# Patient Record
Sex: FEMALE | Race: WHITE | Employment: OTHER | URBAN - METROPOLITAN AREA
[De-identification: names, ages, dates, MRNs, and addresses within clinical notes are randomized per-mention and may not be internally consistent; named-entity substitution may affect disease eponyms.]

---

## 2017-01-13 ENCOUNTER — HOSPITAL ENCOUNTER (OUTPATIENT)
Dept: PHARMACY | Age: 82
Discharge: HOME | End: 2017-01-13
Payer: MEDICARE

## 2017-01-13 ENCOUNTER — OFFICE VISIT (OUTPATIENT)
Dept: SURGERY | Age: 82
End: 2017-01-13

## 2017-01-13 VITALS
BODY MASS INDEX: 38.64 KG/M2 | WEIGHT: 210 LBS | DIASTOLIC BLOOD PRESSURE: 68 MMHG | SYSTOLIC BLOOD PRESSURE: 130 MMHG | HEART RATE: 84 BPM | HEIGHT: 62 IN

## 2017-01-13 DIAGNOSIS — I48.91 ATRIAL FIBRILLATION, UNSPECIFIED TYPE (HCC): ICD-10-CM

## 2017-01-13 DIAGNOSIS — Z91.81 AT HIGH RISK FOR FALLS: ICD-10-CM
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
## 2017-01-13 DIAGNOSIS — E03.9 HYPOTHYROIDISM, UNSPECIFIED TYPE: ICD-10-CM

## 2017-01-13 LAB
GLUCOSE BLD-MCNC: 243 MG/DL
HBA1C MFR BLD: 7.4 %
INR BLD: 2.4
PROTIME: 28.6 SECONDS

## 2017-01-13 PROCEDURE — G8427 DOCREV CUR MEDS BY ELIG CLIN: HCPCS | Performed by: INTERNAL MEDICINE

## 2017-01-13 PROCEDURE — G8419 CALC BMI OUT NRM PARAM NOF/U: HCPCS | Performed by: INTERNAL MEDICINE

## 2017-01-13 PROCEDURE — 4040F PNEUMOC VAC/ADMIN/RCVD: CPT | Performed by: INTERNAL MEDICINE

## 2017-01-13 PROCEDURE — 82962 GLUCOSE BLOOD TEST: CPT | Performed by: INTERNAL MEDICINE

## 2017-01-13 PROCEDURE — 85610 PROTHROMBIN TIME: CPT

## 2017-01-13 PROCEDURE — G0463 HOSPITAL OUTPT CLINIC VISIT: HCPCS

## 2017-01-13 PROCEDURE — 99213 OFFICE O/P EST LOW 20 MIN: CPT | Performed by: INTERNAL MEDICINE

## 2017-01-13 PROCEDURE — 83036 HEMOGLOBIN GLYCOSYLATED A1C: CPT | Performed by: INTERNAL MEDICINE

## 2017-01-13 PROCEDURE — G8484 FLU IMMUNIZE NO ADMIN: HCPCS | Performed by: INTERNAL MEDICINE

## 2017-01-13 PROCEDURE — 1090F PRES/ABSN URINE INCON ASSESS: CPT | Performed by: INTERNAL MEDICINE

## 2017-01-13 PROCEDURE — 1036F TOBACCO NON-USER: CPT | Performed by: INTERNAL MEDICINE

## 2017-01-13 PROCEDURE — 1123F ACP DISCUSS/DSCN MKR DOCD: CPT | Performed by: INTERNAL MEDICINE

## 2017-01-13 PROCEDURE — G8399 PT W/DXA RESULTS DOCUMENT: HCPCS | Performed by: INTERNAL MEDICINE

## 2017-01-13 ASSESSMENT — ENCOUNTER SYMPTOMS: VISUAL CHANGE: 0

## 2017-01-17 ENCOUNTER — HOSPITAL ENCOUNTER (OUTPATIENT)
Dept: WOMENS IMAGING | Age: 82
Discharge: HOME | End: 2017-01-17
Payer: MEDICARE

## 2017-01-17 ENCOUNTER — OFFICE VISIT (OUTPATIENT)
Dept: INTERNAL MEDICINE | Age: 82
End: 2017-01-17

## 2017-01-17 VITALS
TEMPERATURE: 98.1 F | HEIGHT: 62 IN | WEIGHT: 210.8 LBS | DIASTOLIC BLOOD PRESSURE: 68 MMHG | HEART RATE: 71 BPM | SYSTOLIC BLOOD PRESSURE: 112 MMHG | OXYGEN SATURATION: 97 % | RESPIRATION RATE: 16 BRPM | BODY MASS INDEX: 38.79 KG/M2

## 2017-01-17 DIAGNOSIS — Z13.9 SCREENING: ICD-10-CM

## 2017-01-17 DIAGNOSIS — M25.511 ACUTE PAIN OF RIGHT SHOULDER: Primary | ICD-10-CM

## 2017-01-17 DIAGNOSIS — R30.0 DYSURIA: ICD-10-CM

## 2017-01-17 LAB
BILIRUBIN, POC: NORMAL
BLOOD URINE, POC: NORMAL
CLARITY, POC: CLEAR
COLOR, POC: YELLOW
GLUCOSE URINE, POC: NORMAL
KETONES, POC: NORMAL
LEUKOCYTE EST, POC: NORMAL
NITRITE, POC: NORMAL
PH, POC: 6
PROTEIN, POC: NORMAL
SPECIFIC GRAVITY, POC: 1.03
UROBILINOGEN, POC: NORMAL

## 2017-01-17 PROCEDURE — 1123F ACP DISCUSS/DSCN MKR DOCD: CPT | Performed by: FAMILY MEDICINE

## 2017-01-17 PROCEDURE — 99213 OFFICE O/P EST LOW 20 MIN: CPT | Performed by: FAMILY MEDICINE

## 2017-01-17 PROCEDURE — 81002 URINALYSIS NONAUTO W/O SCOPE: CPT | Performed by: FAMILY MEDICINE

## 2017-01-17 PROCEDURE — 1036F TOBACCO NON-USER: CPT | Performed by: FAMILY MEDICINE

## 2017-01-17 PROCEDURE — 1090F PRES/ABSN URINE INCON ASSESS: CPT | Performed by: FAMILY MEDICINE

## 2017-01-17 PROCEDURE — G8399 PT W/DXA RESULTS DOCUMENT: HCPCS | Performed by: FAMILY MEDICINE

## 2017-01-17 PROCEDURE — G8419 CALC BMI OUT NRM PARAM NOF/U: HCPCS | Performed by: FAMILY MEDICINE

## 2017-01-17 PROCEDURE — G0202 SCR MAMMO BI INCL CAD: HCPCS

## 2017-01-17 PROCEDURE — 4040F PNEUMOC VAC/ADMIN/RCVD: CPT | Performed by: FAMILY MEDICINE

## 2017-01-17 PROCEDURE — G8427 DOCREV CUR MEDS BY ELIG CLIN: HCPCS | Performed by: FAMILY MEDICINE

## 2017-01-17 PROCEDURE — G8484 FLU IMMUNIZE NO ADMIN: HCPCS | Performed by: FAMILY MEDICINE

## 2017-01-17 RX ORDER — HYDROCODONE BITARTRATE AND ACETAMINOPHEN 5; 325 MG/1; MG/1
1 TABLET ORAL EVERY 6 HOURS PRN
Qty: 30 TABLET | Refills: 0 | Status: SHIPPED | OUTPATIENT
Start: 2017-01-17 | End: 2017-04-04 | Stop reason: SDUPTHER

## 2017-01-18 ENCOUNTER — NURSE ONLY (OUTPATIENT)
Dept: INTERNAL MEDICINE | Age: 82
End: 2017-01-18

## 2017-01-18 DIAGNOSIS — R30.0 DYSURIA: Primary | ICD-10-CM

## 2017-01-18 DIAGNOSIS — Z79.899 ENCOUNTER FOR LONG-TERM CURRENT USE OF MEDICATION: Primary | ICD-10-CM

## 2017-01-18 LAB
AMPHETAMINE SCREEN, URINE: NORMAL
BARBITURATE SCREEN URINE: NORMAL
BENZODIAZEPINE SCREEN, URINE: NORMAL
BILIRUBIN, POC: NORMAL
BLOOD URINE, POC: NORMAL
CANNABINOID SCREEN URINE: NORMAL
CLARITY, POC: NORMAL
COCAINE METABOLITE SCREEN URINE: NORMAL
COLOR, POC: YELLOW
GLUCOSE URINE, POC: NORMAL
KETONES, POC: NORMAL
LEUKOCYTE EST, POC: NORMAL
Lab: NORMAL
NITRITE, POC: NORMAL
OPIATE SCREEN URINE: NORMAL
PH, POC: 5
PHENCYCLIDINE SCREEN URINE: NORMAL
PROTEIN, POC: NORMAL
SPECIFIC GRAVITY, POC: 1.02
UROBILINOGEN, POC: NORMAL

## 2017-01-18 PROCEDURE — 81002 URINALYSIS NONAUTO W/O SCOPE: CPT | Performed by: FAMILY MEDICINE

## 2017-02-17 ENCOUNTER — HOSPITAL ENCOUNTER (OUTPATIENT)
Dept: PHARMACY | Age: 82
Discharge: HOME | End: 2017-02-17
Payer: MEDICARE

## 2017-02-17 DIAGNOSIS — I48.91 ATRIAL FIBRILLATION, UNSPECIFIED TYPE (HCC): ICD-10-CM

## 2017-02-17 DIAGNOSIS — Z91.81 AT HIGH RISK FOR FALLS: ICD-10-CM
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
## 2017-02-17 LAB
INR BLD: 2.2
PROTIME: 26.3 SECONDS

## 2017-02-17 PROCEDURE — 85610 PROTHROMBIN TIME: CPT

## 2017-02-17 PROCEDURE — G0463 HOSPITAL OUTPT CLINIC VISIT: HCPCS

## 2017-03-02 RX ORDER — SOTALOL HYDROCHLORIDE 80 MG/1
80 TABLET ORAL 2 TIMES DAILY
Qty: 180 TABLET | Refills: 3 | Status: ON HOLD | OUTPATIENT
Start: 2017-03-02 | End: 2018-04-10 | Stop reason: HOSPADM

## 2017-03-07 ENCOUNTER — TELEPHONE (OUTPATIENT)
Dept: PHARMACY | Age: 82
End: 2017-03-07

## 2017-03-07 ENCOUNTER — ANTI-COAG VISIT (OUTPATIENT)
Dept: PHARMACY | Age: 82
End: 2017-03-07

## 2017-03-07 DIAGNOSIS — Z91.81 AT HIGH RISK FOR FALLS: ICD-10-CM
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
## 2017-03-07 DIAGNOSIS — I48.91 ATRIAL FIBRILLATION, UNSPECIFIED TYPE (HCC): ICD-10-CM

## 2017-03-31 ENCOUNTER — HOSPITAL ENCOUNTER (OUTPATIENT)
Dept: CARDIOLOGY | Age: 82
Discharge: HOME | End: 2017-03-31
Payer: MEDICARE

## 2017-03-31 ENCOUNTER — HOSPITAL ENCOUNTER (OUTPATIENT)
Dept: PHARMACY | Age: 82
Discharge: HOME | End: 2017-03-31
Payer: MEDICARE
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
## 2017-03-31 DIAGNOSIS — Z91.81 AT HIGH RISK FOR FALLS: ICD-10-CM

## 2017-03-31 DIAGNOSIS — I48.91 ATRIAL FIBRILLATION, UNSPECIFIED TYPE (HCC): ICD-10-CM

## 2017-03-31 LAB
INR BLD: 2.2
PROTIME: 27 SECONDS

## 2017-03-31 PROCEDURE — G0463 HOSPITAL OUTPT CLINIC VISIT: HCPCS

## 2017-03-31 PROCEDURE — 93280 PM DEVICE PROGR EVAL DUAL: CPT

## 2017-03-31 PROCEDURE — 85610 PROTHROMBIN TIME: CPT

## 2017-04-04 ENCOUNTER — OFFICE VISIT (OUTPATIENT)
Dept: INTERNAL MEDICINE | Age: 82
End: 2017-04-04

## 2017-04-04 VITALS
TEMPERATURE: 98.1 F | DIASTOLIC BLOOD PRESSURE: 60 MMHG | WEIGHT: 208.2 LBS | HEIGHT: 62 IN | RESPIRATION RATE: 16 BRPM | SYSTOLIC BLOOD PRESSURE: 114 MMHG | BODY MASS INDEX: 38.31 KG/M2 | HEART RATE: 73 BPM | OXYGEN SATURATION: 98 %

## 2017-04-04 DIAGNOSIS — I10 ESSENTIAL HYPERTENSION: Primary | ICD-10-CM

## 2017-04-04 PROCEDURE — G8427 DOCREV CUR MEDS BY ELIG CLIN: HCPCS | Performed by: FAMILY MEDICINE

## 2017-04-04 PROCEDURE — 1036F TOBACCO NON-USER: CPT | Performed by: FAMILY MEDICINE

## 2017-04-04 PROCEDURE — 1090F PRES/ABSN URINE INCON ASSESS: CPT | Performed by: FAMILY MEDICINE

## 2017-04-04 PROCEDURE — 99213 OFFICE O/P EST LOW 20 MIN: CPT | Performed by: FAMILY MEDICINE

## 2017-04-04 PROCEDURE — G8417 CALC BMI ABV UP PARAM F/U: HCPCS | Performed by: FAMILY MEDICINE

## 2017-04-04 PROCEDURE — 1123F ACP DISCUSS/DSCN MKR DOCD: CPT | Performed by: FAMILY MEDICINE

## 2017-04-04 PROCEDURE — 4040F PNEUMOC VAC/ADMIN/RCVD: CPT | Performed by: FAMILY MEDICINE

## 2017-04-04 PROCEDURE — G8399 PT W/DXA RESULTS DOCUMENT: HCPCS | Performed by: FAMILY MEDICINE

## 2017-04-04 RX ORDER — HYDROCODONE BITARTRATE AND ACETAMINOPHEN 5; 325 MG/1; MG/1
1 TABLET ORAL EVERY 6 HOURS PRN
Qty: 30 TABLET | Refills: 0 | Status: SHIPPED | OUTPATIENT
Start: 2017-04-04 | End: 2017-06-17

## 2017-04-04 ASSESSMENT — PATIENT HEALTH QUESTIONNAIRE - PHQ9
SUM OF ALL RESPONSES TO PHQ QUESTIONS 1-9: 0
2. FEELING DOWN, DEPRESSED OR HOPELESS: 0
SUM OF ALL RESPONSES TO PHQ9 QUESTIONS 1 & 2: 0
1. LITTLE INTEREST OR PLEASURE IN DOING THINGS: 0

## 2017-04-07 DIAGNOSIS — E03.9 HYPOTHYROIDISM, UNSPECIFIED TYPE: ICD-10-CM

## 2017-04-07 LAB
ANION GAP SERPL CALCULATED.3IONS-SCNC: 10 MEQ/L (ref 7–13)
BUN BLDV-MCNC: 25 MG/DL (ref 8–23)
CALCIUM SERPL-MCNC: 10 MG/DL (ref 8.6–10.2)
CHLORIDE BLD-SCNC: 101 MEQ/L (ref 98–107)
CO2: 30 MEQ/L (ref 22–29)
CREAT SERPL-MCNC: 1.1 MG/DL (ref 0.5–0.9)
GFR AFRICAN AMERICAN: 57.4
GFR NON-AFRICAN AMERICAN: 47.5
GLUCOSE BLD-MCNC: 120 MG/DL (ref 74–109)
HBA1C MFR BLD: 6.9 % (ref 4.8–5.9)
POTASSIUM SERPL-SCNC: 5 MEQ/L (ref 3.5–5.1)
SODIUM BLD-SCNC: 141 MEQ/L (ref 132–144)
T4 FREE: 1.2 NG/DL (ref 0.93–1.7)
TSH SERPL DL<=0.05 MIU/L-ACNC: 1.86 UIU/ML (ref 0.27–4.2)

## 2017-04-14 ENCOUNTER — OFFICE VISIT (OUTPATIENT)
Dept: SURGERY | Age: 82
End: 2017-04-14

## 2017-04-14 VITALS
DIASTOLIC BLOOD PRESSURE: 71 MMHG | BODY MASS INDEX: 38.28 KG/M2 | HEIGHT: 62 IN | SYSTOLIC BLOOD PRESSURE: 124 MMHG | HEART RATE: 87 BPM | WEIGHT: 208 LBS

## 2017-04-14 DIAGNOSIS — E03.9 HYPOTHYROIDISM, UNSPECIFIED TYPE: ICD-10-CM

## 2017-04-14 LAB — GLUCOSE BLD-MCNC: 234 MG/DL

## 2017-04-14 PROCEDURE — 99213 OFFICE O/P EST LOW 20 MIN: CPT | Performed by: INTERNAL MEDICINE

## 2017-04-14 PROCEDURE — 4040F PNEUMOC VAC/ADMIN/RCVD: CPT | Performed by: INTERNAL MEDICINE

## 2017-04-14 PROCEDURE — G8427 DOCREV CUR MEDS BY ELIG CLIN: HCPCS | Performed by: INTERNAL MEDICINE

## 2017-04-14 PROCEDURE — 1090F PRES/ABSN URINE INCON ASSESS: CPT | Performed by: INTERNAL MEDICINE

## 2017-04-14 PROCEDURE — 1123F ACP DISCUSS/DSCN MKR DOCD: CPT | Performed by: INTERNAL MEDICINE

## 2017-04-14 PROCEDURE — 82962 GLUCOSE BLOOD TEST: CPT | Performed by: INTERNAL MEDICINE

## 2017-04-14 PROCEDURE — 1036F TOBACCO NON-USER: CPT | Performed by: INTERNAL MEDICINE

## 2017-04-14 PROCEDURE — G8399 PT W/DXA RESULTS DOCUMENT: HCPCS | Performed by: INTERNAL MEDICINE

## 2017-04-14 PROCEDURE — G8417 CALC BMI ABV UP PARAM F/U: HCPCS | Performed by: INTERNAL MEDICINE

## 2017-05-04 ENCOUNTER — TELEPHONE (OUTPATIENT)
Dept: INTERNAL MEDICINE | Age: 82
End: 2017-05-04

## 2017-05-11 ENCOUNTER — ANTI-COAG VISIT (OUTPATIENT)
Dept: PHARMACY | Age: 82
End: 2017-05-11

## 2017-05-11 ENCOUNTER — HOSPITAL ENCOUNTER (OUTPATIENT)
Dept: CARDIAC CATH/INVASIVE PROCEDURES | Age: 82
Discharge: HOME | End: 2017-05-11
Attending: INTERNAL MEDICINE | Admitting: INTERNAL MEDICINE
Payer: MEDICARE

## 2017-05-11 VITALS
HEIGHT: 62 IN | WEIGHT: 205.03 LBS | BODY MASS INDEX: 37.73 KG/M2 | OXYGEN SATURATION: 98 % | HEART RATE: 70 BPM | RESPIRATION RATE: 16 BRPM | SYSTOLIC BLOOD PRESSURE: 102 MMHG | DIASTOLIC BLOOD PRESSURE: 78 MMHG
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
## 2017-05-11 DIAGNOSIS — I48.91 ATRIAL FIBRILLATION, UNSPECIFIED TYPE (HCC): ICD-10-CM

## 2017-05-11 DIAGNOSIS — Z91.81 AT HIGH RISK FOR FALLS: ICD-10-CM

## 2017-05-11 LAB
INR BLD: 2.1
PROTHROMBIN TIME: 22.6 SEC (ref 8.1–13.7)

## 2017-05-11 PROCEDURE — 2500000003 HC RX 250 WO HCPCS: Performed by: INTERNAL MEDICINE

## 2017-05-11 PROCEDURE — 2580000003 HC RX 258: Performed by: INTERNAL MEDICINE

## 2017-05-11 PROCEDURE — 93005 ELECTROCARDIOGRAM TRACING: CPT

## 2017-05-11 PROCEDURE — 6360000002 HC RX W HCPCS

## 2017-05-11 PROCEDURE — 85610 PROTHROMBIN TIME: CPT

## 2017-05-11 PROCEDURE — 93280 PM DEVICE PROGR EVAL DUAL: CPT

## 2017-05-11 PROCEDURE — 92960 CARDIOVERSION ELECTRIC EXT: CPT | Performed by: INTERNAL MEDICINE

## 2017-05-11 RX ORDER — LOSARTAN POTASSIUM 50 MG/1
50 TABLET ORAL DAILY
Status: DISCONTINUED | OUTPATIENT
Start: 2017-05-11 | End: 2017-05-11 | Stop reason: HOSPADM

## 2017-05-11 RX ORDER — NITROGLYCERIN 0.4 MG/1
0.4 TABLET SUBLINGUAL EVERY 5 MIN PRN
Status: DISCONTINUED | OUTPATIENT
Start: 2017-05-11 | End: 2017-05-11 | Stop reason: HOSPADM

## 2017-05-11 RX ORDER — NALOXONE HYDROCHLORIDE 0.4 MG/ML
0.4 INJECTION, SOLUTION INTRAMUSCULAR; INTRAVENOUS; SUBCUTANEOUS PRN
Status: DISCONTINUED | OUTPATIENT
Start: 2017-05-11 | End: 2017-05-11 | Stop reason: HOSPADM

## 2017-05-11 RX ORDER — SODIUM CHLORIDE 0.9 % (FLUSH) 0.9 %
10 SYRINGE (ML) INJECTION PRN
Status: DISCONTINUED | OUTPATIENT
Start: 2017-05-11 | End: 2017-05-11 | Stop reason: HOSPADM

## 2017-05-11 RX ORDER — SODIUM CHLORIDE 9 MG/ML
INJECTION, SOLUTION INTRAVENOUS CONTINUOUS
Status: DISCONTINUED | OUTPATIENT
Start: 2017-05-11 | End: 2017-05-11 | Stop reason: HOSPADM

## 2017-05-11 RX ORDER — PRAVASTATIN SODIUM 40 MG
40 TABLET ORAL DAILY
Status: DISCONTINUED | OUTPATIENT
Start: 2017-05-11 | End: 2017-05-11 | Stop reason: HOSPADM

## 2017-05-11 RX ORDER — TELMISARTAN 40 MG/1
40 TABLET ORAL DAILY
Status: DISCONTINUED | OUTPATIENT
Start: 2017-05-11 | End: 2017-05-11 | Stop reason: CLARIF

## 2017-05-11 RX ORDER — FUROSEMIDE 40 MG/1
40 TABLET ORAL DAILY
Status: DISCONTINUED | OUTPATIENT
Start: 2017-05-11 | End: 2017-05-11 | Stop reason: HOSPADM

## 2017-05-11 RX ORDER — ASPIRIN 81 MG/1
81 TABLET ORAL DAILY
Status: DISCONTINUED | OUTPATIENT
Start: 2017-05-11 | End: 2017-05-11 | Stop reason: HOSPADM

## 2017-05-11 RX ORDER — MIDAZOLAM HYDROCHLORIDE 1 MG/ML
INJECTION INTRAMUSCULAR; INTRAVENOUS
Status: COMPLETED
Start: 2017-05-11 | End: 2017-05-11

## 2017-05-11 RX ORDER — ISOSORBIDE MONONITRATE 30 MG/1
60 TABLET, EXTENDED RELEASE ORAL DAILY
Status: DISCONTINUED | OUTPATIENT
Start: 2017-05-11 | End: 2017-05-11 | Stop reason: HOSPADM

## 2017-05-11 RX ORDER — FLUMAZENIL 0.1 MG/ML
0.5 INJECTION, SOLUTION INTRAVENOUS ONCE
Status: COMPLETED | OUTPATIENT
Start: 2017-05-11 | End: 2017-05-11

## 2017-05-11 RX ORDER — MEPERIDINE HYDROCHLORIDE 50 MG/ML
INJECTION INTRAMUSCULAR; INTRAVENOUS; SUBCUTANEOUS
Status: COMPLETED
Start: 2017-05-11 | End: 2017-05-11

## 2017-05-11 RX ORDER — SODIUM CHLORIDE 0.9 % (FLUSH) 0.9 %
10 SYRINGE (ML) INJECTION EVERY 12 HOURS SCHEDULED
Status: DISCONTINUED | OUTPATIENT
Start: 2017-05-11 | End: 2017-05-11 | Stop reason: HOSPADM

## 2017-05-11 RX ORDER — SOTALOL HYDROCHLORIDE 80 MG/1
80 TABLET ORAL 2 TIMES DAILY
Status: DISCONTINUED | OUTPATIENT
Start: 2017-05-11 | End: 2017-05-11 | Stop reason: HOSPADM

## 2017-05-11 RX ORDER — WARFARIN SODIUM 5 MG/1
5 TABLET ORAL DAILY
Status: DISCONTINUED | OUTPATIENT
Start: 2017-05-11 | End: 2017-05-11 | Stop reason: HOSPADM

## 2017-05-11 RX ORDER — SODIUM CHLORIDE 0.9 % (FLUSH) 0.9 %
10 SYRINGE (ML) INJECTION EVERY 12 HOURS SCHEDULED
Status: DISCONTINUED | OUTPATIENT
Start: 2017-05-11 | End: 2017-05-11 | Stop reason: SDUPTHER

## 2017-05-11 RX ADMIN — MIDAZOLAM HYDROCHLORIDE: 1 INJECTION, SOLUTION INTRAMUSCULAR; INTRAVENOUS at 10:41

## 2017-05-11 RX ADMIN — FLUMAZENIL 0.5 MG: 0.1 INJECTION, SOLUTION INTRAVENOUS at 10:47

## 2017-05-11 RX ADMIN — SODIUM CHLORIDE: 900 INJECTION, SOLUTION INTRAVENOUS at 10:41

## 2017-05-11 RX ADMIN — Medication 10 ML: at 10:42

## 2017-05-11 RX ADMIN — MEPERIDINE HYDROCHLORIDE: 50 INJECTION, SOLUTION INTRAMUSCULAR; INTRAVENOUS; SUBCUTANEOUS at 10:40

## 2017-05-11 ASSESSMENT — PAIN SCALES - GENERAL: PAINLEVEL_OUTOF10: 4

## 2017-05-12 ENCOUNTER — HOSPITAL ENCOUNTER (OUTPATIENT)
Dept: PHARMACY | Age: 82
Discharge: HOME | End: 2017-05-12
Payer: MEDICARE
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
## 2017-05-12 DIAGNOSIS — I48.91 ATRIAL FIBRILLATION, UNSPECIFIED TYPE (HCC): ICD-10-CM

## 2017-05-12 DIAGNOSIS — Z91.81 AT HIGH RISK FOR FALLS: ICD-10-CM

## 2017-05-12 LAB
INR BLD: 2.3
PROTIME: 28 SECONDS

## 2017-05-12 PROCEDURE — 85610 PROTHROMBIN TIME: CPT

## 2017-05-12 PROCEDURE — 99211 OFF/OP EST MAY X REQ PHY/QHP: CPT

## 2017-05-15 LAB
EKG ATRIAL RATE: 47 BPM
EKG ATRIAL RATE: 78 BPM
EKG P-R INTERVAL: 288 MS
EKG Q-T INTERVAL: 482 MS
EKG Q-T INTERVAL: 514 MS
EKG QRS DURATION: 134 MS
EKG QRS DURATION: 170 MS
EKG QTC CALCULATION (BAZETT): 520 MS
EKG QTC CALCULATION (BAZETT): 555 MS
EKG R AXIS: 55 DEGREES
EKG R AXIS: 59 DEGREES
EKG T AXIS: 109 DEGREES
EKG T AXIS: 115 DEGREES
EKG VENTRICULAR RATE: 70 BPM
EKG VENTRICULAR RATE: 70 BPM

## 2017-05-15 RX ORDER — LEVOTHYROXINE SODIUM 0.07 MG/1
TABLET ORAL
Qty: 30 TABLET | Refills: 5 | Status: SHIPPED | OUTPATIENT
Start: 2017-05-15 | End: 2017-10-18 | Stop reason: SDUPTHER

## 2017-05-18 DIAGNOSIS — E11.8 UNCONTROLLED TYPE 2 DIABETES MELLITUS WITH COMPLICATION, UNSPECIFIED LONG TERM INSULIN USE STATUS: ICD-10-CM

## 2017-05-18 DIAGNOSIS — E11.65 UNCONTROLLED TYPE 2 DIABETES MELLITUS WITH COMPLICATION, UNSPECIFIED LONG TERM INSULIN USE STATUS: ICD-10-CM

## 2017-05-18 RX ORDER — PRAVASTATIN SODIUM 40 MG
40 TABLET ORAL DAILY
Qty: 90 TABLET | Refills: 3 | Status: SHIPPED | OUTPATIENT
Start: 2017-05-18

## 2017-05-23 RX ORDER — PANTOPRAZOLE SODIUM 40 MG/1
TABLET, DELAYED RELEASE ORAL
Qty: 90 TABLET | Refills: 2 | Status: SHIPPED | OUTPATIENT
Start: 2017-05-23 | End: 2018-03-01 | Stop reason: SDUPTHER

## 2017-06-05 ENCOUNTER — TELEPHONE (OUTPATIENT)
Dept: PHARMACY | Age: 82
End: 2017-06-05

## 2017-06-17 ENCOUNTER — HOSPITAL ENCOUNTER (EMERGENCY)
Age: 82
Discharge: HOME | End: 2017-06-17
Payer: MEDICARE

## 2017-06-17 VITALS
WEIGHT: 199 LBS | DIASTOLIC BLOOD PRESSURE: 67 MMHG | HEART RATE: 78 BPM | TEMPERATURE: 97.4 F | BODY MASS INDEX: 36.4 KG/M2 | OXYGEN SATURATION: 95 % | SYSTOLIC BLOOD PRESSURE: 129 MMHG | RESPIRATION RATE: 16 BRPM

## 2017-06-17 DIAGNOSIS — B02.9 HERPES ZOSTER WITHOUT COMPLICATION: Primary | ICD-10-CM

## 2017-06-17 PROCEDURE — 6370000000 HC RX 637 (ALT 250 FOR IP): Performed by: PHYSICIAN ASSISTANT

## 2017-06-17 PROCEDURE — 99282 EMERGENCY DEPT VISIT SF MDM: CPT

## 2017-06-17 RX ORDER — ACYCLOVIR 200 MG/1
800 CAPSULE ORAL
Qty: 140 CAPSULE | Refills: 0 | Status: SHIPPED | OUTPATIENT
Start: 2017-06-17 | End: 2017-06-24

## 2017-06-17 RX ORDER — ACYCLOVIR 200 MG/1
800 CAPSULE ORAL
Status: DISCONTINUED | OUTPATIENT
Start: 2017-06-17 | End: 2017-06-17 | Stop reason: HOSPADM

## 2017-06-17 RX ORDER — HYDROCODONE BITARTRATE AND ACETAMINOPHEN 5; 325 MG/1; MG/1
1 TABLET ORAL EVERY 6 HOURS PRN
Qty: 12 TABLET | Refills: 0 | Status: SHIPPED | OUTPATIENT
Start: 2017-06-17 | End: 2017-08-07

## 2017-06-17 RX ADMIN — ACYCLOVIR 800 MG: 200 CAPSULE ORAL at 09:55

## 2017-06-17 ASSESSMENT — ENCOUNTER SYMPTOMS
EYES NEGATIVE: 1
RESPIRATORY NEGATIVE: 1
GASTROINTESTINAL NEGATIVE: 1

## 2017-06-17 ASSESSMENT — PAIN DESCRIPTION - LOCATION: LOCATION: ARM

## 2017-06-17 ASSESSMENT — PAIN DESCRIPTION - DESCRIPTORS: DESCRIPTORS: DULL;BURNING

## 2017-06-17 ASSESSMENT — PAIN SCALES - GENERAL: PAINLEVEL_OUTOF10: 7

## 2017-06-17 ASSESSMENT — PAIN DESCRIPTION - ORIENTATION: ORIENTATION: LEFT

## 2017-06-17 ASSESSMENT — PAIN DESCRIPTION - FREQUENCY: FREQUENCY: CONTINUOUS

## 2017-06-19 ENCOUNTER — OFFICE VISIT (OUTPATIENT)
Dept: INTERNAL MEDICINE | Age: 82
End: 2017-06-19

## 2017-06-19 VITALS
OXYGEN SATURATION: 97 % | BODY MASS INDEX: 37.36 KG/M2 | DIASTOLIC BLOOD PRESSURE: 76 MMHG | HEIGHT: 62 IN | SYSTOLIC BLOOD PRESSURE: 128 MMHG | HEART RATE: 73 BPM | WEIGHT: 203 LBS | TEMPERATURE: 98.6 F | RESPIRATION RATE: 16 BRPM

## 2017-06-19 DIAGNOSIS — B02.9 HERPES ZOSTER WITHOUT COMPLICATION: Primary | ICD-10-CM

## 2017-06-19 PROCEDURE — 1036F TOBACCO NON-USER: CPT | Performed by: PHYSICIAN ASSISTANT

## 2017-06-19 PROCEDURE — 1123F ACP DISCUSS/DSCN MKR DOCD: CPT | Performed by: PHYSICIAN ASSISTANT

## 2017-06-19 PROCEDURE — G8399 PT W/DXA RESULTS DOCUMENT: HCPCS | Performed by: PHYSICIAN ASSISTANT

## 2017-06-19 PROCEDURE — 99213 OFFICE O/P EST LOW 20 MIN: CPT | Performed by: PHYSICIAN ASSISTANT

## 2017-06-19 PROCEDURE — 1090F PRES/ABSN URINE INCON ASSESS: CPT | Performed by: PHYSICIAN ASSISTANT

## 2017-06-19 PROCEDURE — 4040F PNEUMOC VAC/ADMIN/RCVD: CPT | Performed by: PHYSICIAN ASSISTANT

## 2017-06-19 PROCEDURE — G8417 CALC BMI ABV UP PARAM F/U: HCPCS | Performed by: PHYSICIAN ASSISTANT

## 2017-06-19 PROCEDURE — G8427 DOCREV CUR MEDS BY ELIG CLIN: HCPCS | Performed by: PHYSICIAN ASSISTANT

## 2017-06-19 RX ORDER — NYSTATIN 100000 [USP'U]/G
POWDER TOPICAL
Refills: 0 | COMMUNITY
Start: 2017-06-07 | End: 2019-03-12 | Stop reason: ALTCHOICE

## 2017-06-19 ASSESSMENT — ENCOUNTER SYMPTOMS
WHEEZING: 0
BLURRED VISION: 0
EYE PAIN: 0
ABDOMINAL PAIN: 0
EYE DISCHARGE: 0
SHORTNESS OF BREATH: 0
SORE THROAT: 0
COUGH: 0
VOMITING: 0
DIARRHEA: 0
EYE REDNESS: 0
CONSTIPATION: 0
HEARTBURN: 0
NAUSEA: 0
BACK PAIN: 0

## 2017-06-23 ENCOUNTER — HOSPITAL ENCOUNTER (OUTPATIENT)
Dept: PHARMACY | Age: 82
Setting detail: THERAPIES SERIES
Discharge: HOME | End: 2017-06-23
Payer: MEDICARE

## 2017-06-23 DIAGNOSIS — I48.91 ATRIAL FIBRILLATION, UNSPECIFIED TYPE (HCC): ICD-10-CM

## 2017-06-23 LAB
INR BLD: 2.2
PROTIME: 26.7 SECONDS

## 2017-06-23 PROCEDURE — 99211 OFF/OP EST MAY X REQ PHY/QHP: CPT

## 2017-06-23 PROCEDURE — 85610 PROTHROMBIN TIME: CPT

## 2017-06-30 ENCOUNTER — HOSPITAL ENCOUNTER (OUTPATIENT)
Dept: CARDIOLOGY | Age: 82
Discharge: HOME | End: 2017-06-30
Payer: MEDICARE

## 2017-06-30 PROCEDURE — 93296 REM INTERROG EVL PM/IDS: CPT

## 2017-07-10 ENCOUNTER — TELEPHONE (OUTPATIENT)
Dept: INTERNAL MEDICINE | Age: 82
End: 2017-07-10

## 2017-07-10 RX ORDER — TRIAMCINOLONE ACETONIDE 0.25 MG/G
CREAM TOPICAL
Qty: 15 G | Refills: 0 | Status: ON HOLD | OUTPATIENT
Start: 2017-07-10 | End: 2017-08-01

## 2017-07-13 DIAGNOSIS — E03.9 HYPOTHYROIDISM, UNSPECIFIED TYPE: ICD-10-CM

## 2017-07-13 LAB
ANION GAP SERPL CALCULATED.3IONS-SCNC: 13 MEQ/L (ref 7–13)
BUN BLDV-MCNC: 32 MG/DL (ref 8–23)
CALCIUM SERPL-MCNC: 8.9 MG/DL (ref 8.6–10.2)
CHLORIDE BLD-SCNC: 96 MEQ/L (ref 98–107)
CO2: 29 MEQ/L (ref 22–29)
CREAT SERPL-MCNC: 1.07 MG/DL (ref 0.5–0.9)
GFR AFRICAN AMERICAN: 59.2
GFR NON-AFRICAN AMERICAN: 49
GLUCOSE BLD-MCNC: 161 MG/DL (ref 74–109)
HBA1C MFR BLD: 7.6 % (ref 4.8–5.9)
POTASSIUM SERPL-SCNC: 4.5 MEQ/L (ref 3.5–5.1)
SODIUM BLD-SCNC: 138 MEQ/L (ref 132–144)
T4 FREE: 1.41 NG/DL (ref 0.93–1.7)
TSH SERPL DL<=0.05 MIU/L-ACNC: 4.02 UIU/ML (ref 0.27–4.2)

## 2017-07-18 ENCOUNTER — OFFICE VISIT (OUTPATIENT)
Dept: SURGERY | Age: 82
End: 2017-07-18

## 2017-07-18 VITALS
DIASTOLIC BLOOD PRESSURE: 79 MMHG | WEIGHT: 203 LBS | BODY MASS INDEX: 37.36 KG/M2 | HEART RATE: 72 BPM | SYSTOLIC BLOOD PRESSURE: 127 MMHG | HEIGHT: 62 IN

## 2017-07-18 DIAGNOSIS — E03.9 HYPOTHYROIDISM, UNSPECIFIED TYPE: ICD-10-CM

## 2017-07-18 LAB — GLUCOSE BLD-MCNC: 159 MG/DL

## 2017-07-18 PROCEDURE — 1036F TOBACCO NON-USER: CPT | Performed by: INTERNAL MEDICINE

## 2017-07-18 PROCEDURE — 99213 OFFICE O/P EST LOW 20 MIN: CPT | Performed by: INTERNAL MEDICINE

## 2017-07-18 PROCEDURE — 1090F PRES/ABSN URINE INCON ASSESS: CPT | Performed by: INTERNAL MEDICINE

## 2017-07-18 PROCEDURE — 4040F PNEUMOC VAC/ADMIN/RCVD: CPT | Performed by: INTERNAL MEDICINE

## 2017-07-18 PROCEDURE — G8417 CALC BMI ABV UP PARAM F/U: HCPCS | Performed by: INTERNAL MEDICINE

## 2017-07-18 PROCEDURE — 82962 GLUCOSE BLOOD TEST: CPT | Performed by: INTERNAL MEDICINE

## 2017-07-18 PROCEDURE — G8399 PT W/DXA RESULTS DOCUMENT: HCPCS | Performed by: INTERNAL MEDICINE

## 2017-07-18 PROCEDURE — G8427 DOCREV CUR MEDS BY ELIG CLIN: HCPCS | Performed by: INTERNAL MEDICINE

## 2017-07-18 PROCEDURE — 1123F ACP DISCUSS/DSCN MKR DOCD: CPT | Performed by: INTERNAL MEDICINE

## 2017-08-01 ENCOUNTER — HOSPITAL ENCOUNTER (OUTPATIENT)
Dept: CARDIAC CATH/INVASIVE PROCEDURES | Age: 82
Discharge: HOME | End: 2017-08-01
Attending: INTERNAL MEDICINE | Admitting: INTERNAL MEDICINE
Payer: MEDICARE

## 2017-08-01 VITALS
HEIGHT: 62 IN | HEART RATE: 71 BPM | OXYGEN SATURATION: 100 % | RESPIRATION RATE: 13 BRPM | SYSTOLIC BLOOD PRESSURE: 115 MMHG | TEMPERATURE: 96.8 F | DIASTOLIC BLOOD PRESSURE: 54 MMHG | WEIGHT: 205 LBS | BODY MASS INDEX: 37.73 KG/M2

## 2017-08-01 LAB
INR BLD: 2.1
PROTHROMBIN TIME: 21.9 SEC (ref 8.1–13.7)

## 2017-08-01 PROCEDURE — 92960 CARDIOVERSION ELECTRIC EXT: CPT | Performed by: INTERNAL MEDICINE

## 2017-08-01 PROCEDURE — 93005 ELECTROCARDIOGRAM TRACING: CPT

## 2017-08-01 PROCEDURE — 85610 PROTHROMBIN TIME: CPT

## 2017-08-01 RX ORDER — SODIUM CHLORIDE 0.9 % (FLUSH) 0.9 %
10 SYRINGE (ML) INJECTION PRN
Status: DISCONTINUED | OUTPATIENT
Start: 2017-08-01 | End: 2017-08-01 | Stop reason: HOSPADM

## 2017-08-01 RX ORDER — SODIUM CHLORIDE 0.9 % (FLUSH) 0.9 %
10 SYRINGE (ML) INJECTION EVERY 12 HOURS SCHEDULED
Status: DISCONTINUED | OUTPATIENT
Start: 2017-08-01 | End: 2017-08-01 | Stop reason: HOSPADM

## 2017-08-01 RX ORDER — SODIUM CHLORIDE 9 MG/ML
INJECTION, SOLUTION INTRAVENOUS CONTINUOUS
Status: DISCONTINUED | OUTPATIENT
Start: 2017-08-01 | End: 2017-08-01 | Stop reason: HOSPADM

## 2017-08-01 RX ORDER — MEPERIDINE HYDROCHLORIDE 50 MG/ML
INJECTION INTRAMUSCULAR; INTRAVENOUS; SUBCUTANEOUS
Status: DISCONTINUED
Start: 2017-08-01 | End: 2017-08-01 | Stop reason: HOSPADM

## 2017-08-01 RX ORDER — MIDAZOLAM HYDROCHLORIDE 1 MG/ML
INJECTION INTRAMUSCULAR; INTRAVENOUS
Status: DISCONTINUED
Start: 2017-08-01 | End: 2017-08-01 | Stop reason: HOSPADM

## 2017-08-01 ASSESSMENT — PAIN DESCRIPTION - PAIN TYPE: TYPE: CHRONIC PAIN

## 2017-08-01 ASSESSMENT — PAIN SCALES - GENERAL: PAINLEVEL_OUTOF10: 2

## 2017-08-07 ENCOUNTER — ANTI-COAG VISIT (OUTPATIENT)
Dept: PHARMACY | Age: 82
End: 2017-08-07

## 2017-08-07 ENCOUNTER — OFFICE VISIT (OUTPATIENT)
Dept: INTERNAL MEDICINE | Age: 82
End: 2017-08-07

## 2017-08-07 VITALS
HEIGHT: 62 IN | BODY MASS INDEX: 37.84 KG/M2 | WEIGHT: 205.6 LBS | OXYGEN SATURATION: 97 % | TEMPERATURE: 97.9 F | SYSTOLIC BLOOD PRESSURE: 122 MMHG | HEART RATE: 70 BPM | RESPIRATION RATE: 18 BRPM | DIASTOLIC BLOOD PRESSURE: 60 MMHG

## 2017-08-07 DIAGNOSIS — I10 ESSENTIAL HYPERTENSION: ICD-10-CM

## 2017-08-07 DIAGNOSIS — M25.511 CHRONIC RIGHT SHOULDER PAIN: ICD-10-CM

## 2017-08-07 DIAGNOSIS — G89.29 CHRONIC RIGHT SHOULDER PAIN: ICD-10-CM

## 2017-08-07 DIAGNOSIS — I48.91 ATRIAL FIBRILLATION, UNSPECIFIED TYPE (HCC): ICD-10-CM

## 2017-08-07 LAB — HBA1C MFR BLD: 7.4 % (ref 4.8–5.9)

## 2017-08-07 PROCEDURE — 99213 OFFICE O/P EST LOW 20 MIN: CPT | Performed by: FAMILY MEDICINE

## 2017-08-07 PROCEDURE — G8427 DOCREV CUR MEDS BY ELIG CLIN: HCPCS | Performed by: FAMILY MEDICINE

## 2017-08-07 PROCEDURE — 1123F ACP DISCUSS/DSCN MKR DOCD: CPT | Performed by: FAMILY MEDICINE

## 2017-08-07 PROCEDURE — G8399 PT W/DXA RESULTS DOCUMENT: HCPCS | Performed by: FAMILY MEDICINE

## 2017-08-07 PROCEDURE — 4040F PNEUMOC VAC/ADMIN/RCVD: CPT | Performed by: FAMILY MEDICINE

## 2017-08-07 PROCEDURE — 1090F PRES/ABSN URINE INCON ASSESS: CPT | Performed by: FAMILY MEDICINE

## 2017-08-07 PROCEDURE — G8417 CALC BMI ABV UP PARAM F/U: HCPCS | Performed by: FAMILY MEDICINE

## 2017-08-07 PROCEDURE — 1036F TOBACCO NON-USER: CPT | Performed by: FAMILY MEDICINE

## 2017-08-07 RX ORDER — LIDOCAINE 50 MG/G
1 PATCH TOPICAL DAILY
Qty: 30 PATCH | Refills: 6 | Status: SHIPPED | OUTPATIENT
Start: 2017-08-07 | End: 2018-03-01

## 2017-08-08 ENCOUNTER — ANTI-COAG VISIT (OUTPATIENT)
Dept: PHARMACY | Age: 82
End: 2017-08-08

## 2017-08-08 ENCOUNTER — APPOINTMENT (OUTPATIENT)
Dept: PHARMACY | Age: 82
End: 2017-08-08
Payer: MEDICARE

## 2017-08-08 DIAGNOSIS — I48.91 ATRIAL FIBRILLATION, UNSPECIFIED TYPE (HCC): ICD-10-CM

## 2017-08-08 LAB
EKG ATRIAL RATE: 71 BPM
EKG ATRIAL RATE: 77 BPM
EKG P-R INTERVAL: 256 MS
EKG Q-T INTERVAL: 482 MS
EKG Q-T INTERVAL: 516 MS
EKG QRS DURATION: 160 MS
EKG QRS DURATION: 168 MS
EKG QTC CALCULATION (BAZETT): 535 MS
EKG QTC CALCULATION (BAZETT): 557 MS
EKG R AXIS: 56 DEGREES
EKG R AXIS: 62 DEGREES
EKG T AXIS: 106 DEGREES
EKG T AXIS: 98 DEGREES
EKG VENTRICULAR RATE: 70 BPM
EKG VENTRICULAR RATE: 74 BPM

## 2017-08-09 ENCOUNTER — HOSPITAL ENCOUNTER (OUTPATIENT)
Dept: PHARMACY | Age: 82
Setting detail: THERAPIES SERIES
Discharge: HOME | End: 2017-08-09
Payer: MEDICARE

## 2017-08-09 DIAGNOSIS — I48.91 ATRIAL FIBRILLATION, UNSPECIFIED TYPE (HCC): ICD-10-CM

## 2017-08-09 LAB
INR BLD: 2.1
PROTIME: 25 SECONDS

## 2017-08-09 PROCEDURE — 85610 PROTHROMBIN TIME: CPT

## 2017-08-09 PROCEDURE — 99211 OFF/OP EST MAY X REQ PHY/QHP: CPT

## 2017-08-23 ENCOUNTER — HOSPITAL ENCOUNTER (EMERGENCY)
Age: 82
Discharge: HOME | End: 2017-08-23
Payer: MEDICARE

## 2017-08-23 VITALS
DIASTOLIC BLOOD PRESSURE: 95 MMHG | OXYGEN SATURATION: 95 % | WEIGHT: 200 LBS | SYSTOLIC BLOOD PRESSURE: 162 MMHG | RESPIRATION RATE: 19 BRPM | HEART RATE: 70 BPM | HEIGHT: 62 IN | BODY MASS INDEX: 36.8 KG/M2 | TEMPERATURE: 98 F

## 2017-08-23 DIAGNOSIS — I10 ESSENTIAL HYPERTENSION: Primary | ICD-10-CM

## 2017-08-23 DIAGNOSIS — L30.4 INTERTRIGO: ICD-10-CM

## 2017-08-23 LAB
ANION GAP SERPL CALCULATED.3IONS-SCNC: 13 MEQ/L (ref 7–13)
BASOPHILS ABSOLUTE: 0.1 K/UL (ref 0–0.2)
BASOPHILS RELATIVE PERCENT: 0.7 %
BUN BLDV-MCNC: 25 MG/DL (ref 8–23)
CALCIUM SERPL-MCNC: 8.8 MG/DL (ref 8.6–10.2)
CHLORIDE BLD-SCNC: 103 MEQ/L (ref 98–107)
CHP ED QC CHECK: YES
CO2: 25 MEQ/L (ref 22–29)
CREAT SERPL-MCNC: 0.91 MG/DL (ref 0.5–0.9)
EKG ATRIAL RATE: 72 BPM
EKG Q-T INTERVAL: 490 MS
EKG QRS DURATION: 168 MS
EKG QTC CALCULATION (BAZETT): 536 MS
EKG R AXIS: 66 DEGREES
EKG T AXIS: 81 DEGREES
EKG VENTRICULAR RATE: 72 BPM
EOSINOPHILS ABSOLUTE: 0.2 K/UL (ref 0–0.7)
EOSINOPHILS RELATIVE PERCENT: 1.2 %
GFR AFRICAN AMERICAN: >60
GFR NON-AFRICAN AMERICAN: 59
GLUCOSE BLD-MCNC: 88 MG/DL (ref 74–109)
GLUCOSE BLD-MCNC: 93 MG/DL
GLUCOSE BLD-MCNC: 93 MG/DL (ref 60–115)
HCT VFR BLD CALC: 45 % (ref 37–47)
HEMOGLOBIN: 14.6 G/DL (ref 12–16)
INR BLD: 2.3
LYMPHOCYTES ABSOLUTE: 2.3 K/UL (ref 1–4.8)
LYMPHOCYTES RELATIVE PERCENT: 17.3 %
MCH RBC QN AUTO: 27.5 PG (ref 27–31.3)
MCHC RBC AUTO-ENTMCNC: 32.5 % (ref 33–37)
MCV RBC AUTO: 84.5 FL (ref 82–100)
MONOCYTES ABSOLUTE: 1 K/UL (ref 0.2–0.8)
MONOCYTES RELATIVE PERCENT: 7.7 %
NEUTROPHILS ABSOLUTE: 9.6 K/UL (ref 1.4–6.5)
NEUTROPHILS RELATIVE PERCENT: 73.1 %
PDW BLD-RTO: 15 % (ref 11.5–14.5)
PERFORMED ON: NORMAL
PLATELET # BLD: 193 K/UL (ref 130–400)
POTASSIUM SERPL-SCNC: 4.8 MEQ/L (ref 3.5–5.1)
PROTHROMBIN TIME: 24.4 SEC (ref 8.1–13.7)
RBC # BLD: 5.32 M/UL (ref 4.2–5.4)
SODIUM BLD-SCNC: 141 MEQ/L (ref 132–144)
WBC # BLD: 13.1 K/UL (ref 4.8–10.8)

## 2017-08-23 PROCEDURE — 85610 PROTHROMBIN TIME: CPT

## 2017-08-23 PROCEDURE — 99283 EMERGENCY DEPT VISIT LOW MDM: CPT

## 2017-08-23 PROCEDURE — 93005 ELECTROCARDIOGRAM TRACING: CPT

## 2017-08-23 PROCEDURE — 80048 BASIC METABOLIC PNL TOTAL CA: CPT

## 2017-08-23 PROCEDURE — 36415 COLL VENOUS BLD VENIPUNCTURE: CPT

## 2017-08-23 PROCEDURE — 85025 COMPLETE CBC W/AUTO DIFF WBC: CPT

## 2017-08-23 RX ORDER — DIAPER,BRIEF,INFANT-TODD,DISP
EACH MISCELLANEOUS
Qty: 1 TUBE | Refills: 0 | Status: SHIPPED | OUTPATIENT
Start: 2017-08-23 | End: 2017-08-30

## 2017-08-23 ASSESSMENT — ENCOUNTER SYMPTOMS
VOMITING: 0
ABDOMINAL DISTENTION: 0
APNEA: 0
VOICE CHANGE: 0
EYE DISCHARGE: 0
SHORTNESS OF BREATH: 0
NAUSEA: 0
ANAL BLEEDING: 0
COUGH: 0
PHOTOPHOBIA: 0

## 2017-09-05 RX ORDER — INSULIN LISPRO 100 [IU]/ML
INJECTION, SOLUTION INTRAVENOUS; SUBCUTANEOUS
Qty: 30 PEN | Refills: 3 | Status: SHIPPED | OUTPATIENT
Start: 2017-09-05 | End: 2019-03-03 | Stop reason: SDUPTHER

## 2017-09-08 LAB
ANION GAP SERPL CALCULATED.3IONS-SCNC: 16 MEQ/L (ref 7–13)
BUN BLDV-MCNC: 29 MG/DL (ref 8–23)
CALCIUM SERPL-MCNC: 9 MG/DL (ref 8.6–10.2)
CHLORIDE BLD-SCNC: 99 MEQ/L (ref 98–107)
CO2: 27 MEQ/L (ref 22–29)
CREAT SERPL-MCNC: 1.1 MG/DL (ref 0.5–0.9)
GFR AFRICAN AMERICAN: 57.4
GFR NON-AFRICAN AMERICAN: 47.4
GLUCOSE BLD-MCNC: 101 MG/DL (ref 74–109)
HCT VFR BLD CALC: 44.8 % (ref 37–47)
HEMOGLOBIN: 14.5 G/DL (ref 12–16)
MCH RBC QN AUTO: 27.9 PG (ref 27–31.3)
MCHC RBC AUTO-ENTMCNC: 32.5 % (ref 33–37)
MCV RBC AUTO: 86 FL (ref 82–100)
PDW BLD-RTO: 15.1 % (ref 11.5–14.5)
PLATELET # BLD: 197 K/UL (ref 130–400)
POTASSIUM SERPL-SCNC: 4.6 MEQ/L (ref 3.5–5.1)
RBC # BLD: 5.21 M/UL (ref 4.2–5.4)
SODIUM BLD-SCNC: 142 MEQ/L (ref 132–144)
WBC # BLD: 8.7 K/UL (ref 4.8–10.8)

## 2017-09-20 ENCOUNTER — HOSPITAL ENCOUNTER (OUTPATIENT)
Dept: PHARMACY | Age: 82
Setting detail: THERAPIES SERIES
Discharge: HOME | End: 2017-09-20
Payer: MEDICARE

## 2017-09-20 DIAGNOSIS — I48.91 ATRIAL FIBRILLATION, UNSPECIFIED TYPE (HCC): ICD-10-CM

## 2017-09-20 LAB
INR BLD: 3.2
PROTIME: 38.4 SECONDS

## 2017-09-20 PROCEDURE — 99211 OFF/OP EST MAY X REQ PHY/QHP: CPT

## 2017-09-20 PROCEDURE — 85610 PROTHROMBIN TIME: CPT

## 2017-09-29 ENCOUNTER — HOSPITAL ENCOUNTER (OUTPATIENT)
Dept: CARDIOLOGY | Age: 82
Discharge: HOME | End: 2017-09-29
Payer: MEDICARE

## 2017-09-29 PROCEDURE — 93280 PM DEVICE PROGR EVAL DUAL: CPT

## 2017-10-11 ENCOUNTER — HOSPITAL ENCOUNTER (OUTPATIENT)
Dept: PHARMACY | Age: 82
Setting detail: THERAPIES SERIES
Discharge: HOME | End: 2017-10-11
Payer: MEDICARE

## 2017-10-11 DIAGNOSIS — I48.91 ATRIAL FIBRILLATION, UNSPECIFIED TYPE (HCC): ICD-10-CM

## 2017-10-11 LAB
INR BLD: 2.2
PROTIME: 26.4 SECONDS

## 2017-10-11 PROCEDURE — 85610 PROTHROMBIN TIME: CPT

## 2017-10-11 PROCEDURE — 99211 OFF/OP EST MAY X REQ PHY/QHP: CPT

## 2017-10-18 ENCOUNTER — OFFICE VISIT (OUTPATIENT)
Dept: SURGERY | Age: 82
End: 2017-10-18

## 2017-10-18 VITALS
DIASTOLIC BLOOD PRESSURE: 74 MMHG | HEART RATE: 72 BPM | BODY MASS INDEX: 37.17 KG/M2 | SYSTOLIC BLOOD PRESSURE: 134 MMHG | WEIGHT: 202 LBS | HEIGHT: 62 IN

## 2017-10-18 DIAGNOSIS — E03.9 HYPOTHYROIDISM, UNSPECIFIED TYPE: ICD-10-CM

## 2017-10-18 LAB — GLUCOSE BLD-MCNC: 128 MG/DL

## 2017-10-18 PROCEDURE — G8427 DOCREV CUR MEDS BY ELIG CLIN: HCPCS | Performed by: INTERNAL MEDICINE

## 2017-10-18 PROCEDURE — G8417 CALC BMI ABV UP PARAM F/U: HCPCS | Performed by: INTERNAL MEDICINE

## 2017-10-18 PROCEDURE — G8399 PT W/DXA RESULTS DOCUMENT: HCPCS | Performed by: INTERNAL MEDICINE

## 2017-10-18 PROCEDURE — 1123F ACP DISCUSS/DSCN MKR DOCD: CPT | Performed by: INTERNAL MEDICINE

## 2017-10-18 PROCEDURE — 82962 GLUCOSE BLOOD TEST: CPT | Performed by: INTERNAL MEDICINE

## 2017-10-18 PROCEDURE — 1036F TOBACCO NON-USER: CPT | Performed by: INTERNAL MEDICINE

## 2017-10-18 PROCEDURE — 1090F PRES/ABSN URINE INCON ASSESS: CPT | Performed by: INTERNAL MEDICINE

## 2017-10-18 PROCEDURE — 99213 OFFICE O/P EST LOW 20 MIN: CPT | Performed by: INTERNAL MEDICINE

## 2017-10-18 PROCEDURE — 4040F PNEUMOC VAC/ADMIN/RCVD: CPT | Performed by: INTERNAL MEDICINE

## 2017-10-18 PROCEDURE — G8484 FLU IMMUNIZE NO ADMIN: HCPCS | Performed by: INTERNAL MEDICINE

## 2017-10-18 RX ORDER — LEVOTHYROXINE SODIUM 0.07 MG/1
TABLET ORAL
Qty: 30 TABLET | Refills: 5 | Status: SHIPPED | OUTPATIENT
Start: 2017-10-18 | End: 2018-04-14 | Stop reason: SDUPTHER

## 2017-10-18 NOTE — PROGRESS NOTES
Subjective:      Patient ID: Norma Dela Cruz is a 80 y.o. female. Diabetes   She presents for her follow-up diabetic visit. She has type 2 diabetes mellitus. Her disease course has been worsening. Hypoglycemia symptoms include confusion and nervousness/anxiousness. Associated symptoms include fatigue and foot paresthesias. Symptoms are worsening. Diabetic complications include nephropathy and peripheral neuropathy. Risk factors for coronary artery disease include diabetes mellitus, dyslipidemia, post-menopausal and sedentary lifestyle. Current diabetic treatment includes insulin injections (lantus plus humalog ). She is compliant with treatment all of the time. She is currently taking insulin pre-breakfast, pre-lunch, pre-dinner and at bedtime. Insulin injections are given by patient. She is following a generally healthy diet. She participates in exercise intermittently. Her highest blood glucose is >200 mg/dl. Her overall blood glucose range is 130-140 mg/dl. (Lab Results       Component                Value               Date                       LABA1C                   7.4 (H)             08/07/2017                ) She sees a podiatrist (dr banerjee ).         Hypothyroidism on replacement  With synthroid 75 mcg daily labs  Lab Results   Component Value Date    TSH 4.020 07/13/2017    C1MTKEQ 0.86 11/05/2014               Patient Active Problem List   Diagnosis    Hypertension    Hyperlipidemia    Hypothyroidism    Chronic kidney disease, stage II (mild)    Incontinence of urine    Cystocele    Osteopenia    Degenerative arthritis of lumbar spine    CHF (congestive heart failure) (HCC)    Senile cataract    Uncontrolled type 2 diabetes mellitus without complication, with long-term current use of insulin (HCC)    Atrial fibrillation (HCC)    Pneumonia of both lower lobes due to infectious organism    Hypoxemia    Fall against object    Anticoagulated on Coumadin    At high risk for falls TEST FIVE TIMES DAILy Dx E11.65 (Patient taking differently: TEST THREE TIMES DAILy Dx E11.65), Disp: 200 each, Rfl: 3    FREESTYLE LANCETS MISC, Check blood sugars three times daily. Dx: E11.65 IDDM, Disp: 300 each, Rfl: 6    albuterol (PROVENTIL HFA;VENTOLIN HFA) 108 (90 BASE) MCG/ACT inhaler, [place] inhalations three times a day for 5 days then as needed for cough or SOB, Disp: 1 Inhaler, Rfl: 4    furosemide (LASIX) 40 MG tablet, Take 40 mg by mouth daily Except take 1 tablet by mouth twice daily on Monday, Wednesday, and Friday. Rest of days takes daily. , Disp: , Rfl:     nitroGLYCERIN (NITROSTAT) 0.4 MG SL tablet, Place 1 tablet under the tongue every 5 minutes as needed. , Disp: 25 tablet, Rfl: 1    Cholecalciferol (VITAMIN D3) 1000 UNITS TABS, Take 1,000 Units by mouth daily. , Disp: , Rfl:     Multiple Vitamins-Minerals (CENTRUM SILVER PO), Take  by mouth daily. , Disp: , Rfl:     aspirin 81 MG EC tablet, Take 81 mg by mouth daily. , Disp: , Rfl:         Review of Systems   Constitutional: Positive for fatigue. Musculoskeletal: Positive for arthralgias. Psychiatric/Behavioral: Positive for confusion. The patient is nervous/anxious. All other systems reviewed and are negative. Vitals:    10/18/17 1348   BP: 134/74   Site: Left Arm   Position: Sitting   Cuff Size: Large Adult   Pulse: 72   Weight: 202 lb (91.6 kg)   Height: 5' 2\" (1.575 m)       Objective:   Physical Exam   Constitutional: She appears well-developed and well-nourished. HENT:   Head: Normocephalic and atraumatic. Neck: Neck supple. Cardiovascular: Normal rate. Pulmonary/Chest: Effort normal. She has no wheezes. She has no rales. Abdominal:   Obese    Musculoskeletal: Normal range of motion. Feet:    Neurological: She is alert. Skin: Skin is warm. Psychiatric: Her mood appears anxious. She exhibits a depressed mood.      Lab Results   Component Value Date     09/08/2017    K 4.6 09/08/2017    CL 99 09/08/2017    CO2 27 09/08/2017    BUN 29 (H) 09/08/2017    CREATININE 1.10 (H) 09/08/2017    GLUCOSE 128 10/18/2017    CALCIUM 9.0 09/08/2017    PROT 6.5 11/28/2016    LABALBU 4.0 11/28/2016    BILITOT 0.3 11/28/2016    ALKPHOS 65 11/28/2016    AST 20 11/28/2016    ALT 16 11/28/2016    LABGLOM 47.4 (L) 09/08/2017    GFRAA 57.4 (L) 09/08/2017    GLOB 2.5 11/28/2016         Assessment:      1. Uncontrolled type 2 diabetes mellitus without complication, with long-term current use of insulin (HCC)  POCT Glucose     DIABETES EYE EXAM     DIABETES FOOT EXAM    Lipid Panel    Microalbumin / Creatinine Urine Ratio    Basic Metabolic Panel    Hemoglobin A1C   2.  Hypothyroidism, unspecified type  T4, Free    TSH without Reflex           Plan:      Orders Placed This Encounter   Procedures    Lipid Panel     Standing Status:   Future     Standing Expiration Date:   10/18/2018     Order Specific Question:   Is Patient Fasting?/# of Hours     Answer:   Yes, 12 Hours    Microalbumin / Creatinine Urine Ratio     Standing Status:   Future     Standing Expiration Date:   10/18/2018    T4, Free     Standing Status:   Future     Standing Expiration Date:   10/18/2018    TSH without Reflex     Standing Status:   Future     Standing Expiration Date:   10/18/2018    Basic Metabolic Panel     Standing Status:   Future     Standing Expiration Date:   10/18/2018    Hemoglobin A1C     Standing Status:   Future     Standing Expiration Date:   10/18/2018    POCT Glucose     DIABETES EYE EXAM     DIABETES FOOT EXAM     Orders Placed This Encounter   Medications    levothyroxine (SYNTHROID) 75 MCG tablet     Sig: take 1 tablet by mouth once daily     Dispense:  30 tablet     Refill:  5     continue lantus plus Humalog at current dose

## 2017-11-09 ENCOUNTER — HOSPITAL ENCOUNTER (OUTPATIENT)
Dept: PHARMACY | Age: 82
Setting detail: THERAPIES SERIES
Discharge: HOME | End: 2017-11-09
Payer: MEDICARE

## 2017-11-09 DIAGNOSIS — I48.91 ATRIAL FIBRILLATION, UNSPECIFIED TYPE (HCC): ICD-10-CM

## 2017-11-09 LAB
INR BLD: 3.2
PROTIME: 38.8 SECONDS

## 2017-11-09 PROCEDURE — 99211 OFF/OP EST MAY X REQ PHY/QHP: CPT

## 2017-11-09 PROCEDURE — 85610 PROTHROMBIN TIME: CPT

## 2017-11-15 ENCOUNTER — HOSPITAL ENCOUNTER (EMERGENCY)
Age: 82
Discharge: HOME | End: 2017-11-15
Attending: STUDENT IN AN ORGANIZED HEALTH CARE EDUCATION/TRAINING PROGRAM
Payer: MEDICARE

## 2017-11-15 ENCOUNTER — APPOINTMENT (OUTPATIENT)
Dept: GENERAL RADIOLOGY | Age: 82
End: 2017-11-15
Payer: MEDICARE

## 2017-11-15 VITALS
OXYGEN SATURATION: 96 % | RESPIRATION RATE: 18 BRPM | SYSTOLIC BLOOD PRESSURE: 144 MMHG | TEMPERATURE: 98.4 F | HEART RATE: 81 BPM | BODY MASS INDEX: 35.88 KG/M2 | WEIGHT: 195 LBS | DIASTOLIC BLOOD PRESSURE: 79 MMHG | HEIGHT: 62 IN

## 2017-11-15 DIAGNOSIS — R05.9 COUGH: ICD-10-CM

## 2017-11-15 DIAGNOSIS — J40 BRONCHITIS: Primary | ICD-10-CM

## 2017-11-15 LAB
ALBUMIN SERPL-MCNC: 4.4 G/DL (ref 3.9–4.9)
ALP BLD-CCNC: 61 U/L (ref 40–130)
ALT SERPL-CCNC: 14 U/L (ref 0–33)
ANION GAP SERPL CALCULATED.3IONS-SCNC: 12 MEQ/L (ref 7–13)
APTT: 38.8 SEC (ref 21.6–35.4)
AST SERPL-CCNC: 17 U/L (ref 0–35)
BACTERIA: ABNORMAL /HPF
BASOPHILS ABSOLUTE: 0.1 K/UL (ref 0–0.2)
BASOPHILS RELATIVE PERCENT: 0.7 %
BILIRUB SERPL-MCNC: 0.6 MG/DL (ref 0–1.2)
BILIRUBIN URINE: NEGATIVE
BLOOD, URINE: NEGATIVE
BUN BLDV-MCNC: 21 MG/DL (ref 8–23)
CALCIUM SERPL-MCNC: 9.3 MG/DL (ref 8.6–10.2)
CHLORIDE BLD-SCNC: 98 MEQ/L (ref 98–107)
CLARITY: CLEAR
CO2: 30 MEQ/L (ref 22–29)
COLOR: YELLOW
CREAT SERPL-MCNC: 0.81 MG/DL (ref 0.5–0.9)
EOSINOPHILS ABSOLUTE: 0.2 K/UL (ref 0–0.7)
EOSINOPHILS RELATIVE PERCENT: 1.8 %
EPITHELIAL CELLS, UA: ABNORMAL /HPF
GFR AFRICAN AMERICAN: >60
GFR NON-AFRICAN AMERICAN: >60
GLOBULIN: 2.9 G/DL (ref 2.3–3.5)
GLUCOSE BLD-MCNC: 156 MG/DL (ref 74–109)
GLUCOSE URINE: NEGATIVE MG/DL
HCT VFR BLD CALC: 43.7 % (ref 37–47)
HEMOGLOBIN: 14.4 G/DL (ref 12–16)
INR BLD: 2.6
KETONES, URINE: NEGATIVE MG/DL
LEUKOCYTE ESTERASE, URINE: ABNORMAL
LYMPHOCYTES ABSOLUTE: 1.9 K/UL (ref 1–4.8)
LYMPHOCYTES RELATIVE PERCENT: 19.5 %
MCH RBC QN AUTO: 28.1 PG (ref 27–31.3)
MCHC RBC AUTO-ENTMCNC: 32.8 % (ref 33–37)
MCV RBC AUTO: 85.5 FL (ref 82–100)
MONOCYTES ABSOLUTE: 0.8 K/UL (ref 0.2–0.8)
MONOCYTES RELATIVE PERCENT: 8.6 %
NEUTROPHILS ABSOLUTE: 6.6 K/UL (ref 1.4–6.5)
NEUTROPHILS RELATIVE PERCENT: 69.4 %
NITRITE, URINE: NEGATIVE
PDW BLD-RTO: 14.8 % (ref 11.5–14.5)
PH UA: 6 (ref 5–9)
PLATELET # BLD: 182 K/UL (ref 130–400)
POTASSIUM SERPL-SCNC: 4.1 MEQ/L (ref 3.5–5.1)
PRO-BNP: 1095 PG/ML
PROTEIN UA: NEGATIVE MG/DL
PROTHROMBIN TIME: 27.9 SEC (ref 8.1–13.7)
RBC # BLD: 5.12 M/UL (ref 4.2–5.4)
RBC UA: ABNORMAL /HPF (ref 0–2)
SODIUM BLD-SCNC: 140 MEQ/L (ref 132–144)
SPECIFIC GRAVITY UA: 1.01 (ref 1–1.03)
TOTAL CK: 44 U/L (ref 0–170)
TOTAL PROTEIN: 7.3 G/DL (ref 6.4–8.1)
TROPONIN: <0.01 NG/ML (ref 0–0.01)
URINE REFLEX TO CULTURE: YES
UROBILINOGEN, URINE: 0.2 E.U./DL
WBC # BLD: 9.5 K/UL (ref 4.8–10.8)
WBC UA: ABNORMAL /HPF (ref 0–5)

## 2017-11-15 PROCEDURE — 84484 ASSAY OF TROPONIN QUANT: CPT

## 2017-11-15 PROCEDURE — 87040 BLOOD CULTURE FOR BACTERIA: CPT

## 2017-11-15 PROCEDURE — 82550 ASSAY OF CK (CPK): CPT

## 2017-11-15 PROCEDURE — 36415 COLL VENOUS BLD VENIPUNCTURE: CPT

## 2017-11-15 PROCEDURE — 83880 ASSAY OF NATRIURETIC PEPTIDE: CPT

## 2017-11-15 PROCEDURE — 85025 COMPLETE CBC W/AUTO DIFF WBC: CPT

## 2017-11-15 PROCEDURE — 87077 CULTURE AEROBIC IDENTIFY: CPT

## 2017-11-15 PROCEDURE — 80053 COMPREHEN METABOLIC PANEL: CPT

## 2017-11-15 PROCEDURE — 71020 XR CHEST STANDARD TWO VW: CPT

## 2017-11-15 PROCEDURE — 87186 SC STD MICRODIL/AGAR DIL: CPT

## 2017-11-15 PROCEDURE — 99284 EMERGENCY DEPT VISIT MOD MDM: CPT

## 2017-11-15 PROCEDURE — 85610 PROTHROMBIN TIME: CPT

## 2017-11-15 PROCEDURE — 81001 URINALYSIS AUTO W/SCOPE: CPT

## 2017-11-15 PROCEDURE — 85730 THROMBOPLASTIN TIME PARTIAL: CPT

## 2017-11-15 PROCEDURE — 87086 URINE CULTURE/COLONY COUNT: CPT

## 2017-11-15 RX ORDER — AZITHROMYCIN 250 MG/1
TABLET, FILM COATED ORAL
Qty: 1 PACKET | Refills: 0 | Status: SHIPPED | OUTPATIENT
Start: 2017-11-15 | End: 2017-11-25

## 2017-11-15 RX ORDER — BENZONATATE 100 MG/1
100 CAPSULE ORAL 3 TIMES DAILY PRN
Qty: 20 CAPSULE | Refills: 0 | Status: SHIPPED | OUTPATIENT
Start: 2017-11-15 | End: 2017-12-06 | Stop reason: ALTCHOICE

## 2017-11-15 ASSESSMENT — ENCOUNTER SYMPTOMS
WHEEZING: 0
DIARRHEA: 0
EYE DISCHARGE: 0
NAUSEA: 0
VOMITING: 0
COUGH: 1
COLOR CHANGE: 0
ABDOMINAL PAIN: 0
ABDOMINAL DISTENTION: 0
CONSTIPATION: 0
RHINORRHEA: 0
SHORTNESS OF BREATH: 1
STRIDOR: 0
SORE THROAT: 0

## 2017-11-15 NOTE — ED PROVIDER NOTES
[facility] ED  eMERGENCY dEPARTMENT eNCOUnter      Pt Name: Negro Vargas  MRN: [id number]  [place] 1934  Date of evaluation: 11/15/2017  Provider: Uli Dawn PA-C    CHIEF COMPLAINT       Chief Complaint   Patient presents with    Cough     x 3 weeks with congestion. Having trouble catching her breath         HISTORY OF PRESENT ILLNESS   (Location/Symptom, Timing/Onset, Context/Setting, Quality, Duration, Modifying Factors, Severity)  Note limiting factors. Negro Vargas is a 80 y.o. female who presents to the emergency department With a complaint of ongoing cough ×3 weeks. Patient denies any fevers she states she only has shortness of breath when she is coughing excessively but otherwise no shortness of breath. She denies any fevers no nausea vomiting no dizziness no chest pain. She states she went to the urgent care center today and they advised her to come to the emergency department. She has not followed up with her family doctor to this point. HPI    Nursing Notes were reviewed. REVIEW OF SYSTEMS    (2-9 systems for level 4, 10 or more for level 5)     Review of Systems   Constitutional: Negative for activity change and appetite change. HENT: Negative for congestion, ear discharge, ear pain, nosebleeds, rhinorrhea and sore throat. Eyes: Negative for discharge. Respiratory: Positive for cough and shortness of breath. Negative for wheezing and stridor. Cardiovascular: Negative for chest pain, palpitations and leg swelling. Gastrointestinal: Negative for abdominal distention, abdominal pain, constipation, diarrhea, nausea and vomiting. Genitourinary: Negative for difficulty urinating and dysuria. Musculoskeletal: Negative for arthralgias. Skin: Negative for color change, pallor, rash and wound. Neurological: Negative for dizziness, tremors, syncope, weakness, numbness and headaches. Psychiatric/Behavioral: Negative for agitation and confusion. Except as noted above the remainder of the review of systems was reviewed and negative. PAST MEDICAL HISTORY     Past Medical History:   Diagnosis Date    Chronic kidney disease (CKD), stage II (mild)     Hyperlipidemia     Hypertension     Hypothyroidism     Interstitial cystitis     Dr. Santa Adams diagnosed this for michele.  Type II or unspecified type diabetes mellitus without mention of complication, not stated as uncontrolled     UTI (urinary tract infection)          SURGICAL HISTORY       Past Surgical History:   Procedure Laterality Date    ABDOMEN SURGERY      CATARACT REMOVAL WITH IMPLANT      both eyes    CORONARY ANGIOPLASTY WITH STENT PLACEMENT      HYSTERECTOMY      OTHER SURGICAL HISTORY  09/07/2016    GENERATOR CHANGE     PACEMAKER PLACEMENT           CURRENT MEDICATIONS       Previous Medications    ALBUTEROL (PROVENTIL HFA;VENTOLIN HFA) 108 (90 BASE) MCG/ACT INHALER    [place] inhalations three times a day for 5 days then as needed for cough or SOB    ALENDRONATE (FOSAMAX) 70 MG TABLET    take 1 tablet by mouth every week on an empty stomach with 6 oz of water. No food / meds for AN HOUR. Remain Upright    ASPIRIN 81 MG EC TABLET    Take 81 mg by mouth daily. CHOLECALCIFEROL (VITAMIN D3) 1000 UNITS TABS    Take 1,000 Units by mouth daily. FREESTYLE LANCETS MISC    Check blood sugars three times daily. Dx: E11.65 IDDM    FUROSEMIDE (LASIX) 40 MG TABLET    Take 40 mg by mouth daily Except take 1 tablet by mouth twice daily on Monday, Wednesday, and Friday. Rest of days takes daily.     GLUCOSE BLOOD VI TEST STRIPS (FREESTYLE INSULINX TEST) STRIP    TEST FIVE TIMES DAILy Dx E11.65    HUMALOG KWIKPEN 100 UNIT/ML PEN    inject 4 unit subcutaneously IF GLUCOSE  6 UNITS -200 8 UNITS -857 10 UNITS -747 12 UNITS -360 14 UNITS -4    ISOSORBIDE MONONITRATE (IMDUR) 60 MG EXTENDED RELEASE TABLET    Take 1 tablet by mouth daily    LANTUS SOLOSTAR 100 UNIT/ML INJECTION PEN    inject 30 units subcutaneously every morning    LEVOTHYROXINE (SYNTHROID) 75 MCG TABLET    take 1 tablet by mouth once daily    LIDOCAINE (LIDODERM) 5 %    Place 1 patch onto the skin daily 12 hours on, 12 hours off. MECLIZINE (ANTIVERT) 12.5 MG TABLET    Take 12.5 mg by mouth 3 times daily as needed    MULTIPLE VITAMINS-MINERALS (CENTRUM SILVER PO)    Take  by mouth daily. NITROGLYCERIN (NITROSTAT) 0.4 MG SL TABLET    Place 1 tablet under the tongue every 5 minutes as needed. NOVOFINE 32G X 6 MM MISC    use four times a day    NYAMYC 098611 UNIT/GM POWDER    apply to affected area three times a day    PANTOPRAZOLE (PROTONIX) 40 MG TABLET    take 1 tablet by mouth once daily    PRAVASTATIN (PRAVACHOL) 40 MG TABLET    Take 1 tablet by mouth daily    SOTALOL (BETAPACE) 80 MG TABLET    Take 1 tablet by mouth 2 times daily    TELMISARTAN (MICARDIS) 40 MG TABLET    Take 1 tablet by mouth daily    WARFARIN (COUMADIN) 5 MG TABLET    Take as directed by [facility] AT [facility]. Quantity equals 90 day supply. ALLERGIES     Avelox [moxifloxacin hcl in nacl];  Penicillins; and Adhesive tape    FAMILY HISTORY       Family History   Problem Relation Age of Onset    Arthritis Other     Diabetes Other     Heart Disease Other           SOCIAL HISTORY       Social History     Social History    Marital status:      Spouse name: N/A    Number of children: N/A    Years of education: N/A     Social History Main Topics    Smoking status: Never Smoker    Smokeless tobacco: Never Used    Alcohol use No      Comment: denies    Drug use: No    Sexual activity: Not Currently     Other Topics Concern    None     Social History Narrative    None       SCREENINGS             PHYSICAL EXAM    (up to 7 for level 4, 8 or more for level 5)     ED Triage Vitals [11/15/17 1143]   BP Temp Temp Source Pulse Resp SpO2 Height Weight   (!) 144/79 98.4 °F (36.9 °C) Oral 81 18 96 % 5' 2\" (1.575 m) 195 lb (88.5 kg)       Physical Exam   Constitutional: She is oriented to person, place, and time. She appears well-developed and well-nourished. HENT:   Head: Normocephalic. Eyes: Pupils are equal, round, and reactive to light. Neck: Normal range of motion. Neck supple. No JVD present. No tracheal deviation present. Cardiovascular: Normal rate. Pulmonary/Chest: Effort normal and breath sounds normal. No respiratory distress. She has no wheezes. She has no rales. She exhibits no tenderness. Lungs sounds are clear in all fields no wheezes rales or rhonchi, no signs of accessory muscle use speaks in full sentences   Abdominal: Soft. Bowel sounds are normal. She exhibits no distension and no mass. There is no tenderness. There is no guarding. Musculoskeletal: She exhibits no edema or deformity. Neurological: She is alert and oriented to person, place, and time. Coordination normal.   Skin: Skin is warm and dry. Psychiatric: She has a normal mood and affect. DIAGNOSTIC RESULTS     EKG: All EKG's are interpreted by the Emergency Department Physician who either signs or Co-signs this chart in the absence of a cardiologist.        RADIOLOGY:   Non-plain film images such as CT, Ultrasound and MRI are read by the radiologist. Plain radiographic images are visualized and preliminarily interpreted by the emergency physician with the below findings:    Chest x-ray shows no acute pulmonary process.     Interpretation per the Radiologist below, if available at the time of this note:    XR CHEST STANDARD (2 VW)    (Results Pending)         ED BEDSIDE ULTRASOUND:   Performed by ED Physician - none    LABS:  Labs Reviewed   COMPREHENSIVE METABOLIC PANEL - Abnormal; Notable for the following:        Result Value    CO2 30 (*)     Glucose 156 (*)     All other components within normal limits   CBC WITH AUTO DIFFERENTIAL - Abnormal; Notable for the following:     MCHC 32.8 (*) RDW 14.8 (*)     Neutrophils # 6.6 (*)     All other components within normal limits   URINE RT REFLEX TO CULTURE - Abnormal; Notable for the following:     Leukocyte Esterase, Urine MODERATE (*)     All other components within normal limits   PROTIME-INR - Abnormal; Notable for the following:     Protime 27.9 (*)     All other components within normal limits   APTT - Abnormal; Notable for the following:     aPTT 38.8 (*)     All other components within normal limits   CULTURE BLOOD #1   CULTURE BLOOD #2   URINE CULTURE   TROPONIN   CK   BRAIN NATRIURETIC PEPTIDE   MICROSCOPIC URINALYSIS       All other labs were within normal range or not returned as of this dictation. EMERGENCY DEPARTMENT COURSE and DIFFERENTIAL DIAGNOSIS/MDM:   Vitals:    Vitals:    11/15/17 1143   BP: (!) 144/79   Pulse: 81   Resp: 18   Temp: 98.4 °F (36.9 °C)   TempSrc: Oral   SpO2: 96%   Weight: 195 lb (88.5 kg)   Height: 5' 2\" (1.575 m)         ED Course      MDM  Number of Diagnoses or Management Options  Diagnosis management comments: Patient was seen by myself as well as Dr. Jarrell Boas. Cardiac evaluation was completed on the patient here in emergency department chest x-ray shows no acute pulmonary process EKG shows no acute changes and cardiac enzymes are negative this time patient appears comfortable during her visit here in emergency department with minimal cough which is nonproductive. He discharged out as a bronchitis with Zithromax and Tessalon Perles. Advised to follow-up with her regular family physician in the next 48 hours. CRITICAL CARE TIME   Total Critical Care time was 0 minutes, excluding separately reportable procedures. There was a high probability of clinically significant/life threatening deterioration in the patient's condition which required my urgent intervention. CONSULTS:  None    PROCEDURES:  Unless otherwise noted below, none     Procedures    FINAL IMPRESSION      1. Bronchitis    2.  Cough DISPOSITION/PLAN   DISPOSITION     PATIENT REFERRED TO:  Emani Metz MD  . Mary Boucher [address] [place] [id number]  [phone]    In 2 days        DISCHARGE MEDICATIONS:  New Prescriptions    AZITHROMYCIN (ZITHROMAX) 250 MG TABLET    Take 2 tablets (500 mg) on Day 1, followed by 1 tablet (250 mg) once daily on Days 2 through 5.     BENZONATATE (TESSALON PERLES) 100 MG CAPSULE    Take 1 capsule by mouth 3 times daily as needed for Cough          (Please note that portions of this note were completed with a voice recognition program.  Efforts were made to edit the dictations but occasionally words are mis-transcribed.)    Jose Lynn PA-C (electronically signed)  Attending Emergency Physician         Jose Lynn PA-C  11/15/17 2627

## 2017-11-17 LAB
ORGANISM: ABNORMAL
URINE CULTURE, ROUTINE: ABNORMAL
URINE CULTURE, ROUTINE: ABNORMAL

## 2017-11-18 ENCOUNTER — OFFICE VISIT (OUTPATIENT)
Dept: INTERNAL MEDICINE | Age: 82
End: 2017-11-18

## 2017-11-18 VITALS
OXYGEN SATURATION: 98 % | DIASTOLIC BLOOD PRESSURE: 56 MMHG | SYSTOLIC BLOOD PRESSURE: 100 MMHG | HEART RATE: 72 BPM | BODY MASS INDEX: 36.47 KG/M2 | WEIGHT: 198.2 LBS | HEIGHT: 62 IN | TEMPERATURE: 98 F

## 2017-11-18 DIAGNOSIS — J20.9 ACUTE BRONCHITIS, UNSPECIFIED ORGANISM: Primary | ICD-10-CM

## 2017-11-18 DIAGNOSIS — J30.89 ACUTE NON-SEASONAL ALLERGIC RHINITIS, UNSPECIFIED TRIGGER: ICD-10-CM

## 2017-11-18 PROCEDURE — 99213 OFFICE O/P EST LOW 20 MIN: CPT | Performed by: PHYSICIAN ASSISTANT

## 2017-11-18 PROCEDURE — 1090F PRES/ABSN URINE INCON ASSESS: CPT | Performed by: PHYSICIAN ASSISTANT

## 2017-11-18 PROCEDURE — 1123F ACP DISCUSS/DSCN MKR DOCD: CPT | Performed by: PHYSICIAN ASSISTANT

## 2017-11-18 PROCEDURE — G8484 FLU IMMUNIZE NO ADMIN: HCPCS | Performed by: PHYSICIAN ASSISTANT

## 2017-11-18 PROCEDURE — G8399 PT W/DXA RESULTS DOCUMENT: HCPCS | Performed by: PHYSICIAN ASSISTANT

## 2017-11-18 PROCEDURE — G8427 DOCREV CUR MEDS BY ELIG CLIN: HCPCS | Performed by: PHYSICIAN ASSISTANT

## 2017-11-18 PROCEDURE — G8417 CALC BMI ABV UP PARAM F/U: HCPCS | Performed by: PHYSICIAN ASSISTANT

## 2017-11-18 PROCEDURE — 4040F PNEUMOC VAC/ADMIN/RCVD: CPT | Performed by: PHYSICIAN ASSISTANT

## 2017-11-18 PROCEDURE — 1036F TOBACCO NON-USER: CPT | Performed by: PHYSICIAN ASSISTANT

## 2017-11-18 RX ORDER — LORATADINE 10 MG/1
10 TABLET ORAL DAILY
Qty: 30 TABLET | Refills: 1 | Status: SHIPPED | OUTPATIENT
Start: 2017-11-18 | End: 2018-03-01

## 2017-11-18 ASSESSMENT — ENCOUNTER SYMPTOMS
NAUSEA: 1
COUGH: 1
HEARTBURN: 1
EYES NEGATIVE: 1
SPUTUM PRODUCTION: 1

## 2017-11-18 NOTE — PROGRESS NOTES
(CKD), stage II (mild)     Hyperlipidemia     Hypertension     Hypothyroidism     Interstitial cystitis     Dr. Sergio Barnett diagnosed this for michele.  Type II or unspecified type diabetes mellitus without mention of complication, not stated as uncontrolled     UTI (urinary tract infection)      Past Surgical History:   Procedure Laterality Date    ABDOMEN SURGERY      CATARACT REMOVAL WITH IMPLANT      both eyes    CORONARY ANGIOPLASTY WITH STENT PLACEMENT      HYSTERECTOMY      OTHER SURGICAL HISTORY  09/07/2016    GENERATOR CHANGE     PACEMAKER PLACEMENT       Social History     Social History    Marital status:      Spouse name: N/A    Number of children: N/A    Years of education: N/A     Occupational History    Not on file.      Social History Main Topics    Smoking status: Never Smoker    Smokeless tobacco: Never Used    Alcohol use No      Comment: denies    Drug use: No    Sexual activity: Not Currently     Other Topics Concern    Not on file     Social History Narrative    No narrative on file     Family History   Problem Relation Age of Onset    Arthritis Other     Diabetes Other     Heart Disease Other      Allergies   Allergen Reactions    Avelox [Moxifloxacin Hcl In Nacl] Itching and Rash    Penicillins Other (See Comments)     welt at the site of injection    Adhesive Tape Rash     Current Outpatient Prescriptions   Medication Sig Dispense Refill    loratadine (CLARITIN) 10 MG tablet Take 1 tablet by mouth daily 30 tablet 1    azithromycin (ZITHROMAX) 250 MG tablet Take 2 tablets (500 mg) on Day 1, followed by 1 tablet (250 mg) once daily on Days 2 through 5. 1 packet 0    benzonatate (TESSALON PERLES) 100 MG capsule Take 1 capsule by mouth 3 times daily as needed for Cough 20 capsule 0    LANTUS SOLOSTAR 100 UNIT/ML injection pen inject 30 units subcutaneously every morning 5 Pen 3    levothyroxine (SYNTHROID) 75 MCG tablet take 1 tablet by mouth Vitamins-Minerals (CENTRUM SILVER PO) Take  by mouth daily.  aspirin 81 MG EC tablet Take 81 mg by mouth daily.  lidocaine (LIDODERM) 5 % Place 1 patch onto the skin daily 12 hours on, 12 hours off. 30 patch 6    NYAMYC 047265 UNIT/GM powder apply to affected area three times a day  0    furosemide (LASIX) 40 MG tablet Take 40 mg by mouth daily Except take 1 tablet by mouth twice daily on Monday, Wednesday, and Friday. Rest of days takes daily. No current facility-administered medications for this visit. Objective    Vitals:    11/18/17 0949   BP: (!) 100/56   Site: Left Arm   Position: Sitting   Cuff Size: Large Adult   Pulse: 72   Temp: 98 °F (36.7 °C)   TempSrc: Oral   SpO2: 98%   Weight: 198 lb 3.2 oz (89.9 kg)   Height: 5' 2\" (1.575 m)     Physical Exam   Constitutional: She is oriented to person, place, and time and well-developed, well-nourished, and in no distress. HENT:   Head: Normocephalic and atraumatic. Right Ear: External ear normal.   Left Ear: External ear normal.   Nose: Nose normal.   Mouth/Throat: Oropharynx is clear and moist.   Eyes: Conjunctivae and EOM are normal. Pupils are equal, round, and reactive to light. Neck: Normal range of motion. Neck supple. No thyromegaly present. Cardiovascular: Normal rate, regular rhythm and normal heart sounds. No murmur heard. Pulmonary/Chest: Effort normal and breath sounds normal. She has no wheezes. She has no rales. Lymphadenopathy:     She has no cervical adenopathy. Neurological: She is alert and oriented to person, place, and time. Gait normal.   Skin: Skin is warm and dry. Assessment & Plan   1. Acute bronchitis, unspecified organism           No orders of the defined types were placed in this encounter.     Orders Placed This Encounter   Medications    loratadine (CLARITIN) 10 MG tablet     Sig: Take 1 tablet by mouth daily     Dispense:  30 tablet     Refill:  1     There are no discontinued

## 2017-11-20 LAB
BLOOD CULTURE, ROUTINE: NORMAL
CULTURE, BLOOD 2: NORMAL

## 2017-11-27 ENCOUNTER — TELEPHONE (OUTPATIENT)
Dept: PHARMACY | Age: 82
End: 2017-11-27

## 2017-12-05 RX ORDER — WARFARIN SODIUM 5 MG/1
TABLET ORAL
Qty: 90 TABLET | Refills: 1 | Status: SHIPPED | OUTPATIENT
Start: 2017-12-05 | End: 2018-08-15 | Stop reason: SDUPTHER

## 2017-12-06 ENCOUNTER — OFFICE VISIT (OUTPATIENT)
Dept: INTERNAL MEDICINE | Age: 82
End: 2017-12-06

## 2017-12-06 VITALS
HEIGHT: 62 IN | WEIGHT: 206 LBS | OXYGEN SATURATION: 98 % | RESPIRATION RATE: 14 BRPM | HEART RATE: 84 BPM | SYSTOLIC BLOOD PRESSURE: 112 MMHG | DIASTOLIC BLOOD PRESSURE: 64 MMHG | TEMPERATURE: 97.6 F | BODY MASS INDEX: 37.91 KG/M2

## 2017-12-06 DIAGNOSIS — M47.814 SPONDYLOSIS OF THORACIC REGION WITHOUT MYELOPATHY OR RADICULOPATHY: ICD-10-CM

## 2017-12-06 DIAGNOSIS — M47.812 SPONDYLOSIS OF CERVICAL REGION WITHOUT MYELOPATHY OR RADICULOPATHY: Primary | ICD-10-CM

## 2017-12-06 PROCEDURE — G8399 PT W/DXA RESULTS DOCUMENT: HCPCS | Performed by: PHYSICIAN ASSISTANT

## 2017-12-06 PROCEDURE — 1123F ACP DISCUSS/DSCN MKR DOCD: CPT | Performed by: PHYSICIAN ASSISTANT

## 2017-12-06 PROCEDURE — 1090F PRES/ABSN URINE INCON ASSESS: CPT | Performed by: PHYSICIAN ASSISTANT

## 2017-12-06 PROCEDURE — G8427 DOCREV CUR MEDS BY ELIG CLIN: HCPCS | Performed by: PHYSICIAN ASSISTANT

## 2017-12-06 PROCEDURE — G8484 FLU IMMUNIZE NO ADMIN: HCPCS | Performed by: PHYSICIAN ASSISTANT

## 2017-12-06 PROCEDURE — G8417 CALC BMI ABV UP PARAM F/U: HCPCS | Performed by: PHYSICIAN ASSISTANT

## 2017-12-06 PROCEDURE — 1036F TOBACCO NON-USER: CPT | Performed by: PHYSICIAN ASSISTANT

## 2017-12-06 PROCEDURE — 99213 OFFICE O/P EST LOW 20 MIN: CPT | Performed by: PHYSICIAN ASSISTANT

## 2017-12-06 PROCEDURE — 4040F PNEUMOC VAC/ADMIN/RCVD: CPT | Performed by: PHYSICIAN ASSISTANT

## 2017-12-06 RX ORDER — HYDROCODONE BITARTRATE AND ACETAMINOPHEN 5; 325 MG/1; MG/1
1 TABLET ORAL EVERY 6 HOURS PRN
Qty: 28 TABLET | Refills: 0 | Status: SHIPPED | OUTPATIENT
Start: 2017-12-06 | End: 2017-12-13

## 2017-12-06 ASSESSMENT — ENCOUNTER SYMPTOMS
BACK PAIN: 1
ABDOMINAL PAIN: 0
SHORTNESS OF BREATH: 1
DOUBLE VISION: 0
SORE THROAT: 0
CONSTIPATION: 0
COUGH: 1
VOMITING: 0
NAUSEA: 0
EYE PAIN: 0
DIARRHEA: 0

## 2017-12-07 ENCOUNTER — HOSPITAL ENCOUNTER (OUTPATIENT)
Dept: PHARMACY | Age: 82
Setting detail: THERAPIES SERIES
Discharge: HOME | End: 2017-12-07
Payer: MEDICARE

## 2017-12-07 DIAGNOSIS — I48.91 ATRIAL FIBRILLATION, UNSPECIFIED TYPE (HCC): ICD-10-CM

## 2017-12-07 LAB
INR BLD: 3.1
PROTIME: 36.7 SECONDS

## 2017-12-07 PROCEDURE — 99211 OFF/OP EST MAY X REQ PHY/QHP: CPT

## 2017-12-07 PROCEDURE — 85610 PROTHROMBIN TIME: CPT

## 2017-12-11 DIAGNOSIS — E03.9 HYPOTHYROIDISM, UNSPECIFIED TYPE: ICD-10-CM

## 2017-12-11 LAB
ANION GAP SERPL CALCULATED.3IONS-SCNC: 13 MEQ/L (ref 9–17)
BUN BLDV-MCNC: 26 MG/DL (ref 8–23)
CALCIUM SERPL-MCNC: 9.4 MG/DL (ref 8.6–10.2)
CHLORIDE BLD-SCNC: 98 MEQ/L (ref 97–107)
CO2: 31 MEQ/L (ref 17–24)
CREAT SERPL-MCNC: 0.93 MG/DL (ref 0.5–0.9)
CREATININE URINE: 150.4 MG/DL
GFR AFRICAN AMERICAN: >60
GFR NON-AFRICAN AMERICAN: 57.5
GLUCOSE BLD-MCNC: 250 MG/DL (ref 74–109)
HBA1C MFR BLD: 7.2 % (ref 4.8–5.9)
MICROALBUMIN UR-MCNC: 2.9 MG/DL
MICROALBUMIN/CREAT UR-RTO: 19.3 MG/G (ref 0–30)
POTASSIUM SERPL-SCNC: 5 MEQ/L (ref 3.2–4.4)
SODIUM BLD-SCNC: 142 MEQ/L (ref 132–138)
T4 FREE: 1.19 NG/DL (ref 0.93–1.7)
TSH SERPL DL<=0.05 MIU/L-ACNC: 1.06 UIU/ML (ref 0.27–4.2)

## 2017-12-13 LAB
CHOLESTEROL, TOTAL: 163 MG/DL (ref 0–199)
HDLC SERPL-MCNC: 40 MG/DL (ref 40–59)
LDL CHOLESTEROL CALCULATED: 94 MG/DL (ref 0–129)
TRIGL SERPL-MCNC: 143 MG/DL (ref 0–200)

## 2017-12-22 ENCOUNTER — HOSPITAL ENCOUNTER (OUTPATIENT)
Dept: PHARMACY | Age: 82
Setting detail: THERAPIES SERIES
Discharge: HOME | End: 2017-12-22
Payer: MEDICARE

## 2017-12-22 DIAGNOSIS — I48.91 ATRIAL FIBRILLATION, UNSPECIFIED TYPE (HCC): ICD-10-CM

## 2017-12-22 LAB
INR BLD: 2.6
PROTIME: 30.9 SECONDS

## 2017-12-22 PROCEDURE — 99211 OFF/OP EST MAY X REQ PHY/QHP: CPT | Performed by: PHARMACIST

## 2017-12-22 PROCEDURE — 85610 PROTHROMBIN TIME: CPT | Performed by: PHARMACIST

## 2017-12-29 ENCOUNTER — HOSPITAL ENCOUNTER (OUTPATIENT)
Dept: CARDIOLOGY | Age: 82
Discharge: HOME | End: 2017-12-29
Payer: MEDICARE

## 2017-12-29 PROCEDURE — 93296 REM INTERROG EVL PM/IDS: CPT

## 2018-01-17 ENCOUNTER — OFFICE VISIT (OUTPATIENT)
Dept: SURGERY | Age: 83
End: 2018-01-17

## 2018-01-17 VITALS
HEIGHT: 62 IN | WEIGHT: 203 LBS | HEART RATE: 80 BPM | SYSTOLIC BLOOD PRESSURE: 111 MMHG | BODY MASS INDEX: 37.36 KG/M2 | DIASTOLIC BLOOD PRESSURE: 71 MMHG

## 2018-01-17 DIAGNOSIS — E03.9 HYPOTHYROIDISM, UNSPECIFIED TYPE: ICD-10-CM

## 2018-01-17 LAB — GLUCOSE BLD-MCNC: 168 MG/DL

## 2018-01-17 PROCEDURE — 1090F PRES/ABSN URINE INCON ASSESS: CPT | Performed by: INTERNAL MEDICINE

## 2018-01-17 PROCEDURE — G8417 CALC BMI ABV UP PARAM F/U: HCPCS | Performed by: INTERNAL MEDICINE

## 2018-01-17 PROCEDURE — 1036F TOBACCO NON-USER: CPT | Performed by: INTERNAL MEDICINE

## 2018-01-17 PROCEDURE — 82962 GLUCOSE BLOOD TEST: CPT | Performed by: INTERNAL MEDICINE

## 2018-01-17 PROCEDURE — G8484 FLU IMMUNIZE NO ADMIN: HCPCS | Performed by: INTERNAL MEDICINE

## 2018-01-17 PROCEDURE — 4040F PNEUMOC VAC/ADMIN/RCVD: CPT | Performed by: INTERNAL MEDICINE

## 2018-01-17 PROCEDURE — G8399 PT W/DXA RESULTS DOCUMENT: HCPCS | Performed by: INTERNAL MEDICINE

## 2018-01-17 PROCEDURE — 99213 OFFICE O/P EST LOW 20 MIN: CPT | Performed by: INTERNAL MEDICINE

## 2018-01-17 PROCEDURE — 1123F ACP DISCUSS/DSCN MKR DOCD: CPT | Performed by: INTERNAL MEDICINE

## 2018-01-17 PROCEDURE — G8427 DOCREV CUR MEDS BY ELIG CLIN: HCPCS | Performed by: INTERNAL MEDICINE

## 2018-01-17 NOTE — PROGRESS NOTES
sotalol (BETAPACE) 80 MG tablet, Take 1 tablet by mouth 2 times daily, Disp: 180 tablet, Rfl: 3    telmisartan (MICARDIS) 40 MG tablet, Take 1 tablet by mouth daily, Disp: 30 tablet, Rfl: 11    meclizine (ANTIVERT) 12.5 MG tablet, Take 12.5 mg by mouth 3 times daily as needed, Disp: , Rfl:     isosorbide mononitrate (IMDUR) 60 MG extended release tablet, Take 1 tablet by mouth daily, Disp: 30 tablet, Rfl: 3    glucose blood VI test strips (FREESTYLE INSULINX TEST) strip, TEST FIVE TIMES DAILy Dx E11.65 (Patient taking differently: TEST THREE TIMES DAILy Dx E11.65), Disp: 200 each, Rfl: 3    FREESTYLE LANCETS MISC, Check blood sugars three times daily. Dx: E11.65 IDDM, Disp: 300 each, Rfl: 6    albuterol (PROVENTIL HFA;VENTOLIN HFA) 108 (90 BASE) MCG/ACT inhaler, [place] inhalations three times a day for 5 days then as needed for cough or SOB, Disp: 1 Inhaler, Rfl: 4    furosemide (LASIX) 40 MG tablet, Take 40 mg by mouth daily Except take 1 tablet by mouth twice daily on Monday, Wednesday, and Friday. Rest of days takes daily. , Disp: , Rfl:     nitroGLYCERIN (NITROSTAT) 0.4 MG SL tablet, Place 1 tablet under the tongue every 5 minutes as needed. , Disp: 25 tablet, Rfl: 1    Cholecalciferol (VITAMIN D3) 1000 UNITS TABS, Take 1,000 Units by mouth daily. , Disp: , Rfl:     Multiple Vitamins-Minerals (CENTRUM SILVER PO), Take  by mouth daily. , Disp: , Rfl:     aspirin 81 MG EC tablet, Take 81 mg by mouth daily. , Disp: , Rfl:         Review of Systems   Constitutional: Positive for fatigue. Musculoskeletal: Positive for arthralgias. Psychiatric/Behavioral: Positive for confusion. The patient is nervous/anxious. All other systems reviewed and are negative.       Vitals:    01/17/18 1347   BP: 111/71   Site: Right Arm   Position: Sitting   Cuff Size: Medium Adult   Pulse: 80   Weight: 203 lb (92.1 kg)   Height: 5' 2\" (1.575 m)       Objective:   Physical Exam   Constitutional: She appears well-developed

## 2018-01-19 ENCOUNTER — HOSPITAL ENCOUNTER (OUTPATIENT)
Dept: PHARMACY | Age: 83
Setting detail: THERAPIES SERIES
Discharge: HOME | End: 2018-01-19
Payer: MEDICARE

## 2018-01-19 DIAGNOSIS — I48.91 ATRIAL FIBRILLATION, UNSPECIFIED TYPE (HCC): ICD-10-CM

## 2018-01-19 LAB
ALBUMIN SERPL-MCNC: 4 G/DL (ref 3.9–4.9)
ALP BLD-CCNC: 67 U/L (ref 40–130)
ALT SERPL-CCNC: 18 U/L (ref 0–33)
ANION GAP SERPL CALCULATED.3IONS-SCNC: 13 MEQ/L (ref 7–13)
AST SERPL-CCNC: 19 U/L (ref 0–35)
BILIRUB SERPL-MCNC: 0.5 MG/DL (ref 0–1.2)
BILIRUBIN DIRECT: 0 MG/DL (ref 0–0.3)
BILIRUBIN, INDIRECT: 0.5 MG/DL (ref 0–0.6)
BUN BLDV-MCNC: 24 MG/DL (ref 8–23)
CALCIUM SERPL-MCNC: 9.3 MG/DL (ref 8.6–10.2)
CHLORIDE BLD-SCNC: 99 MEQ/L (ref 98–107)
CHOLESTEROL, TOTAL: 151 MG/DL (ref 0–199)
CO2: 30 MEQ/L (ref 22–29)
CREAT SERPL-MCNC: 0.83 MG/DL (ref 0.5–0.9)
GFR AFRICAN AMERICAN: >60
GFR NON-AFRICAN AMERICAN: >60
GLUCOSE BLD-MCNC: 147 MG/DL (ref 74–109)
HCT VFR BLD CALC: 44 % (ref 37–47)
HDLC SERPL-MCNC: 41 MG/DL (ref 40–59)
HEMOGLOBIN: 14.5 G/DL (ref 12–16)
INR BLD: 2.6
LDL CHOLESTEROL CALCULATED: 83 MG/DL (ref 0–129)
MCH RBC QN AUTO: 29 PG (ref 27–31.3)
MCHC RBC AUTO-ENTMCNC: 33 % (ref 33–37)
MCV RBC AUTO: 87.7 FL (ref 82–100)
PDW BLD-RTO: 14.6 % (ref 11.5–14.5)
PLATELET # BLD: 200 K/UL (ref 130–400)
POTASSIUM SERPL-SCNC: 5.3 MEQ/L (ref 3.5–5.1)
PROTIME: 30.8 SECONDS
RBC # BLD: 5.01 M/UL (ref 4.2–5.4)
SODIUM BLD-SCNC: 142 MEQ/L (ref 132–144)
TOTAL PROTEIN: 6.9 G/DL (ref 6.4–8.1)
TRIGL SERPL-MCNC: 135 MG/DL (ref 0–200)
WBC # BLD: 9.2 K/UL (ref 4.8–10.8)

## 2018-01-19 PROCEDURE — 85610 PROTHROMBIN TIME: CPT | Performed by: PHARMACIST

## 2018-01-19 PROCEDURE — 99211 OFF/OP EST MAY X REQ PHY/QHP: CPT | Performed by: PHARMACIST

## 2018-02-16 ENCOUNTER — HOSPITAL ENCOUNTER (OUTPATIENT)
Dept: PHARMACY | Age: 83
Setting detail: THERAPIES SERIES
Discharge: HOME | End: 2018-02-16
Payer: MEDICARE

## 2018-02-16 DIAGNOSIS — I48.91 ATRIAL FIBRILLATION, UNSPECIFIED TYPE (HCC): ICD-10-CM

## 2018-02-16 LAB
INR BLD: 4.4
PROTIME: 52.4 SECONDS

## 2018-02-16 PROCEDURE — 85610 PROTHROMBIN TIME: CPT

## 2018-02-16 PROCEDURE — 99211 OFF/OP EST MAY X REQ PHY/QHP: CPT

## 2018-02-16 NOTE — PROGRESS NOTES
Ms. Curt Curtis is a 80 y.o. y/o female with history of Afib who presents today for anticoagulation monitoring and adjustment. INR 4.4 is supra-therapeutic for this patient (goal range 2-3) and is reflective of 25 mg TWD  Patient verifies current dosing regimen, patient able to verbally recall dose  Patient reports 0  missed doses since last INR   Patient denies s/sx clotting and/or stroke  Patient denies hematuria, epistaxis, rectal bleeding  Patient denies changes in diet, alcohol, or tobacco use  Reviewed medication list and drug allergies with patient, updated any medication additions or modifications accordingly. Pt is under a lot of stress due to loss of  and not eating well. Patient also denies any pending medical or dental procedures scheduled at this time.  Pt will start eating more greens  Patient was instructed to hold today, then resume 25 mg TWD and RTC 4 weeks

## 2018-03-01 ENCOUNTER — OFFICE VISIT (OUTPATIENT)
Dept: INTERNAL MEDICINE CLINIC | Age: 83
End: 2018-03-01
Payer: MEDICARE

## 2018-03-01 VITALS
TEMPERATURE: 98.4 F | WEIGHT: 205.4 LBS | BODY MASS INDEX: 37.8 KG/M2 | HEART RATE: 61 BPM | OXYGEN SATURATION: 96 % | DIASTOLIC BLOOD PRESSURE: 64 MMHG | RESPIRATION RATE: 16 BRPM | HEIGHT: 62 IN | SYSTOLIC BLOOD PRESSURE: 110 MMHG

## 2018-03-01 DIAGNOSIS — E03.9 HYPOTHYROIDISM, UNSPECIFIED TYPE: ICD-10-CM

## 2018-03-01 DIAGNOSIS — I50.42 HEART FAILURE, SYSTOLIC AND DIASTOLIC, CHRONIC (HCC): ICD-10-CM

## 2018-03-01 DIAGNOSIS — R52 PAIN IN PACEMAKER POCKET: ICD-10-CM

## 2018-03-01 DIAGNOSIS — E78.2 MIXED HYPERLIPIDEMIA: ICD-10-CM

## 2018-03-01 DIAGNOSIS — I48.91 ATRIAL FIBRILLATION, UNSPECIFIED TYPE (HCC): Primary | ICD-10-CM

## 2018-03-01 DIAGNOSIS — I10 ESSENTIAL HYPERTENSION: ICD-10-CM

## 2018-03-01 DIAGNOSIS — I48.20 CHRONIC ATRIAL FIBRILLATION (HCC): ICD-10-CM

## 2018-03-01 PROCEDURE — G8417 CALC BMI ABV UP PARAM F/U: HCPCS | Performed by: FAMILY MEDICINE

## 2018-03-01 PROCEDURE — 1123F ACP DISCUSS/DSCN MKR DOCD: CPT | Performed by: FAMILY MEDICINE

## 2018-03-01 PROCEDURE — 1036F TOBACCO NON-USER: CPT | Performed by: FAMILY MEDICINE

## 2018-03-01 PROCEDURE — 1090F PRES/ABSN URINE INCON ASSESS: CPT | Performed by: FAMILY MEDICINE

## 2018-03-01 PROCEDURE — 4040F PNEUMOC VAC/ADMIN/RCVD: CPT | Performed by: FAMILY MEDICINE

## 2018-03-01 PROCEDURE — G8427 DOCREV CUR MEDS BY ELIG CLIN: HCPCS | Performed by: FAMILY MEDICINE

## 2018-03-01 PROCEDURE — 99213 OFFICE O/P EST LOW 20 MIN: CPT | Performed by: FAMILY MEDICINE

## 2018-03-01 PROCEDURE — G8399 PT W/DXA RESULTS DOCUMENT: HCPCS | Performed by: FAMILY MEDICINE

## 2018-03-01 PROCEDURE — G8484 FLU IMMUNIZE NO ADMIN: HCPCS | Performed by: FAMILY MEDICINE

## 2018-03-01 RX ORDER — PANTOPRAZOLE SODIUM 40 MG/1
TABLET, DELAYED RELEASE ORAL
Qty: 90 TABLET | Refills: 3 | Status: SHIPPED | OUTPATIENT
Start: 2018-03-01 | End: 2018-09-04 | Stop reason: SDUPTHER

## 2018-03-01 RX ORDER — BLOOD PRESSURE TEST KIT
1 KIT MISCELLANEOUS
COMMUNITY

## 2018-03-01 RX ORDER — SENNOSIDES 8.6 MG
650 CAPSULE ORAL EVERY 8 HOURS PRN
Status: ON HOLD | COMMUNITY
End: 2018-04-10 | Stop reason: HOSPADM

## 2018-03-01 RX ORDER — ALENDRONATE SODIUM 70 MG/1
TABLET ORAL
Qty: 12 TABLET | Refills: 1 | Status: SHIPPED | OUTPATIENT
Start: 2018-03-01 | End: 2018-08-09 | Stop reason: SDUPTHER

## 2018-03-01 RX ORDER — ISOSORBIDE MONONITRATE 60 MG/1
60 TABLET, EXTENDED RELEASE ORAL DAILY
Qty: 90 TABLET | Refills: 3 | Status: SHIPPED | OUTPATIENT
Start: 2018-03-01 | End: 2018-09-04 | Stop reason: SDUPTHER

## 2018-03-01 RX ORDER — BLOOD PRESSURE TEST KIT
KIT MISCELLANEOUS
Status: CANCELLED | OUTPATIENT
Start: 2018-03-01

## 2018-03-01 NOTE — PROGRESS NOTES
General Appearance:  Alert oriented pleasant cooperative no acute distress. HEENT: Eyes clear nonicteric facial muscles symmetrical.  Ears hearing good TMs intact no erythema no wax no edema  Neck: Soft nontender no adenopathy no carotid bruits  Lungs: Clear to auscultation no wheezes rhonchi or rales  Heart bradycardic irregular rhythm no murmurs rubs or gallops  Extremities: No rashes or edema. Assessment:  1. Atrial fibrillation, unspecified type [facility])  She is on an take adequate anticoagulation and is rate controlled. 2. Essential hypertension  Under good control   3. Mixed hyperlipidemia  Followed by cardiology   4. Hypothyroidism, unspecified type  She has labs pending per endocrinology   5. Uncontrolled type 2 diabetes mellitus without complication, with long-term current use of insulin ([facility])  Last hemoglobin A1c was good and she has additional labs pending per endocrinology   6. Heart failure, systolic and diastolic, chronic ([facility])  isosorbide mononitrate (IMDUR) 60 MG extended release tablet  Stable   7. Chronic atrial fibrillation (HCC)     8. Pain in pacemaker pocket  Referral To Unknown External Cardiology               Plan:  Current Outpatient Prescriptions   Medication Sig Dispense Refill    acetaminophen (TYLENOL ARTHRITIS PAIN) 650 MG extended release tablet Take 650 mg by mouth every 8 hours as needed for Pain      isosorbide mononitrate (IMDUR) 60 MG extended release tablet Take 1 tablet by mouth daily 90 tablet 3    pantoprazole (PROTONIX) 40 MG tablet take 1 tablet by mouth once daily 90 tablet 3    alendronate (FOSAMAX) 70 MG tablet take 1 tablet by mouth every week on an empty stomach with 6 oz of water. No food / meds for AN HOUR.  Remain Upright 12 tablet 1    Alcohol Swabs PADS by Does not apply route      glucose blood VI test strips (FREESTYLE INSULINX TEST) strip TEST FIVE TIMES DAILy Dx E11.65 200 each 3    warfarin (COUMADIN) 5 MG tablet Take as directed by [id number] [employer] Requested Specialty:   Cardiology     Number of Visits Requested:   1       Orders Placed This Encounter   Medications    isosorbide mononitrate (IMDUR) 60 MG extended release tablet     Sig: Take 1 tablet by mouth daily     Dispense:  90 tablet     Refill:  3    pantoprazole (PROTONIX) 40 MG tablet     Sig: take 1 tablet by mouth once daily     Dispense:  90 tablet     Refill:  3    alendronate (FOSAMAX) 70 MG tablet     Sig: take 1 tablet by mouth every week on an empty stomach with 6 oz of water. No food / meds for AN HOUR. Remain Upright     Dispense:  12 tablet     Refill:  1       Quality & Risk Score Accuracy - MEDICARE ADVANTAGE    Visit Dx: Heart failure, systolic and diastolic, chronic (HCC)  Asymptomatic. Continue current treatment plan and follow up at least yearly. Visit Dx:  Chronic atrial fibrillation ([facility] [address]  Stable based upon review of recent symptoms and physical exam. Continue current treatment plan and follow up at least yearly. On anticoagulation and FU with cardiology regularity. Last edited 03/01/18 13:15 EST by Heydi Villafana MD           Return in about 6 months (around 9/1/2018).     Dr. Heydi Villafana      3/1/18  1:53 PM

## 2018-03-16 ENCOUNTER — HOSPITAL ENCOUNTER (OUTPATIENT)
Dept: PHARMACY | Age: 83
Setting detail: THERAPIES SERIES
Discharge: HOME | End: 2018-03-16
Payer: MEDICARE

## 2018-03-16 DIAGNOSIS — I48.91 ATRIAL FIBRILLATION, UNSPECIFIED TYPE (HCC): ICD-10-CM

## 2018-03-16 LAB
INR BLD: 2.9
PROTIME: 34.9 SECONDS

## 2018-03-16 PROCEDURE — 99211 OFF/OP EST MAY X REQ PHY/QHP: CPT

## 2018-03-16 PROCEDURE — 85610 PROTHROMBIN TIME: CPT

## 2018-03-20 ENCOUNTER — HOSPITAL ENCOUNTER (OUTPATIENT)
Dept: GENERAL RADIOLOGY | Age: 83
Discharge: HOME | End: 2018-03-22
Payer: MEDICARE

## 2018-03-20 DIAGNOSIS — R06.09 DOE (DYSPNEA ON EXERTION): ICD-10-CM

## 2018-03-20 PROCEDURE — 71046 X-RAY EXAM CHEST 2 VIEWS: CPT

## 2018-03-29 LAB
ANION GAP SERPL CALCULATED.3IONS-SCNC: 21 MEQ/L (ref 7–13)
BUN BLDV-MCNC: 32 MG/DL (ref 8–23)
CHLORIDE BLD-SCNC: 98 MEQ/L (ref 98–107)
CO2: 23 MEQ/L (ref 22–29)
CREAT SERPL-MCNC: 1.01 MG/DL (ref 0.5–0.9)
GFR AFRICAN AMERICAN: >60
GFR NON-AFRICAN AMERICAN: 52.2
MAGNESIUM: 2.1 MG/DL (ref 1.7–2.3)
POTASSIUM SERPL-SCNC: 4.3 MEQ/L (ref 3.5–5.1)
SODIUM BLD-SCNC: 142 MEQ/L (ref 132–144)

## 2018-03-30 ENCOUNTER — HOSPITAL ENCOUNTER (OUTPATIENT)
Dept: CARDIOLOGY | Age: 83
Discharge: HOME | End: 2018-03-30
Payer: MEDICARE

## 2018-03-30 PROCEDURE — 93280 PM DEVICE PROGR EVAL DUAL: CPT

## 2018-04-09 DIAGNOSIS — E03.9 HYPOTHYROIDISM, UNSPECIFIED TYPE: ICD-10-CM

## 2018-04-09 LAB
ANION GAP SERPL CALCULATED.3IONS-SCNC: 14 MEQ/L (ref 7–13)
BUN BLDV-MCNC: 21 MG/DL (ref 8–23)
CALCIUM SERPL-MCNC: 10 MG/DL (ref 8.6–10.2)
CHLORIDE BLD-SCNC: 99 MEQ/L (ref 98–107)
CO2: 31 MEQ/L (ref 22–29)
CREAT SERPL-MCNC: 0.95 MG/DL (ref 0.5–0.9)
GFR AFRICAN AMERICAN: >60
GFR NON-AFRICAN AMERICAN: 56.1
GLUCOSE BLD-MCNC: 124 MG/DL (ref 74–109)
HBA1C MFR BLD: 7.3 % (ref 4.8–5.9)
HCT VFR BLD CALC: 43.6 % (ref 37–47)
HEMOGLOBIN: 14.9 G/DL (ref 12–16)
MCH RBC QN AUTO: 29.6 PG (ref 27–31.3)
MCHC RBC AUTO-ENTMCNC: 34.1 % (ref 33–37)
MCV RBC AUTO: 86.8 FL (ref 82–100)
PDW BLD-RTO: 14.6 % (ref 11.5–14.5)
PLATELET # BLD: 183 K/UL (ref 130–400)
POTASSIUM SERPL-SCNC: 5.5 MEQ/L (ref 3.5–5.1)
RBC # BLD: 5.03 M/UL (ref 4.2–5.4)
SODIUM BLD-SCNC: 144 MEQ/L (ref 132–144)
T4 FREE: 1.37 NG/DL (ref 0.93–1.7)
TSH SERPL DL<=0.05 MIU/L-ACNC: 2.78 UIU/ML (ref 0.27–4.2)
WBC # BLD: 8.4 K/UL (ref 4.8–10.8)

## 2018-04-10 ENCOUNTER — HOSPITAL ENCOUNTER (OUTPATIENT)
Dept: CARDIAC CATH/INVASIVE PROCEDURES | Age: 83
Setting detail: OUTPATIENT SURGERY
Discharge: HOME | End: 2018-04-10
Attending: INTERNAL MEDICINE | Admitting: INTERNAL MEDICINE
Payer: MEDICARE

## 2018-04-10 VITALS
RESPIRATION RATE: 17 BRPM | SYSTOLIC BLOOD PRESSURE: 101 MMHG | OXYGEN SATURATION: 96 % | HEART RATE: 80 BPM | DIASTOLIC BLOOD PRESSURE: 72 MMHG

## 2018-04-10 LAB
EKG ATRIAL RATE: 75 BPM
EKG Q-T INTERVAL: 488 MS
EKG QRS DURATION: 158 MS
EKG QTC CALCULATION (BAZETT): 541 MS
EKG R AXIS: 87 DEGREES
EKG T AXIS: 82 DEGREES
EKG VENTRICULAR RATE: 74 BPM
INR BLD: 3.1
PROTHROMBIN TIME: 32.8 SEC (ref 8.1–13.7)

## 2018-04-10 PROCEDURE — 2500000003 HC RX 250 WO HCPCS: Performed by: INTERNAL MEDICINE

## 2018-04-10 PROCEDURE — 6360000002 HC RX W HCPCS: Performed by: INTERNAL MEDICINE

## 2018-04-10 PROCEDURE — 93005 ELECTROCARDIOGRAM TRACING: CPT

## 2018-04-10 PROCEDURE — 92960 CARDIOVERSION ELECTRIC EXT: CPT | Performed by: INTERNAL MEDICINE

## 2018-04-10 PROCEDURE — 6360000002 HC RX W HCPCS

## 2018-04-10 PROCEDURE — 2580000003 HC RX 258: Performed by: INTERNAL MEDICINE

## 2018-04-10 PROCEDURE — 85610 PROTHROMBIN TIME: CPT

## 2018-04-10 RX ORDER — SODIUM CHLORIDE 0.9 % (FLUSH) 0.9 %
10 SYRINGE (ML) INJECTION PRN
Status: CANCELLED | OUTPATIENT
Start: 2018-04-10

## 2018-04-10 RX ORDER — DEXTROSE AND SODIUM CHLORIDE 5; .45 G/100ML; G/100ML
INJECTION, SOLUTION INTRAVENOUS CONTINUOUS
Status: DISCONTINUED | OUTPATIENT
Start: 2018-04-10 | End: 2018-04-10

## 2018-04-10 RX ORDER — MIDAZOLAM HYDROCHLORIDE 1 MG/ML
INJECTION INTRAMUSCULAR; INTRAVENOUS
Status: COMPLETED | OUTPATIENT
Start: 2018-04-10 | End: 2018-04-10

## 2018-04-10 RX ORDER — SODIUM CHLORIDE 0.9 % (FLUSH) 0.9 %
10 SYRINGE (ML) INJECTION EVERY 12 HOURS SCHEDULED
Status: CANCELLED | OUTPATIENT
Start: 2018-04-10

## 2018-04-10 RX ORDER — SOTALOL HYDROCHLORIDE 120 MG/1
120 TABLET ORAL 2 TIMES DAILY
Status: DISCONTINUED | OUTPATIENT
Start: 2018-04-10 | End: 2018-04-10 | Stop reason: HOSPADM

## 2018-04-10 RX ORDER — FENTANYL CITRATE 50 UG/ML
50 INJECTION, SOLUTION INTRAMUSCULAR; INTRAVENOUS ONCE
Status: DISCONTINUED | OUTPATIENT
Start: 2018-04-10 | End: 2018-04-10

## 2018-04-10 RX ORDER — SOTALOL HYDROCHLORIDE 120 MG/1
120 TABLET ORAL 2 TIMES DAILY
Qty: 60 TABLET | Refills: 3 | Status: SHIPPED | OUTPATIENT
Start: 2018-04-10

## 2018-04-10 RX ORDER — NALOXONE HYDROCHLORIDE 0.4 MG/ML
INJECTION, SOLUTION INTRAMUSCULAR; INTRAVENOUS; SUBCUTANEOUS
Status: DISCONTINUED
Start: 2018-04-10 | End: 2018-04-10

## 2018-04-10 RX ORDER — FLUMAZENIL 0.1 MG/ML
INJECTION, SOLUTION INTRAVENOUS
Status: COMPLETED | OUTPATIENT
Start: 2018-04-10 | End: 2018-04-10

## 2018-04-10 RX ORDER — MIDAZOLAM HYDROCHLORIDE 1 MG/ML
5 INJECTION INTRAMUSCULAR; INTRAVENOUS ONCE
Status: DISCONTINUED | OUTPATIENT
Start: 2018-04-10 | End: 2018-04-10

## 2018-04-10 RX ORDER — METOPROLOL SUCCINATE 50 MG/1
50 TABLET, EXTENDED RELEASE ORAL 2 TIMES DAILY
Status: DISCONTINUED | OUTPATIENT
Start: 2018-04-10 | End: 2018-04-10

## 2018-04-10 RX ORDER — FLUMAZENIL 0.1 MG/ML
INJECTION, SOLUTION INTRAVENOUS
Status: DISCONTINUED
Start: 2018-04-10 | End: 2018-04-10

## 2018-04-10 RX ORDER — METOPROLOL SUCCINATE 50 MG/1
50 TABLET, EXTENDED RELEASE ORAL 2 TIMES DAILY
Qty: 30 TABLET | Refills: 3 | Status: SHIPPED | OUTPATIENT
Start: 2018-04-10 | End: 2018-04-10 | Stop reason: HOSPADM

## 2018-04-10 RX ORDER — MAGNESIUM OXIDE 400 MG/1
400 TABLET ORAL DAILY
COMMUNITY

## 2018-04-10 RX ADMIN — FENTANYL CITRATE 50 MCG: 50 INJECTION, SOLUTION INTRAMUSCULAR; INTRAVENOUS at 10:34

## 2018-04-10 RX ADMIN — MIDAZOLAM HYDROCHLORIDE 2 MG: 1 INJECTION, SOLUTION INTRAMUSCULAR; INTRAVENOUS at 10:38

## 2018-04-10 RX ADMIN — MIDAZOLAM HYDROCHLORIDE 2 MG: 1 INJECTION, SOLUTION INTRAMUSCULAR; INTRAVENOUS at 10:34

## 2018-04-10 RX ADMIN — DEXTROSE AND SODIUM CHLORIDE: 5; 450 INJECTION, SOLUTION INTRAVENOUS at 09:11

## 2018-04-10 RX ADMIN — FLUMAZENIL 0.3 MG: 0.1 INJECTION, SOLUTION INTRAVENOUS at 10:47

## 2018-04-10 RX ADMIN — MIDAZOLAM HYDROCHLORIDE 1 MG: 1 INJECTION, SOLUTION INTRAMUSCULAR; INTRAVENOUS at 10:35

## 2018-04-13 PROCEDURE — 93010 ELECTROCARDIOGRAM REPORT: CPT | Performed by: INTERNAL MEDICINE

## 2018-04-16 RX ORDER — LEVOTHYROXINE SODIUM 0.07 MG/1
TABLET ORAL
Qty: 30 TABLET | Refills: 5 | Status: SHIPPED | OUTPATIENT
Start: 2018-04-16 | End: 2018-10-17 | Stop reason: SDUPTHER

## 2018-04-19 ENCOUNTER — HOSPITAL ENCOUNTER (OUTPATIENT)
Dept: PHARMACY | Age: 83
Setting detail: THERAPIES SERIES
Discharge: HOME | End: 2018-04-19
Payer: MEDICARE

## 2018-04-19 DIAGNOSIS — I48.91 ATRIAL FIBRILLATION, UNSPECIFIED TYPE (HCC): ICD-10-CM

## 2018-04-19 LAB
INR BLD: 3.1
PROTIME: 36.9 SECONDS

## 2018-04-19 PROCEDURE — 85610 PROTHROMBIN TIME: CPT | Performed by: PHARMACIST

## 2018-04-19 PROCEDURE — 99211 OFF/OP EST MAY X REQ PHY/QHP: CPT | Performed by: PHARMACIST

## 2018-04-24 ENCOUNTER — OFFICE VISIT (OUTPATIENT)
Dept: ENDOCRINOLOGY | Age: 83
End: 2018-04-24
Payer: MEDICARE

## 2018-04-24 VITALS
BODY MASS INDEX: 38.09 KG/M2 | WEIGHT: 207 LBS | HEART RATE: 72 BPM | HEIGHT: 62 IN | SYSTOLIC BLOOD PRESSURE: 122 MMHG | DIASTOLIC BLOOD PRESSURE: 78 MMHG

## 2018-04-24 DIAGNOSIS — E03.9 HYPOTHYROIDISM, UNSPECIFIED TYPE: ICD-10-CM

## 2018-04-24 LAB — GLUCOSE BLD-MCNC: 127 MG/DL

## 2018-04-24 PROCEDURE — 1036F TOBACCO NON-USER: CPT | Performed by: INTERNAL MEDICINE

## 2018-04-24 PROCEDURE — 4040F PNEUMOC VAC/ADMIN/RCVD: CPT | Performed by: INTERNAL MEDICINE

## 2018-04-24 PROCEDURE — G8427 DOCREV CUR MEDS BY ELIG CLIN: HCPCS | Performed by: INTERNAL MEDICINE

## 2018-04-24 PROCEDURE — 99213 OFFICE O/P EST LOW 20 MIN: CPT | Performed by: INTERNAL MEDICINE

## 2018-04-24 PROCEDURE — G8399 PT W/DXA RESULTS DOCUMENT: HCPCS | Performed by: INTERNAL MEDICINE

## 2018-04-24 PROCEDURE — 82962 GLUCOSE BLOOD TEST: CPT | Performed by: INTERNAL MEDICINE

## 2018-04-24 PROCEDURE — 1090F PRES/ABSN URINE INCON ASSESS: CPT | Performed by: INTERNAL MEDICINE

## 2018-04-24 PROCEDURE — G8417 CALC BMI ABV UP PARAM F/U: HCPCS | Performed by: INTERNAL MEDICINE

## 2018-04-24 PROCEDURE — 1123F ACP DISCUSS/DSCN MKR DOCD: CPT | Performed by: INTERNAL MEDICINE

## 2018-04-24 RX ORDER — METOPROLOL SUCCINATE 50 MG/1
100 TABLET, EXTENDED RELEASE ORAL 2 TIMES DAILY
COMMUNITY

## 2018-05-17 ENCOUNTER — HOSPITAL ENCOUNTER (OUTPATIENT)
Dept: PHARMACY | Age: 83
Setting detail: THERAPIES SERIES
Discharge: HOME | End: 2018-05-17
Payer: MEDICARE

## 2018-05-17 DIAGNOSIS — I48.91 ATRIAL FIBRILLATION, UNSPECIFIED TYPE (HCC): ICD-10-CM

## 2018-05-17 LAB
INR BLD: 2.5
PROTIME: 30.3 SECONDS

## 2018-05-17 PROCEDURE — 99211 OFF/OP EST MAY X REQ PHY/QHP: CPT

## 2018-05-17 PROCEDURE — 85610 PROTHROMBIN TIME: CPT

## 2018-06-06 ENCOUNTER — OFFICE VISIT (OUTPATIENT)
Dept: INTERNAL MEDICINE CLINIC | Age: 83
End: 2018-06-06
Payer: MEDICARE

## 2018-06-06 VITALS
OXYGEN SATURATION: 95 % | DIASTOLIC BLOOD PRESSURE: 72 MMHG | SYSTOLIC BLOOD PRESSURE: 114 MMHG | TEMPERATURE: 98 F | HEART RATE: 73 BPM

## 2018-06-06 DIAGNOSIS — I48.91 ATRIAL FIBRILLATION, UNSPECIFIED TYPE (HCC): ICD-10-CM

## 2018-06-06 DIAGNOSIS — Z91.81 AT HIGH RISK FOR FALLS: Primary | ICD-10-CM

## 2018-06-06 DIAGNOSIS — N18.30 TYPE 2 DIABETES MELLITUS WITH STAGE 3 CHRONIC KIDNEY DISEASE, WITH LONG-TERM CURRENT USE OF INSULIN (HCC): ICD-10-CM

## 2018-06-06 DIAGNOSIS — E11.22 TYPE 2 DIABETES MELLITUS WITH STAGE 3 CHRONIC KIDNEY DISEASE, WITH LONG-TERM CURRENT USE OF INSULIN (HCC): ICD-10-CM

## 2018-06-06 DIAGNOSIS — Z79.4 TYPE 2 DIABETES MELLITUS WITH STAGE 3 CHRONIC KIDNEY DISEASE, WITH LONG-TERM CURRENT USE OF INSULIN (HCC): ICD-10-CM

## 2018-06-06 DIAGNOSIS — I10 ESSENTIAL HYPERTENSION: ICD-10-CM

## 2018-06-06 PROCEDURE — 4040F PNEUMOC VAC/ADMIN/RCVD: CPT | Performed by: PHYSICIAN ASSISTANT

## 2018-06-06 PROCEDURE — G8417 CALC BMI ABV UP PARAM F/U: HCPCS | Performed by: PHYSICIAN ASSISTANT

## 2018-06-06 PROCEDURE — G8399 PT W/DXA RESULTS DOCUMENT: HCPCS | Performed by: PHYSICIAN ASSISTANT

## 2018-06-06 PROCEDURE — G8427 DOCREV CUR MEDS BY ELIG CLIN: HCPCS | Performed by: PHYSICIAN ASSISTANT

## 2018-06-06 PROCEDURE — 1090F PRES/ABSN URINE INCON ASSESS: CPT | Performed by: PHYSICIAN ASSISTANT

## 2018-06-06 PROCEDURE — 1123F ACP DISCUSS/DSCN MKR DOCD: CPT | Performed by: PHYSICIAN ASSISTANT

## 2018-06-06 PROCEDURE — 1036F TOBACCO NON-USER: CPT | Performed by: PHYSICIAN ASSISTANT

## 2018-06-06 PROCEDURE — 99214 OFFICE O/P EST MOD 30 MIN: CPT | Performed by: PHYSICIAN ASSISTANT

## 2018-06-06 ASSESSMENT — ENCOUNTER SYMPTOMS
BACK PAIN: 1
HEARTBURN: 0
COUGH: 1
BLURRED VISION: 0
CONSTIPATION: 0
WHEEZING: 0
NAUSEA: 0
SHORTNESS OF BREATH: 0
SORE THROAT: 0
VOMITING: 0
ABDOMINAL PAIN: 0
DIARRHEA: 0

## 2018-06-06 ASSESSMENT — PATIENT HEALTH QUESTIONNAIRE - PHQ9
2. FEELING DOWN, DEPRESSED OR HOPELESS: 0
SUM OF ALL RESPONSES TO PHQ QUESTIONS 1-9: 1
1. LITTLE INTEREST OR PLEASURE IN DOING THINGS: 1
SUM OF ALL RESPONSES TO PHQ9 QUESTIONS 1 & 2: 1

## 2018-06-14 ENCOUNTER — HOSPITAL ENCOUNTER (OUTPATIENT)
Dept: PHARMACY | Age: 83
Setting detail: THERAPIES SERIES
Discharge: HOME | End: 2018-06-14
Payer: MEDICARE

## 2018-06-14 DIAGNOSIS — I48.91 ATRIAL FIBRILLATION, UNSPECIFIED TYPE (HCC): ICD-10-CM

## 2018-06-14 LAB
INR BLD: 2.4
PROTIME: 28.8 SECONDS

## 2018-06-14 PROCEDURE — 99211 OFF/OP EST MAY X REQ PHY/QHP: CPT

## 2018-06-14 PROCEDURE — 85610 PROTHROMBIN TIME: CPT

## 2018-06-22 RX ORDER — METOPROLOL SUCCINATE 50 MG/1
50 TABLET, EXTENDED RELEASE ORAL 2 TIMES DAILY
Qty: 30 TABLET | OUTPATIENT
Start: 2018-06-22

## 2018-06-29 ENCOUNTER — HOSPITAL ENCOUNTER (OUTPATIENT)
Dept: CARDIOLOGY | Age: 83
Discharge: HOME | End: 2018-06-29
Payer: MEDICARE

## 2018-06-29 PROCEDURE — 93296 REM INTERROG EVL PM/IDS: CPT

## 2018-07-17 DIAGNOSIS — E03.9 HYPOTHYROIDISM, UNSPECIFIED TYPE: ICD-10-CM

## 2018-07-17 LAB
ANION GAP SERPL CALCULATED.3IONS-SCNC: 13 MEQ/L (ref 7–13)
BUN BLDV-MCNC: 26 MG/DL (ref 8–23)
CALCIUM SERPL-MCNC: 9.3 MG/DL (ref 8.6–10.2)
CHLORIDE BLD-SCNC: 99 MEQ/L (ref 98–107)
CO2: 28 MEQ/L (ref 22–29)
CREAT SERPL-MCNC: 1.16 MG/DL (ref 0.5–0.9)
GFR AFRICAN AMERICAN: 53.8
GFR NON-AFRICAN AMERICAN: 44.5
GLUCOSE BLD-MCNC: 206 MG/DL (ref 74–109)
HBA1C MFR BLD: 7.9 % (ref 4.8–5.9)
HCT VFR BLD CALC: 44.5 % (ref 37–47)
HEMOGLOBIN: 14.9 G/DL (ref 12–16)
MCH RBC QN AUTO: 29.6 PG (ref 27–31.3)
MCHC RBC AUTO-ENTMCNC: 33.5 % (ref 33–37)
MCV RBC AUTO: 88.3 FL (ref 82–100)
PDW BLD-RTO: 14.6 % (ref 11.5–14.5)
PLATELET # BLD: 207 K/UL (ref 130–400)
POTASSIUM SERPL-SCNC: 4.3 MEQ/L (ref 3.5–5.1)
RBC # BLD: 5.04 M/UL (ref 4.2–5.4)
SODIUM BLD-SCNC: 140 MEQ/L (ref 132–144)
T4 FREE: 1.36 NG/DL (ref 0.93–1.7)
TSH SERPL DL<=0.05 MIU/L-ACNC: 2.62 UIU/ML (ref 0.27–4.2)
WBC # BLD: 9.2 K/UL (ref 4.8–10.8)

## 2018-07-24 ENCOUNTER — OFFICE VISIT (OUTPATIENT)
Dept: ENDOCRINOLOGY | Age: 83
End: 2018-07-24
Payer: MEDICARE

## 2018-07-24 VITALS
SYSTOLIC BLOOD PRESSURE: 114 MMHG | WEIGHT: 206 LBS | DIASTOLIC BLOOD PRESSURE: 68 MMHG | HEART RATE: 74 BPM | HEIGHT: 62 IN | BODY MASS INDEX: 37.91 KG/M2

## 2018-07-24 DIAGNOSIS — E03.9 HYPOTHYROIDISM, UNSPECIFIED TYPE: Primary | ICD-10-CM

## 2018-07-24 DIAGNOSIS — B35.1 ONYCHOMYCOSIS: ICD-10-CM

## 2018-07-24 LAB — GLUCOSE BLD-MCNC: 159 MG/DL

## 2018-07-24 PROCEDURE — 99213 OFFICE O/P EST LOW 20 MIN: CPT | Performed by: INTERNAL MEDICINE

## 2018-07-24 PROCEDURE — 4040F PNEUMOC VAC/ADMIN/RCVD: CPT | Performed by: INTERNAL MEDICINE

## 2018-07-24 PROCEDURE — 1090F PRES/ABSN URINE INCON ASSESS: CPT | Performed by: INTERNAL MEDICINE

## 2018-07-24 PROCEDURE — 1123F ACP DISCUSS/DSCN MKR DOCD: CPT | Performed by: INTERNAL MEDICINE

## 2018-07-24 PROCEDURE — 82962 GLUCOSE BLOOD TEST: CPT | Performed by: INTERNAL MEDICINE

## 2018-07-24 PROCEDURE — 1036F TOBACCO NON-USER: CPT | Performed by: INTERNAL MEDICINE

## 2018-07-24 PROCEDURE — G8427 DOCREV CUR MEDS BY ELIG CLIN: HCPCS | Performed by: INTERNAL MEDICINE

## 2018-07-24 PROCEDURE — G8399 PT W/DXA RESULTS DOCUMENT: HCPCS | Performed by: INTERNAL MEDICINE

## 2018-07-24 PROCEDURE — 1101F PT FALLS ASSESS-DOCD LE1/YR: CPT | Performed by: INTERNAL MEDICINE

## 2018-07-24 PROCEDURE — G8417 CALC BMI ABV UP PARAM F/U: HCPCS | Performed by: INTERNAL MEDICINE

## 2018-07-24 NOTE — PROGRESS NOTES
Free    TSH without Reflex   2. Uncontrolled type 2 diabetes mellitus without complication, with long-term current use of insulin ([facility])  POCT Glucose    Basic Metabolic Panel    Hemoglobin A1C   3.  Onychomycosis             Plan:        Orders Placed This Encounter   Procedures    Basic Metabolic Panel     Standing Status:   Future     Standing Expiration Date:   7/24/2019    Hemoglobin A1C     Standing Status:   Future     Standing Expiration Date:   7/24/2019    T4, Free     Standing Status:   Future     Standing Expiration Date:   7/24/2019    TSH without Reflex     Standing Status:   Future     Standing Expiration Date:   7/24/2019    POCT Glucose       Orders Placed This Encounter   Procedures    POCT Glucose     continue current dose of lantus, Humalog plus synthroid

## 2018-07-26 ENCOUNTER — HOSPITAL ENCOUNTER (OUTPATIENT)
Dept: PHARMACY | Age: 83
Setting detail: THERAPIES SERIES
Discharge: HOME | End: 2018-07-26
Payer: MEDICARE

## 2018-07-26 DIAGNOSIS — I48.91 ATRIAL FIBRILLATION, UNSPECIFIED TYPE (HCC): ICD-10-CM

## 2018-07-26 LAB
INR BLD: 3.5
PROTIME: 42.1 SECONDS

## 2018-07-26 PROCEDURE — 99211 OFF/OP EST MAY X REQ PHY/QHP: CPT

## 2018-07-26 PROCEDURE — 85610 PROTHROMBIN TIME: CPT

## 2018-08-09 ENCOUNTER — HOSPITAL ENCOUNTER (OUTPATIENT)
Dept: PHARMACY | Age: 83
Setting detail: THERAPIES SERIES
Discharge: HOME | End: 2018-08-09
Payer: MEDICARE

## 2018-08-09 DIAGNOSIS — I48.91 ATRIAL FIBRILLATION, UNSPECIFIED TYPE (HCC): ICD-10-CM

## 2018-08-09 LAB
INR BLD: 3.1
PROTIME: 36.8 SECONDS

## 2018-08-09 PROCEDURE — 85610 PROTHROMBIN TIME: CPT

## 2018-08-09 PROCEDURE — 99211 OFF/OP EST MAY X REQ PHY/QHP: CPT

## 2018-08-09 RX ORDER — ALENDRONATE SODIUM 70 MG/1
TABLET ORAL
Qty: 12 TABLET | Refills: 1 | Status: SHIPPED | OUTPATIENT
Start: 2018-08-09 | End: 2019-01-30 | Stop reason: SDUPTHER

## 2018-08-09 NOTE — PROGRESS NOTES
Ms. Vicky Lee is a 80 y.o. y/o female with history of Afib who presents today for anticoagulation monitoring and adjustment.   INR 3.1 is slightly supra-therapeutic for this patient (goal range 2.0-3.0) and is reflective of 25 mg TWD  Patient verifies current dosing regimen, patient able to verbally recall dose  Patient reports 0 missed doses since last INR   Patient denies s/sx clotting and/or stroke  Patient denies hematuria, epistaxis, rectal bleeding  Patient denies changes in diet, alcohol, or tobacco use  Reviewed medication list and drug allergies with patient, updated any medication additions or modifications accordingly  Patient also denies any pending medical or dental procedures scheduled at this time  Due to 2 elevated INRs patient was instructed to reduce 22.5 mg TWD (reduction of 10%) and RTC 2 weeks

## 2018-08-15 ENCOUNTER — TELEPHONE (OUTPATIENT)
Dept: ENDOCRINOLOGY | Age: 83
End: 2018-08-15

## 2018-08-15 RX ORDER — WARFARIN SODIUM 5 MG/1
TABLET ORAL
Qty: 90 TABLET | Refills: 1 | Status: SHIPPED | OUTPATIENT
Start: 2018-08-15 | End: 2019-05-15 | Stop reason: SDUPTHER

## 2018-08-16 ENCOUNTER — TELEPHONE (OUTPATIENT)
Dept: PHARMACY | Age: 83
End: 2018-08-16

## 2018-08-23 ENCOUNTER — HOSPITAL ENCOUNTER (OUTPATIENT)
Dept: PHARMACY | Age: 83
Setting detail: THERAPIES SERIES
Discharge: HOME | End: 2018-08-23
Payer: MEDICARE

## 2018-08-23 DIAGNOSIS — I48.91 ATRIAL FIBRILLATION, UNSPECIFIED TYPE (HCC): ICD-10-CM

## 2018-08-23 LAB
INR BLD: 2.4
PROTIME: 29.3 SECONDS

## 2018-08-23 PROCEDURE — 99211 OFF/OP EST MAY X REQ PHY/QHP: CPT

## 2018-08-23 PROCEDURE — 85610 PROTHROMBIN TIME: CPT

## 2018-09-04 ENCOUNTER — OFFICE VISIT (OUTPATIENT)
Dept: INTERNAL MEDICINE CLINIC | Age: 83
End: 2018-09-04
Payer: MEDICARE

## 2018-09-04 VITALS
DIASTOLIC BLOOD PRESSURE: 70 MMHG | TEMPERATURE: 98.1 F | HEART RATE: 86 BPM | WEIGHT: 208.6 LBS | OXYGEN SATURATION: 98 % | BODY MASS INDEX: 38.39 KG/M2 | SYSTOLIC BLOOD PRESSURE: 126 MMHG | RESPIRATION RATE: 16 BRPM | HEIGHT: 62 IN

## 2018-09-04 DIAGNOSIS — Z23 NEED FOR SHINGLES VACCINE: ICD-10-CM

## 2018-09-04 DIAGNOSIS — I50.42 HEART FAILURE, SYSTOLIC AND DIASTOLIC, CHRONIC (HCC): ICD-10-CM

## 2018-09-04 DIAGNOSIS — E78.2 MIXED HYPERLIPIDEMIA: ICD-10-CM

## 2018-09-04 DIAGNOSIS — I50.9 CHRONIC CONGESTIVE HEART FAILURE, UNSPECIFIED HEART FAILURE TYPE (HCC): ICD-10-CM

## 2018-09-04 DIAGNOSIS — I10 ESSENTIAL HYPERTENSION: Primary | ICD-10-CM

## 2018-09-04 PROCEDURE — G8417 CALC BMI ABV UP PARAM F/U: HCPCS | Performed by: FAMILY MEDICINE

## 2018-09-04 PROCEDURE — 1101F PT FALLS ASSESS-DOCD LE1/YR: CPT | Performed by: FAMILY MEDICINE

## 2018-09-04 PROCEDURE — 99213 OFFICE O/P EST LOW 20 MIN: CPT | Performed by: FAMILY MEDICINE

## 2018-09-04 PROCEDURE — 1036F TOBACCO NON-USER: CPT | Performed by: FAMILY MEDICINE

## 2018-09-04 PROCEDURE — 1090F PRES/ABSN URINE INCON ASSESS: CPT | Performed by: FAMILY MEDICINE

## 2018-09-04 PROCEDURE — G8399 PT W/DXA RESULTS DOCUMENT: HCPCS | Performed by: FAMILY MEDICINE

## 2018-09-04 PROCEDURE — 4040F PNEUMOC VAC/ADMIN/RCVD: CPT | Performed by: FAMILY MEDICINE

## 2018-09-04 PROCEDURE — 1123F ACP DISCUSS/DSCN MKR DOCD: CPT | Performed by: FAMILY MEDICINE

## 2018-09-04 PROCEDURE — G8427 DOCREV CUR MEDS BY ELIG CLIN: HCPCS | Performed by: FAMILY MEDICINE

## 2018-09-04 RX ORDER — PANTOPRAZOLE SODIUM 40 MG/1
TABLET, DELAYED RELEASE ORAL
Qty: 90 TABLET | Refills: 3 | Status: SHIPPED | OUTPATIENT
Start: 2018-09-04 | End: 2019-08-13 | Stop reason: SDUPTHER

## 2018-09-04 RX ORDER — ACETAMINOPHEN 325 MG/1
650 TABLET ORAL EVERY 6 HOURS PRN
Status: ON HOLD | COMMUNITY
End: 2020-11-23 | Stop reason: ALTCHOICE

## 2018-09-04 RX ORDER — ISOSORBIDE MONONITRATE 60 MG/1
60 TABLET, EXTENDED RELEASE ORAL DAILY
Qty: 90 TABLET | Refills: 3 | Status: SHIPPED | OUTPATIENT
Start: 2018-09-04 | End: 2019-08-13 | Stop reason: SDUPTHER

## 2018-09-04 NOTE — PROGRESS NOTES
Insulin Pen Needle (NOVOFINE) 32G X 6 MM MISC use four times a day 300 each 3    zoster recombinant adjuvanted vaccine (SHINGRIX) 50 MCG SUSR injection Inject 0.5 mLs into the muscle once for 1 dose 0.5 mL 1    warfarin (COUMADIN) 5 MG tablet Take as directed by [facility]. Quantity equals 90 day supply. 90 tablet 1    alendronate (FOSAMAX) 70 MG tablet take 1 tablet by mouth every week on an empty stomach with 6 oz of water. No food / meds for AN HOUR. Remain Upright 12 tablet 1    insulin glargine (LANTUS SOLOSTAR) 100 UNIT/ML injection pen inject 30 units subcutaneously every morning 5 pen 3    metoprolol succinate (TOPROL XL) 50 MG extended release tablet Take 50 mg by mouth 2 times daily      levothyroxine (SYNTHROID) 75 MCG tablet take 1 tablet by mouth once daily 30 tablet 5    magnesium oxide (MAG-OX) 400 MG tablet Take 400 mg by mouth daily      sotalol (BETAPACE) 120 MG tablet Take 1 tablet by mouth 2 times daily 60 tablet 3    Alcohol Swabs PADS by Does not apply route      glucose blood VI test strips (FREESTYLE INSULINX TEST) strip TEST FIVE TIMES DAILy Dx E11.65 200 each 3    HUMALOG KWIKPEN 100 UNIT/ML pen inject 4 unit subcutaneously IF GLUCOSE  6 UNITS -200 8 UNITS -571 10 UNITS -277 12 UNITS -360 14 UNITS -4 30 Pen 3    NYAMYC 488392 UNIT/GM powder apply to affected area three times a day  0    pravastatin (PRAVACHOL) 40 MG tablet Take 1 tablet by mouth daily 90 tablet 3    telmisartan (MICARDIS) 40 MG tablet Take 1 tablet by mouth daily 30 tablet 11    meclizine (ANTIVERT) 12.5 MG tablet Take 12.5 mg by mouth 3 times daily as needed      FREESTYLE LANCETS MISC Check blood sugars three times daily.  Dx: E11.65 IDDM 300 each 6    albuterol (PROVENTIL HFA;VENTOLIN HFA) 108 (90 BASE) MCG/ACT inhaler [place] inhalations three times a day for 5 days then as needed for cough or SOB (Patient taking differently: Inhale 2

## 2018-09-13 ENCOUNTER — HOSPITAL ENCOUNTER (OUTPATIENT)
Dept: PHARMACY | Age: 83
Setting detail: THERAPIES SERIES
Discharge: HOME | End: 2018-09-13
Payer: MEDICARE

## 2018-09-13 DIAGNOSIS — I48.91 ATRIAL FIBRILLATION, UNSPECIFIED TYPE (HCC): ICD-10-CM

## 2018-09-13 LAB
INR BLD: 3.2
PROTIME: 38.9 SECONDS

## 2018-09-13 PROCEDURE — 85610 PROTHROMBIN TIME: CPT

## 2018-09-13 PROCEDURE — 99211 OFF/OP EST MAY X REQ PHY/QHP: CPT

## 2018-09-13 NOTE — PROGRESS NOTES
Ms. Lilian Enriquez is a 80 y.o. y/o female with history of Afib who presents today for anticoagulation monitoring and adjustment.   INR 3.2 is sl supra-therapeutic for this patient (goal range 2-3) and is reflective of 22.5 mg TWD  Patient verifies current dosing regimen, patient able to verbally recall dose  Patient reports 0  missed doses since last INR   Patient denies s/sx clotting and/or stroke  Patient denies hematuria, epistaxis, rectal bleeding  Patient denies changes in diet, alcohol, or tobacco use  Reviewed medication list and drug allergies with patient, updated any medication additions or modifications accordingly  Patient also denies any pending medical or dental procedures scheduled at this time  Patient was instructed to hold today then resume 22.5 mg TWD and RTC 3 weeks

## 2018-09-28 ENCOUNTER — HOSPITAL ENCOUNTER (OUTPATIENT)
Dept: CARDIOLOGY | Age: 83
Discharge: HOME | End: 2018-09-28
Payer: MEDICARE

## 2018-09-28 PROCEDURE — 93280 PM DEVICE PROGR EVAL DUAL: CPT

## 2018-10-04 ENCOUNTER — HOSPITAL ENCOUNTER (OUTPATIENT)
Dept: PHARMACY | Age: 83
Setting detail: THERAPIES SERIES
Discharge: HOME | End: 2018-10-04
Payer: MEDICARE

## 2018-10-04 DIAGNOSIS — I48.91 ATRIAL FIBRILLATION, UNSPECIFIED TYPE (HCC): ICD-10-CM

## 2018-10-04 LAB
INR BLD: 2.9
PROTIME: 34.5 SECONDS

## 2018-10-04 PROCEDURE — 99211 OFF/OP EST MAY X REQ PHY/QHP: CPT

## 2018-10-04 PROCEDURE — 85610 PROTHROMBIN TIME: CPT

## 2018-10-16 ENCOUNTER — HOSPITAL ENCOUNTER (EMERGENCY)
Age: 83
Discharge: HOME | End: 2018-10-16
Attending: EMERGENCY MEDICINE
Payer: MEDICARE

## 2018-10-16 ENCOUNTER — APPOINTMENT (OUTPATIENT)
Dept: CT IMAGING | Age: 83
End: 2018-10-16
Payer: MEDICARE

## 2018-10-16 VITALS
TEMPERATURE: 98.4 F | WEIGHT: 207 LBS | BODY MASS INDEX: 38.09 KG/M2 | DIASTOLIC BLOOD PRESSURE: 99 MMHG | HEART RATE: 80 BPM | RESPIRATION RATE: 22 BRPM | OXYGEN SATURATION: 93 % | SYSTOLIC BLOOD PRESSURE: 111 MMHG | HEIGHT: 62 IN

## 2018-10-16 DIAGNOSIS — R11.2 NAUSEA VOMITING AND DIARRHEA: Primary | ICD-10-CM

## 2018-10-16 DIAGNOSIS — R19.7 NAUSEA VOMITING AND DIARRHEA: Primary | ICD-10-CM

## 2018-10-16 LAB
ALBUMIN SERPL-MCNC: 3.8 G/DL (ref 3.9–4.9)
ALP BLD-CCNC: 59 U/L (ref 40–130)
ALT SERPL-CCNC: 11 U/L (ref 0–33)
ANION GAP SERPL CALCULATED.3IONS-SCNC: 15 MEQ/L (ref 7–13)
AST SERPL-CCNC: 16 U/L (ref 0–35)
BACTERIA: ABNORMAL /HPF
BASOPHILS ABSOLUTE: 0.1 K/UL (ref 0–0.2)
BASOPHILS RELATIVE PERCENT: 0.4 %
BILIRUB SERPL-MCNC: 1.1 MG/DL (ref 0–1.2)
BILIRUBIN URINE: NEGATIVE
BLOOD, URINE: NEGATIVE
BUN BLDV-MCNC: 19 MG/DL (ref 8–23)
CALCIUM SERPL-MCNC: 9.1 MG/DL (ref 8.6–10.2)
CHLORIDE BLD-SCNC: 99 MEQ/L (ref 98–107)
CLARITY: CLEAR
CO2: 23 MEQ/L (ref 22–29)
COLOR: YELLOW
CREAT SERPL-MCNC: 0.82 MG/DL (ref 0.5–0.9)
EOSINOPHILS ABSOLUTE: 0.1 K/UL (ref 0–0.7)
EOSINOPHILS RELATIVE PERCENT: 0.8 %
EPITHELIAL CELLS, UA: ABNORMAL /HPF
GFR AFRICAN AMERICAN: >60
GFR NON-AFRICAN AMERICAN: >60
GLOBULIN: 3.5 G/DL (ref 2.3–3.5)
GLUCOSE BLD-MCNC: 194 MG/DL (ref 74–109)
GLUCOSE URINE: NEGATIVE MG/DL
HCT VFR BLD CALC: 43.2 % (ref 37–47)
HEMOGLOBIN: 14.6 G/DL (ref 12–16)
KETONES, URINE: NEGATIVE MG/DL
LEUKOCYTE ESTERASE, URINE: ABNORMAL
LIPASE: 21 U/L (ref 13–60)
LYMPHOCYTES ABSOLUTE: 1.4 K/UL (ref 1–4.8)
LYMPHOCYTES RELATIVE PERCENT: 11.1 %
MCH RBC QN AUTO: 29.1 PG (ref 27–31.3)
MCHC RBC AUTO-ENTMCNC: 33.7 % (ref 33–37)
MCV RBC AUTO: 86.1 FL (ref 82–100)
MONOCYTES ABSOLUTE: 1.1 K/UL (ref 0.2–0.8)
MONOCYTES RELATIVE PERCENT: 8.9 %
NEUTROPHILS ABSOLUTE: 10 K/UL (ref 1.4–6.5)
NEUTROPHILS RELATIVE PERCENT: 78.8 %
NITRITE, URINE: NEGATIVE
PDW BLD-RTO: 14.3 % (ref 11.5–14.5)
PH UA: 5 (ref 5–9)
PLATELET # BLD: 174 K/UL (ref 130–400)
POTASSIUM SERPL-SCNC: 4.2 MEQ/L (ref 3.5–5.1)
PROTEIN UA: NEGATIVE MG/DL
RBC # BLD: 5.01 M/UL (ref 4.2–5.4)
RBC UA: ABNORMAL /HPF (ref 0–2)
SODIUM BLD-SCNC: 137 MEQ/L (ref 132–144)
SPECIFIC GRAVITY UA: 1.01 (ref 1–1.03)
TOTAL PROTEIN: 7.3 G/DL (ref 6.4–8.1)
URINE REFLEX TO CULTURE: YES
UROBILINOGEN, URINE: 0.2 E.U./DL
WBC # BLD: 12.7 K/UL (ref 4.8–10.8)
WBC UA: ABNORMAL /HPF (ref 0–5)

## 2018-10-16 PROCEDURE — 36415 COLL VENOUS BLD VENIPUNCTURE: CPT

## 2018-10-16 PROCEDURE — 2580000003 HC RX 258: Performed by: EMERGENCY MEDICINE

## 2018-10-16 PROCEDURE — 6360000002 HC RX W HCPCS: Performed by: EMERGENCY MEDICINE

## 2018-10-16 PROCEDURE — 6370000000 HC RX 637 (ALT 250 FOR IP): Performed by: EMERGENCY MEDICINE

## 2018-10-16 PROCEDURE — 81001 URINALYSIS AUTO W/SCOPE: CPT

## 2018-10-16 PROCEDURE — 96374 THER/PROPH/DIAG INJ IV PUSH: CPT

## 2018-10-16 PROCEDURE — 74177 CT ABD & PELVIS W/CONTRAST: CPT

## 2018-10-16 PROCEDURE — 6360000004 HC RX CONTRAST MEDICATION: Performed by: EMERGENCY MEDICINE

## 2018-10-16 PROCEDURE — 87086 URINE CULTURE/COLONY COUNT: CPT

## 2018-10-16 PROCEDURE — 83690 ASSAY OF LIPASE: CPT

## 2018-10-16 PROCEDURE — 80053 COMPREHEN METABOLIC PANEL: CPT

## 2018-10-16 PROCEDURE — 87186 SC STD MICRODIL/AGAR DIL: CPT

## 2018-10-16 PROCEDURE — 87077 CULTURE AEROBIC IDENTIFY: CPT

## 2018-10-16 PROCEDURE — 99284 EMERGENCY DEPT VISIT MOD MDM: CPT

## 2018-10-16 PROCEDURE — 85025 COMPLETE CBC W/AUTO DIFF WBC: CPT

## 2018-10-16 RX ORDER — ONDANSETRON 2 MG/ML
4 INJECTION INTRAMUSCULAR; INTRAVENOUS ONCE
Status: COMPLETED | OUTPATIENT
Start: 2018-10-16 | End: 2018-10-16

## 2018-10-16 RX ORDER — ONDANSETRON 4 MG/1
4 TABLET, ORALLY DISINTEGRATING ORAL EVERY 8 HOURS PRN
Qty: 20 TABLET | Refills: 0 | Status: SHIPPED | OUTPATIENT
Start: 2018-10-16 | End: 2019-03-12 | Stop reason: ALTCHOICE

## 2018-10-16 RX ORDER — MECLIZINE HCL 12.5 MG/1
12.5 TABLET ORAL 3 TIMES DAILY PRN
Qty: 20 TABLET | Refills: 0 | Status: SHIPPED | OUTPATIENT
Start: 2018-10-16 | End: 2018-10-26

## 2018-10-16 RX ORDER — ACETAMINOPHEN 500 MG
1000 TABLET ORAL ONCE
Status: COMPLETED | OUTPATIENT
Start: 2018-10-16 | End: 2018-10-16

## 2018-10-16 RX ORDER — LOPERAMIDE HYDROCHLORIDE 2 MG/1
2 CAPSULE ORAL ONCE
Status: COMPLETED | OUTPATIENT
Start: 2018-10-16 | End: 2018-10-16

## 2018-10-16 RX ORDER — 0.9 % SODIUM CHLORIDE 0.9 %
1000 INTRAVENOUS SOLUTION INTRAVENOUS ONCE
Status: COMPLETED | OUTPATIENT
Start: 2018-10-16 | End: 2018-10-16

## 2018-10-16 RX ADMIN — ACETAMINOPHEN 1000 MG: 500 TABLET ORAL at 08:54

## 2018-10-16 RX ADMIN — LOPERAMIDE HYDROCHLORIDE 2 MG: 2 CAPSULE ORAL at 08:40

## 2018-10-16 RX ADMIN — IOPAMIDOL 100 ML: 755 INJECTION, SOLUTION INTRAVENOUS at 09:51

## 2018-10-16 RX ADMIN — ONDANSETRON 4 MG: 2 INJECTION INTRAMUSCULAR; INTRAVENOUS at 08:41

## 2018-10-16 RX ADMIN — SODIUM CHLORIDE 1000 ML: 9 INJECTION, SOLUTION INTRAVENOUS at 08:41

## 2018-10-16 ASSESSMENT — ENCOUNTER SYMPTOMS
EYE PAIN: 0
CHEST TIGHTNESS: 0
ABDOMINAL PAIN: 0
NAUSEA: 1
VOMITING: 0
SHORTNESS OF BREATH: 0
DIARRHEA: 1
SORE THROAT: 0

## 2018-10-16 ASSESSMENT — PAIN SCALES - GENERAL: PAINLEVEL_OUTOF10: 7

## 2018-10-16 NOTE — ED PROVIDER NOTES
(SYNTHROID) 75 MCG TABLET    take 1 tablet by mouth once daily    MAGNESIUM OXIDE (MAG-OX) 400 MG TABLET    Take 400 mg by mouth daily    MECLIZINE (ANTIVERT) 12.5 MG TABLET    Take 12.5 mg by mouth 3 times daily as needed    METOPROLOL SUCCINATE (TOPROL XL) 50 MG EXTENDED RELEASE TABLET    Take 50 mg by mouth 2 times daily    MULTIPLE VITAMINS-MINERALS (CENTRUM SILVER PO)    Take  by mouth daily. NITROGLYCERIN (NITROSTAT) 0.4 MG SL TABLET    Place 1 tablet under the tongue every 5 minutes as needed. [id number] [id number] UNIT/GM POWDER    apply to affected area three times a day    PANTOPRAZOLE (PROTONIX) 40 MG TABLET    take 1 tablet by mouth once daily    PRAVASTATIN (PRAVACHOL) 40 MG TABLET    Take 1 tablet by mouth daily    SOTALOL (BETAPACE) 120 MG TABLET    Take 1 tablet by mouth 2 times daily    TELMISARTAN (MICARDIS) 40 MG TABLET    Take 1 tablet by mouth daily    WARFARIN (COUMADIN) 5 MG TABLET    Take as directed by [facility] AT [facility]. Quantity equals 90 day supply. ALLERGIES     Avelox [moxifloxacin hcl in nacl]; Penicillins; and Adhesive tape    FAMILY HISTORY       Family History   Problem Relation Age of Onset    Arthritis Other     Diabetes Other     Heart Disease Other           SOCIAL HISTORY       Social History     Social History    Marital status:       Spouse name: N/A    Number of children: N/A    Years of education: N/A     Social History Main Topics    Smoking status: Never Smoker    Smokeless tobacco: Never Used    Alcohol use No      Comment: denies    Drug use: No    Sexual activity: Not Currently     Other Topics Concern    None     Social History Narrative    None       SCREENINGS    Silver Coma Scale  Eye Opening: Spontaneous  Best Verbal Response: Oriented  Best Motor Response: Obeys commands  Silver Coma Scale Score: 15 @FLOW([id number])@      PHYSICAL EXAM    (up to 7 for level 4, 8 or more for level 5)     ED Triage Vitals words are mis-transcribed.)    Ulisses Orosco DO (electronically signed)  Attending Emergency Physician          Ulisses Orosco DO  10/16/18 1028

## 2018-10-17 RX ORDER — LEVOTHYROXINE SODIUM 0.07 MG/1
TABLET ORAL
Qty: 30 TABLET | Refills: 5 | Status: ON HOLD | OUTPATIENT
Start: 2018-10-17 | End: 2018-11-28

## 2018-10-17 RX ORDER — LEVOTHYROXINE SODIUM 0.07 MG/1
TABLET ORAL
Qty: 30 TABLET | Refills: 5 | Status: SHIPPED | OUTPATIENT
Start: 2018-10-17 | End: 2019-04-12 | Stop reason: SDUPTHER

## 2018-10-18 LAB
ORGANISM: ABNORMAL
URINE CULTURE, ROUTINE: ABNORMAL
URINE CULTURE, ROUTINE: ABNORMAL

## 2018-10-24 DIAGNOSIS — E03.9 HYPOTHYROIDISM, UNSPECIFIED TYPE: ICD-10-CM

## 2018-10-24 LAB
ANION GAP SERPL CALCULATED.3IONS-SCNC: 16 MEQ/L (ref 7–13)
BUN BLDV-MCNC: 22 MG/DL (ref 8–23)
CALCIUM SERPL-MCNC: 9.4 MG/DL (ref 8.6–10.2)
CHLORIDE BLD-SCNC: 97 MEQ/L (ref 98–107)
CO2: 30 MEQ/L (ref 22–29)
CREAT SERPL-MCNC: 1.17 MG/DL (ref 0.5–0.9)
GFR AFRICAN AMERICAN: 53.3
GFR NON-AFRICAN AMERICAN: 44
GLUCOSE BLD-MCNC: 151 MG/DL (ref 74–109)
HBA1C MFR BLD: 8.3 % (ref 4.8–5.9)
POTASSIUM SERPL-SCNC: 4.7 MEQ/L (ref 3.5–5.1)
SODIUM BLD-SCNC: 143 MEQ/L (ref 132–144)
T4 FREE: 1.21 NG/DL (ref 0.93–1.7)
TSH SERPL DL<=0.05 MIU/L-ACNC: 3.7 UIU/ML (ref 0.27–4.2)

## 2018-10-31 LAB
ALBUMIN SERPL-MCNC: 3.9 G/DL (ref 3.9–4.9)
ALP BLD-CCNC: 57 U/L (ref 40–130)
ALT SERPL-CCNC: 13 U/L (ref 0–33)
ANION GAP SERPL CALCULATED.3IONS-SCNC: 10 MEQ/L (ref 7–13)
AST SERPL-CCNC: 17 U/L (ref 0–35)
BILIRUB SERPL-MCNC: 0.6 MG/DL (ref 0–1.2)
BILIRUBIN DIRECT: <0.2 MG/DL (ref 0–0.3)
BILIRUBIN, INDIRECT: NORMAL MG/DL (ref 0–0.6)
BUN BLDV-MCNC: 25 MG/DL (ref 8–23)
CALCIUM SERPL-MCNC: 9.6 MG/DL (ref 8.6–10.2)
CHLORIDE BLD-SCNC: 100 MEQ/L (ref 98–107)
CHOLESTEROL, TOTAL: 151 MG/DL (ref 0–199)
CO2: 31 MEQ/L (ref 22–29)
CREAT SERPL-MCNC: 1.09 MG/DL (ref 0.5–0.9)
GFR AFRICAN AMERICAN: 57.8
GFR NON-AFRICAN AMERICAN: 47.8
GLUCOSE BLD-MCNC: 121 MG/DL (ref 74–109)
HCT VFR BLD CALC: 43.9 % (ref 37–47)
HDLC SERPL-MCNC: 40 MG/DL (ref 40–59)
HEMOGLOBIN: 14.6 G/DL (ref 12–16)
LDL CHOLESTEROL CALCULATED: 85 MG/DL (ref 0–129)
MCH RBC QN AUTO: 28.9 PG (ref 27–31.3)
MCHC RBC AUTO-ENTMCNC: 33.3 % (ref 33–37)
MCV RBC AUTO: 86.8 FL (ref 82–100)
PDW BLD-RTO: 14.8 % (ref 11.5–14.5)
PLATELET # BLD: 213 K/UL (ref 130–400)
POTASSIUM SERPL-SCNC: 5.1 MEQ/L (ref 3.5–5.1)
RBC # BLD: 5.06 M/UL (ref 4.2–5.4)
SODIUM BLD-SCNC: 141 MEQ/L (ref 132–144)
TOTAL PROTEIN: 6.9 G/DL (ref 6.4–8.1)
TRIGL SERPL-MCNC: 129 MG/DL (ref 0–200)
TSH SERPL DL<=0.05 MIU/L-ACNC: 3.66 UIU/ML (ref 0.27–4.2)
WBC # BLD: 8.4 K/UL (ref 4.8–10.8)

## 2018-11-01 ENCOUNTER — HOSPITAL ENCOUNTER (OUTPATIENT)
Dept: PHARMACY | Age: 83
Setting detail: THERAPIES SERIES
Discharge: HOME | End: 2018-11-01
Payer: MEDICARE

## 2018-11-01 DIAGNOSIS — I48.91 ATRIAL FIBRILLATION, UNSPECIFIED TYPE (HCC): ICD-10-CM

## 2018-11-01 PROCEDURE — 85610 PROTHROMBIN TIME: CPT | Performed by: PHARMACIST

## 2018-11-01 PROCEDURE — 99211 OFF/OP EST MAY X REQ PHY/QHP: CPT | Performed by: PHARMACIST

## 2018-11-28 ENCOUNTER — HOSPITAL ENCOUNTER (OUTPATIENT)
Dept: CARDIAC CATH/INVASIVE PROCEDURES | Age: 83
Discharge: HOME | End: 2018-11-28
Attending: INTERNAL MEDICINE | Admitting: INTERNAL MEDICINE
Payer: MEDICARE

## 2018-11-28 VITALS
SYSTOLIC BLOOD PRESSURE: 115 MMHG | DIASTOLIC BLOOD PRESSURE: 62 MMHG | OXYGEN SATURATION: 98 % | HEART RATE: 72 BPM | RESPIRATION RATE: 14 BRPM

## 2018-11-28 LAB
ANION GAP SERPL CALCULATED.3IONS-SCNC: 13 MEQ/L (ref 7–13)
BUN BLDV-MCNC: 17 MG/DL (ref 8–23)
CALCIUM SERPL-MCNC: 9.2 MG/DL (ref 8.6–10.2)
CHLORIDE BLD-SCNC: 101 MEQ/L (ref 98–107)
CO2: 28 MEQ/L (ref 22–29)
CREAT SERPL-MCNC: 0.94 MG/DL (ref 0.5–0.9)
EKG ATRIAL RATE: 82 BPM
EKG ATRIAL RATE: 87 BPM
EKG P-R INTERVAL: 258 MS
EKG Q-T INTERVAL: 494 MS
EKG Q-T INTERVAL: 506 MS
EKG QRS DURATION: 158 MS
EKG QRS DURATION: 162 MS
EKG QTC CALCULATION (BAZETT): 551 MS
EKG QTC CALCULATION (BAZETT): 583 MS
EKG R AXIS: 60 DEGREES
EKG R AXIS: 68 DEGREES
EKG T AXIS: 88 DEGREES
EKG T AXIS: 99 DEGREES
EKG VENTRICULAR RATE: 75 BPM
EKG VENTRICULAR RATE: 80 BPM
GFR AFRICAN AMERICAN: >60
GFR NON-AFRICAN AMERICAN: 56.7
GLUCOSE BLD-MCNC: 135 MG/DL (ref 74–109)
INR BLD: 2.7
POTASSIUM REFLEX MAGNESIUM: 4.2 MEQ/L (ref 3.5–5.1)
PROTHROMBIN TIME: 28.4 SEC (ref 9.6–12.3)
SODIUM BLD-SCNC: 142 MEQ/L (ref 132–144)

## 2018-11-28 PROCEDURE — 92960 CARDIOVERSION ELECTRIC EXT: CPT | Performed by: INTERNAL MEDICINE

## 2018-11-28 PROCEDURE — 80048 BASIC METABOLIC PNL TOTAL CA: CPT

## 2018-11-28 PROCEDURE — 93280 PM DEVICE PROGR EVAL DUAL: CPT

## 2018-11-28 PROCEDURE — 85610 PROTHROMBIN TIME: CPT

## 2018-11-28 PROCEDURE — 2580000003 HC RX 258: Performed by: INTERNAL MEDICINE

## 2018-11-28 PROCEDURE — 93005 ELECTROCARDIOGRAM TRACING: CPT

## 2018-11-28 PROCEDURE — 6360000002 HC RX W HCPCS: Performed by: INTERNAL MEDICINE

## 2018-11-28 RX ORDER — MIDAZOLAM HYDROCHLORIDE 1 MG/ML
INJECTION INTRAMUSCULAR; INTRAVENOUS
Status: COMPLETED | OUTPATIENT
Start: 2018-11-28 | End: 2018-11-28

## 2018-11-28 RX ORDER — MIDAZOLAM HYDROCHLORIDE 5 MG/ML
6 INJECTION INTRAMUSCULAR; INTRAVENOUS ONCE
Status: DISCONTINUED | OUTPATIENT
Start: 2018-11-28 | End: 2018-11-28

## 2018-11-28 RX ORDER — FLUMAZENIL 0.1 MG/ML
0.5 INJECTION, SOLUTION INTRAVENOUS ONCE
Status: DISCONTINUED | OUTPATIENT
Start: 2018-11-28 | End: 2018-11-28

## 2018-11-28 RX ORDER — FENTANYL CITRATE 50 UG/ML
100 INJECTION, SOLUTION INTRAMUSCULAR; INTRAVENOUS ONCE
Status: COMPLETED | OUTPATIENT
Start: 2018-11-28 | End: 2018-11-28

## 2018-11-28 RX ORDER — NALOXONE HYDROCHLORIDE 0.4 MG/ML
0.4 INJECTION, SOLUTION INTRAMUSCULAR; INTRAVENOUS; SUBCUTANEOUS PRN
Status: DISCONTINUED | OUTPATIENT
Start: 2018-11-28 | End: 2018-11-28

## 2018-11-28 RX ORDER — SODIUM CHLORIDE 9 MG/ML
INJECTION, SOLUTION INTRAVENOUS CONTINUOUS
Status: DISCONTINUED | OUTPATIENT
Start: 2018-11-28 | End: 2018-11-28

## 2018-11-28 RX ORDER — SODIUM CHLORIDE 0.9 % (FLUSH) 0.9 %
10 SYRINGE (ML) INJECTION PRN
Status: DISCONTINUED | OUTPATIENT
Start: 2018-11-28 | End: 2018-11-28 | Stop reason: HOSPADM

## 2018-11-28 RX ORDER — MIDAZOLAM HYDROCHLORIDE 1 MG/ML
INJECTION INTRAMUSCULAR; INTRAVENOUS
Status: DISCONTINUED
Start: 2018-11-28 | End: 2018-11-28

## 2018-11-28 RX ORDER — MIDAZOLAM HYDROCHLORIDE 1 MG/ML
4 INJECTION INTRAMUSCULAR; INTRAVENOUS ONCE
Status: COMPLETED | OUTPATIENT
Start: 2018-11-28 | End: 2018-11-28

## 2018-11-28 RX ORDER — SODIUM CHLORIDE 0.9 % (FLUSH) 0.9 %
10 SYRINGE (ML) INJECTION EVERY 12 HOURS SCHEDULED
Status: DISCONTINUED | OUTPATIENT
Start: 2018-11-28 | End: 2018-11-28 | Stop reason: HOSPADM

## 2018-11-28 RX ADMIN — FENTANYL CITRATE 50 MCG: 50 INJECTION, SOLUTION INTRAMUSCULAR; INTRAVENOUS at 11:10

## 2018-11-28 RX ADMIN — MIDAZOLAM HYDROCHLORIDE 3 MG: 1 INJECTION, SOLUTION INTRAMUSCULAR; INTRAVENOUS at 11:26

## 2018-11-28 RX ADMIN — FENTANYL CITRATE 50 MCG: 50 INJECTION, SOLUTION INTRAMUSCULAR; INTRAVENOUS at 11:25

## 2018-11-28 RX ADMIN — MIDAZOLAM HYDROCHLORIDE 3 MG: 1 INJECTION, SOLUTION INTRAMUSCULAR; INTRAVENOUS at 11:10

## 2018-11-28 RX ADMIN — SODIUM CHLORIDE: 9 INJECTION, SOLUTION INTRAVENOUS at 11:24

## 2018-11-28 ASSESSMENT — PAIN SCALES - GENERAL: PAINLEVEL_OUTOF10: 0

## 2018-11-28 NOTE — PROGRESS NOTES
Device interrogation post cardioversion per Dr. Dalton Acosta order. Presents Ap/. Gd RA/RV pacemaker capture, sensing and lead impedances. Appropriate DDDR pacemaker function. No changes made. Battery status OK. Bedside RN updated. See complete report on chart.

## 2018-11-28 NOTE — PROGRESS NOTES
Patient up and ambulated to the restroom. Vital signs are stable.   Pacemaker check complete and patient is ready for discharge

## 2018-12-05 ENCOUNTER — HOSPITAL ENCOUNTER (OUTPATIENT)
Dept: PHARMACY | Age: 83
Setting detail: THERAPIES SERIES
Discharge: HOME | End: 2018-12-05
Payer: MEDICARE

## 2018-12-05 DIAGNOSIS — I48.91 ATRIAL FIBRILLATION, UNSPECIFIED TYPE (HCC): ICD-10-CM

## 2018-12-05 LAB — INTERNATIONAL NORMALIZATION RATIO, POC: 2.8

## 2018-12-05 PROCEDURE — 85610 PROTHROMBIN TIME: CPT | Performed by: PHARMACIST

## 2018-12-05 PROCEDURE — 99211 OFF/OP EST MAY X REQ PHY/QHP: CPT | Performed by: PHARMACIST

## 2018-12-09 PROCEDURE — 93010 ELECTROCARDIOGRAM REPORT: CPT | Performed by: INTERNAL MEDICINE

## 2018-12-31 ENCOUNTER — HOSPITAL ENCOUNTER (OUTPATIENT)
Dept: CARDIOLOGY | Age: 83
Discharge: HOME | End: 2018-12-31
Payer: MEDICARE

## 2018-12-31 PROCEDURE — 93296 REM INTERROG EVL PM/IDS: CPT

## 2019-01-09 ENCOUNTER — HOSPITAL ENCOUNTER (OUTPATIENT)
Dept: PHARMACY | Age: 84
Setting detail: THERAPIES SERIES
Discharge: HOME | End: 2019-01-09
Payer: MEDICARE

## 2019-01-09 DIAGNOSIS — I48.91 ATRIAL FIBRILLATION, UNSPECIFIED TYPE (HCC): ICD-10-CM

## 2019-01-09 LAB — INTERNATIONAL NORMALIZATION RATIO, POC: 2.8

## 2019-01-09 PROCEDURE — 99211 OFF/OP EST MAY X REQ PHY/QHP: CPT | Performed by: PHARMACIST

## 2019-01-09 PROCEDURE — 85610 PROTHROMBIN TIME: CPT | Performed by: PHARMACIST

## 2019-01-21 RX ORDER — INSULIN GLARGINE 100 [IU]/ML
INJECTION, SOLUTION SUBCUTANEOUS
Qty: 15 ML | Refills: 3 | Status: SHIPPED | OUTPATIENT
Start: 2019-01-21 | End: 2019-06-25 | Stop reason: SDUPTHER

## 2019-01-30 RX ORDER — ALENDRONATE SODIUM 70 MG/1
TABLET ORAL
Qty: 12 TABLET | Refills: 1 | Status: SHIPPED | OUTPATIENT
Start: 2019-01-30 | End: 2019-06-05 | Stop reason: SDUPTHER

## 2019-02-13 ENCOUNTER — APPOINTMENT (OUTPATIENT)
Dept: PHARMACY | Age: 84
End: 2019-02-13
Payer: MEDICARE

## 2019-02-15 ENCOUNTER — HOSPITAL ENCOUNTER (OUTPATIENT)
Dept: PHARMACY | Age: 84
Setting detail: THERAPIES SERIES
Discharge: HOME | End: 2019-02-15
Payer: MEDICARE

## 2019-02-15 DIAGNOSIS — I48.91 ATRIAL FIBRILLATION, UNSPECIFIED TYPE (HCC): ICD-10-CM

## 2019-02-15 LAB — INTERNATIONAL NORMALIZATION RATIO, POC: 3

## 2019-02-15 PROCEDURE — 99211 OFF/OP EST MAY X REQ PHY/QHP: CPT

## 2019-02-15 PROCEDURE — 85610 PROTHROMBIN TIME: CPT

## 2019-03-04 RX ORDER — INSULIN LISPRO 100 [IU]/ML
INJECTION, SOLUTION INTRAVENOUS; SUBCUTANEOUS
Qty: 30 ML | Refills: 3 | Status: SHIPPED | OUTPATIENT
Start: 2019-03-04 | End: 2019-06-25 | Stop reason: SDUPTHER

## 2019-03-12 ENCOUNTER — TELEPHONE (OUTPATIENT)
Dept: FAMILY MEDICINE CLINIC | Age: 84
End: 2019-03-12

## 2019-03-12 ENCOUNTER — OFFICE VISIT (OUTPATIENT)
Dept: FAMILY MEDICINE CLINIC | Age: 84
End: 2019-03-12
Payer: MEDICARE

## 2019-03-12 VITALS
HEART RATE: 70 BPM | RESPIRATION RATE: 16 BRPM | OXYGEN SATURATION: 98 % | SYSTOLIC BLOOD PRESSURE: 110 MMHG | HEIGHT: 62 IN | BODY MASS INDEX: 38.64 KG/M2 | DIASTOLIC BLOOD PRESSURE: 62 MMHG | WEIGHT: 210 LBS | TEMPERATURE: 97.9 F

## 2019-03-12 DIAGNOSIS — I50.9 CHRONIC CONGESTIVE HEART FAILURE, UNSPECIFIED HEART FAILURE TYPE (HCC): ICD-10-CM

## 2019-03-12 DIAGNOSIS — N18.30 CKD (CHRONIC KIDNEY DISEASE) STAGE 3, GFR 30-59 ML/MIN (HCC): Chronic | ICD-10-CM

## 2019-03-12 DIAGNOSIS — M25.511 CHRONIC RIGHT SHOULDER PAIN: Chronic | ICD-10-CM

## 2019-03-12 DIAGNOSIS — I10 ESSENTIAL HYPERTENSION: ICD-10-CM

## 2019-03-12 DIAGNOSIS — R10.11 RUQ PAIN: Chronic | ICD-10-CM

## 2019-03-12 DIAGNOSIS — G89.29 CHRONIC RIGHT SHOULDER PAIN: Chronic | ICD-10-CM

## 2019-03-12 DIAGNOSIS — I48.91 ATRIAL FIBRILLATION, UNSPECIFIED TYPE (HCC): ICD-10-CM

## 2019-03-12 DIAGNOSIS — E78.2 MIXED HYPERLIPIDEMIA: ICD-10-CM

## 2019-03-12 DIAGNOSIS — E03.9 HYPOTHYROIDISM, UNSPECIFIED TYPE: ICD-10-CM

## 2019-03-12 DIAGNOSIS — B37.2 CANDIDAL INTERTRIGO: Chronic | ICD-10-CM

## 2019-03-12 PROBLEM — R42 VERTIGO: Chronic | Status: ACTIVE | Noted: 2019-03-12

## 2019-03-12 PROBLEM — Z95.0 PACEMAKER: Chronic | Status: ACTIVE | Noted: 2019-03-12

## 2019-03-12 PROBLEM — M81.0 AGE-RELATED OSTEOPOROSIS WITHOUT CURRENT PATHOLOGICAL FRACTURE: Chronic | Status: ACTIVE | Noted: 2019-03-12

## 2019-03-12 PROBLEM — K21.9 GASTROESOPHAGEAL REFLUX DISEASE WITHOUT ESOPHAGITIS: Chronic | Status: ACTIVE | Noted: 2019-03-12

## 2019-03-12 PROBLEM — E66.812 CLASS 2 SEVERE OBESITY DUE TO EXCESS CALORIES WITH SERIOUS COMORBIDITY AND BODY MASS INDEX (BMI) OF 38.0 TO 38.9 IN ADULT: Chronic | Status: ACTIVE | Noted: 2019-03-12

## 2019-03-12 PROBLEM — E66.01 CLASS 2 SEVERE OBESITY DUE TO EXCESS CALORIES WITH SERIOUS COMORBIDITY AND BODY MASS INDEX (BMI) OF 38.0 TO 38.9 IN ADULT (HCC): Chronic | Status: ACTIVE | Noted: 2019-03-12

## 2019-03-12 LAB
ANION GAP SERPL CALCULATED.3IONS-SCNC: 17 MEQ/L (ref 9–15)
BASOPHILS ABSOLUTE: 0.1 K/UL (ref 0–0.2)
BASOPHILS RELATIVE PERCENT: 0.6 %
BUN BLDV-MCNC: 30 MG/DL (ref 8–23)
CALCIUM SERPL-MCNC: 9.4 MG/DL (ref 8.5–9.9)
CHLORIDE BLD-SCNC: 97 MEQ/L (ref 95–107)
CO2: 27 MEQ/L (ref 20–31)
CREAT SERPL-MCNC: 1.16 MG/DL (ref 0.5–0.9)
CREATININE URINE: 59.7 MG/DL
EOSINOPHILS ABSOLUTE: 0.1 K/UL (ref 0–0.7)
EOSINOPHILS RELATIVE PERCENT: 1.1 %
GFR AFRICAN AMERICAN: 53.8
GFR NON-AFRICAN AMERICAN: 44.4
GLUCOSE BLD-MCNC: 146 MG/DL (ref 70–99)
HBA1C MFR BLD: 8.5 % (ref 4.8–5.9)
HCT VFR BLD CALC: 43.8 % (ref 37–47)
HEMOGLOBIN: 14.5 G/DL (ref 12–16)
LYMPHOCYTES ABSOLUTE: 2.1 K/UL (ref 1–4.8)
LYMPHOCYTES RELATIVE PERCENT: 22.9 %
MCH RBC QN AUTO: 28.8 PG (ref 27–31.3)
MCHC RBC AUTO-ENTMCNC: 33.2 % (ref 33–37)
MCV RBC AUTO: 86.7 FL (ref 82–100)
MICROALBUMIN UR-MCNC: <1.2 MG/DL
MICROALBUMIN/CREAT UR-RTO: NORMAL MG/G (ref 0–30)
MONOCYTES ABSOLUTE: 0.9 K/UL (ref 0.2–0.8)
MONOCYTES RELATIVE PERCENT: 9.2 %
NEUTROPHILS ABSOLUTE: 6.2 K/UL (ref 1.4–6.5)
NEUTROPHILS RELATIVE PERCENT: 66.2 %
PDW BLD-RTO: 14.6 % (ref 11.5–14.5)
PLATELET # BLD: 234 K/UL (ref 130–400)
POTASSIUM SERPL-SCNC: 3.4 MEQ/L (ref 3.4–4.9)
RBC # BLD: 5.05 M/UL (ref 4.2–5.4)
SODIUM BLD-SCNC: 141 MEQ/L (ref 135–144)
WBC # BLD: 9.4 K/UL (ref 4.8–10.8)

## 2019-03-12 PROCEDURE — G8399 PT W/DXA RESULTS DOCUMENT: HCPCS | Performed by: FAMILY MEDICINE

## 2019-03-12 PROCEDURE — 1123F ACP DISCUSS/DSCN MKR DOCD: CPT | Performed by: FAMILY MEDICINE

## 2019-03-12 PROCEDURE — 1090F PRES/ABSN URINE INCON ASSESS: CPT | Performed by: FAMILY MEDICINE

## 2019-03-12 PROCEDURE — 99215 OFFICE O/P EST HI 40 MIN: CPT | Performed by: FAMILY MEDICINE

## 2019-03-12 PROCEDURE — G8427 DOCREV CUR MEDS BY ELIG CLIN: HCPCS | Performed by: FAMILY MEDICINE

## 2019-03-12 PROCEDURE — G8417 CALC BMI ABV UP PARAM F/U: HCPCS | Performed by: FAMILY MEDICINE

## 2019-03-12 PROCEDURE — 1101F PT FALLS ASSESS-DOCD LE1/YR: CPT | Performed by: FAMILY MEDICINE

## 2019-03-12 PROCEDURE — 4040F PNEUMOC VAC/ADMIN/RCVD: CPT | Performed by: FAMILY MEDICINE

## 2019-03-12 PROCEDURE — G8482 FLU IMMUNIZE ORDER/ADMIN: HCPCS | Performed by: FAMILY MEDICINE

## 2019-03-12 PROCEDURE — 1036F TOBACCO NON-USER: CPT | Performed by: FAMILY MEDICINE

## 2019-03-12 RX ORDER — NYSTATIN 100000 U/G
OINTMENT TOPICAL
Qty: 60 G | Refills: 5 | Status: SHIPPED | OUTPATIENT
Start: 2019-03-12 | End: 2019-11-14 | Stop reason: ALTCHOICE

## 2019-03-12 ASSESSMENT — PATIENT HEALTH QUESTIONNAIRE - PHQ9
1. LITTLE INTEREST OR PLEASURE IN DOING THINGS: 0
2. FEELING DOWN, DEPRESSED OR HOPELESS: 0
SUM OF ALL RESPONSES TO PHQ9 QUESTIONS 1 & 2: 0
SUM OF ALL RESPONSES TO PHQ QUESTIONS 1-9: 0
SUM OF ALL RESPONSES TO PHQ QUESTIONS 1-9: 0

## 2019-03-22 ENCOUNTER — OFFICE VISIT (OUTPATIENT)
Dept: ENDOCRINOLOGY | Age: 84
End: 2019-03-22
Payer: MEDICARE

## 2019-03-22 ENCOUNTER — HOSPITAL ENCOUNTER (OUTPATIENT)
Dept: ULTRASOUND IMAGING | Age: 84
Discharge: HOME | End: 2019-03-24
Payer: MEDICARE

## 2019-03-22 VITALS
HEIGHT: 62 IN | WEIGHT: 209 LBS | HEART RATE: 77 BPM | SYSTOLIC BLOOD PRESSURE: 104 MMHG | OXYGEN SATURATION: 96 % | BODY MASS INDEX: 38.46 KG/M2 | DIASTOLIC BLOOD PRESSURE: 71 MMHG

## 2019-03-22 DIAGNOSIS — E03.9 HYPOTHYROIDISM, UNSPECIFIED TYPE: ICD-10-CM

## 2019-03-22 DIAGNOSIS — R10.11 RUQ PAIN: Chronic | ICD-10-CM

## 2019-03-22 LAB — GLUCOSE BLD-MCNC: 259 MG/DL

## 2019-03-22 PROCEDURE — 4040F PNEUMOC VAC/ADMIN/RCVD: CPT | Performed by: INTERNAL MEDICINE

## 2019-03-22 PROCEDURE — 99213 OFFICE O/P EST LOW 20 MIN: CPT | Performed by: INTERNAL MEDICINE

## 2019-03-22 PROCEDURE — 1036F TOBACCO NON-USER: CPT | Performed by: INTERNAL MEDICINE

## 2019-03-22 PROCEDURE — 82962 GLUCOSE BLOOD TEST: CPT | Performed by: INTERNAL MEDICINE

## 2019-03-22 PROCEDURE — G8417 CALC BMI ABV UP PARAM F/U: HCPCS | Performed by: INTERNAL MEDICINE

## 2019-03-22 PROCEDURE — 1101F PT FALLS ASSESS-DOCD LE1/YR: CPT | Performed by: INTERNAL MEDICINE

## 2019-03-22 PROCEDURE — 1123F ACP DISCUSS/DSCN MKR DOCD: CPT | Performed by: INTERNAL MEDICINE

## 2019-03-22 PROCEDURE — 1090F PRES/ABSN URINE INCON ASSESS: CPT | Performed by: INTERNAL MEDICINE

## 2019-03-22 PROCEDURE — 76705 ECHO EXAM OF ABDOMEN: CPT

## 2019-03-22 PROCEDURE — G8482 FLU IMMUNIZE ORDER/ADMIN: HCPCS | Performed by: INTERNAL MEDICINE

## 2019-03-22 PROCEDURE — G8427 DOCREV CUR MEDS BY ELIG CLIN: HCPCS | Performed by: INTERNAL MEDICINE

## 2019-03-22 PROCEDURE — G8399 PT W/DXA RESULTS DOCUMENT: HCPCS | Performed by: INTERNAL MEDICINE

## 2019-03-29 ENCOUNTER — HOSPITAL ENCOUNTER (OUTPATIENT)
Dept: PHARMACY | Age: 84
Setting detail: THERAPIES SERIES
Discharge: HOME | End: 2019-03-29
Payer: MEDICARE

## 2019-03-29 DIAGNOSIS — I48.91 ATRIAL FIBRILLATION, UNSPECIFIED TYPE (HCC): ICD-10-CM

## 2019-03-29 LAB — INTERNATIONAL NORMALIZATION RATIO, POC: 2.5

## 2019-03-29 PROCEDURE — 99211 OFF/OP EST MAY X REQ PHY/QHP: CPT | Performed by: PHARMACIST

## 2019-03-29 PROCEDURE — 85610 PROTHROMBIN TIME: CPT | Performed by: PHARMACIST

## 2019-04-01 ENCOUNTER — HOSPITAL ENCOUNTER (OUTPATIENT)
Dept: CARDIOLOGY | Age: 84
Discharge: HOME | End: 2019-04-01
Payer: MEDICARE

## 2019-04-01 PROCEDURE — 93280 PM DEVICE PROGR EVAL DUAL: CPT

## 2019-04-15 RX ORDER — LEVOTHYROXINE SODIUM 0.07 MG/1
TABLET ORAL
Qty: 30 TABLET | Refills: 5 | Status: SHIPPED | OUTPATIENT
Start: 2019-04-15 | End: 2019-10-09 | Stop reason: SDUPTHER

## 2019-04-24 ENCOUNTER — HOSPITAL ENCOUNTER (OUTPATIENT)
Dept: ORTHOPEDIC SURGERY | Age: 84
Discharge: HOME | End: 2019-04-26
Payer: MEDICARE

## 2019-04-24 DIAGNOSIS — M25.511 PAIN IN JOINT OF RIGHT SHOULDER: ICD-10-CM

## 2019-04-24 PROCEDURE — 73030 X-RAY EXAM OF SHOULDER: CPT

## 2019-05-03 ENCOUNTER — OFFICE VISIT (OUTPATIENT)
Dept: FAMILY MEDICINE CLINIC | Age: 84
End: 2019-05-03
Payer: MEDICARE

## 2019-05-03 VITALS
WEIGHT: 204.8 LBS | OXYGEN SATURATION: 97 % | BODY MASS INDEX: 37.69 KG/M2 | HEIGHT: 62 IN | SYSTOLIC BLOOD PRESSURE: 110 MMHG | HEART RATE: 71 BPM | TEMPERATURE: 97.8 F | RESPIRATION RATE: 14 BRPM | DIASTOLIC BLOOD PRESSURE: 64 MMHG

## 2019-05-03 DIAGNOSIS — R14.1 ABDOMINAL GAS PAIN: Primary | ICD-10-CM

## 2019-05-03 PROCEDURE — 99213 OFFICE O/P EST LOW 20 MIN: CPT | Performed by: NURSE PRACTITIONER

## 2019-05-03 PROCEDURE — G8427 DOCREV CUR MEDS BY ELIG CLIN: HCPCS | Performed by: NURSE PRACTITIONER

## 2019-05-03 PROCEDURE — 1090F PRES/ABSN URINE INCON ASSESS: CPT | Performed by: NURSE PRACTITIONER

## 2019-05-03 PROCEDURE — 1036F TOBACCO NON-USER: CPT | Performed by: NURSE PRACTITIONER

## 2019-05-03 PROCEDURE — 4040F PNEUMOC VAC/ADMIN/RCVD: CPT | Performed by: NURSE PRACTITIONER

## 2019-05-03 PROCEDURE — G8399 PT W/DXA RESULTS DOCUMENT: HCPCS | Performed by: NURSE PRACTITIONER

## 2019-05-03 PROCEDURE — G8417 CALC BMI ABV UP PARAM F/U: HCPCS | Performed by: NURSE PRACTITIONER

## 2019-05-03 PROCEDURE — 1123F ACP DISCUSS/DSCN MKR DOCD: CPT | Performed by: NURSE PRACTITIONER

## 2019-05-03 RX ORDER — SYRINGE-NEEDLE,INSULIN,0.5 ML 30 GX5/16"
SYRINGE, EMPTY DISPOSABLE MISCELLANEOUS
Refills: 0 | COMMUNITY
Start: 2019-04-18 | End: 2020-08-10

## 2019-05-03 ASSESSMENT — ENCOUNTER SYMPTOMS
ABDOMINAL PAIN: 1
FLATUS: 1

## 2019-05-03 NOTE — PATIENT INSTRUCTIONS
Patient Education        Gas and Bloating: Care Instructions  Your Care Instructions    Gas and bloating can be uncomfortable and embarrassing problems. All people pass gas, but some people produce more gas than others, sometimes enough to cause distress. It is normal to pass gas from 6 to 20 times per day. Excess gas usually is not caused by a serious health problem. Gas and bloating usually are caused by something you eat or drink, including some food supplements and medicines. Gas and bloating are usually harmless and go away without treatment. However, changing your diet can help end the problem. Some over-the-counter medicines can help prevent gas and relieve bloating. Follow-up care is a key part of your treatment and safety. Be sure to make and go to all appointments, and call your doctor if you are having problems. It's also a good idea to know your test results and keep a list of the medicines you take. How can you care for yourself at home? · Keep a food diary if you think a food gives you gas. Write down what you eat or drink. Also record when you get gas. If you notice that a food seems to cause your gas each time, avoid it and see if the gas goes away. Examples of foods that cause gas include:  ? Fried and fatty foods. ? Beans. ? Vegetables such as artichokes, asparagus, broccoli, brussels sprouts, cabbage, cauliflower, cucumbers, green peppers, onions, peas, radishes, and raw potatoes. ? Fruits such as apricots, bananas, melons, peaches, pears, prunes, and raw apples. ? Wheat and wheat bran. · Soak dry beans in water overnight, then dump the water and cook the soaked beans in new water. This can help prevent gas and bloating. · If you have problems with lactose, avoid dairy products such as milk and cheese. · Try not to swallow air. Do not drink through a straw, gulp your food, or chew gum. · Take an over-the-counter medicine. Read and follow all instructions on the label. ?  Food enzymes, such as Beano, can be added to gas-producing foods to prevent gas. ? Antacids, such as Maalox Anti-Gas and Mylanta Gas, can relieve bloating by making you burp. Be careful when you take over-the-counter antacid medicines. Many of these medicines have aspirin in them. Read the label to make sure that you are not taking more than the recommended dose. Too much aspirin can be harmful. ? Activated charcoal tablets, such as CharcoCaps, may decrease odor from gas you pass. ? If you have problems with lactose, you can take medicines such as Dairy Ease and Lactaid with dairy products to prevent gas and bloating. · Get some exercise regularly. When should you call for help? Call 911 anytime you think you may need emergency care. For example, call if:    · You have gas and signs of a heart attack, such as:  ? Chest pain or pressure. ? Sweating. ? Shortness of breath. ? Nausea or vomiting. ? Pain that spreads from the chest to the neck, jaw, or one or both shoulders or arms. ? Dizziness or lightheadedness. ? A fast or uneven pulse. After calling 911, chew 1 adult-strength aspirin. Wait for an ambulance. Do not try to drive yourself.    Call your doctor now or seek immediate medical care if:    · You have severe belly pain.     · You have blood in your stool.    Watch closely for changes in your health, and be sure to contact your doctor if:    · You have blood or pus in your urine.     · Your urine is cloudy or smells bad.     · You are burping and have trouble swallowing.     · You feel bloated and have swelling in your belly.     · You do not get better as expected. Where can you learn more? Go to [email or web address]://[email or web address] and sign in to your [employer] account. Enter Y974 in the iAgree box to learn more about \"Gas and Bloating: Care Instructions. \"     If you do not have an account, please click on the \"Sign Up Now\" link.   Current as of: September 23, 2018  Content

## 2019-05-03 NOTE — PROGRESS NOTES
 OTHER SURGICAL HISTORY  09/07/2016    GENERATOR CHANGE     PACEMAKER PLACEMENT       Social History     Socioeconomic History    Marital status:       Spouse name: Not on file    Number of children: Not on file    Years of education: Not on file    Highest education level: Not on file   Occupational History    Not on file   Social Needs    Financial resource strain: Not on file    Food insecurity:     Worry: Not on file     Inability: Not on file    Transportation needs:     Medical: Not on file     Non-medical: Not on file   Tobacco Use    Smoking status: Never Smoker    Smokeless tobacco: Never Used   Substance and Sexual Activity    Alcohol use: No     Comment: denies    Drug use: No    Sexual activity: Not Currently   Lifestyle    Physical activity:     Days per week: Not on file     Minutes per session: Not on file    Stress: Not on file   Relationships    Social connections:     Talks on phone: Not on file     Gets together: Not on file     Attends Buddhist service: Not on file     Active member of club or organization: Not on file     Attends meetings of clubs or organizations: Not on file     Relationship status: Not on file    Intimate partner violence:     Fear of current or ex partner: Not on file     Emotionally abused: Not on file     Physically abused: Not on file     Forced sexual activity: Not on file   Other Topics Concern    Not on file   Social History Narrative    Not on file     Family History   Problem Relation Age of Onset    Arthritis Other     Diabetes Other     Heart Disease Other      Allergies   Allergen Reactions    Latex Itching    Avelox [Moxifloxacin Hcl In Nacl] Itching and Rash    Penicillins Other (See Comments)     welt at the site of injection    Adhesive Tape Rash     Current Outpatient Medications   Medication Sig Dispense Refill    RA PEN NEEDLES 31G X 5 MM MISC   0    blood glucose test strips (FREESTYLE INSULINX TEST) strip TEST five times a day 200 strip 3    levothyroxine (SYNTHROID) 75 MCG tablet take 1 tablet by mouth once daily 30 tablet 5    HUMALOG KWIKPEN 100 UNIT/ML pen inject 4 units subcutaneously IF GLUCOSE  6 UNITS -200 8 UNITS -250 -300 12 UNITS -360 14 UNITS 361-400 MAX 42 30 mL 3    alendronate (FOSAMAX) 70 MG tablet take 1 tablet by mouth every week on an empty stomach with 6 oz of water. No food / meds for AN HOUR. Remain Upright 12 tablet 1    LANTUS SOLOSTAR 100 UNIT/ML injection pen inject 30 units subcutaneously every morning 15 mL 3    Insulin Degludec (TRESIBA FLEXTOUCH) 100 UNIT/ML SOPN Inject 30 Units into the skin daily 2 pen 0    acetaminophen (TYLENOL) 325 MG tablet Take 650 mg by mouth every 6 hours as needed for Pain      isosorbide mononitrate (IMDUR) 60 MG extended release tablet Take 1 tablet by mouth daily 90 tablet 3    pantoprazole (PROTONIX) 40 MG tablet take 1 tablet by mouth once daily (Patient taking differently: Take 40 mg by mouth daily take 1 tablet by mouth once daily) 90 tablet 3    Insulin Pen Needle (NOVOFINE) 32G X 6 MM MISC use four times a day (Patient taking differently: 1 each 3 times daily use four times a day) 300 each 3    warfarin (COUMADIN) 5 MG tablet Take as directed by [facility]. Quantity equals 90 day supply. (Patient taking differently: Take 5 mg by mouth Take as directed by [facility].  Quantity equals 90 day supply.) 90 tablet 1    metoprolol succinate (TOPROL XL) 50 MG extended release tablet Take 50 mg by mouth 2 times daily      magnesium oxide (MAG-OX) 400 MG tablet Take 400 mg by mouth daily      sotalol (BETAPACE) 120 MG tablet Take 1 tablet by mouth 2 times daily 60 tablet 3    Alcohol Swabs PADS 1 each by Does not apply route       pravastatin (PRAVACHOL) 40 MG tablet Take 1 tablet by mouth daily 90 tablet 3    telmisartan (MICARDIS) 40 MG tablet Take 1 tablet by mouth daily 30 tablet 11    meclizine (ANTIVERT) 12.5 MG tablet Take 12.5 mg by mouth 3 times daily as needed      FREESTYLE LANCETS MISC Check blood sugars three times daily. Dx: E11.65 IDDM (Patient taking differently: 1 each 3 times daily Check blood sugars three times daily. Dx: E11.65 IDDM) 300 each 6    albuterol (PROVENTIL HFA;VENTOLIN HFA) 108 (90 BASE) MCG/ACT inhaler [place] inhalations three times a day for 5 days then as needed for cough or SOB (Patient taking differently: Inhale 2 puffs into the lungs every 4 hours as needed ) 1 Inhaler 4    furosemide (LASIX) 40 MG tablet Take 40 mg by mouth daily Except take 1 tablet by mouth twice daily on Monday, Wednesday, and Friday. Rest of days takes daily.  nitroGLYCERIN (NITROSTAT) 0.4 MG SL tablet Place 1 tablet under the tongue every 5 minutes as needed. 25 tablet 1    Cholecalciferol (VITAMIN D3) 1000 UNITS TABS Take 1,000 Units by mouth daily.  Multiple Vitamins-Minerals (CENTRUM SILVER PO) Take 1 tablet by mouth daily       aspirin 81 MG EC tablet Take 81 mg by mouth daily.  nystatin (MYCOSTATIN) 527920 UNIT/GM ointment Apply topically 2 times daily. 60 g 5     No current facility-administered medications for this visit. PMH, Surgical Hx, Family Hx, and Social Hx reviewed and updated. Health Maintenance reviewed. Objective  Vitals:    05/03/19 1208   BP: 110/64   Site: Left Upper Arm   Position: Sitting   Cuff Size: Large Adult   Pulse: 71   Resp: 14   Temp: 97.8 °F (36.6 °C)   TempSrc: Oral   SpO2: 97%   Weight: 204 lb 12.8 oz (92.9 kg)   Height: 5' 2\" (1.575 m)     BP Readings from Last 3 Encounters:   05/03/19 110/64   03/22/19 104/71   03/12/19 110/62     Wt Readings from Last 3 Encounters:   05/03/19 204 lb 12.8 oz (92.9 kg)   03/22/19 209 lb (94.8 kg)   03/12/19 210 lb (95.3 kg)     Physical Exam   Constitutional: She appears well-developed and well-nourished. She is active.    HENT:   Right Ear:

## 2019-05-04 ASSESSMENT — ENCOUNTER SYMPTOMS
COUGH: 0
DIARRHEA: 0
BACK PAIN: 0
RHINORRHEA: 0
SORE THROAT: 0
VOMITING: 0
COLOR CHANGE: 0
SHORTNESS OF BREATH: 0
NAUSEA: 0
WHEEZING: 0

## 2019-05-07 ENCOUNTER — APPOINTMENT (OUTPATIENT)
Dept: GENERAL RADIOLOGY | Age: 84
End: 2019-05-07
Payer: MEDICARE

## 2019-05-07 ENCOUNTER — HOSPITAL ENCOUNTER (EMERGENCY)
Age: 84
Discharge: HOME | End: 2019-05-07
Payer: MEDICARE

## 2019-05-07 VITALS
TEMPERATURE: 98.5 F | HEIGHT: 62 IN | HEART RATE: 88 BPM | OXYGEN SATURATION: 98 % | DIASTOLIC BLOOD PRESSURE: 77 MMHG | SYSTOLIC BLOOD PRESSURE: 126 MMHG | RESPIRATION RATE: 16 BRPM | WEIGHT: 204 LBS | BODY MASS INDEX: 37.54 KG/M2

## 2019-05-07 DIAGNOSIS — R05.9 COUGH: ICD-10-CM

## 2019-05-07 DIAGNOSIS — J18.9 PNEUMONIA DUE TO ORGANISM: Primary | ICD-10-CM

## 2019-05-07 LAB
ALBUMIN SERPL-MCNC: 4 G/DL (ref 3.5–4.6)
ALP BLD-CCNC: 64 U/L (ref 40–130)
ALT SERPL-CCNC: 23 U/L (ref 0–33)
ANION GAP SERPL CALCULATED.3IONS-SCNC: 11 MEQ/L (ref 9–15)
AST SERPL-CCNC: 17 U/L (ref 0–35)
BASOPHILS ABSOLUTE: 0.1 K/UL (ref 0–0.2)
BASOPHILS RELATIVE PERCENT: 0.6 %
BILIRUB SERPL-MCNC: 0.6 MG/DL (ref 0.2–0.7)
BUN BLDV-MCNC: 38 MG/DL (ref 8–23)
CALCIUM SERPL-MCNC: 9.3 MG/DL (ref 8.5–9.9)
CHLORIDE BLD-SCNC: 100 MEQ/L (ref 95–107)
CO2: 32 MEQ/L (ref 20–31)
CREAT SERPL-MCNC: 1.17 MG/DL (ref 0.5–0.9)
EKG ATRIAL RATE: 71 BPM
EKG Q-T INTERVAL: 448 MS
EKG QRS DURATION: 124 MS
EKG QTC CALCULATION (BAZETT): 483 MS
EKG R AXIS: 67 DEGREES
EKG T AXIS: 123 DEGREES
EKG VENTRICULAR RATE: 70 BPM
EOSINOPHILS ABSOLUTE: 0.1 K/UL (ref 0–0.7)
EOSINOPHILS RELATIVE PERCENT: 1 %
GFR AFRICAN AMERICAN: 53.2
GFR NON-AFRICAN AMERICAN: 44
GLOBULIN: 3 G/DL (ref 2.3–3.5)
GLUCOSE BLD-MCNC: 119 MG/DL (ref 70–99)
HCT VFR BLD CALC: 43.4 % (ref 37–47)
HEMOGLOBIN: 14.2 G/DL (ref 12–16)
LYMPHOCYTES ABSOLUTE: 1.7 K/UL (ref 1–4.8)
LYMPHOCYTES RELATIVE PERCENT: 11.5 %
MCH RBC QN AUTO: 28.5 PG (ref 27–31.3)
MCHC RBC AUTO-ENTMCNC: 32.8 % (ref 33–37)
MCV RBC AUTO: 86.9 FL (ref 82–100)
MONOCYTES ABSOLUTE: 1.3 K/UL (ref 0.2–0.8)
MONOCYTES RELATIVE PERCENT: 8.8 %
NEUTROPHILS ABSOLUTE: 11.4 K/UL (ref 1.4–6.5)
NEUTROPHILS RELATIVE PERCENT: 78.1 %
PDW BLD-RTO: 14.8 % (ref 11.5–14.5)
PLATELET # BLD: 203 K/UL (ref 130–400)
POTASSIUM SERPL-SCNC: 4.6 MEQ/L (ref 3.4–4.9)
PRO-BNP: 1260 PG/ML
RBC # BLD: 4.99 M/UL (ref 4.2–5.4)
SODIUM BLD-SCNC: 143 MEQ/L (ref 135–144)
TOTAL PROTEIN: 7 G/DL (ref 6.3–8)
TROPONIN: <0.01 NG/ML (ref 0–0.01)
WBC # BLD: 14.5 K/UL (ref 4.8–10.8)

## 2019-05-07 PROCEDURE — 96374 THER/PROPH/DIAG INJ IV PUSH: CPT

## 2019-05-07 PROCEDURE — 99284 EMERGENCY DEPT VISIT MOD MDM: CPT

## 2019-05-07 PROCEDURE — 6360000002 HC RX W HCPCS: Performed by: EMERGENCY MEDICINE

## 2019-05-07 PROCEDURE — 71045 X-RAY EXAM CHEST 1 VIEW: CPT

## 2019-05-07 PROCEDURE — 36415 COLL VENOUS BLD VENIPUNCTURE: CPT

## 2019-05-07 PROCEDURE — 84484 ASSAY OF TROPONIN QUANT: CPT

## 2019-05-07 PROCEDURE — 87040 BLOOD CULTURE FOR BACTERIA: CPT

## 2019-05-07 PROCEDURE — 93005 ELECTROCARDIOGRAM TRACING: CPT

## 2019-05-07 PROCEDURE — 85025 COMPLETE CBC W/AUTO DIFF WBC: CPT

## 2019-05-07 PROCEDURE — 94640 AIRWAY INHALATION TREATMENT: CPT

## 2019-05-07 PROCEDURE — 83880 ASSAY OF NATRIURETIC PEPTIDE: CPT

## 2019-05-07 PROCEDURE — 6370000000 HC RX 637 (ALT 250 FOR IP): Performed by: PHYSICIAN ASSISTANT

## 2019-05-07 PROCEDURE — 80053 COMPREHEN METABOLIC PANEL: CPT

## 2019-05-07 PROCEDURE — 2580000003 HC RX 258: Performed by: EMERGENCY MEDICINE

## 2019-05-07 RX ORDER — SODIUM CHLORIDE 0.9 % (FLUSH) 0.9 %
3 SYRINGE (ML) INJECTION EVERY 8 HOURS
Status: DISCONTINUED | OUTPATIENT
Start: 2019-05-07 | End: 2019-05-07 | Stop reason: HOSPADM

## 2019-05-07 RX ORDER — AZITHROMYCIN 500 MG/1
500 TABLET, FILM COATED ORAL ONCE
Status: COMPLETED | OUTPATIENT
Start: 2019-05-07 | End: 2019-05-07

## 2019-05-07 RX ORDER — TRAMADOL HYDROCHLORIDE 50 MG/1
50 TABLET ORAL EVERY 6 HOURS PRN
Qty: 8 TABLET | Refills: 0 | Status: SHIPPED | OUTPATIENT
Start: 2019-05-07 | End: 2019-05-09

## 2019-05-07 RX ORDER — AZITHROMYCIN 250 MG/1
TABLET, FILM COATED ORAL
Qty: 1 PACKET | Refills: 0 | Status: SHIPPED | OUTPATIENT
Start: 2019-05-07 | End: 2019-05-17

## 2019-05-07 RX ORDER — BENZONATATE 100 MG/1
100 CAPSULE ORAL 3 TIMES DAILY PRN
Qty: 20 CAPSULE | Refills: 0 | Status: SHIPPED | OUTPATIENT
Start: 2019-05-07 | End: 2019-11-14 | Stop reason: ALTCHOICE

## 2019-05-07 RX ORDER — METHYLPREDNISOLONE SODIUM SUCCINATE 125 MG/2ML
125 INJECTION, POWDER, LYOPHILIZED, FOR SOLUTION INTRAMUSCULAR; INTRAVENOUS ONCE
Status: COMPLETED | OUTPATIENT
Start: 2019-05-07 | End: 2019-05-07

## 2019-05-07 RX ADMIN — METHYLPREDNISOLONE SODIUM SUCCINATE 125 MG: 125 INJECTION, POWDER, FOR SOLUTION INTRAMUSCULAR; INTRAVENOUS at 06:24

## 2019-05-07 RX ADMIN — ALBUTEROL SULFATE 5 MG: 2.5 SOLUTION RESPIRATORY (INHALATION) at 06:05

## 2019-05-07 RX ADMIN — Medication 3 ML: at 06:28

## 2019-05-07 RX ADMIN — AZITHROMYCIN MONOHYDRATE 500 MG: 500 TABLET ORAL at 08:02

## 2019-05-07 ASSESSMENT — ENCOUNTER SYMPTOMS
NAUSEA: 0
RHINORRHEA: 0
ABDOMINAL DISTENTION: 0
SHORTNESS OF BREATH: 0
EYE DISCHARGE: 0
COLOR CHANGE: 0
DIARRHEA: 0
VOMITING: 0
SORE THROAT: 0
ABDOMINAL PAIN: 0
CONSTIPATION: 0
COUGH: 1

## 2019-05-07 ASSESSMENT — PAIN DESCRIPTION - LOCATION: LOCATION: BACK

## 2019-05-07 ASSESSMENT — PAIN DESCRIPTION - ORIENTATION: ORIENTATION: RIGHT;LOWER

## 2019-05-07 ASSESSMENT — PAIN SCALES - GENERAL: PAINLEVEL_OUTOF10: 10

## 2019-05-07 ASSESSMENT — PAIN DESCRIPTION - PAIN TYPE: TYPE: ACUTE PAIN

## 2019-05-07 NOTE — ED PROVIDER NOTES
[facility] [facility] ED  eMERGENCY dEPARTMENT eNCOUnter      Pt Name: Anuja Moser  MRN: [id number]  [place] 1934  Date of evaluation: 5/7/2019  Provider: Toshia Burns PA-C    CHIEF COMPLAINT       Chief Complaint   Patient presents with    Cough         HISTORY OF PRESENT ILLNESS   (Location/Symptom, Timing/Onset,Context/Setting, Quality, Duration, Modifying Factors, Severity)  Note limiting factors. Anuja Moser is a 80 y.o. female who presents to the emergency department with a complaint of cough which patient states been ongoing for last 3 months. She states that she is seen her family doctor one week ago she states that she forgot to advise her of the cough that she's had ongoing at that time so she did not address it. She denies any shortness of breath no nausea vomiting or dizziness she denies any chest pain at this time no fevers or cough is nonproductive. This cough started 3 months ago when she was spraying ant spray in her garage. HPI    NursingNotes were reviewed. REVIEW OF SYSTEMS    (2-9 systems for level 4, 10 or more for level 5)     Review of Systems   Constitutional: Negative for activity change and appetite change. HENT: Negative for congestion, ear discharge, ear pain, nosebleeds, rhinorrhea and sore throat. Eyes: Negative for discharge. Respiratory: Positive for cough. Negative for shortness of breath. Cardiovascular: Negative for chest pain, palpitations and leg swelling. Gastrointestinal: Negative for abdominal distention, abdominal pain, constipation, diarrhea, nausea and vomiting. Genitourinary: Negative for difficulty urinating, dysuria and flank pain. Musculoskeletal: Negative for arthralgias. Skin: Negative for color change, pallor, rash and wound. Neurological: Negative for dizziness, tremors, syncope, weakness, numbness and headaches. Psychiatric/Behavioral: Negative for agitation and confusion.        Except as noted above the remainder of the review of systems was reviewed and negative. PAST MEDICAL HISTORY     Past Medical History:   Diagnosis Date    Chronic kidney disease (CKD), stage II (mild)     Hyperlipidemia     Hypertension     Hypothyroidism     Interstitial cystitis     Dr. Kevin Bower diagnosed this for michele.  Type II or unspecified type diabetes mellitus without mention of complication, not stated as uncontrolled          SURGICALHISTORY       Past Surgical History:   Procedure Laterality Date    ABDOMEN SURGERY      CATARACT REMOVAL WITH IMPLANT      both eyes    CORONARY ANGIOPLASTY WITH STENT PLACEMENT      HYSTERECTOMY      OTHER SURGICAL HISTORY  09/07/2016    GENERATOR CHANGE     PACEMAKER PLACEMENT           CURRENT MEDICATIONS       Previous Medications    ACETAMINOPHEN (TYLENOL) 325 MG TABLET    Take 650 mg by mouth every 6 hours as needed for Pain    ALBUTEROL (PROVENTIL HFA;VENTOLIN HFA) 108 (90 BASE) MCG/ACT INHALER    [place] inhalations three times a day for 5 days then as needed for cough or SOB    ALCOHOL SWABS PADS    1 each by Does not apply route     ALENDRONATE (FOSAMAX) 70 MG TABLET    take 1 tablet by mouth every week on an empty stomach with 6 oz of water. No food / meds for AN HOUR. Remain Upright    ASPIRIN 81 MG EC TABLET    Take 81 mg by mouth daily. BLOOD GLUCOSE TEST STRIPS (FREESTYLE INSULINX TEST) STRIP    TEST five times a day    CHOLECALCIFEROL (VITAMIN D3) 1000 UNITS TABS    Take 1,000 Units by mouth daily. FREESTYLE LANCETS MISC    Check blood sugars three times daily. Dx: E11.65 IDDM    FUROSEMIDE (LASIX) 40 MG TABLET    Take 40 mg by mouth daily Except take 1 tablet by mouth twice daily on Monday, Wednesday, and Friday. Rest of days takes daily.     HUMALOG KWIKPEN 100 UNIT/ML PEN    inject 4 units subcutaneously IF GLUCOSE  6 UNITS -200 8 UNITS -250 -300 12 UNITS -360 14 UNITS 361-400 MAX 42    INSULIN DEGLUDEC (TRESIBA FLEXTOUCH) 100 UNIT/ML SOPN    Inject 30 Units into the skin daily    INSULIN PEN NEEDLE (NOVOFINE) 32G X 6 MM MISC    use four times a day    ISOSORBIDE MONONITRATE (IMDUR) 60 MG EXTENDED RELEASE TABLET    Take 1 tablet by mouth daily    LANTUS SOLOSTAR 100 UNIT/ML INJECTION PEN    inject 30 units subcutaneously every morning    LEVOTHYROXINE (SYNTHROID) 75 MCG TABLET    take 1 tablet by mouth once daily    MAGNESIUM OXIDE (MAG-OX) 400 MG TABLET    Take 400 mg by mouth daily    MECLIZINE (ANTIVERT) 12.5 MG TABLET    Take 12.5 mg by mouth 3 times daily as needed    METOPROLOL SUCCINATE (TOPROL XL) 50 MG EXTENDED RELEASE TABLET    Take 50 mg by mouth 2 times daily    MULTIPLE VITAMINS-MINERALS (CENTRUM SILVER PO)    Take 1 tablet by mouth daily     NITROGLYCERIN (NITROSTAT) 0.4 MG SL TABLET    Place 1 tablet under the tongue every 5 minutes as needed. NYSTATIN (MYCOSTATIN) 711483 UNIT/GM OINTMENT    Apply topically 2 times daily. PANTOPRAZOLE (PROTONIX) 40 MG TABLET    take 1 tablet by mouth once daily    PRAVASTATIN (PRAVACHOL) 40 MG TABLET    Take 1 tablet by mouth daily    RA PEN NEEDLES 31G X 5 MM MISC        SOTALOL (BETAPACE) 120 MG TABLET    Take 1 tablet by mouth 2 times daily    TELMISARTAN (MICARDIS) 40 MG TABLET    Take 1 tablet by mouth daily    WARFARIN (COUMADIN) 5 MG TABLET    Take as directed by [facility]. Quantity equals 90 day supply. ALLERGIES     Latex; Avelox [moxifloxacin hcl in nacl]; Penicillins; and Adhesive tape    FAMILY HISTORY       Family History   Problem Relation Age of Onset    Arthritis Other     Diabetes Other     Heart Disease Other           SOCIAL HISTORY       Social History     Socioeconomic History    Marital status:       Spouse name: None    Number of children: None    Years of education: None    Highest education level: None   Occupational History    None   Social Needs    Financial resource strain: None    Food insecurity:     Worry: None     Inability: None    Transportation needs:     Medical: None     Non-medical: None   Tobacco Use    Smoking status: Never Smoker    Smokeless tobacco: Never Used   Substance and Sexual Activity    Alcohol use: No     Comment: denies    Drug use: No    Sexual activity: Not Currently   Lifestyle    Physical activity:     Days per week: None     Minutes per session: None    Stress: None   Relationships    Social connections:     Talks on phone: None     Gets together: None     Attends [employer] service: None     Active member of club or organization: None     Attends meetings of clubs or organizations: None     Relationship status: None    Intimate partner violence:     Fear of current or ex partner: None     Emotionally abused: None     Physically abused: None     Forced sexual activity: None   Other Topics Concern    None   Social History Narrative    None       SCREENINGS      @FLOW([id number])@      PHYSICAL EXAM    (up to 7 for level 4, 8 or more for level 5)     ED Triage Vitals [05/07/19 0530]   BP Temp Temp Source Pulse Resp SpO2 Height Weight   (!) 161/132 98.5 °F (36.9 °C) Oral 81 18 99 % 5' 2\" (1.575 m) 204 lb (92.5 kg)       Physical Exam   Constitutional: She is oriented to person, place, and time. She appears well-developed and well-nourished. No distress. HENT:   Head: Normocephalic. Eyes: Pupils are equal, round, and reactive to light. EOM are normal.   Neck: Normal range of motion. Neck supple. No JVD present. No tracheal deviation present. Cardiovascular: Normal rate. Pulmonary/Chest: Effort normal and breath sounds normal. No respiratory distress. She has no wheezes. She has no rales. She exhibits no tenderness. After breathing treatment, No accessory muscle use lung sounds are clear in all fields no wheezes rales or rhonchi. Room air saturations at that time are 98%. Prior to respiratory treatment there 96%. Abdominal: Soft.  Bowel sounds are normal. She exhibits no distension and no mass. There is no tenderness. There is no guarding. Musculoskeletal: She exhibits no edema or deformity. Neurological: She is oriented to person, place, and time. Coordination normal.   Skin: Skin is warm. DIAGNOSTIC RESULTS     EKG: All EKG's are interpreted by the Emergency Department Physician who either signs or Co-signsthis chart in the absence of a cardiologist.    EKG shows ventricular paced rhythm at 70 bpm QT is 440 ms. When compared to EKG of November 28, 2018 ventricular rate has decreased by 10 bpm.    RADIOLOGY:   Non-plain filmimages such as CT, Ultrasound and MRI are read by the radiologist. Plain radiographic images are visualized and preliminarily interpreted by the emergency physician with the below findings:        Interpretation per the Radiologist below, if available at the time ofthis note:    XR CHEST PORTABLE    (Results Pending)         ED BEDSIDE ULTRASOUND:   Performed by ED Physician - none    LABS:  Labs Reviewed   COMPREHENSIVE METABOLIC PANEL - Abnormal; Notable for the following components:       Result Value    CO2 32 (*)     Glucose 119 (*)     BUN 38 (*)     CREATININE 1.17 (*)     GFR Non- 44.0 (*)     GFR  53.2 (*)     All other components within normal limits   CBC WITH AUTO DIFFERENTIAL - Abnormal; Notable for the following components:    WBC 14.5 (*)     MCHC 32.8 (*)     RDW 14.8 (*)     Neutrophils # 11.4 (*)     Monocytes # 1.3 (*)     All other components within normal limits   CULTURE BLOOD #1   CULTURE BLOOD #2   TROPONIN   BRAIN NATRIURETIC PEPTIDE   URINE RT REFLEX TO CULTURE       All other labs were within normal range or not returned as of this dictation.     EMERGENCY DEPARTMENT COURSE and DIFFERENTIAL DIAGNOSIS/MDM:   Vitals:    Vitals:    05/07/19 0530 05/07/19 0608 05/07/19 0705   BP: (!) 161/132  129/66   Pulse: 81  70   Resp: 18 18 17   Temp: 98.5 °F (36.9 °C)     TempSrc: Oral     SpO2: 99% 96% 96%   Weight: 204 lb (92.5 kg)     Height: 5' 2\" (1.575 m)              MDM  Number of Diagnoses or Management Options  Cough:   Pneumonia due to organism:   Diagnosis management comments: Patient presents with a cough which she states been ongoing for last 3 months since she had used and spray chest x-ray shows a right lower lobe infiltrate on today's use she does have a mildly elevated white count of 14.5 thousand she does not present with a fever and she shows no signs of respiratory distress. Patient this time was given a dose of Zithromax in the emergency department she was discharged home with the same she was also given a prescription for [facility], as well as Ultram for pain control, she was advised to follow-up with her regular family physician in the next 2-3 days for reevaluation. She was advised if she has any worsening shortness of breath fever cough shortness of breath or pain to return to the ER. CRITICAL CARE TIME   Total Critical Care time was 0 minutes, excluding separately reportableprocedures. There was a high probability of clinicallysignificant/life threatening deterioration in the patient's condition which required my urgent intervention. CONSULTS:  None    PROCEDURES:  Unless otherwise noted below, none     Procedures    FINAL IMPRESSION      1. Pneumonia due to organism    2. Cough          DISPOSITION/PLAN   DISPOSITION Decision To Discharge 05/07/2019 07:56:40 AM      PATIENT REFERRED TO:  Deniz Navarro MD  [address] [facility], [address]  [address] [facility]  [phone]    In 3 days        DISCHARGE MEDICATIONS:  New Prescriptions    AZITHROMYCIN (ZITHROMAX) 250 MG TABLET    Take 2 tablets (500 mg) on Day 1, followed by 1 tablet (250 mg) once daily on Days 2 through 5.     BENZONATATE (TESSALON PERLES) 100 MG CAPSULE    Take 1 capsule by mouth 3 times daily as needed for Cough    TRAMADOL (ULTRAM) 50 MG TABLET    Take 1 tablet by mouth every 6 hours as needed for Pain for up to 2 days.           (Please note that portions of this note were completed with a voice recognition program.  Efforts were made to edit the dictations but occasionally words are mis-transcribed.)    Sai Booker PA-C (electronically signed)  Attending Emergency Physician         Sai Booker PA-C  05/07/19 1655

## 2019-05-07 NOTE — ED NOTES
Nurse called taxi service for patient and updated on 0930  time. Patient sitting in lobby at this time.       Gay Vidal RN  05/07/19 6460

## 2019-05-09 PROCEDURE — 93010 ELECTROCARDIOGRAM REPORT: CPT | Performed by: INTERNAL MEDICINE

## 2019-05-10 ENCOUNTER — APPOINTMENT (OUTPATIENT)
Dept: CT IMAGING | Age: 84
End: 2019-05-10
Payer: MEDICARE

## 2019-05-10 ENCOUNTER — TELEPHONE (OUTPATIENT)
Dept: ADMINISTRATIVE | Age: 84
End: 2019-05-10

## 2019-05-10 ENCOUNTER — HOSPITAL ENCOUNTER (EMERGENCY)
Age: 84
Discharge: HOME | End: 2019-05-10
Payer: MEDICARE

## 2019-05-10 ENCOUNTER — APPOINTMENT (OUTPATIENT)
Dept: GENERAL RADIOLOGY | Age: 84
End: 2019-05-10
Payer: MEDICARE

## 2019-05-10 VITALS
SYSTOLIC BLOOD PRESSURE: 128 MMHG | BODY MASS INDEX: 36.8 KG/M2 | OXYGEN SATURATION: 100 % | RESPIRATION RATE: 19 BRPM | DIASTOLIC BLOOD PRESSURE: 76 MMHG | HEIGHT: 62 IN | HEART RATE: 88 BPM | TEMPERATURE: 98 F | WEIGHT: 200 LBS

## 2019-05-10 DIAGNOSIS — R53.1 GENERALIZED WEAKNESS: ICD-10-CM

## 2019-05-10 DIAGNOSIS — I95.9 HYPOTENSION, UNSPECIFIED HYPOTENSION TYPE: Primary | ICD-10-CM

## 2019-05-10 LAB
ALBUMIN SERPL-MCNC: 3.9 G/DL (ref 3.5–4.6)
ALP BLD-CCNC: 67 U/L (ref 40–130)
ALT SERPL-CCNC: 20 U/L (ref 0–33)
ANION GAP SERPL CALCULATED.3IONS-SCNC: 11 MEQ/L (ref 9–15)
AST SERPL-CCNC: 20 U/L (ref 0–35)
BASOPHILS ABSOLUTE: 0.1 K/UL (ref 0–0.2)
BASOPHILS RELATIVE PERCENT: 0.4 %
BILIRUB SERPL-MCNC: 0.7 MG/DL (ref 0.2–0.7)
BUN BLDV-MCNC: 36 MG/DL (ref 8–23)
CALCIUM SERPL-MCNC: 9.1 MG/DL (ref 8.5–9.9)
CHLORIDE BLD-SCNC: 96 MEQ/L (ref 95–107)
CO2: 28 MEQ/L (ref 20–31)
CREAT SERPL-MCNC: 1.14 MG/DL (ref 0.5–0.9)
EKG ATRIAL RATE: 78 BPM
EKG Q-T INTERVAL: 494 MS
EKG QRS DURATION: 160 MS
EKG QTC CALCULATION (BAZETT): 536 MS
EKG R AXIS: 60 DEGREES
EKG T AXIS: 85 DEGREES
EKG VENTRICULAR RATE: 71 BPM
EOSINOPHILS ABSOLUTE: 0.2 K/UL (ref 0–0.7)
EOSINOPHILS RELATIVE PERCENT: 1.7 %
GFR AFRICAN AMERICAN: 54.8
GFR NON-AFRICAN AMERICAN: 45.3
GLOBULIN: 3.3 G/DL (ref 2.3–3.5)
GLUCOSE BLD-MCNC: 200 MG/DL (ref 70–99)
HCT VFR BLD CALC: 42.2 % (ref 37–47)
HEMOGLOBIN: 14.1 G/DL (ref 12–16)
INR BLD: 2.7
LACTIC ACID: 1.2 MMOL/L (ref 0.5–2.2)
LYMPHOCYTES ABSOLUTE: 1.5 K/UL (ref 1–4.8)
LYMPHOCYTES RELATIVE PERCENT: 11.8 %
MAGNESIUM: 2.4 MG/DL (ref 1.7–2.4)
MCH RBC QN AUTO: 29.2 PG (ref 27–31.3)
MCHC RBC AUTO-ENTMCNC: 33.5 % (ref 33–37)
MCV RBC AUTO: 87 FL (ref 82–100)
MONOCYTES ABSOLUTE: 1.3 K/UL (ref 0.2–0.8)
MONOCYTES RELATIVE PERCENT: 9.7 %
NEUTROPHILS ABSOLUTE: 10 K/UL (ref 1.4–6.5)
NEUTROPHILS RELATIVE PERCENT: 76.4 %
PDW BLD-RTO: 14.8 % (ref 11.5–14.5)
PLATELET # BLD: 209 K/UL (ref 130–400)
POTASSIUM SERPL-SCNC: 4.3 MEQ/L (ref 3.4–4.9)
PROTHROMBIN TIME: 26.8 SEC (ref 9–11.5)
RAPID INFLUENZA  B AGN: NEGATIVE
RAPID INFLUENZA A AGN: NEGATIVE
RBC # BLD: 4.85 M/UL (ref 4.2–5.4)
SODIUM BLD-SCNC: 135 MEQ/L (ref 135–144)
TOTAL CK: 49 U/L (ref 0–170)
TOTAL PROTEIN: 7.2 G/DL (ref 6.3–8)
TROPONIN: <0.01 NG/ML (ref 0–0.01)
WBC # BLD: 13 K/UL (ref 4.8–10.8)

## 2019-05-10 PROCEDURE — 83735 ASSAY OF MAGNESIUM: CPT

## 2019-05-10 PROCEDURE — 85025 COMPLETE CBC W/AUTO DIFF WBC: CPT

## 2019-05-10 PROCEDURE — 6360000004 HC RX CONTRAST MEDICATION: Performed by: NURSE PRACTITIONER

## 2019-05-10 PROCEDURE — 80053 COMPREHEN METABOLIC PANEL: CPT

## 2019-05-10 PROCEDURE — 85610 PROTHROMBIN TIME: CPT

## 2019-05-10 PROCEDURE — 87040 BLOOD CULTURE FOR BACTERIA: CPT

## 2019-05-10 PROCEDURE — 93005 ELECTROCARDIOGRAM TRACING: CPT

## 2019-05-10 PROCEDURE — 84484 ASSAY OF TROPONIN QUANT: CPT

## 2019-05-10 PROCEDURE — 87804 INFLUENZA ASSAY W/OPTIC: CPT

## 2019-05-10 PROCEDURE — 82550 ASSAY OF CK (CPK): CPT

## 2019-05-10 PROCEDURE — 2580000003 HC RX 258: Performed by: NURSE PRACTITIONER

## 2019-05-10 PROCEDURE — 99285 EMERGENCY DEPT VISIT HI MDM: CPT

## 2019-05-10 PROCEDURE — 36415 COLL VENOUS BLD VENIPUNCTURE: CPT

## 2019-05-10 PROCEDURE — 71045 X-RAY EXAM CHEST 1 VIEW: CPT

## 2019-05-10 PROCEDURE — 74177 CT ABD & PELVIS W/CONTRAST: CPT

## 2019-05-10 PROCEDURE — 83605 ASSAY OF LACTIC ACID: CPT

## 2019-05-10 RX ORDER — 0.9 % SODIUM CHLORIDE 0.9 %
1000 INTRAVENOUS SOLUTION INTRAVENOUS ONCE
Status: COMPLETED | OUTPATIENT
Start: 2019-05-10 | End: 2019-05-10

## 2019-05-10 RX ADMIN — SODIUM CHLORIDE 1000 ML: 9 INJECTION, SOLUTION INTRAVENOUS at 16:05

## 2019-05-10 RX ADMIN — IOPAMIDOL 100 ML: 612 INJECTION, SOLUTION INTRAVENOUS at 17:23

## 2019-05-10 ASSESSMENT — ENCOUNTER SYMPTOMS
BACK PAIN: 0
ABDOMINAL PAIN: 0
PHOTOPHOBIA: 0
DIARRHEA: 0
SHORTNESS OF BREATH: 0
VOMITING: 0
COUGH: 1
RHINORRHEA: 0
EYE PAIN: 0
NAUSEA: 0
SORE THROAT: 0

## 2019-05-10 NOTE — ED TRIAGE NOTES
Per squad pt was hypoxic on scene with 89-91% room kristin at home. Squad states pt was recently released from hospital after being treated for pneumonia. Pt states she is now feeling worse than she did after discharge pt is pale and diaphoretic. Pt denies any n/v/d pt states she has been sweating but states she is unsure if she has been running a temp. Pt has non productive dry cough noted at this time.  Pt states she has about 2 more days left with antibiotic

## 2019-05-10 NOTE — ED PROVIDER NOTES
[address] [facility] ED  eMERGENCY dEPARTMENT eNCOUnter      Pt Name: Fredrick Barrett  MRN: [id number]  [place] 1934  Date of evaluation: 5/10/2019  Provider: KRISTA Martinez - CNP    CHIEF COMPLAINT       Chief Complaint   Patient presents with    Fatigue     pt staets she is currently being tretaed for pneumonia and is now feeling worse than she did after discharge         HISTORY OF PRESENT ILLNESS   (Location/Symptom, Timing/Onset,Context/Setting, Quality, Duration, Modifying Factors, Severity)  Note limiting factors. Fredrick Barrett is a 80 y.o. female who presents to the emergency department with complaints generalized weakness, fatigue over the last 4 days since being diagnosed with pneumonia. She reports a decrease in appetite due to the generalized weakness and not feeling like she wants to eat. She does admit to an associated cough which was previously evaluated in the ED and she was put on Zithromax 4. She denies any headache dizziness lightheadedness fever chills chest pain shortness of breath palpitations or orthopnea dyspnea on exertion nausea vomiting diarrhea constipation or abdominal pain. She denies any dysuria or flank pain. She denies any modifying factors. Location/Symptom - generalized weakness  Timing/Onset - 4 days  Context/Setting - as above  Quality - generalized weakness  Duration - 4 days  Modifying Factors - none  Severity - mild to moderate    Nursing Notes were reviewed. REVIEW OF SYSTEMS    (2-9 systems for level 4, 10 or more for level 5)     Review of Systems   Constitutional: Positive for activity change, appetite change, diaphoresis and fatigue. Negative for chills and fever. HENT: Negative for congestion, rhinorrhea and sore throat. Eyes: Negative for photophobia and pain. Respiratory: Positive for cough. Negative for shortness of breath. Cardiovascular: Negative for chest pain and palpitations.    Gastrointestinal: Negative for abdominal organization: None     Attends meetings of clubs or organizations: None     Relationship status: None    Intimate partner violence:     Fear of current or ex partner: None     Emotionally abused: None     Physically abused: None     Forced sexual activity: None   Other Topics Concern    None   Social History Narrative    None       SCREENINGS    [place] Coma Scale  Eye Opening: Spontaneous  Best Verbal Response: Oriented  Best Motor Response: Obeys commands  Silver Coma Scale Score: 15 @FLOW([id number])@      PHYSICAL EXAM    (up to 7 for level 4, 8 or more for level 5)     ED Triage Vitals [05/10/19 1344]   BP Temp Temp Source Pulse Resp SpO2 Height Weight   (!) 77/66 98 °F (36.7 °C) Oral 82 18 96 % 5' 2\" (1.575 m) 200 lb (90.7 kg)       Physical Exam   Constitutional: She is oriented to person, place, and time. She appears well-developed and well-nourished. She is active. No distress. HENT:   Head: Normocephalic and atraumatic. Mouth/Throat: Mucous membranes are normal.   Eyes: Conjunctivae and lids are normal.   Neck: Normal range of motion. Neck supple. Cardiovascular: Normal rate, regular rhythm, normal heart sounds, intact distal pulses and normal pulses. Exam reveals no gallop and no friction rub. No murmur heard. Pulmonary/Chest: Effort normal and breath sounds normal.   No respiratory distress. No conversational dyspnea. No stridor or grunting or accessory muscle usage. Lungs sounds are clear with good air exchange. No rhonchi rales or wheezes. +cough   Abdominal: Soft. Normal appearance and bowel sounds are normal. There is no tenderness. Lymphadenopathy:     She has no cervical adenopathy. Neurological: She is alert and oriented to person, place, and time. She has normal strength. Answering questions appropriately. No gaze deficit. No gait abnormality. Moving all extremities. Skin: Skin is warm, dry and intact. Capillary refill takes less than 2 seconds. No rash noted.  She is not she would benefit from observation overnight. This is discussed with the hospitalist Dr. Lio Carmona. He sees no reason for admission as the patient has responded to IV fluid and ED treatment. He requests the ED attending evaluate the patient and if admission is still deemed necessary to call him back. Patient will be evaluated by ED attending although currently in CAT scan. CAT scan was ordered as the patient's daughter called and noted a remote history of some abdominal pain complaints. Patient did recall that she had a history of abdominal pain to the right lateral abdomen/right flank but cannot recall how recently this pain was present. Decision is made that pending a normal CT scan of the abdomen and pelvis with the patient is able to keep her blood pressure up and did not become hypotensive again she can be discharged home. Blood pressure has remained stable. CT scan of the abdomen and pelvis did not demonstrate acute pathology. Patient again able to ambulate throughout the ED without any tachycardia, hypotension, hypoxia. She is deemed stable for discharge. Extended discharge instructions provided to her including sign symptoms or red flags in which to return to the emergency department. She expresses good understanding of these instructions. She is discharged home in improved, stable condition. Standard anticipatory guidance given to patient upon discharge. Have given them a specific time frame in which to follow-up and who to follow-up with. I have also advised them that they should return to the emergency department if they get worse, or not getting better or develop any new or concerning symptoms. Patient demonstrates understanding and all questions were answered. Prior to discharge I did speak with the patient again regarding home going. She is rather excited for home going. She states that she does feel significantly improved. She is requesting something to eat before discharge home.   I did rediscuss the extended discharge instructions were provided, she again expresses good understanding. CRITICAL CARE TIME       CONSULTS:  None    PROCEDURES:  Unless otherwise noted below, none     Procedures    FINAL IMPRESSION      1. Hypotension, unspecified hypotension type    2. Generalized weakness          DISPOSITION/PLAN   DISPOSITION Decision To Admit 05/10/2019 04:55:43 PM      PATIENT REFERRED TO:  No follow-up provider specified.     DISCHARGE MEDICATIONS:  New Prescriptions    No medications on file          (Please notethat portions of this note were completed with a voice recognition program.  Efforts were made to edit the dictations but occasionally words are mis-transcribed.)    KRISTA Snowden - CNP (electronically signed)  Attending Emergency Physician          KRISTA Snowden CNP  05/10/19 [address] [place] [address], APRN - CNP  05/10/19 [address] [place] [address], KRISTA - [facility]  05/10/19 1816  Attending Supervising Physicians Attestation Statement  I was present with the nurse practitioner during the history and exam. I discussed the findings and plans with the nurse practitioner and agree as documented in his note     Electronically signed by Gordo Boss DO on 5/13/19 at 5:05 PM         Gordo Boss DO  05/13/19 5319

## 2019-05-11 PROCEDURE — 93010 ELECTROCARDIOGRAM REPORT: CPT | Performed by: INTERNAL MEDICINE

## 2019-05-12 LAB
BLOOD CULTURE, ROUTINE: NORMAL
CULTURE, BLOOD 2: NORMAL

## 2019-05-15 ENCOUNTER — OFFICE VISIT (OUTPATIENT)
Dept: FAMILY MEDICINE CLINIC | Age: 84
End: 2019-05-15
Payer: MEDICARE

## 2019-05-15 ENCOUNTER — HOSPITAL ENCOUNTER (OUTPATIENT)
Dept: PHARMACY | Age: 84
Setting detail: THERAPIES SERIES
Discharge: HOME | End: 2019-05-15
Payer: MEDICARE

## 2019-05-15 VITALS
HEIGHT: 62 IN | RESPIRATION RATE: 14 BRPM | BODY MASS INDEX: 37.65 KG/M2 | WEIGHT: 204.6 LBS | OXYGEN SATURATION: 98 % | HEART RATE: 60 BPM | TEMPERATURE: 97.9 F | DIASTOLIC BLOOD PRESSURE: 60 MMHG | SYSTOLIC BLOOD PRESSURE: 110 MMHG

## 2019-05-15 DIAGNOSIS — J40 BRONCHITIS: ICD-10-CM

## 2019-05-15 DIAGNOSIS — R93.2 ABNORMAL LIVER ULTRASOUND: Chronic | ICD-10-CM

## 2019-05-15 DIAGNOSIS — I48.91 ATRIAL FIBRILLATION, UNSPECIFIED TYPE (HCC): ICD-10-CM

## 2019-05-15 DIAGNOSIS — R10.11 RUQ PAIN: Primary | Chronic | ICD-10-CM

## 2019-05-15 LAB
BLOOD CULTURE, ROUTINE: NORMAL
CULTURE, BLOOD 2: NORMAL
INTERNATIONAL NORMALIZATION RATIO, POC: 2.4

## 2019-05-15 PROCEDURE — 4040F PNEUMOC VAC/ADMIN/RCVD: CPT | Performed by: FAMILY MEDICINE

## 2019-05-15 PROCEDURE — 99211 OFF/OP EST MAY X REQ PHY/QHP: CPT

## 2019-05-15 PROCEDURE — 1090F PRES/ABSN URINE INCON ASSESS: CPT | Performed by: FAMILY MEDICINE

## 2019-05-15 PROCEDURE — 1123F ACP DISCUSS/DSCN MKR DOCD: CPT | Performed by: FAMILY MEDICINE

## 2019-05-15 PROCEDURE — G8427 DOCREV CUR MEDS BY ELIG CLIN: HCPCS | Performed by: FAMILY MEDICINE

## 2019-05-15 PROCEDURE — G8417 CALC BMI ABV UP PARAM F/U: HCPCS | Performed by: FAMILY MEDICINE

## 2019-05-15 PROCEDURE — G8399 PT W/DXA RESULTS DOCUMENT: HCPCS | Performed by: FAMILY MEDICINE

## 2019-05-15 PROCEDURE — 1036F TOBACCO NON-USER: CPT | Performed by: FAMILY MEDICINE

## 2019-05-15 PROCEDURE — 99214 OFFICE O/P EST MOD 30 MIN: CPT | Performed by: FAMILY MEDICINE

## 2019-05-15 PROCEDURE — 85610 PROTHROMBIN TIME: CPT

## 2019-05-15 RX ORDER — WARFARIN SODIUM 5 MG/1
TABLET ORAL
Qty: 90 TABLET | Refills: 1 | Status: SHIPPED | OUTPATIENT
Start: 2019-05-15 | End: 2020-05-14 | Stop reason: SDUPTHER

## 2019-05-15 NOTE — PROGRESS NOTES
No other questions and or concerns for today's visit      Review of Systems  Is having fevers, chills, sweats. No unintended weight loss. No nausea, vomiting, diarrhea, constipation, bloody stools, black tarry stools. No rashes. No swollen glands. Past Medical History:   Diagnosis Date    Chronic kidney disease (CKD), stage II (mild)     Hyperlipidemia     Hypertension     Hypothyroidism     Interstitial cystitis     Dr. Senait Callahan diagnosed this for patinet.  Type II or unspecified type diabetes mellitus without mention of complication, not stated as uncontrolled      Past Surgical History:   Procedure Laterality Date    ABDOMEN SURGERY      CATARACT REMOVAL WITH IMPLANT      both eyes    CORONARY ANGIOPLASTY WITH STENT PLACEMENT      HYSTERECTOMY      OTHER SURGICAL HISTORY  09/07/2016    GENERATOR CHANGE     PACEMAKER PLACEMENT       Social History     Socioeconomic History    Marital status:       Spouse name: Not on file    Number of children: Not on file    Years of education: Not on file    Highest education level: Not on file   Occupational History    Not on file   Social Needs    Financial resource strain: Not on file    Food insecurity:     Worry: Not on file     Inability: Not on file    Transportation needs:     Medical: Not on file     Non-medical: Not on file   Tobacco Use    Smoking status: Never Smoker    Smokeless tobacco: Never Used   Substance and Sexual Activity    Alcohol use: No     Comment: denies    Drug use: No    Sexual activity: Not Currently   Lifestyle    Physical activity:     Days per week: Not on file     Minutes per session: Not on file    Stress: Not on file   Relationships    Social connections:     Talks on phone: Not on file     Gets together: Not on file     Attends Nondenominational service: Not on file     Active member of club or organization: Not on file     Attends meetings of clubs or organizations: Not on file Relationship status: Not on file    Intimate partner violence:     Fear of current or ex partner: Not on file     Emotionally abused: Not on file     Physically abused: Not on file     Forced sexual activity: Not on file   Other Topics Concern    Not on file   Social History Narrative    Not on file     Family History   Problem Relation Age of Onset    Arthritis Other     Diabetes Other     Heart Disease Other      Allergies   Allergen Reactions    Latex Itching    Avelox [Moxifloxacin Hcl In Nacl] Itching and Rash    Penicillins Other (See Comments)     welt at the site of injection    Adhesive Tape Rash     Current Outpatient Medications   Medication Sig Dispense Refill    azithromycin (ZITHROMAX) 250 MG tablet Take 2 tablets (500 mg) on Day 1, followed by 1 tablet (250 mg) once daily on Days 2 through 5. 1 packet 0    benzonatate (TESSALON PERLES) 100 MG capsule Take 1 capsule by mouth 3 times daily as needed for Cough 20 capsule 0    RA PEN NEEDLES 31G X 5 MM MISC   0    blood glucose test strips (FREESTYLE INSULINX TEST) strip TEST five times a day 200 strip 3    levothyroxine (SYNTHROID) 75 MCG tablet take 1 tablet by mouth once daily 30 tablet 5    nystatin (MYCOSTATIN) 163804 UNIT/GM ointment Apply topically 2 times daily. 60 g 5    HUMALOG KWIKPEN 100 UNIT/ML pen inject 4 units subcutaneously IF GLUCOSE  6 UNITS -200 8 UNITS -250 -300 12 UNITS -360 14 UNITS 361-400 MAX 42 30 mL 3    alendronate (FOSAMAX) 70 MG tablet take 1 tablet by mouth every week on an empty stomach with 6 oz of water. No food / meds for AN HOUR.  Remain Upright 12 tablet 1    LANTUS SOLOSTAR 100 UNIT/ML injection pen inject 30 units subcutaneously every morning 15 mL 3    Insulin Degludec (TRESIBA FLEXTOUCH) 100 UNIT/ML SOPN Inject 30 Units into the skin daily 2 pen 0    acetaminophen (TYLENOL) 325 MG tablet Take 650 mg by mouth every 6 hours as needed for Pain      isosorbide  warfarin (COUMADIN) 5 MG tablet Take as directed by [facility] EMERGENCY MEDICAL [place] AT [place] [facility]. Quantity equals 90 day supply. 90 tablet 1     No current facility-administered medications for this visit. PMH, Surgical Hx, Family Hx, and Social Hxreviewed and updated. Health Maintenance reviewed. Objective    Vitals:    05/15/19 0946   BP: 110/60   Pulse: 60   Resp: 14   Temp: 97.9 °F (36.6 °C)   SpO2: 98%   Weight: 204 lb 9.6 oz (92.8 kg)   Height: 5' 2\" (1.575 m)        Physical Exam   Constitutional: She is oriented to person, place, and time. Obese no acute distress   HENT:   Head: Normocephalic and atraumatic. Eyes: Conjunctivae are normal.   Neck: Neck supple. Carotid bruit is not present. No thyromegaly present. Cardiovascular: Normal rate, regular rhythm, S1 normal, S2 normal and intact distal pulses. Pulmonary/Chest: Effort normal. She has no wheezes. She has no rales. Abdominal: Soft. Bowel sounds are normal. She exhibits no distension and no mass. There is tenderness (RUQ and epigastric TTP). There is guarding. There is no rebound. Musculoskeletal: She exhibits edema (BLE). Lymphadenopathy:     She has no cervical adenopathy. Neurological: She is alert and oriented to person, place, and time. Skin: Skin is warm and dry. Rash (candidal intertrigo) noted. Psychiatric: She has a normal mood and affect. Lab Results   Component Value Date    LABA1C 8.5 (H) 03/12/2019    LABA1C 8.3 (H) 10/24/2018    LABA1C 7.9 (H) 07/17/2018     Lab Results   Component Value Date    LABMICR <1.20 03/12/2019    CREATININE 1.14 (H) 05/10/2019     Lab Results   Component Value Date    ALT 20 05/10/2019    AST 20 05/10/2019     Lab Results   Component Value Date    CHOL 151 10/31/2018    TRIG 129 10/31/2018    HDL 40 10/31/2018    LDLCALC 85 10/31/2018        Assessment & Plan   Visit Diagnoses and Associated Orders     RUQ pain    -  Primary    Referred for consideration of ERCP.  More diffuse pain and TTP may be related to chronic constipation, adhesions. Shilpa Baldwin MD, Gastroenterology, [facility]]           Abnormal liver ultrasound        Referred for further eval. Reassuring that CT and labs are normal.    Shilpa Baldwin MD, Gastroenterology, [facility]]           Bronchitis        Resolving. Reviewed with the patient: all disease processes, current clinical status, medications, activities and diet.      Side effects, adverse effects of the medication prescribed today, as well as treatment plan/ rationale and result expectations have been discussed with the patient who expresses understanding and desires to proceed.     Close follow up to evaluate treatment results and for coordination of care. I have reviewed the patient's medical history in detail and updated the computerized patient record. More than 50% of the appointment was spent in face-to-face counseling, education and care coordination. Orders Placed This Encounter   Procedures   Wendy Quezada MD, Gastroenterology, [place]     Referral Priority:   Routine     Referral Type:   Eval and Treat     Referral Reason:   Specialty Services Required     Referred to Provider:   Chinedu Powers MD     Requested Specialty:   Gastroenterology     Number of Visits Requested:   1     No orders of the defined types were placed in this encounter. There are no discontinued medications. Return in about 3 months (around 8/15/2019) for HTN, DM, Obesity. Controlled Substances Monitoring:     RX Monitoring 12/6/2017   Attestation The Prescription Monitoring Report for this patient was reviewed today. Chronic Pain Routine Monitoring Possible medication side effects, risk of tolerance and/or dependence, and alternative treatments discussed. ;Potential drug abuse or diversion identified, see note documentation.        Norberto Campbell MD

## 2019-05-15 NOTE — PROGRESS NOTES
Ms. Anitra Yin is a 80 y.o. y/o female with history of Afib who presents today for anticoagulation monitoring and adjustment. INR 2.4 is therapeutic for this patient (goal range 2-3) and is reflective of 22.5 mg TWD  Patient verifies current dosing regimen, patient able to verbally recall dose  Patient reports 0  missed doses since last INR   Patient denies s/sx clotting and/or stroke  Pt just got out of hospital with Pneumonia. Done with antibiotics now. Pt states has to get additional tests for her liver.   Patient denies hematuria, epistaxis, rectal bleeding  Patient denies changes in diet, alcohol, or tobacco use  Reviewed medication list and drug allergies with patient, updated any medication additions or modifications accordingly  Patient also denies any pending medical or dental procedures scheduled at this time  Patient was instructed to continue 22.5 mg TWD and RTC 6 weeks

## 2019-05-29 RX ORDER — LANCETS 28 GAUGE
EACH MISCELLANEOUS
Qty: 100 EACH | Refills: 11 | Status: SHIPPED | OUTPATIENT
Start: 2019-05-29

## 2019-06-05 RX ORDER — ALENDRONATE SODIUM 70 MG/1
TABLET ORAL
Qty: 12 TABLET | Refills: 1 | Status: SHIPPED | OUTPATIENT
Start: 2019-06-05 | End: 2019-11-29 | Stop reason: SDUPTHER

## 2019-06-07 ENCOUNTER — OFFICE VISIT (OUTPATIENT)
Dept: GASTROENTEROLOGY | Age: 84
End: 2019-06-07
Payer: MEDICARE

## 2019-06-07 VITALS
SYSTOLIC BLOOD PRESSURE: 112 MMHG | DIASTOLIC BLOOD PRESSURE: 62 MMHG | WEIGHT: 206 LBS | BODY MASS INDEX: 37.91 KG/M2 | HEART RATE: 73 BPM | TEMPERATURE: 97 F | OXYGEN SATURATION: 97 % | HEIGHT: 62 IN

## 2019-06-07 DIAGNOSIS — G89.29 CHRONIC RIGHT-SIDED THORACIC BACK PAIN: Primary | ICD-10-CM

## 2019-06-07 DIAGNOSIS — M54.6 CHRONIC RIGHT-SIDED THORACIC BACK PAIN: Primary | ICD-10-CM

## 2019-06-07 DIAGNOSIS — R10.31 RIGHT LOWER QUADRANT ABDOMINAL PAIN: ICD-10-CM

## 2019-06-07 DIAGNOSIS — R93.5 ABNORMAL ULTRASOUND OF ABDOMEN: ICD-10-CM

## 2019-06-07 PROCEDURE — 1036F TOBACCO NON-USER: CPT | Performed by: INTERNAL MEDICINE

## 2019-06-07 PROCEDURE — 1090F PRES/ABSN URINE INCON ASSESS: CPT | Performed by: INTERNAL MEDICINE

## 2019-06-07 PROCEDURE — G8427 DOCREV CUR MEDS BY ELIG CLIN: HCPCS | Performed by: INTERNAL MEDICINE

## 2019-06-07 PROCEDURE — 4040F PNEUMOC VAC/ADMIN/RCVD: CPT | Performed by: INTERNAL MEDICINE

## 2019-06-07 PROCEDURE — 99203 OFFICE O/P NEW LOW 30 MIN: CPT | Performed by: INTERNAL MEDICINE

## 2019-06-07 PROCEDURE — G8399 PT W/DXA RESULTS DOCUMENT: HCPCS | Performed by: INTERNAL MEDICINE

## 2019-06-07 PROCEDURE — G8417 CALC BMI ABV UP PARAM F/U: HCPCS | Performed by: INTERNAL MEDICINE

## 2019-06-07 PROCEDURE — 1123F ACP DISCUSS/DSCN MKR DOCD: CPT | Performed by: INTERNAL MEDICINE

## 2019-06-07 NOTE — PROGRESS NOTES
Gastroenterology Clinic Visit    Boom Adame  [id number]  Chief Complaint   Patient presents with    Consultation     HPI: 80 y.o. female presents to the clinic with abnormal findings on liver ultrasound. Patient underwent right upper quadrant ultrasound in March 2019 for symptoms of right upper quadrant pain. Intra-and extrahepatic duct dilation was noted. Patient denies any history of jaundice, liver disease or gallbladder stone disease. On further questioning patient does report to some right-sided pain which appears to be in the lateral aspect of the right side of the abdomen. Patient denies any association of the pain to food or bowel movements. Patient denies any increase or decrease in symptoms over the last few months. The pain is throughout the day, it is worse with movement. She has history of arthritis. Review of Systems   All other systems reviewed and are negative. Past Medical History:   Diagnosis Date    Chronic kidney disease (CKD), stage II (mild)     Hyperlipidemia     Hypertension     Hypothyroidism     Interstitial cystitis     Dr. Theodore Harris diagnosed this for patinet.  Type II or unspecified type diabetes mellitus without mention of complication, not stated as uncontrolled          Past Surgical History:   Procedure Laterality Date    ABDOMEN SURGERY      CATARACT REMOVAL WITH IMPLANT      both eyes    CORONARY ANGIOPLASTY WITH STENT PLACEMENT      HYSTERECTOMY      OTHER SURGICAL HISTORY  09/07/2016    GENERATOR CHANGE     PACEMAKER PLACEMENT       Current Outpatient Medications on File Prior to Visit   Medication Sig Dispense Refill    alendronate (FOSAMAX) 70 MG tablet take 1 tablet by mouth every week on an empty stomach with 6 oz of water. No food / meds for AN HOUR.  Remain Upright 12 tablet 1    FREESTYLE LANCETS MISC TEST three times a day 100 each 11    warfarin (COUMADIN) 5 MG tablet Take as directed by [facility] EM[facility]GENCY [facility]. Quantity equals 90 day supply. 90 tablet 1    benzonatate (TESSALON PERLES) 100 MG capsule Take 1 capsule by mouth 3 times daily as needed for Cough 20 capsule 0    RA PEN NEEDLES 31G X 5 MM MISC   0    blood glucose test strips (FREESTYLE INSULINX TEST) strip TEST five times a day 200 strip 3    levothyroxine (SYNTHROID) 75 MCG tablet take 1 tablet by mouth once daily 30 tablet 5    nystatin (MYCOSTATIN) 455356 UNIT/GM ointment Apply topically 2 times daily.  60 g 5    HUMALOG KWIKPEN 100 UNIT/ML pen inject 4 units subcutaneously IF GLUCOSE  6 UNITS -200 8 UNITS -250 -300 12 UNITS -360 14 UNITS 361-400 MAX 42 30 mL 3    LANTUS SOLOSTAR 100 UNIT/ML injection pen inject 30 units subcutaneously every morning 15 mL 3    Insulin Degludec (TRESIBA FLEXTOUCH) 100 UNIT/ML SOPN Inject 30 Units into the skin daily 2 pen 0    acetaminophen (TYLENOL) 325 MG tablet Take 650 mg by mouth every 6 hours as needed for Pain      isosorbide mononitrate (IMDUR) 60 MG extended release tablet Take 1 tablet by mouth daily 90 tablet 3    pantoprazole (PROTONIX) 40 MG tablet take 1 tablet by mouth once daily (Patient taking differently: Take 40 mg by mouth daily take 1 tablet by mouth once daily) 90 tablet 3    Insulin Pen Needle (NOVOFINE) 32G X 6 MM MISC use four times a day (Patient taking differently: 1 each 3 times daily use four times a day) 300 each 3    metoprolol succinate (TOPROL XL) 50 MG extended release tablet Take 50 mg by mouth 2 times daily      magnesium oxide (MAG-OX) 400 MG tablet Take 400 mg by mouth daily      sotalol (BETAPACE) 120 MG tablet Take 1 tablet by mouth 2 times daily 60 tablet 3    Alcohol Swabs PADS 1 each by Does not apply route       pravastatin (PRAVACHOL) 40 MG tablet Take 1 tablet by mouth daily 90 tablet 3    telmisartan (MICARDIS) 40 MG tablet Take 1 tablet by mouth daily 30 tablet 11    meclizine (ANTIVERT) 12.5 MG tablet Take 12.5 mg by mouth 3 times daily as needed      albuterol (PROVENTIL HFA;VENTOLIN HFA) 108 (90 BASE) MCG/ACT inhaler [place] inhalations three times a day for 5 days then as needed for cough or SOB (Patient taking differently: Inhale 2 puffs into the lungs every 4 hours as needed ) 1 Inhaler 4    furosemide (LASIX) 40 MG tablet Take 40 mg by mouth daily Except take 1 tablet by mouth twice daily on Monday, Wednesday, and Friday. Rest of days takes daily.  nitroGLYCERIN (NITROSTAT) 0.4 MG SL tablet Place 1 tablet under the tongue every 5 minutes as needed. 25 tablet 1    Cholecalciferol (VITAMIN D3) 1000 UNITS TABS Take 1,000 Units by mouth daily.  Multiple Vitamins-Minerals (CENTRUM SILVER PO) Take 1 tablet by mouth daily       aspirin 81 MG EC tablet Take 81 mg by mouth daily. No current facility-administered medications on file prior to visit. Family History   Problem Relation Age of Onset    Arthritis Other     Diabetes Other     Heart Disease Other       Social History     Socioeconomic History    Marital status:       Spouse name: None    Number of children: None    Years of education: None    Highest education level: None   Occupational History    None   Social Needs    Financial resource strain: None    Food insecurity:     Worry: None     Inability: None    Transportation needs:     Medical: None     Non-medical: None   Tobacco Use    Smoking status: Never Smoker    Smokeless tobacco: Never Used   Substance and Sexual Activity    Alcohol use: No     Comment: denies    Drug use: No    Sexual activity: Not Currently   Lifestyle    Physical activity:     Days per week: None     Minutes per session: None    Stress: None   Relationships    Social connections:     Talks on phone: None     Gets together: None     Attends Christian service: None     Active member of club or organization: None     Attends meetings of clubs or organizations: None     Relationship status: None  Intimate partner violence:     Fear of current or ex partner: None     Emotionally abused: None     Physically abused: None     Forced sexual activity: None   Other Topics Concern    None   Social History Narrative    None       Blood pressure 112/62, pulse 73, temperature 97 °F (36.1 °C), height 5' 2\" (1.575 m), weight 206 lb (93.4 kg), SpO2 97 %, not currently breastfeeding. Physical Exam   Constitutional: She is oriented to person, place, and time. She appears well-developed and well-nourished. No distress. Eyes: No scleral icterus. Cardiovascular: Normal rate and regular rhythm. Pulmonary/Chest: Effort normal and breath sounds normal.   Abdominal: Soft. Normal appearance and bowel sounds are normal. She exhibits no distension, no ascites and no mass. There is no hepatosplenomegaly. There is no tenderness. There is no rebound, no guarding and no CVA tenderness. Musculoskeletal: Normal range of motion. Lymphadenopathy:     She has no cervical adenopathy. Neurological: She is alert and oriented to person, place, and time. Psychiatric: She has a normal mood and affect. Her behavior is normal. Judgment and thought content normal.     Laboratory, Pathology, Radiology reviewed indetail with relevant important investigations summarized below:  Lab Results   Component Value Date    WBC 13.0 (H) 05/10/2019    HGB 14.1 05/10/2019    HCT 42.2 05/10/2019    MCV 87.0 05/10/2019     05/10/2019     Lab Results   Component Value Date    ALT 20 05/10/2019    AST 20 05/10/2019    ALKPHOS 67 05/10/2019    BILITOT 0.7 05/10/2019     Ct Abdomen Pelvis W Iv Contrast Additional Contrast? None  Result Date: 5/10/2019  CT ABDOMEN PELVIS W IV CONTRAST: 5/10/2019 CLINICAL HISTORY: Lower abdominal pain. COMPARISON: 10/16/2018. TECHNIQUE: [facility]  All CT scans at this facility use dose modulation, iterative reconstruction, and/or weight based dosing when appropriate to reduce radiation dose to as low as reasonably achievable. FINDINGS: A moderate amount of stool is noted throughout the colon and rectum. Mild to moderate diverticulosis of the sigmoid colon appears unchanged, without evidence of significant diverticulitis. There is no abscess, free air, significant small bowel dilatation, or other significant change from the prior study identified. Mild probable atelectasis or scarring of the visualized lung bases is again noted. The heart remains mildly enlarged with coronary artery calcifications and a pacemaker again noted. No significant pleural or pericardial effusions are present of the visualized lung bases. The liver, gallbladder, spleen, pancreas, adrenal glands, kidneys, great vessels, urinary bladder, and additional images of pelvis appear within normal limits; with a small left adrenal nodule consistent with an adenoma. Mild to moderate degenerative changes of the thoracolumbar spine are again noted. NO ACUTE INTRA-ABDOMINAL PROCESS OR SIGNIFICANT CHANGE FROM 10/16/2018 IDENTIFIED. Xr Chest Portable  Result Date: 5/10/2019  EXAMINATION: XR CHEST PORTABLE CLINICAL HISTORY: NONPRODUCTIVE COUGH COMPARISONS: MAY 7, 2019 FINDINGS: Pacemaker generator and wires unchanged. Osseous structures intact. Cardiopericardial silhouette normal. Aorta calcified. Lungs clear. NO ACUTE CARDIOPULMONARY DISEASE. Transabdominal sonography with color flow: 3/2019:  Liver is normal in size, shape, and echogenicity. Mild intrahepatic ductal dilatation identified. Common duct measures 10.3 mm. No intraluminal filling defect identified. Gallbladder contains no shadowing and no echogenic foci. No para cholecystic fluid. No gallbladder wall thickening. Pancreatic head and body are normal in size, shape, and echogenicity.  Pancreatic tail obscured by overlying bowel gas     Endoscopic investigations: Does not recall    Assessmentand Plan:  80 y.o. female with mildly

## 2019-06-25 ENCOUNTER — HOSPITAL ENCOUNTER (OUTPATIENT)
Dept: PHARMACY | Age: 84
Setting detail: THERAPIES SERIES
Discharge: HOME | End: 2019-06-25
Payer: MEDICARE

## 2019-06-25 ENCOUNTER — OFFICE VISIT (OUTPATIENT)
Dept: ENDOCRINOLOGY | Age: 84
End: 2019-06-25
Payer: MEDICARE

## 2019-06-25 VITALS
HEART RATE: 72 BPM | WEIGHT: 208 LBS | DIASTOLIC BLOOD PRESSURE: 79 MMHG | HEIGHT: 62 IN | SYSTOLIC BLOOD PRESSURE: 125 MMHG | BODY MASS INDEX: 38.28 KG/M2

## 2019-06-25 DIAGNOSIS — I48.91 ATRIAL FIBRILLATION, UNSPECIFIED TYPE (HCC): ICD-10-CM

## 2019-06-25 DIAGNOSIS — E03.9 HYPOTHYROIDISM, UNSPECIFIED TYPE: ICD-10-CM

## 2019-06-25 LAB
ANION GAP SERPL CALCULATED.3IONS-SCNC: 15 MEQ/L (ref 9–15)
BUN BLDV-MCNC: 30 MG/DL (ref 8–23)
CALCIUM SERPL-MCNC: 10 MG/DL (ref 8.5–9.9)
CHLORIDE BLD-SCNC: 97 MEQ/L (ref 95–107)
CHP ED QC CHECK: NORMAL
CO2: 30 MEQ/L (ref 20–31)
CREAT SERPL-MCNC: 1.11 MG/DL (ref 0.5–0.9)
CREATININE URINE: 36.5 MG/DL
GFR AFRICAN AMERICAN: 56.5
GFR NON-AFRICAN AMERICAN: 46.7
GLUCOSE BLD-MCNC: 233 MG/DL
GLUCOSE BLD-MCNC: 237 MG/DL (ref 70–99)
HBA1C MFR BLD: 7.7 %
HBA1C MFR BLD: 7.8 % (ref 4.8–5.9)
INTERNATIONAL NORMALIZATION RATIO, POC: 1.7
MICROALBUMIN UR-MCNC: <1.2 MG/DL
MICROALBUMIN/CREAT UR-RTO: NORMAL MG/G (ref 0–30)
POTASSIUM SERPL-SCNC: 3.9 MEQ/L (ref 3.4–4.9)
SODIUM BLD-SCNC: 142 MEQ/L (ref 135–144)
T4 FREE: 1.41 NG/DL (ref 0.84–1.68)
TSH REFLEX: 1.77 UIU/ML (ref 0.44–3.86)

## 2019-06-25 PROCEDURE — 1090F PRES/ABSN URINE INCON ASSESS: CPT | Performed by: INTERNAL MEDICINE

## 2019-06-25 PROCEDURE — G8399 PT W/DXA RESULTS DOCUMENT: HCPCS | Performed by: INTERNAL MEDICINE

## 2019-06-25 PROCEDURE — 83036 HEMOGLOBIN GLYCOSYLATED A1C: CPT | Performed by: INTERNAL MEDICINE

## 2019-06-25 PROCEDURE — 4040F PNEUMOC VAC/ADMIN/RCVD: CPT | Performed by: INTERNAL MEDICINE

## 2019-06-25 PROCEDURE — 1123F ACP DISCUSS/DSCN MKR DOCD: CPT | Performed by: INTERNAL MEDICINE

## 2019-06-25 PROCEDURE — G8427 DOCREV CUR MEDS BY ELIG CLIN: HCPCS | Performed by: INTERNAL MEDICINE

## 2019-06-25 PROCEDURE — 99211 OFF/OP EST MAY X REQ PHY/QHP: CPT | Performed by: PHARMACIST

## 2019-06-25 PROCEDURE — 99213 OFFICE O/P EST LOW 20 MIN: CPT | Performed by: INTERNAL MEDICINE

## 2019-06-25 PROCEDURE — 82962 GLUCOSE BLOOD TEST: CPT | Performed by: INTERNAL MEDICINE

## 2019-06-25 PROCEDURE — 1036F TOBACCO NON-USER: CPT | Performed by: INTERNAL MEDICINE

## 2019-06-25 PROCEDURE — 85610 PROTHROMBIN TIME: CPT | Performed by: PHARMACIST

## 2019-06-25 PROCEDURE — G8417 CALC BMI ABV UP PARAM F/U: HCPCS | Performed by: INTERNAL MEDICINE

## 2019-06-25 NOTE — PROGRESS NOTES
Subjective:      Patient ID: Lise Sanches is a 80 y.o. female. 3 month f/u on diabetes hypothyroidism   Diabetes   She presents for her follow-up diabetic visit. She has type 2 diabetes mellitus. Diabetic complications include heart disease and nephropathy. Current diabetic treatment includes insulin injections. She is currently taking insulin pre-breakfast, pre-lunch, pre-dinner and at bedtime. She monitors blood glucose at home 3-4 x per day. Her overall blood glucose range is 180-200 mg/dl. (Lab Results       Component                Value               Date                       LABA1C                   7.8 (H)             06/25/2019              Reviewed logs average glucose 190   )         hypothyroidism on replacement with synthroid 75 mcg daily     Results for Kareem Palacios (MRN [id number]) as of 6/29/2019 15:14   Ref. Range 7/17/2018 11:07 10/24/2018 09:59 3/12/2019 15:59 6/25/2019 13:43 6/25/2019 16:33   Hemoglobin A1C Latest Ref Range: 4.8 - 5.9 % 7.9 (H) 8.3 (H) 8.5 (H) 7.7 7.8 (H)        reviewed labs    Results for Kareem Palacios (MRN [id number]) as of 6/29/2019 15:14   Ref.  Range 6/25/2019 16:33 6/25/2019 16:35   Sodium Latest Ref Range: 135 - 144 mEq/L 142    Potassium Latest Ref Range: 3.4 - 4.9 mEq/L 3.9    Chloride Latest Ref Range: 95 - 107 mEq/L 97    CO2 Latest Ref Range: 20 - 31 mEq/L 30    BUN Latest Ref Range: 8 - 23 mg/dL 30 (H)    Creatinine Latest Ref Range: 0.50 - 0.90 mg/dL 1.11 (H)    Anion Gap Latest Ref Range: 9 - 15 mEq/L 15    GFR Non- Latest Ref Range: >60  46.7 (L)    GFR  Latest Ref Range: >60  56.5 (L)    Glucose Latest Ref Range: 70 - 99 mg/dL 237 (H)    Calcium Latest Ref Range: 8.5 - 9.9 mg/dL 10.0 (H)    Hemoglobin A1C Latest Ref Range: 4.8 - 5.9 % 7.8 (H)    TSH Latest Ref Range: 0.440 - 3.860 uIU/mL 1.770    T4 Free Latest Ref Range: 0.84 - 1.68 ng/dL 1.41    Creatinine, Ur Latest Ref Range: Not Established mg/dL  36.5 Microalbumin Creatinine Ratio Latest Ref Range: 0.0 - 30.0 mg/G  see below   Microalbumin, Random Urine Latest Ref Range: Not Established mg/dL  <1.20       Patient Active Problem List   Diagnosis    Essential hypertension    Mixed hyperlipidemia    Hypothyroidism    CKD (chronic kidney disease) stage 3, GFR 30-59 ml/min ([facility])    Incontinence of urine    Degenerative arthritis of lumbar spine    CHF (congestive heart failure) ([facility])    Senile cataract    Uncontrolled type 2 diabetes mellitus without complication, with long-term current use of insulin ([facility])    Atrial fibrillation ([facility])    Anticoagulated on Coumadin    At high risk for falls    Gastroesophageal reflux disease without esophagitis    Vertigo    Age-related osteoporosis without current pathological fracture    Chronic right shoulder pain    Pacemaker    Candidal intertrigo    RUQ pain    Class 2 severe obesity due to excess calories with serious comorbidity and body mass index (BMI) of 38.0 to 38.9 in adult [facility])    Abnormal liver ultrasound          Review of Systems    Vitals:    06/25/19 1336   BP: 125/79   Pulse: 72   Weight: 208 lb (94.3 kg)   Height: 5' 2\" (1.575 m)       Objective:   Physical Exam   Constitutional: She appears well-developed and well-nourished. HENT:   Head: Normocephalic and atraumatic. Neck: Neck supple. Cardiovascular: Normal rate and regular rhythm. Pulmonary/Chest: Breath sounds normal.   Musculoskeletal: Normal range of motion. Feet:    Neurological: She is alert. Skin: Skin is warm. Psychiatric: She has a normal mood and affect. Assessment:       Diagnosis Orders   1. Uncontrolled type 2 diabetes mellitus without complication, with long-term current use of insulin ([facility])  Basic Metabolic Panel    Hemoglobin A1C    Microalbumin / Creatinine Urine Ratio    POCT Glucose    POCT glycosylated hemoglobin (Hb A1C)   2.  Hypothyroidism, unspecified type  TSH with Reflex    T4, Free

## 2019-06-25 NOTE — PROGRESS NOTES
Ms. Orlando Burnette is a 80 y.o. y/o female with history of Afib who presents today for anticoagulation monitoring and adjustment. INR 1.7 is subtherapeutic for this patient (goal range 2.3-3.0) and is reflective of 22.5 mg TWD  Patient verifies current dosing regimen, patient able to verbally recall dose  Patient reports no  missed doses since last INR   Patient denies s/sx clotting and/or stroke  Patient denies hematuria, epistaxis, rectal bleeding  Patient denies changes in alcohol, or tobacco use    Patient states that she has recently been drinking a glass of V8 every day. She states that she doesn't like it very much so she will not continue drinking it consistently. This may be the cause for the decreased INR.      Reviewed medication list and drug allergies with patient, updated any medication additions or modifications accordingly  Patient also denies any pending medical or dental procedures scheduled at this time  Patient was instructed to take a full tablet for one day to increase her weekly dose one time to 25 mg TWD and then continue her regimen of 22.5 mg TWD and RTC 4 weeks

## 2019-07-01 ENCOUNTER — HOSPITAL ENCOUNTER (OUTPATIENT)
Dept: CARDIOLOGY | Age: 84
Discharge: HOME | End: 2019-07-01
Payer: MEDICARE

## 2019-07-01 PROCEDURE — 93296 REM INTERROG EVL PM/IDS: CPT

## 2019-07-23 ENCOUNTER — HOSPITAL ENCOUNTER (OUTPATIENT)
Dept: PHARMACY | Age: 84
Setting detail: THERAPIES SERIES
Discharge: HOME | End: 2019-07-23
Payer: MEDICARE

## 2019-07-23 DIAGNOSIS — I48.91 ATRIAL FIBRILLATION, UNSPECIFIED TYPE (HCC): ICD-10-CM

## 2019-07-23 LAB — INTERNATIONAL NORMALIZATION RATIO, POC: 2.9

## 2019-07-23 PROCEDURE — 99211 OFF/OP EST MAY X REQ PHY/QHP: CPT

## 2019-07-23 PROCEDURE — 85610 PROTHROMBIN TIME: CPT

## 2019-08-13 ENCOUNTER — OFFICE VISIT (OUTPATIENT)
Dept: FAMILY MEDICINE CLINIC | Age: 84
End: 2019-08-13
Payer: MEDICARE

## 2019-08-13 VITALS
DIASTOLIC BLOOD PRESSURE: 76 MMHG | OXYGEN SATURATION: 97 % | TEMPERATURE: 98.1 F | RESPIRATION RATE: 16 BRPM | SYSTOLIC BLOOD PRESSURE: 128 MMHG | HEART RATE: 76 BPM | WEIGHT: 210 LBS | HEIGHT: 62 IN | BODY MASS INDEX: 38.64 KG/M2

## 2019-08-13 DIAGNOSIS — I48.91 ATRIAL FIBRILLATION, UNSPECIFIED TYPE (HCC): ICD-10-CM

## 2019-08-13 DIAGNOSIS — I50.42 HEART FAILURE, SYSTOLIC AND DIASTOLIC, CHRONIC (HCC): Primary | ICD-10-CM

## 2019-08-13 DIAGNOSIS — I10 ESSENTIAL HYPERTENSION: ICD-10-CM

## 2019-08-13 DIAGNOSIS — N18.30 CKD (CHRONIC KIDNEY DISEASE) STAGE 3, GFR 30-59 ML/MIN (HCC): Chronic | ICD-10-CM

## 2019-08-13 DIAGNOSIS — R42 VERTIGO: Chronic | ICD-10-CM

## 2019-08-13 DIAGNOSIS — E03.9 HYPOTHYROIDISM, UNSPECIFIED TYPE: ICD-10-CM

## 2019-08-13 DIAGNOSIS — K21.9 GASTROESOPHAGEAL REFLUX DISEASE WITHOUT ESOPHAGITIS: Chronic | ICD-10-CM

## 2019-08-13 PROCEDURE — 1123F ACP DISCUSS/DSCN MKR DOCD: CPT | Performed by: FAMILY MEDICINE

## 2019-08-13 PROCEDURE — 1036F TOBACCO NON-USER: CPT | Performed by: FAMILY MEDICINE

## 2019-08-13 PROCEDURE — 4040F PNEUMOC VAC/ADMIN/RCVD: CPT | Performed by: FAMILY MEDICINE

## 2019-08-13 PROCEDURE — 99214 OFFICE O/P EST MOD 30 MIN: CPT | Performed by: FAMILY MEDICINE

## 2019-08-13 PROCEDURE — G8399 PT W/DXA RESULTS DOCUMENT: HCPCS | Performed by: FAMILY MEDICINE

## 2019-08-13 PROCEDURE — G8417 CALC BMI ABV UP PARAM F/U: HCPCS | Performed by: FAMILY MEDICINE

## 2019-08-13 PROCEDURE — G8427 DOCREV CUR MEDS BY ELIG CLIN: HCPCS | Performed by: FAMILY MEDICINE

## 2019-08-13 PROCEDURE — 1090F PRES/ABSN URINE INCON ASSESS: CPT | Performed by: FAMILY MEDICINE

## 2019-08-13 RX ORDER — ISOSORBIDE MONONITRATE 60 MG/1
60 TABLET, EXTENDED RELEASE ORAL DAILY
Qty: 90 TABLET | Refills: 3 | Status: SHIPPED | OUTPATIENT
Start: 2019-08-13 | End: 2020-08-18

## 2019-08-13 RX ORDER — PANTOPRAZOLE SODIUM 40 MG/1
TABLET, DELAYED RELEASE ORAL
Qty: 90 TABLET | Refills: 3 | Status: SHIPPED | OUTPATIENT
Start: 2019-08-13 | End: 2020-08-31

## 2019-08-13 RX ORDER — MECLIZINE HCL 12.5 MG/1
12.5 TABLET ORAL 3 TIMES DAILY PRN
Qty: 60 TABLET | Refills: 3 | Status: SHIPPED | OUTPATIENT
Start: 2019-08-13

## 2019-08-19 ENCOUNTER — TELEPHONE (OUTPATIENT)
Dept: FAMILY MEDICINE CLINIC | Age: 84
End: 2019-08-19

## 2019-08-19 ENCOUNTER — OFFICE VISIT (OUTPATIENT)
Dept: FAMILY MEDICINE CLINIC | Age: 84
End: 2019-08-19
Payer: MEDICARE

## 2019-08-19 VITALS
OXYGEN SATURATION: 98 % | SYSTOLIC BLOOD PRESSURE: 126 MMHG | HEART RATE: 107 BPM | RESPIRATION RATE: 16 BRPM | TEMPERATURE: 97.4 F | BODY MASS INDEX: 39.38 KG/M2 | HEIGHT: 62 IN | DIASTOLIC BLOOD PRESSURE: 68 MMHG | WEIGHT: 214 LBS

## 2019-08-19 DIAGNOSIS — L23.7 ALLERGIC CONTACT DERMATITIS DUE TO PLANTS, EXCEPT FOOD: ICD-10-CM

## 2019-08-19 PROCEDURE — 99213 OFFICE O/P EST LOW 20 MIN: CPT | Performed by: NURSE PRACTITIONER

## 2019-08-19 PROCEDURE — 96372 THER/PROPH/DIAG INJ SC/IM: CPT | Performed by: NURSE PRACTITIONER

## 2019-08-19 PROCEDURE — 4040F PNEUMOC VAC/ADMIN/RCVD: CPT | Performed by: NURSE PRACTITIONER

## 2019-08-19 PROCEDURE — 1123F ACP DISCUSS/DSCN MKR DOCD: CPT | Performed by: NURSE PRACTITIONER

## 2019-08-19 PROCEDURE — G8399 PT W/DXA RESULTS DOCUMENT: HCPCS | Performed by: NURSE PRACTITIONER

## 2019-08-19 PROCEDURE — 1090F PRES/ABSN URINE INCON ASSESS: CPT | Performed by: NURSE PRACTITIONER

## 2019-08-19 PROCEDURE — G8417 CALC BMI ABV UP PARAM F/U: HCPCS | Performed by: NURSE PRACTITIONER

## 2019-08-19 PROCEDURE — G8427 DOCREV CUR MEDS BY ELIG CLIN: HCPCS | Performed by: NURSE PRACTITIONER

## 2019-08-19 PROCEDURE — 1036F TOBACCO NON-USER: CPT | Performed by: NURSE PRACTITIONER

## 2019-08-19 RX ORDER — METHYLPREDNISOLONE 4 MG/1
TABLET ORAL
Qty: 1 KIT | Refills: 0 | Status: SHIPPED | OUTPATIENT
Start: 2019-08-19 | End: 2019-08-25

## 2019-08-19 RX ORDER — DIPHENHYDRAMINE HCL 25 MG
25 TABLET ORAL EVERY 6 HOURS PRN
Qty: 30 TABLET | Refills: 0 | Status: SHIPPED | OUTPATIENT
Start: 2019-08-19 | End: 2019-08-27

## 2019-08-19 RX ORDER — METHYLPREDNISOLONE ACETATE 40 MG/ML
40 INJECTION, SUSPENSION INTRA-ARTICULAR; INTRALESIONAL; INTRAMUSCULAR; SOFT TISSUE ONCE
Status: COMPLETED | OUTPATIENT
Start: 2019-08-19 | End: 2019-08-19

## 2019-08-19 RX ADMIN — METHYLPREDNISOLONE ACETATE 40 MG: 40 INJECTION, SUSPENSION INTRA-ARTICULAR; INTRALESIONAL; INTRAMUSCULAR; SOFT TISSUE at 18:15

## 2019-08-19 ASSESSMENT — ENCOUNTER SYMPTOMS
BACK PAIN: 0
ABDOMINAL DISTENTION: 0
EYE DISCHARGE: 0
CONSTIPATION: 0
SORE THROAT: 0
SHORTNESS OF BREATH: 0
WHEEZING: 0
COUGH: 0
CHEST TIGHTNESS: 0
NAUSEA: 0
FACIAL SWELLING: 0
DIARRHEA: 0
ABDOMINAL PAIN: 0
SINUS PRESSURE: 0
EYE REDNESS: 0

## 2019-08-20 ENCOUNTER — HOSPITAL ENCOUNTER (OUTPATIENT)
Dept: PHARMACY | Age: 84
Setting detail: THERAPIES SERIES
Discharge: HOME | End: 2019-08-20
Payer: MEDICARE

## 2019-08-20 DIAGNOSIS — I48.91 ATRIAL FIBRILLATION, UNSPECIFIED TYPE (HCC): ICD-10-CM

## 2019-08-20 LAB — INTERNATIONAL NORMALIZATION RATIO, POC: 2.7

## 2019-08-20 PROCEDURE — 85610 PROTHROMBIN TIME: CPT | Performed by: PHARMACIST

## 2019-08-20 PROCEDURE — 99211 OFF/OP EST MAY X REQ PHY/QHP: CPT | Performed by: PHARMACIST

## 2019-08-23 ENCOUNTER — HOSPITAL ENCOUNTER (OUTPATIENT)
Dept: PHARMACY | Age: 84
Setting detail: THERAPIES SERIES
Discharge: HOME | End: 2019-08-23
Payer: MEDICARE

## 2019-08-23 DIAGNOSIS — I48.91 ATRIAL FIBRILLATION, UNSPECIFIED TYPE (HCC): ICD-10-CM

## 2019-08-23 LAB — INTERNATIONAL NORMALIZATION RATIO, POC: 3.1

## 2019-08-23 PROCEDURE — 99211 OFF/OP EST MAY X REQ PHY/QHP: CPT

## 2019-08-23 PROCEDURE — 85610 PROTHROMBIN TIME: CPT

## 2019-09-07 LAB
ALBUMIN SERPL-MCNC: 3.8 G/DL (ref 3.5–4.6)
ALP BLD-CCNC: 74 U/L (ref 40–130)
ALT SERPL-CCNC: 15 U/L (ref 0–33)
ANION GAP SERPL CALCULATED.3IONS-SCNC: 13 MEQ/L (ref 9–15)
AST SERPL-CCNC: 15 U/L (ref 0–35)
BILIRUB SERPL-MCNC: 0.4 MG/DL (ref 0.2–0.7)
BILIRUBIN DIRECT: <0.2 MG/DL (ref 0–0.4)
BILIRUBIN, INDIRECT: NORMAL MG/DL (ref 0–0.6)
BUN BLDV-MCNC: 20 MG/DL (ref 8–23)
CALCIUM SERPL-MCNC: 9.3 MG/DL (ref 8.5–9.9)
CHLORIDE BLD-SCNC: 100 MEQ/L (ref 95–107)
CHOLESTEROL, TOTAL: 170 MG/DL (ref 0–199)
CO2: 29 MEQ/L (ref 20–31)
CREAT SERPL-MCNC: 1.06 MG/DL (ref 0.5–0.9)
GFR AFRICAN AMERICAN: 59.6
GFR NON-AFRICAN AMERICAN: 49.2
GLUCOSE BLD-MCNC: 186 MG/DL (ref 70–99)
HCT VFR BLD CALC: 41.9 % (ref 37–47)
HDLC SERPL-MCNC: 50 MG/DL (ref 40–59)
HEMOGLOBIN: 14 G/DL (ref 12–16)
LDL CHOLESTEROL CALCULATED: 90 MG/DL (ref 0–129)
MAGNESIUM: 2.1 MG/DL (ref 1.7–2.4)
MCH RBC QN AUTO: 29.8 PG (ref 27–31.3)
MCHC RBC AUTO-ENTMCNC: 33.4 % (ref 33–37)
MCV RBC AUTO: 89.4 FL (ref 82–100)
PDW BLD-RTO: 14 % (ref 11.5–14.5)
PLATELET # BLD: 174 K/UL (ref 130–400)
POTASSIUM SERPL-SCNC: 4.2 MEQ/L (ref 3.4–4.9)
RBC # BLD: 4.69 M/UL (ref 4.2–5.4)
SODIUM BLD-SCNC: 142 MEQ/L (ref 135–144)
TOTAL PROTEIN: 6.8 G/DL (ref 6.3–8)
TRIGL SERPL-MCNC: 152 MG/DL (ref 0–150)
WBC # BLD: 9.4 K/UL (ref 4.8–10.8)

## 2019-09-17 ENCOUNTER — HOSPITAL ENCOUNTER (OUTPATIENT)
Dept: PHARMACY | Age: 84
Setting detail: THERAPIES SERIES
Discharge: HOME | End: 2019-09-17
Payer: MEDICARE

## 2019-09-17 DIAGNOSIS — I48.91 ATRIAL FIBRILLATION, UNSPECIFIED TYPE (HCC): ICD-10-CM

## 2019-09-17 LAB — INTERNATIONAL NORMALIZATION RATIO, POC: 3.2

## 2019-09-17 PROCEDURE — 99211 OFF/OP EST MAY X REQ PHY/QHP: CPT

## 2019-09-17 PROCEDURE — 85610 PROTHROMBIN TIME: CPT

## 2019-09-25 ENCOUNTER — OFFICE VISIT (OUTPATIENT)
Dept: ENDOCRINOLOGY | Age: 84
End: 2019-09-25
Payer: MEDICARE

## 2019-09-25 VITALS
SYSTOLIC BLOOD PRESSURE: 122 MMHG | BODY MASS INDEX: 39.38 KG/M2 | DIASTOLIC BLOOD PRESSURE: 75 MMHG | WEIGHT: 214 LBS | HEIGHT: 62 IN | HEART RATE: 71 BPM

## 2019-09-25 DIAGNOSIS — B35.1 ONYCHOMYCOSIS: ICD-10-CM

## 2019-09-25 LAB
CHP ED QC CHECK: NORMAL
GLUCOSE BLD-MCNC: 174 MG/DL
HBA1C MFR BLD: 7.5 %

## 2019-09-25 PROCEDURE — G8399 PT W/DXA RESULTS DOCUMENT: HCPCS | Performed by: INTERNAL MEDICINE

## 2019-09-25 PROCEDURE — G8417 CALC BMI ABV UP PARAM F/U: HCPCS | Performed by: INTERNAL MEDICINE

## 2019-09-25 PROCEDURE — 4040F PNEUMOC VAC/ADMIN/RCVD: CPT | Performed by: INTERNAL MEDICINE

## 2019-09-25 PROCEDURE — 1123F ACP DISCUSS/DSCN MKR DOCD: CPT | Performed by: INTERNAL MEDICINE

## 2019-09-25 PROCEDURE — 82962 GLUCOSE BLOOD TEST: CPT | Performed by: INTERNAL MEDICINE

## 2019-09-25 PROCEDURE — 1036F TOBACCO NON-USER: CPT | Performed by: INTERNAL MEDICINE

## 2019-09-25 PROCEDURE — 83036 HEMOGLOBIN GLYCOSYLATED A1C: CPT | Performed by: INTERNAL MEDICINE

## 2019-09-25 PROCEDURE — 1090F PRES/ABSN URINE INCON ASSESS: CPT | Performed by: INTERNAL MEDICINE

## 2019-09-25 PROCEDURE — G8428 CUR MEDS NOT DOCUMENT: HCPCS | Performed by: INTERNAL MEDICINE

## 2019-09-25 PROCEDURE — 99213 OFFICE O/P EST LOW 20 MIN: CPT | Performed by: INTERNAL MEDICINE

## 2019-09-30 ENCOUNTER — HOSPITAL ENCOUNTER (OUTPATIENT)
Dept: CARDIOLOGY | Age: 84
Discharge: HOME | End: 2019-09-30
Payer: MEDICARE

## 2019-09-30 LAB
ANION GAP SERPL CALCULATED.3IONS-SCNC: 14 MEQ/L (ref 9–15)
BUN BLDV-MCNC: 19 MG/DL (ref 8–23)
CALCIUM SERPL-MCNC: 10.2 MG/DL (ref 8.5–9.9)
CHLORIDE BLD-SCNC: 97 MEQ/L (ref 95–107)
CO2: 30 MEQ/L (ref 20–31)
CREAT SERPL-MCNC: 1.2 MG/DL (ref 0.5–0.9)
CREATININE URINE: 29.1 MG/DL
GFR AFRICAN AMERICAN: 51.6
GFR NON-AFRICAN AMERICAN: 42.7
GLUCOSE BLD-MCNC: 201 MG/DL (ref 70–99)
HBA1C MFR BLD: 7.9 % (ref 4.8–5.9)
MICROALBUMIN UR-MCNC: <1.2 MG/DL
MICROALBUMIN/CREAT UR-RTO: NORMAL MG/G (ref 0–30)
POTASSIUM SERPL-SCNC: 4.5 MEQ/L (ref 3.4–4.9)
SODIUM BLD-SCNC: 141 MEQ/L (ref 135–144)

## 2019-09-30 PROCEDURE — 93280 PM DEVICE PROGR EVAL DUAL: CPT

## 2019-10-09 RX ORDER — LEVOTHYROXINE SODIUM 0.07 MG/1
TABLET ORAL
Qty: 30 TABLET | Refills: 5 | Status: SHIPPED | OUTPATIENT
Start: 2019-10-09 | End: 2020-04-06

## 2019-10-14 ENCOUNTER — HOSPITAL ENCOUNTER (OUTPATIENT)
Dept: PHARMACY | Age: 84
Setting detail: THERAPIES SERIES
Discharge: HOME | End: 2019-10-14
Payer: MEDICARE

## 2019-10-14 DIAGNOSIS — I48.91 ATRIAL FIBRILLATION, UNSPECIFIED TYPE (HCC): ICD-10-CM

## 2019-10-14 LAB — INTERNATIONAL NORMALIZATION RATIO, POC: 2.6

## 2019-10-14 PROCEDURE — 99211 OFF/OP EST MAY X REQ PHY/QHP: CPT

## 2019-10-14 PROCEDURE — 85610 PROTHROMBIN TIME: CPT

## 2019-11-11 ENCOUNTER — HOSPITAL ENCOUNTER (OUTPATIENT)
Dept: PHARMACY | Age: 84
Setting detail: THERAPIES SERIES
Discharge: HOME | End: 2019-11-11
Payer: MEDICARE

## 2019-11-11 DIAGNOSIS — I48.91 ATRIAL FIBRILLATION, UNSPECIFIED TYPE (HCC): ICD-10-CM

## 2019-11-11 LAB — INTERNATIONAL NORMALIZATION RATIO, POC: 3.1

## 2019-11-11 PROCEDURE — 99211 OFF/OP EST MAY X REQ PHY/QHP: CPT

## 2019-11-11 PROCEDURE — 85610 PROTHROMBIN TIME: CPT

## 2019-11-14 ENCOUNTER — OFFICE VISIT (OUTPATIENT)
Dept: FAMILY MEDICINE CLINIC | Age: 84
End: 2019-11-14
Payer: MEDICARE

## 2019-11-14 VITALS
OXYGEN SATURATION: 98 % | RESPIRATION RATE: 16 BRPM | BODY MASS INDEX: 39.56 KG/M2 | SYSTOLIC BLOOD PRESSURE: 126 MMHG | TEMPERATURE: 97.9 F | WEIGHT: 215 LBS | HEIGHT: 62 IN | DIASTOLIC BLOOD PRESSURE: 62 MMHG | HEART RATE: 75 BPM

## 2019-11-14 DIAGNOSIS — I50.9 CHRONIC CONGESTIVE HEART FAILURE, UNSPECIFIED HEART FAILURE TYPE (HCC): ICD-10-CM

## 2019-11-14 DIAGNOSIS — E66.01 CLASS 2 SEVERE OBESITY DUE TO EXCESS CALORIES WITH SERIOUS COMORBIDITY AND BODY MASS INDEX (BMI) OF 38.0 TO 38.9 IN ADULT (HCC): Chronic | ICD-10-CM

## 2019-11-14 DIAGNOSIS — L30.4 INTERTRIGO: Chronic | ICD-10-CM

## 2019-11-14 DIAGNOSIS — L89.302 PRESSURE INJURY OF BUTTOCK, STAGE 2, UNSPECIFIED LATERALITY (HCC): Chronic | ICD-10-CM

## 2019-11-14 DIAGNOSIS — E78.2 MIXED HYPERLIPIDEMIA: ICD-10-CM

## 2019-11-14 DIAGNOSIS — E03.9 HYPOTHYROIDISM, UNSPECIFIED TYPE: ICD-10-CM

## 2019-11-14 DIAGNOSIS — I10 ESSENTIAL HYPERTENSION: Primary | ICD-10-CM

## 2019-11-14 DIAGNOSIS — I48.91 ATRIAL FIBRILLATION, UNSPECIFIED TYPE (HCC): ICD-10-CM

## 2019-11-14 PROBLEM — L23.7 ALLERGIC CONTACT DERMATITIS DUE TO PLANTS, EXCEPT FOOD: Status: RESOLVED | Noted: 2019-08-19 | Resolved: 2019-11-14

## 2019-11-14 PROCEDURE — 1123F ACP DISCUSS/DSCN MKR DOCD: CPT | Performed by: FAMILY MEDICINE

## 2019-11-14 PROCEDURE — 99214 OFFICE O/P EST MOD 30 MIN: CPT | Performed by: FAMILY MEDICINE

## 2019-11-14 PROCEDURE — 1090F PRES/ABSN URINE INCON ASSESS: CPT | Performed by: FAMILY MEDICINE

## 2019-11-14 PROCEDURE — 1036F TOBACCO NON-USER: CPT | Performed by: FAMILY MEDICINE

## 2019-11-14 PROCEDURE — 4040F PNEUMOC VAC/ADMIN/RCVD: CPT | Performed by: FAMILY MEDICINE

## 2019-11-14 PROCEDURE — G8427 DOCREV CUR MEDS BY ELIG CLIN: HCPCS | Performed by: FAMILY MEDICINE

## 2019-11-14 PROCEDURE — G8482 FLU IMMUNIZE ORDER/ADMIN: HCPCS | Performed by: FAMILY MEDICINE

## 2019-11-14 PROCEDURE — G8399 PT W/DXA RESULTS DOCUMENT: HCPCS | Performed by: FAMILY MEDICINE

## 2019-11-14 PROCEDURE — G8417 CALC BMI ABV UP PARAM F/U: HCPCS | Performed by: FAMILY MEDICINE

## 2019-11-14 RX ORDER — GREEN TEA/HOODIA GORDONII 315-12.5MG
1 CAPSULE ORAL DAILY
Qty: 30 TABLET | Refills: 12 | Status: SHIPPED | OUTPATIENT
Start: 2019-11-14 | End: 2019-12-14

## 2019-11-14 RX ORDER — MENTHOL AND ZINC OXIDE .44; 20.625 G/100G; G/100G
OINTMENT TOPICAL 2 TIMES DAILY PRN
Qty: 113 G | Refills: 5 | Status: SHIPPED | OUTPATIENT
Start: 2019-11-14 | End: 2019-11-28

## 2019-12-09 ENCOUNTER — HOSPITAL ENCOUNTER (OUTPATIENT)
Dept: PHARMACY | Age: 84
Setting detail: THERAPIES SERIES
Discharge: HOME | End: 2019-12-09
Payer: MEDICARE

## 2019-12-09 DIAGNOSIS — I48.19 OTHER PERSISTENT ATRIAL FIBRILLATION (HCC): ICD-10-CM

## 2019-12-09 LAB — INTERNATIONAL NORMALIZATION RATIO, POC: 2.9

## 2019-12-09 PROCEDURE — 85610 PROTHROMBIN TIME: CPT | Performed by: PHARMACIST

## 2019-12-09 PROCEDURE — 99211 OFF/OP EST MAY X REQ PHY/QHP: CPT | Performed by: PHARMACIST

## 2019-12-19 ENCOUNTER — OFFICE VISIT (OUTPATIENT)
Dept: ENDOCRINOLOGY | Age: 84
End: 2019-12-19
Payer: MEDICARE

## 2019-12-19 VITALS
HEIGHT: 62 IN | WEIGHT: 215 LBS | HEART RATE: 81 BPM | DIASTOLIC BLOOD PRESSURE: 81 MMHG | OXYGEN SATURATION: 93 % | RESPIRATION RATE: 16 BRPM | SYSTOLIC BLOOD PRESSURE: 130 MMHG | BODY MASS INDEX: 39.56 KG/M2

## 2019-12-19 DIAGNOSIS — B35.1 ONYCHOMYCOSIS: ICD-10-CM

## 2019-12-19 LAB
CHP ED QC CHECK: NORMAL
GLUCOSE BLD-MCNC: 226 MG/DL

## 2019-12-19 PROCEDURE — G8482 FLU IMMUNIZE ORDER/ADMIN: HCPCS | Performed by: INTERNAL MEDICINE

## 2019-12-19 PROCEDURE — 99213 OFFICE O/P EST LOW 20 MIN: CPT | Performed by: INTERNAL MEDICINE

## 2019-12-19 PROCEDURE — G8399 PT W/DXA RESULTS DOCUMENT: HCPCS | Performed by: INTERNAL MEDICINE

## 2019-12-19 PROCEDURE — G8417 CALC BMI ABV UP PARAM F/U: HCPCS | Performed by: INTERNAL MEDICINE

## 2019-12-19 PROCEDURE — G8427 DOCREV CUR MEDS BY ELIG CLIN: HCPCS | Performed by: INTERNAL MEDICINE

## 2019-12-19 PROCEDURE — 1123F ACP DISCUSS/DSCN MKR DOCD: CPT | Performed by: INTERNAL MEDICINE

## 2019-12-19 PROCEDURE — 4040F PNEUMOC VAC/ADMIN/RCVD: CPT | Performed by: INTERNAL MEDICINE

## 2019-12-19 PROCEDURE — 1036F TOBACCO NON-USER: CPT | Performed by: INTERNAL MEDICINE

## 2019-12-19 PROCEDURE — 82962 GLUCOSE BLOOD TEST: CPT | Performed by: INTERNAL MEDICINE

## 2019-12-19 PROCEDURE — 1090F PRES/ABSN URINE INCON ASSESS: CPT | Performed by: INTERNAL MEDICINE

## 2019-12-19 RX ORDER — ACETAMINOPHEN, DEXTROMETHORPHAN HYDROBROMIDE, DOXYLAMINE SUCCINATE, PHENYLEPHRINE HYDROCHLORIDE 650; 20; 12.5; 1 MG/30ML; MG/30ML; MG/30ML; MG/30ML
SOLUTION ORAL
Status: ON HOLD | COMMUNITY
Start: 2019-12-15 | End: 2020-10-29

## 2019-12-19 RX ORDER — ANORECTAL OINTMENT 15.7; .44; 24; 20.6 G/100G; G/100G; G/100G; G/100G
OINTMENT TOPICAL
Refills: 0 | Status: ON HOLD | COMMUNITY
Start: 2019-11-15 | End: 2020-10-29

## 2019-12-31 ENCOUNTER — HOSPITAL ENCOUNTER (OUTPATIENT)
Dept: CARDIOLOGY | Age: 84
Discharge: HOME | End: 2019-12-31
Payer: MEDICARE

## 2019-12-31 PROCEDURE — 93296 REM INTERROG EVL PM/IDS: CPT

## 2020-01-06 ENCOUNTER — HOSPITAL ENCOUNTER (EMERGENCY)
Age: 85
Discharge: HOME | End: 2020-01-06
Payer: MEDICARE

## 2020-01-06 VITALS
HEART RATE: 80 BPM | DIASTOLIC BLOOD PRESSURE: 78 MMHG | SYSTOLIC BLOOD PRESSURE: 134 MMHG | BODY MASS INDEX: 39.56 KG/M2 | RESPIRATION RATE: 18 BRPM | OXYGEN SATURATION: 99 % | HEIGHT: 62 IN | WEIGHT: 215 LBS | TEMPERATURE: 97.6 F

## 2020-01-06 LAB
ALBUMIN SERPL-MCNC: 4 G/DL (ref 3.5–4.6)
ALP BLD-CCNC: 65 U/L (ref 40–130)
ALT SERPL-CCNC: 13 U/L (ref 0–33)
ANION GAP SERPL CALCULATED.3IONS-SCNC: 15 MEQ/L (ref 9–15)
APTT: 39.5 SEC (ref 24.4–36.8)
AST SERPL-CCNC: 19 U/L (ref 0–35)
BASOPHILS ABSOLUTE: 0.1 K/UL (ref 0–0.2)
BASOPHILS RELATIVE PERCENT: 1 %
BILIRUB SERPL-MCNC: 0.4 MG/DL (ref 0.2–0.7)
BUN BLDV-MCNC: 23 MG/DL (ref 8–23)
CALCIUM SERPL-MCNC: 9.4 MG/DL (ref 8.5–9.9)
CHLORIDE BLD-SCNC: 100 MEQ/L (ref 95–107)
CO2: 26 MEQ/L (ref 20–31)
CREAT SERPL-MCNC: 1.17 MG/DL (ref 0.5–0.9)
EOSINOPHILS ABSOLUTE: 0.2 K/UL (ref 0–0.7)
EOSINOPHILS RELATIVE PERCENT: 2.2 %
GFR AFRICAN AMERICAN: 53.1
GFR NON-AFRICAN AMERICAN: 43.9
GLOBULIN: 2.8 G/DL (ref 2.3–3.5)
GLUCOSE BLD-MCNC: 93 MG/DL (ref 70–99)
HCT VFR BLD CALC: 39.4 % (ref 37–47)
HEMOGLOBIN: 13.2 G/DL (ref 12–16)
INR BLD: 2.7
LYMPHOCYTES ABSOLUTE: 1.8 K/UL (ref 1–4.8)
LYMPHOCYTES RELATIVE PERCENT: 16.6 %
MCH RBC QN AUTO: 29.1 PG (ref 27–31.3)
MCHC RBC AUTO-ENTMCNC: 33.6 % (ref 33–37)
MCV RBC AUTO: 86.4 FL (ref 82–100)
MONOCYTES ABSOLUTE: 0.9 K/UL (ref 0.2–0.8)
MONOCYTES RELATIVE PERCENT: 7.9 %
NEUTROPHILS ABSOLUTE: 7.9 K/UL (ref 1.4–6.5)
NEUTROPHILS RELATIVE PERCENT: 72.3 %
PDW BLD-RTO: 14.5 % (ref 11.5–14.5)
PLATELET # BLD: 219 K/UL (ref 130–400)
POTASSIUM SERPL-SCNC: 4.1 MEQ/L (ref 3.4–4.9)
PROTHROMBIN TIME: 29.6 SEC (ref 12.3–14.9)
RBC # BLD: 4.56 M/UL (ref 4.2–5.4)
SODIUM BLD-SCNC: 141 MEQ/L (ref 135–144)
TOTAL PROTEIN: 6.8 G/DL (ref 6.3–8)
WBC # BLD: 10.9 K/UL (ref 4.8–10.8)

## 2020-01-06 PROCEDURE — 80053 COMPREHEN METABOLIC PANEL: CPT

## 2020-01-06 PROCEDURE — 36415 COLL VENOUS BLD VENIPUNCTURE: CPT

## 2020-01-06 PROCEDURE — 85610 PROTHROMBIN TIME: CPT

## 2020-01-06 PROCEDURE — 99283 EMERGENCY DEPT VISIT LOW MDM: CPT

## 2020-01-06 PROCEDURE — 85025 COMPLETE CBC W/AUTO DIFF WBC: CPT

## 2020-01-06 PROCEDURE — 85730 THROMBOPLASTIN TIME PARTIAL: CPT

## 2020-01-06 ASSESSMENT — PAIN SCALES - GENERAL: PAINLEVEL_OUTOF10: 6

## 2020-01-06 ASSESSMENT — PAIN DESCRIPTION - FREQUENCY: FREQUENCY: INTERMITTENT

## 2020-01-06 ASSESSMENT — PAIN DESCRIPTION - PAIN TYPE: TYPE: CHRONIC PAIN

## 2020-01-06 ASSESSMENT — PAIN DESCRIPTION - LOCATION: LOCATION: GENERALIZED

## 2020-01-06 ASSESSMENT — PAIN DESCRIPTION - DESCRIPTORS: DESCRIPTORS: ACHING

## 2020-01-06 NOTE — ED NOTES
Lab specimens green top and purple top obtained. Lab notified of needed blue tube.      Yaima Rojo RN  01/06/20 [id number]

## 2020-01-06 NOTE — ED PROVIDER NOTES
[address] [facility] ED  EMERGENCY DEPARTMENT ENCOUNTER      Pt Name: Shelly Stevenson  MRN: [id number]  [place] 1934  Date of evaluation: 1/6/2020  Provider: Fredi Hunt       Chief Complaint   Patient presents with    Epistaxis         HISTORY OF PRESENT ILLNESS   (Location/Symptom, Timing/Onset,Context/Setting, Quality, Duration, Modifying Factors, Severity)  Note limiting factors. Shelly Stevenson is a 80 y.o. female who presents to the emergency department with complaint of left-sided nosebleed. Patient states she does take Coumadin regularly and has in the past had other nosebleeds similar to this. She states that the nosebleed was slowing continues for about 1 hour. She states that she was holding pressure against it with a kitchen towel as well as a nasal clip she was provided from a previous evaluation when she had a nosebleed. She states that she has been able to stop the nosebleed in the past and she presents for evaluation as this bleed lasted longer than her previous nosebleeds which generally stopped very easily within a few minutes. She denies any injury to the area any swelling, denies any runny nose cough nasal congestion upper respiratory symptoms. She denies any pain or discomfort denies headaches fevers chills sweats. She denies any shortness of breath cough congestion no chest pains. Denies abdominal pain nausea vomiting or diarrhea. She denies swallowing and blood and states that she was holding pressure on her nose in a tilted forward position as she was appropriately trained to do so on the previous encounter of a nosebleed. She states that she recently had her INR level checked at the Coumadin clinic and the level was 3.1, she states that she is presenting for concern evaluation of an increase in her INR level. Nursing Notes were reviewed.     REVIEW OF SYSTEMS    (2-9 systems for level 4, 10 or more for level 5)     Review of Systems   Constitutional: Negative for activity change, appetite change, chills, diaphoresis, fatigue and fever. HENT: Positive for nosebleeds. Negative for congestion, ear pain, postnasal drip, rhinorrhea, sore throat and trouble swallowing. Eyes: Negative for visual disturbance. Respiratory: Negative for cough, chest tightness, shortness of breath and wheezing. Cardiovascular: Negative for chest pain and palpitations. Gastrointestinal: Negative for abdominal distention, abdominal pain, constipation, diarrhea, nausea and vomiting. Genitourinary: Negative for difficulty urinating and flank pain. Musculoskeletal: Negative for arthralgias, back pain and myalgias. Skin: Negative for color change and rash. Neurological: Negative for dizziness, tremors, seizures, syncope, facial asymmetry, speech difficulty, weakness, light-headedness, numbness and headaches. Except as noted above the remainder of the review of systems was reviewed and negative. PAST MEDICAL HISTORY     Past Medical History:   Diagnosis Date    Abnormal liver ultrasound 5/15/2019    Allergic contact dermatitis due to plants, except food 8/19/2019    Chronic kidney disease (CKD), stage II (mild)     Hyperlipidemia     Hypertension     Hypothyroidism     Interstitial cystitis     Dr. Carlos Norton diagnosed this for patinet.  Type II or unspecified type diabetes mellitus without mention of complication, not stated as uncontrolled      Past Surgical History:   Procedure Laterality Date    ABDOMEN SURGERY      CATARACT REMOVAL WITH IMPLANT      both eyes    CORONARY ANGIOPLASTY WITH STENT PLACEMENT      HYSTERECTOMY      OTHER SURGICAL HISTORY  09/07/2016    GENERATOR CHANGE     PACEMAKER PLACEMENT       Social History     Socioeconomic History    Marital status:       Spouse name: None    Number of children: None    Years of education: None    Highest education level: None   Occupational History    None   Social Needs    Financial resource strain: None    Food insecurity:     Worry: None     Inability: None    Transportation needs:     Medical: None     Non-medical: None   Tobacco Use    Smoking status: Never Smoker    Smokeless tobacco: Never Used   Substance and Sexual Activity    Alcohol use: No     Comment: denies    Drug use: No    Sexual activity: Not Currently   Lifestyle    Physical activity:     Days per week: None     Minutes per session: None    Stress: None   Relationships    Social connections:     Talks on phone: None     Gets together: None     Attends Sikhism service: None     Active member of club or organization: None     Attends meetings of clubs or organizations: None     Relationship status: None    Intimate partner violence:     Fear of current or ex partner: None     Emotionally abused: None     Physically abused: None     Forced sexual activity: None   Other Topics Concern    None   Social History Narrative    None       SCREENINGS             PHYSICAL EXAM    (up to 7 for level 4, 8 or more for level 5)     ED Triage Vitals   BP Temp Temp Source Pulse Resp SpO2 Height Weight   01/06/20 0022 01/06/20 0022 01/06/20 0022 01/06/20 0022 01/06/20 0022 01/06/20 0140 01/06/20 0022 01/06/20 0022   (!) 166/100 97.6 °F (36.4 °C) Oral 80 20 96 % 5' 2\" (1.575 m) 215 lb (97.5 kg)       Physical Exam  Constitutional:       General: She is not in acute distress. Appearance: Normal appearance. She is normal weight. She is not ill-appearing, toxic-appearing or diaphoretic. HENT:      Head: Normocephalic and atraumatic. Right Ear: Hearing, tympanic membrane, ear canal and external ear normal.      Left Ear: Hearing, tympanic membrane, ear canal and external ear normal.      Nose: No signs of injury, nasal tenderness, mucosal edema, congestion or rhinorrhea. Right Nostril: No foreign body, epistaxis, septal hematoma or occlusion.       Left Nostril: Epistaxis (dried blood in the left nasal passage no continuous bleeding no occlusion no mucosal edema no sign of other injury. Appears to be resolved from patient applying direct pressure no continuous bleeding) present. No foreign body, septal hematoma or occlusion. Right Turbinates: Not enlarged, swollen or pale. Left Turbinates: Not enlarged, swollen or pale. Mouth/Throat:      Lips: Pink. Mouth: Mucous membranes are moist.      Pharynx: Oropharynx is clear. No oropharyngeal exudate or posterior oropharyngeal erythema. Eyes:      General:         Right eye: No discharge. Left eye: No discharge. Conjunctiva/sclera: Conjunctivae normal.   Neck:      Musculoskeletal: Normal range of motion and neck supple. No neck rigidity or muscular tenderness. Cardiovascular:      Rate and Rhythm: Normal rate and regular rhythm. Pulses: Normal pulses. Pulmonary:      Effort: Pulmonary effort is normal.      Breath sounds: Normal breath sounds. Abdominal:      General: There is no distension. Palpations: Abdomen is soft. Tenderness: There is no tenderness. There is no guarding or rebound. Musculoskeletal: Normal range of motion. General: No tenderness or signs of injury. Skin:     General: Skin is warm and dry. Capillary Refill: Capillary refill takes less than 2 seconds. Neurological:      General: No focal deficit present. Mental Status: She is alert and oriented to person, place, and time. Mental status is at baseline. Cranial Nerves: No cranial nerve deficit. Sensory: No sensory deficit. Motor: No weakness.          RESULTS     EKG: All EKG's are interpreted by the Emergency Department Physician who either signs or Co-signsthis chart in the absence of a cardiologist.        RADIOLOGY:   Non-plain filmimages such as CT, Ultrasound and MRI are read by the radiologist. Plain radiographic images are visualized and preliminarily interpreted by the emergency physician with the below findings:        Interpretation per the Radiologist below, if available at the time ofthis note:    No orders to display         ED BEDSIDE ULTRASOUND:   Performed by ED Physician - none    LABS:  Labs Reviewed   COMPREHENSIVE METABOLIC PANEL - Abnormal; Notable for the following components:       Result Value    CREATININE 1.17 (*)     GFR Non- 43.9 (*)     GFR  53.1 (*)     All other components within normal limits   PROTIME-INR - Abnormal; Notable for the following components:    Protime 29.6 (*)     All other components within normal limits   CBC WITH AUTO DIFFERENTIAL - Abnormal; Notable for the following components:    WBC 10.9 (*)     Neutrophils Absolute 7.9 (*)     Monocytes Absolute 0.9 (*)     All other components within normal limits   APTT - Abnormal; Notable for the following components:    aPTT 39.5 (*)     All other components within normal limits       All other labs were within normal range or not returned as of this dictation. EMERGENCY DEPARTMENT COURSE and DIFFERENTIAL DIAGNOSIS/MDM:   Vitals:    Vitals:    01/06/20 0022 01/06/20 0140 01/06/20 0304   BP: (!) 166/100 (!) 153/81 134/78   Pulse: 80 80 80   Resp: 20 14 18   Temp: 97.6 °F (36.4 °C)  97.6 °F (36.4 °C)   TempSrc: Oral  Oral   SpO2:  96% 99%   Weight: 215 lb (97.5 kg)     Height: 5' 2\" (1.575 m)              MDM patient is afebrile nontoxic parents no acute distress hemodynamically stable. Patient presented with a nursing pain rating scores a 6 out of 10 however the patient denies any current pain or discomfort and states that she has not had any pain or discomfort. She denies any injury swelling of the face or headache. Labs ordered for further evaluation the patient's INR is 2.7 appears to be within limits of her Coumadin therapy. On evaluation the patient appears to have completely resolved her epistaxis with appropriate pressure holding.   There is a small

## 2020-01-08 ENCOUNTER — OFFICE VISIT (OUTPATIENT)
Dept: FAMILY MEDICINE CLINIC | Age: 85
End: 2020-01-08
Payer: MEDICARE

## 2020-01-08 VITALS
HEART RATE: 71 BPM | BODY MASS INDEX: 39.38 KG/M2 | DIASTOLIC BLOOD PRESSURE: 78 MMHG | SYSTOLIC BLOOD PRESSURE: 132 MMHG | HEIGHT: 62 IN | WEIGHT: 214 LBS | RESPIRATION RATE: 18 BRPM | OXYGEN SATURATION: 98 % | TEMPERATURE: 97.8 F

## 2020-01-08 PROCEDURE — G8427 DOCREV CUR MEDS BY ELIG CLIN: HCPCS | Performed by: FAMILY MEDICINE

## 2020-01-08 PROCEDURE — G8482 FLU IMMUNIZE ORDER/ADMIN: HCPCS | Performed by: FAMILY MEDICINE

## 2020-01-08 PROCEDURE — 99214 OFFICE O/P EST MOD 30 MIN: CPT | Performed by: FAMILY MEDICINE

## 2020-01-08 PROCEDURE — 1123F ACP DISCUSS/DSCN MKR DOCD: CPT | Performed by: FAMILY MEDICINE

## 2020-01-08 PROCEDURE — 1036F TOBACCO NON-USER: CPT | Performed by: FAMILY MEDICINE

## 2020-01-08 PROCEDURE — 4040F PNEUMOC VAC/ADMIN/RCVD: CPT | Performed by: FAMILY MEDICINE

## 2020-01-08 PROCEDURE — 1090F PRES/ABSN URINE INCON ASSESS: CPT | Performed by: FAMILY MEDICINE

## 2020-01-08 PROCEDURE — G8417 CALC BMI ABV UP PARAM F/U: HCPCS | Performed by: FAMILY MEDICINE

## 2020-01-08 PROCEDURE — G8399 PT W/DXA RESULTS DOCUMENT: HCPCS | Performed by: FAMILY MEDICINE

## 2020-01-08 ASSESSMENT — PATIENT HEALTH QUESTIONNAIRE - PHQ9
DEPRESSION UNABLE TO ASSESS: PT REFUSES
1. LITTLE INTEREST OR PLEASURE IN DOING THINGS: 0
SUM OF ALL RESPONSES TO PHQ QUESTIONS 1-9: 0
SUM OF ALL RESPONSES TO PHQ QUESTIONS 1-9: 0
2. FEELING DOWN, DEPRESSED OR HOPELESS: 0
SUM OF ALL RESPONSES TO PHQ9 QUESTIONS 1 & 2: 0

## 2020-01-08 NOTE — PROGRESS NOTES
headache and eyes feel messed up too. Has cough productive of scant sputum x over one month    No other questions and or concerns for today's visit      Review of Systems No fevers, chills, sweats. No unintended weight loss. No abdominal pain, nausea, vomiting, diarrhea, constipation, bloody stools, black tarry stools. No rashes. No swollen glands. Past Medical History:   Diagnosis Date    Abnormal liver ultrasound 5/15/2019    Allergic contact dermatitis due to plants, except food 8/19/2019    Chronic kidney disease (CKD), stage II (mild)     Hyperlipidemia     Hypertension     Hypothyroidism     Interstitial cystitis     Dr. Nadira Odonnell diagnosed this for patinet.  Type II or unspecified type diabetes mellitus without mention of complication, not stated as uncontrolled      Past Surgical History:   Procedure Laterality Date    ABDOMEN SURGERY      CATARACT REMOVAL WITH IMPLANT      both eyes    CORONARY ANGIOPLASTY WITH STENT PLACEMENT      HYSTERECTOMY      OTHER SURGICAL HISTORY  09/07/2016    GENERATOR CHANGE     PACEMAKER PLACEMENT       Social History     Socioeconomic History    Marital status:       Spouse name: Not on file    Number of children: Not on file    Years of education: Not on file    Highest education level: Not on file   Occupational History    Not on file   Social Needs    Financial resource strain: Not on file    Food insecurity:     Worry: Not on file     Inability: Not on file    Transportation needs:     Medical: Not on file     Non-medical: Not on file   Tobacco Use    Smoking status: Never Smoker    Smokeless tobacco: Never Used   Substance and Sexual Activity    Alcohol use: No     Comment: denies    Drug use: No    Sexual activity: Not Currently   Lifestyle    Physical activity:     Days per week: Not on file     Minutes per session: Not on file    Stress: Not on file   Relationships    Social connections:     Talks on phone: Not on file     Gets together: Not on file     Attends Voodoo service: Not on file     Active member of club or organization: Not on file     Attends meetings of clubs or organizations: Not on file     Relationship status: Not on file    Intimate partner violence:     Fear of current or ex partner: Not on file     Emotionally abused: Not on file     Physically abused: Not on file     Forced sexual activity: Not on file   Other Topics Concern    Not on file   Social History Narrative    Not on file     Family History   Problem Relation Age of Onset    Arthritis Other     Diabetes Other     Heart Disease Other      Allergies   Allergen Reactions    Latex Itching    Avelox [Moxifloxacin Hcl In Nacl] Itching and Rash    Penicillins Other (See Comments)     welt at the site of injection    Adhesive Tape Rash     Current Outpatient Medications   Medication Sig Dispense Refill    sodium chloride (OCEAN, BABY AYR) 0.65 % nasal spray 1 spray by Nasal route as needed for Congestion 1 Bottle 0    CALMOSEPTINE 0.44-20.6 % OINT ointment   0    Probiotic Product (RA PROBIOTIC COMPLEX) CAPS       alendronate (FOSAMAX) 70 MG tablet take 1 tablet by mouth every week on empty stomach with 6 OUNCES OF WATER NO FOOD OR MEDS FOR AN HOUR REMAIN UPRIGHT 12 tablet 4    levothyroxine (SYNTHROID) 75 MCG tablet take 1 tablet by mouth once daily 30 tablet 5    isosorbide mononitrate (IMDUR) 60 MG extended release tablet Take 1 tablet by mouth daily 90 tablet 3    pantoprazole (PROTONIX) 40 MG tablet take 1 tablet by mouth once daily 90 tablet 3    meclizine (ANTIVERT) 12.5 MG tablet Take 1 tablet by mouth 3 times daily as needed for Dizziness 60 tablet 3    insulin lispro (HUMALOG KWIKPEN) 100 UNIT/ML pen inject 4 units subcutaneously IF GLUCOSE  6 UNITS -200 8 UNITS -250 -300 12 UNITS -360 14 UNITS 361-400 MAX 42 10 pen 3    insulin glargine (LANTUS SOLOSTAR) 100 UNIT/ML injection pen inject 30 units subcutaneously every morning 15 pen 3    FREESTYLE LANCETS MISC TEST three times a day 100 each 11    warfarin (COUMADIN) 5 MG tablet Take as directed by [facility] EMERGENCY MEDICAL [place] [facility] [place] [facility]. Quantity equals 90 day supply. 90 tablet 1    RA PEN NEEDLES 31G X 5 MM MISC   0    blood glucose test strips (FREESTYLE INSULINX TEST) strip TEST five times a day 200 strip 3    acetaminophen (TYLENOL) 325 MG tablet Take 650 mg by mouth every 6 hours as needed for Pain      Insulin Pen Needle (NOVOFINE) 32G X 6 MM MISC use four times a day (Patient taking differently: 1 each 3 times daily use four times a day) 300 each 3    metoprolol succinate (TOPROL XL) 50 MG extended release tablet Take 50 mg by mouth 2 times daily      magnesium oxide (MAG-OX) 400 MG tablet Take 400 mg by mouth daily      sotalol (BETAPACE) 120 MG tablet Take 1 tablet by mouth 2 times daily 60 tablet 3    Alcohol Swabs PADS 1 each by Does not apply route       pravastatin (PRAVACHOL) 40 MG tablet Take 1 tablet by mouth daily 90 tablet 3    telmisartan (MICARDIS) 40 MG tablet Take 1 tablet by mouth daily 30 tablet 11    albuterol (PROVENTIL HFA;VENTOLIN HFA) 108 (90 BASE) MCG/ACT inhaler [place] inhalations three times a day for 5 days then as needed for cough or SOB (Patient taking differently: Inhale 2 puffs into the lungs every 4 hours as needed ) 1 Inhaler 4    furosemide (LASIX) 40 MG tablet Take 40 mg by mouth daily Except take 1 tablet by mouth twice daily on Monday, Wednesday, and Friday. Rest of days takes daily.  nitroGLYCERIN (NITROSTAT) 0.4 MG SL tablet Place 1 tablet under the tongue every 5 minutes as needed. 25 tablet 1    Cholecalciferol (VITAMIN D3) 1000 UNITS TABS Take 1,000 Units by mouth daily.  Multiple Vitamins-Minerals (CENTRUM SILVER PO) Take 1 tablet by mouth daily       aspirin 81 MG EC tablet Take 81 mg by mouth daily. No current facility-administered medications for this visit. patient record. More than 50% of the appointment was spent in face-to-face counseling, education and care coordination. Please note this report has been partially produced using speech recognition software and may contain mistakes related to that system including errors in grammar, punctuation and spelling as well as words and phrases that may seem inappropriate. If there are questions or concerns, please feel free to contact me to clarify. No orders of the defined types were placed in this encounter. No orders of the defined types were placed in this encounter. There are no discontinued medications. No follow-ups on file. Controlled Substance Monitoring:    Acute and Chronic Pain Monitoring:   RX Monitoring 12/6/2017   Attestation The Prescription Monitoring Report for this patient was reviewed today. Periodic Controlled Substance Monitoring Possible medication side effects, risk of tolerance and/or dependence, and alternative treatments discussed. ;Potential drug abuse or diversion identified, see note documentation.            Jay Iverson MD

## 2020-01-09 ASSESSMENT — ENCOUNTER SYMPTOMS
NAUSEA: 0
SHORTNESS OF BREATH: 0
ABDOMINAL PAIN: 0
TROUBLE SWALLOWING: 0
ABDOMINAL DISTENTION: 0
CHEST TIGHTNESS: 0
DIARRHEA: 0
VOMITING: 0
CONSTIPATION: 0
COLOR CHANGE: 0
BACK PAIN: 0
SORE THROAT: 0
COUGH: 0
RHINORRHEA: 0
WHEEZING: 0

## 2020-01-13 ENCOUNTER — APPOINTMENT (OUTPATIENT)
Dept: PHARMACY | Age: 85
End: 2020-01-13
Payer: MEDICARE

## 2020-01-16 ENCOUNTER — HOSPITAL ENCOUNTER (OUTPATIENT)
Dept: PHARMACY | Age: 85
Setting detail: THERAPIES SERIES
Discharge: HOME | End: 2020-01-16
Payer: MEDICARE

## 2020-01-16 LAB — INTERNATIONAL NORMALIZATION RATIO, POC: 2.6

## 2020-01-16 PROCEDURE — 99211 OFF/OP EST MAY X REQ PHY/QHP: CPT

## 2020-01-16 PROCEDURE — 85610 PROTHROMBIN TIME: CPT

## 2020-02-17 ENCOUNTER — APPOINTMENT (OUTPATIENT)
Dept: CT IMAGING | Age: 85
End: 2020-02-17
Payer: MEDICARE

## 2020-02-17 ENCOUNTER — HOSPITAL ENCOUNTER (EMERGENCY)
Age: 85
Discharge: HOME | End: 2020-02-17
Payer: MEDICARE

## 2020-02-17 ENCOUNTER — APPOINTMENT (OUTPATIENT)
Dept: GENERAL RADIOLOGY | Age: 85
End: 2020-02-17
Payer: MEDICARE

## 2020-02-17 VITALS
HEART RATE: 71 BPM | TEMPERATURE: 97.6 F | HEIGHT: 62 IN | RESPIRATION RATE: 18 BRPM | BODY MASS INDEX: 39.01 KG/M2 | SYSTOLIC BLOOD PRESSURE: 131 MMHG | OXYGEN SATURATION: 100 % | WEIGHT: 212 LBS | DIASTOLIC BLOOD PRESSURE: 65 MMHG

## 2020-02-17 LAB
ALBUMIN SERPL-MCNC: 3.7 G/DL (ref 3.5–4.6)
ALP BLD-CCNC: 62 U/L (ref 40–130)
ALT SERPL-CCNC: 13 U/L (ref 0–33)
ANION GAP SERPL CALCULATED.3IONS-SCNC: 12 MEQ/L (ref 9–15)
AST SERPL-CCNC: 19 U/L (ref 0–35)
BACTERIA: ABNORMAL /HPF
BASOPHILS ABSOLUTE: 0.1 K/UL (ref 0–0.2)
BASOPHILS RELATIVE PERCENT: 1.1 %
BILIRUB SERPL-MCNC: 0.6 MG/DL (ref 0.2–0.7)
BILIRUBIN URINE: NEGATIVE
BLOOD, URINE: NEGATIVE
BUN BLDV-MCNC: 22 MG/DL (ref 8–23)
CALCIUM SERPL-MCNC: 8.8 MG/DL (ref 8.5–9.9)
CHLORIDE BLD-SCNC: 97 MEQ/L (ref 95–107)
CLARITY: CLEAR
CO2: 28 MEQ/L (ref 20–31)
COLOR: YELLOW
CREAT SERPL-MCNC: 1.2 MG/DL (ref 0.5–0.9)
EOSINOPHILS ABSOLUTE: 0.3 K/UL (ref 0–0.7)
EOSINOPHILS RELATIVE PERCENT: 3.6 %
EPITHELIAL CELLS, UA: ABNORMAL /HPF (ref 0–5)
GFR AFRICAN AMERICAN: 47
GFR AFRICAN AMERICAN: 51.6
GFR NON-AFRICAN AMERICAN: 39
GFR NON-AFRICAN AMERICAN: 42.6
GLOBULIN: 3.1 G/DL (ref 2.3–3.5)
GLUCOSE BLD-MCNC: 183 MG/DL (ref 70–99)
GLUCOSE URINE: NEGATIVE MG/DL
HCT VFR BLD CALC: 40 % (ref 37–47)
HEMOGLOBIN: 13.5 G/DL (ref 12–16)
HYALINE CASTS: ABNORMAL /HPF (ref 0–5)
INR BLD: 2.8
KETONES, URINE: NEGATIVE MG/DL
LEUKOCYTE ESTERASE, URINE: ABNORMAL
LIPASE: 26 U/L (ref 12–95)
LYMPHOCYTES ABSOLUTE: 1.9 K/UL (ref 1–4.8)
LYMPHOCYTES RELATIVE PERCENT: 21.9 %
MCH RBC QN AUTO: 28.8 PG (ref 27–31.3)
MCHC RBC AUTO-ENTMCNC: 33.8 % (ref 33–37)
MCV RBC AUTO: 85.1 FL (ref 82–100)
MONOCYTES ABSOLUTE: 0.7 K/UL (ref 0.2–0.8)
MONOCYTES RELATIVE PERCENT: 8.3 %
NEUTROPHILS ABSOLUTE: 5.7 K/UL (ref 1.4–6.5)
NEUTROPHILS RELATIVE PERCENT: 65.1 %
NITRITE, URINE: NEGATIVE
PDW BLD-RTO: 14.4 % (ref 11.5–14.5)
PERFORMED ON: ABNORMAL
PH UA: 7 (ref 5–9)
PLATELET # BLD: 208 K/UL (ref 130–400)
POC CREATININE WHOLE BLOOD: 1.3
POC CREATININE: 1.3 MG/DL (ref 0.6–1.2)
POC SAMPLE TYPE: ABNORMAL
POTASSIUM SERPL-SCNC: 4.5 MEQ/L (ref 3.4–4.9)
PROTEIN UA: NEGATIVE MG/DL
PROTHROMBIN TIME: 30.7 SEC (ref 12.3–14.9)
RBC # BLD: 4.71 M/UL (ref 4.2–5.4)
RBC UA: ABNORMAL /HPF (ref 0–5)
SODIUM BLD-SCNC: 137 MEQ/L (ref 135–144)
SPECIFIC GRAVITY UA: 1.01 (ref 1–1.03)
TOTAL PROTEIN: 6.8 G/DL (ref 6.3–8)
URINE REFLEX TO CULTURE: YES
UROBILINOGEN, URINE: 0.2 E.U./DL
WBC # BLD: 8.7 K/UL (ref 4.8–10.8)
WBC UA: ABNORMAL /HPF (ref 0–5)

## 2020-02-17 PROCEDURE — 85025 COMPLETE CBC W/AUTO DIFF WBC: CPT

## 2020-02-17 PROCEDURE — 96375 TX/PRO/DX INJ NEW DRUG ADDON: CPT

## 2020-02-17 PROCEDURE — 71046 X-RAY EXAM CHEST 2 VIEWS: CPT

## 2020-02-17 PROCEDURE — 2580000003 HC RX 258: Performed by: PHYSICIAN ASSISTANT

## 2020-02-17 PROCEDURE — 6360000002 HC RX W HCPCS: Performed by: PHYSICIAN ASSISTANT

## 2020-02-17 PROCEDURE — 74177 CT ABD & PELVIS W/CONTRAST: CPT

## 2020-02-17 PROCEDURE — 81001 URINALYSIS AUTO W/SCOPE: CPT

## 2020-02-17 PROCEDURE — 96365 THER/PROPH/DIAG IV INF INIT: CPT

## 2020-02-17 PROCEDURE — 87086 URINE CULTURE/COLONY COUNT: CPT

## 2020-02-17 PROCEDURE — 87186 SC STD MICRODIL/AGAR DIL: CPT

## 2020-02-17 PROCEDURE — 99284 EMERGENCY DEPT VISIT MOD MDM: CPT

## 2020-02-17 PROCEDURE — 87077 CULTURE AEROBIC IDENTIFY: CPT

## 2020-02-17 PROCEDURE — 36415 COLL VENOUS BLD VENIPUNCTURE: CPT

## 2020-02-17 PROCEDURE — 83690 ASSAY OF LIPASE: CPT

## 2020-02-17 PROCEDURE — 6370000000 HC RX 637 (ALT 250 FOR IP): Performed by: PHYSICIAN ASSISTANT

## 2020-02-17 PROCEDURE — 6360000004 HC RX CONTRAST MEDICATION: Performed by: PHYSICIAN ASSISTANT

## 2020-02-17 PROCEDURE — 80053 COMPREHEN METABOLIC PANEL: CPT

## 2020-02-17 PROCEDURE — 85610 PROTHROMBIN TIME: CPT

## 2020-02-17 RX ORDER — 0.9 % SODIUM CHLORIDE 0.9 %
250 INTRAVENOUS SOLUTION INTRAVENOUS ONCE
Status: COMPLETED | OUTPATIENT
Start: 2020-02-17 | End: 2020-02-17

## 2020-02-17 RX ORDER — ACETAMINOPHEN 500 MG
1000 TABLET ORAL ONCE
Status: COMPLETED | OUTPATIENT
Start: 2020-02-17 | End: 2020-02-17

## 2020-02-17 RX ORDER — MORPHINE SULFATE 2 MG/ML
2 INJECTION, SOLUTION INTRAMUSCULAR; INTRAVENOUS
Status: DISCONTINUED | OUTPATIENT
Start: 2020-02-17 | End: 2020-02-17 | Stop reason: HOSPADM

## 2020-02-17 RX ORDER — ONDANSETRON 2 MG/ML
4 INJECTION INTRAMUSCULAR; INTRAVENOUS ONCE
Status: COMPLETED | OUTPATIENT
Start: 2020-02-17 | End: 2020-02-17

## 2020-02-17 RX ORDER — CEPHALEXIN 500 MG/1
500 CAPSULE ORAL 2 TIMES DAILY
Qty: 20 CAPSULE | Refills: 0 | Status: SHIPPED | OUTPATIENT
Start: 2020-02-17 | End: 2020-02-27

## 2020-02-17 RX ORDER — AZITHROMYCIN 500 MG/1
500 TABLET, FILM COATED ORAL DAILY
Qty: 2 TABLET | Refills: 0 | Status: SHIPPED | OUTPATIENT
Start: 2020-02-17 | End: 2020-02-19

## 2020-02-17 RX ORDER — AZITHROMYCIN 500 MG/1
500 TABLET, FILM COATED ORAL ONCE
Status: COMPLETED | OUTPATIENT
Start: 2020-02-17 | End: 2020-02-17

## 2020-02-17 RX ADMIN — AZITHROMYCIN 500 MG: 500 TABLET, FILM COATED ORAL at 20:13

## 2020-02-17 RX ADMIN — SODIUM CHLORIDE 250 ML: 9 INJECTION, SOLUTION INTRAVENOUS at 20:13

## 2020-02-17 RX ADMIN — MORPHINE SULFATE 2 MG: 2 INJECTION, SOLUTION INTRAMUSCULAR; INTRAVENOUS at 18:33

## 2020-02-17 RX ADMIN — IOPAMIDOL 100 ML: 755 INJECTION, SOLUTION INTRAVENOUS at 19:14

## 2020-02-17 RX ADMIN — ACETAMINOPHEN 1000 MG: 500 TABLET ORAL at 20:13

## 2020-02-17 RX ADMIN — CEFTRIAXONE SODIUM 1 G: 1 INJECTION, POWDER, FOR SOLUTION INTRAMUSCULAR; INTRAVENOUS at 20:16

## 2020-02-17 RX ADMIN — ONDANSETRON 4 MG: 2 INJECTION INTRAMUSCULAR; INTRAVENOUS at 18:32

## 2020-02-17 ASSESSMENT — ENCOUNTER SYMPTOMS
PHOTOPHOBIA: 0
ANAL BLEEDING: 0
BLOOD IN STOOL: 0
BACK PAIN: 1
SHORTNESS OF BREATH: 0
EYE DISCHARGE: 0
APNEA: 0
VOMITING: 0
ABDOMINAL PAIN: 1
VOICE CHANGE: 0
COUGH: 0
ABDOMINAL DISTENTION: 0

## 2020-02-17 ASSESSMENT — PAIN SCALES - GENERAL
PAINLEVEL_OUTOF10: 1
PAINLEVEL_OUTOF10: 0
PAINLEVEL_OUTOF10: 10

## 2020-02-17 ASSESSMENT — PAIN DESCRIPTION - PAIN TYPE: TYPE: ACUTE PAIN

## 2020-02-17 ASSESSMENT — PAIN DESCRIPTION - FREQUENCY: FREQUENCY: INTERMITTENT

## 2020-02-17 ASSESSMENT — PAIN DESCRIPTION - ONSET: ONSET: ON-GOING

## 2020-02-17 ASSESSMENT — PAIN DESCRIPTION - ORIENTATION: ORIENTATION: LEFT

## 2020-02-17 ASSESSMENT — PAIN DESCRIPTION - DESCRIPTORS: DESCRIPTORS: SHARP

## 2020-02-17 ASSESSMENT — PAIN DESCRIPTION - LOCATION: LOCATION: ABDOMEN;BACK

## 2020-02-17 NOTE — ED TRIAGE NOTES
Patient is a&o x4; calm and cooperative. Patient c/o abdominal pain that radiates to back and right shoulder. Neuro WDL. Bowel sounds present in all four quadrants, soft, non-distended, tenderness with palpation in LLQ. States last bowel movement was this morning and it was \"normal.\" Patient also complains of back pain.

## 2020-02-18 ENCOUNTER — TELEPHONE (OUTPATIENT)
Dept: PHARMACY | Age: 85
End: 2020-02-18

## 2020-02-18 RX ORDER — WARFARIN SODIUM 5 MG/1
TABLET ORAL
Qty: 70 TABLET | Refills: 0 | Status: SHIPPED | OUTPATIENT
Start: 2020-02-18 | End: 2021-08-19 | Stop reason: SDUPTHER

## 2020-02-18 NOTE — ED PROVIDER NOTES
[address] [facility] ED  eMERGENCY dEPARTMENT eNCOUnter      Pt Name: Emma Curtis  MRN: [id number]  [place] 1934  Date of evaluation: 2/17/2020  Provider: Hany Romero PA-C    CHIEF COMPLAINT       Chief Complaint   Patient presents with    Abdominal Pain     LLQ that radiates to her back         HISTORY OF PRESENT ILLNESS   (Location/Symptom, Timing/Onset,Context/Setting, Quality, Duration, Modifying Factors, Severity)  Note limiting factors. Emma Curtis is a 80 y.o. female who presents to the emergency department has left-sided  abdominal pain that moves into her back. Notes she also has she notes that she has had this pain for over a month. She has been evaluated for similar in the past including right-sided back pain she denies fever chills vomiting. Symptoms are mild to moderate severity movement or touch worsens symptoms nothing improves symptoms. HPI    NursingNotes were reviewed. REVIEW OF SYSTEMS    (2-9 systems for level 4, 10 or more for level 5)     Review of Systems   Constitutional: Negative for activity change, appetite change, fever and unexpected weight change. HENT: Negative for ear discharge, nosebleeds and voice change. Eyes: Negative for photophobia and discharge. Respiratory: Negative for apnea, cough and shortness of breath. Cardiovascular: Negative for chest pain. Gastrointestinal: Positive for abdominal pain. Negative for abdominal distention, anal bleeding, blood in stool and vomiting. Endocrine: Negative for cold intolerance, heat intolerance and polyphagia. Genitourinary: Negative for genital sores and hematuria. Musculoskeletal: Positive for back pain. Negative for joint swelling. Skin: Positive for wound. Negative for pallor. Allergic/Immunologic: Negative for immunocompromised state. Neurological: Negative for seizures and facial asymmetry. Hematological: Does not bruise/bleed easily.    Psychiatric/Behavioral: Negative for behavioral problems, self-injury and sleep disturbance. All other systems reviewed and are negative. Except as noted above the remainder of the review of systems was reviewed and negative. PAST MEDICAL HISTORY     Past Medical History:   Diagnosis Date    Abnormal liver ultrasound 5/15/2019    Allergic contact dermatitis due to plants, except food 8/19/2019    Chronic kidney disease (CKD), stage II (mild)     Hyperlipidemia     Hypertension     Hypothyroidism     Interstitial cystitis     Dr. Macario Johnson diagnosed this for michele.  Type II or unspecified type diabetes mellitus without mention of complication, not stated as uncontrolled          SURGICALHISTORY       Past Surgical History:   Procedure Laterality Date    ABDOMEN SURGERY      CATARACT REMOVAL WITH IMPLANT      both eyes    CORONARY ANGIOPLASTY WITH STENT PLACEMENT      HYSTERECTOMY      OTHER SURGICAL HISTORY  09/07/2016    GENERATOR CHANGE     PACEMAKER PLACEMENT           CURRENT MEDICATIONS       Previous Medications    ACETAMINOPHEN (TYLENOL) 325 MG TABLET    Take 650 mg by mouth every 6 hours as needed for Pain    ALBUTEROL (PROVENTIL HFA;VENTOLIN HFA) 108 (90 BASE) MCG/ACT INHALER    [place] inhalations three times a day for 5 days then as needed for cough or SOB    ALCOHOL SWABS PADS    1 each by Does not apply route     ALENDRONATE (FOSAMAX) 70 MG TABLET    take 1 tablet by mouth every week on empty stomach with 6 OUNCES OF WATER NO FOOD OR MEDS FOR AN HOUR REMAIN UPRIGHT    ASPIRIN 81 MG EC TABLET    Take 81 mg by mouth daily. BLOOD GLUCOSE TEST STRIPS (FREESTYLE INSULINX TEST) STRIP    TEST five times a day    CALMOSEPTINE 0.44-20.6 % OINT OINTMENT        CHOLECALCIFEROL (VITAMIN D3) 1000 UNITS TABS    Take 1,000 Units by mouth daily.       FREESTYLE LANCETS MISC    TEST three times a day    FUROSEMIDE (LASIX) 40 MG TABLET    Take 40 mg by mouth daily Except take 1 tablet by mouth twice daily on Monday, moist.   Eyes:      General:         Right eye: No discharge. Left eye: No discharge. Pupils: Pupils are equal, round, and reactive to light. Neck:      Musculoskeletal: Normal range of motion and neck supple. Cardiovascular:      Rate and Rhythm: Normal rate and regular rhythm. Heart sounds: Normal heart sounds. Pulmonary:      Effort: Pulmonary effort is normal. No respiratory distress. Breath sounds: Normal breath sounds. No stridor. Abdominal:      General: Bowel sounds are normal. There is no distension. Palpations: Abdomen is soft. Tenderness: There is abdominal tenderness. Musculoskeletal: Normal range of motion. Skin:     General: Skin is warm. Findings: No erythema. Neurological:      Mental Status: She is alert and oriented to person, place, and time.          DIAGNOSTIC RESULTS     EKG: All EKG's are interpreted by the Emergency Department Physician who either signs or Co-signsthis chart in the absence of a cardiologist.         RADIOLOGY:   Lavone Lolling such as CT, Ultrasound and MRI are read by the radiologist. [place] radiographic images are visualized and preliminarily interpreted by the emergency physician with the below findings:    See rad report    Interpretation per the Radiologist below, if available at the time ofthis note:    CT ABDOMEN PELVIS W IV CONTRAST Additional Contrast? None    (Results Pending)   XR CHEST STANDARD (2 VW)    (Results Pending)         ED BEDSIDE ULTRASOUND:   Performed by ED Physician - none    LABS:  Labs Reviewed   URINE RT REFLEX TO CULTURE - Abnormal; Notable for the following components:       Result Value    Leukocyte Esterase, Urine MODERATE (*)     All other components within normal limits   COMPREHENSIVE METABOLIC PANEL - Abnormal; Notable for the following components:    Glucose 183 (*)     CREATININE 1.20 (*)     GFR Non- 42.6 (*)     GFR  51.6 (*)     All other   [address], [address] [facility]  [phone]    Call in 1 day      [facility] [place] ([facility]) ED  [address]  [phone]    If symptoms worsen      DISCHARGE MEDICATIONS:  New Prescriptions    AZITHROMYCIN (ZITHROMAX) 500 MG TABLET    Take 1 tablet by mouth daily for 2 days Start tomorrow    CEPHALEXIN (KEFLEX) 500 MG CAPSULE    Take 1 capsule by mouth 2 times daily for 10 days          (Please note that portions of this note were completed with a voice recognition program.  Efforts were made to edit the dictations but occasionally words are mis-transcribed.)    Dioni Payne PA-C (electronically signed)  Attending Emergency Physician       Dioni Payne PA-C  02/17/20 2032

## 2020-02-18 NOTE — ED NOTES
Discharge  instructions given and reviewed. Patient verbalized understanding. Patient ambulated out of ED with a steady gait to POV.      Ridge Douglas RN  02/17/20 205

## 2020-02-18 NOTE — ED NOTES
Call placed to patients neighbor, whom will give the patient a ride home when discharged.       Valente Guerrero RN  02/17/20 2040

## 2020-02-21 ENCOUNTER — HOSPITAL ENCOUNTER (EMERGENCY)
Age: 85
Discharge: HOME | End: 2020-02-22
Payer: MEDICARE

## 2020-02-21 LAB
ORGANISM: ABNORMAL
URINE CULTURE, ROUTINE: ABNORMAL

## 2020-02-21 PROCEDURE — 99284 EMERGENCY DEPT VISIT MOD MDM: CPT

## 2020-02-21 ASSESSMENT — PAIN DESCRIPTION - LOCATION: LOCATION: GENERALIZED

## 2020-02-21 ASSESSMENT — PAIN DESCRIPTION - PAIN TYPE: TYPE: ACUTE PAIN

## 2020-02-21 ASSESSMENT — PAIN SCALES - GENERAL: PAINLEVEL_OUTOF10: 6

## 2020-02-22 ENCOUNTER — APPOINTMENT (OUTPATIENT)
Dept: GENERAL RADIOLOGY | Age: 85
End: 2020-02-22
Payer: MEDICARE

## 2020-02-22 ENCOUNTER — APPOINTMENT (OUTPATIENT)
Dept: CT IMAGING | Age: 85
End: 2020-02-22
Payer: MEDICARE

## 2020-02-22 VITALS
HEIGHT: 62 IN | DIASTOLIC BLOOD PRESSURE: 62 MMHG | HEART RATE: 70 BPM | TEMPERATURE: 98.4 F | OXYGEN SATURATION: 96 % | RESPIRATION RATE: 25 BRPM | BODY MASS INDEX: 37.54 KG/M2 | WEIGHT: 204 LBS | SYSTOLIC BLOOD PRESSURE: 105 MMHG

## 2020-02-22 LAB
ALBUMIN SERPL-MCNC: 3.7 G/DL (ref 3.5–4.6)
ALP BLD-CCNC: 60 U/L (ref 40–130)
ALT SERPL-CCNC: 32 U/L (ref 0–33)
ANION GAP SERPL CALCULATED.3IONS-SCNC: 11 MEQ/L (ref 9–15)
AST SERPL-CCNC: 45 U/L (ref 0–35)
BASOPHILS ABSOLUTE: 0 K/UL (ref 0–0.2)
BASOPHILS RELATIVE PERCENT: 0.5 %
BILIRUB SERPL-MCNC: 0.5 MG/DL (ref 0.2–0.7)
BUN BLDV-MCNC: 26 MG/DL (ref 8–23)
CALCIUM SERPL-MCNC: 9.3 MG/DL (ref 8.5–9.9)
CHLORIDE BLD-SCNC: 91 MEQ/L (ref 95–107)
CO2: 29 MEQ/L (ref 20–31)
CREAT SERPL-MCNC: 1.33 MG/DL (ref 0.5–0.9)
EOSINOPHILS ABSOLUTE: 0.3 K/UL (ref 0–0.7)
EOSINOPHILS RELATIVE PERCENT: 3.4 %
GFR AFRICAN AMERICAN: 45.8
GFR NON-AFRICAN AMERICAN: 37.9
GLOBULIN: 3.1 G/DL (ref 2.3–3.5)
GLUCOSE BLD-MCNC: 122 MG/DL (ref 70–99)
HCT VFR BLD CALC: 39.2 % (ref 37–47)
HEMOGLOBIN: 13.4 G/DL (ref 12–16)
LACTIC ACID: 1.7 MMOL/L (ref 0.5–2.2)
LYMPHOCYTES ABSOLUTE: 0.5 K/UL (ref 1–4.8)
LYMPHOCYTES RELATIVE PERCENT: 6.1 %
MAGNESIUM: 2.1 MG/DL (ref 1.7–2.4)
MCH RBC QN AUTO: 29.1 PG (ref 27–31.3)
MCHC RBC AUTO-ENTMCNC: 34 % (ref 33–37)
MCV RBC AUTO: 85.5 FL (ref 82–100)
MONOCYTES ABSOLUTE: 0.8 K/UL (ref 0.2–0.8)
MONOCYTES RELATIVE PERCENT: 10.9 %
NEUTROPHILS ABSOLUTE: 6.2 K/UL (ref 1.4–6.5)
NEUTROPHILS RELATIVE PERCENT: 79.1 %
PDW BLD-RTO: 14.4 % (ref 11.5–14.5)
PLATELET # BLD: 169 K/UL (ref 130–400)
POTASSIUM SERPL-SCNC: 4.9 MEQ/L (ref 3.4–4.9)
PRO-BNP: 1540 PG/ML
RBC # BLD: 4.59 M/UL (ref 4.2–5.4)
SODIUM BLD-SCNC: 131 MEQ/L (ref 135–144)
TOTAL PROTEIN: 6.8 G/DL (ref 6.3–8)
TROPONIN: <0.01 NG/ML (ref 0–0.01)
WBC # BLD: 7.8 K/UL (ref 4.8–10.8)

## 2020-02-22 PROCEDURE — 84484 ASSAY OF TROPONIN QUANT: CPT

## 2020-02-22 PROCEDURE — 83735 ASSAY OF MAGNESIUM: CPT

## 2020-02-22 PROCEDURE — 6360000002 HC RX W HCPCS: Performed by: STUDENT IN AN ORGANIZED HEALTH CARE EDUCATION/TRAINING PROGRAM

## 2020-02-22 PROCEDURE — 71046 X-RAY EXAM CHEST 2 VIEWS: CPT

## 2020-02-22 PROCEDURE — 85025 COMPLETE CBC W/AUTO DIFF WBC: CPT

## 2020-02-22 PROCEDURE — 93005 ELECTROCARDIOGRAM TRACING: CPT | Performed by: STUDENT IN AN ORGANIZED HEALTH CARE EDUCATION/TRAINING PROGRAM

## 2020-02-22 PROCEDURE — 83605 ASSAY OF LACTIC ACID: CPT

## 2020-02-22 PROCEDURE — 94640 AIRWAY INHALATION TREATMENT: CPT

## 2020-02-22 PROCEDURE — 74176 CT ABD & PELVIS W/O CONTRAST: CPT

## 2020-02-22 PROCEDURE — 87040 BLOOD CULTURE FOR BACTERIA: CPT

## 2020-02-22 PROCEDURE — 36415 COLL VENOUS BLD VENIPUNCTURE: CPT

## 2020-02-22 PROCEDURE — 80053 COMPREHEN METABOLIC PANEL: CPT

## 2020-02-22 PROCEDURE — 83880 ASSAY OF NATRIURETIC PEPTIDE: CPT

## 2020-02-22 RX ORDER — ALBUTEROL SULFATE 90 UG/1
2 AEROSOL, METERED RESPIRATORY (INHALATION) 4 TIMES DAILY PRN
Qty: 1 INHALER | Refills: 0 | Status: SHIPPED | OUTPATIENT
Start: 2020-02-22 | End: 2020-10-29

## 2020-02-22 RX ORDER — ALBUTEROL SULFATE 2.5 MG/3ML
2.5 SOLUTION RESPIRATORY (INHALATION) ONCE
Status: COMPLETED | OUTPATIENT
Start: 2020-02-22 | End: 2020-02-22

## 2020-02-22 RX ORDER — BENZONATATE 100 MG/1
100 CAPSULE ORAL 3 TIMES DAILY PRN
Qty: 15 CAPSULE | Refills: 0 | Status: SHIPPED | OUTPATIENT
Start: 2020-02-22 | End: 2020-02-27

## 2020-02-22 RX ADMIN — ALBUTEROL SULFATE 2.5 MG: 2.5 SOLUTION RESPIRATORY (INHALATION) at 00:35

## 2020-02-22 ASSESSMENT — ENCOUNTER SYMPTOMS
COUGH: 1
CONSTIPATION: 0
PHOTOPHOBIA: 0
WHEEZING: 0
SHORTNESS OF BREATH: 0
ABDOMINAL DISTENTION: 0
VOMITING: 0
BLOOD IN STOOL: 0
DIARRHEA: 0
NAUSEA: 0
ABDOMINAL PAIN: 1

## 2020-02-22 NOTE — ED PROVIDER NOTES
tablet under the tongue every 5 minutes as needed. , Disp-25 tablet, R-1      Cholecalciferol (VITAMIN D3) 1000 UNITS TABS Take 1,000 Units by mouth daily. Multiple Vitamins-Minerals (CENTRUM SILVER PO) Take 1 tablet by mouth daily Historical Med      aspirin 81 MG EC tablet Take 81 mg by mouth daily. !! - Potential duplicate medications found. Please discuss with provider. ALLERGIES     Latex; Avelox [moxifloxacin hcl in nacl]; Penicillins; and Adhesive tape    FAMILY HISTORY       Family History   Problem Relation Age of Onset    Arthritis Other     Diabetes Other     Heart Disease Other           SOCIAL HISTORY       Social History     Socioeconomic History    Marital status:       Spouse name: None    Number of children: None    Years of education: None    Highest education level: None   Occupational History    None   Social Needs    Financial resource strain: None    Food insecurity:     Worry: None     Inability: None    Transportation needs:     Medical: None     Non-medical: None   Tobacco Use    Smoking status: Never Smoker    Smokeless tobacco: Never Used   Substance and Sexual Activity    Alcohol use: No     Comment: denies    Drug use: No    Sexual activity: Not Currently   Lifestyle    Physical activity:     Days per week: None     Minutes per session: None    Stress: None   Relationships    Social connections:     Talks on phone: None     Gets together: None     Attends Rastafarian service: None     Active member of club or organization: None     Attends meetings of clubs or organizations: None     Relationship status: None    Intimate partner violence:     Fear of current or ex partner: None     Emotionally abused: None     Physically abused: None     Forced sexual activity: None   Other Topics Concern    None   Social History Narrative    None         PHYSICAL EXAM        ED Triage Vitals [02/21/20 2330]   BP Temp Temp Source Pulse Resp SpO2 Height Weight   (!) 146/65 98.4 °F (36.9 °C) Oral 71 23 97 % 5' 2\" (1.575 m) 204 lb (92.5 kg)       Physical Exam  Constitutional:       General: She is not in acute distress. Appearance: She is well-developed. She is not toxic-appearing. HENT:      Head: Normocephalic and atraumatic. Nose: Nose normal.      Mouth/Throat:      Mouth: Mucous membranes are moist.   Eyes:      Pupils: Pupils are equal, round, and reactive to light. Neck:      Musculoskeletal: Normal range of motion. Cardiovascular:      Rate and Rhythm: Normal rate and regular rhythm. Heart sounds: No murmur. No friction rub. No gallop. Pulmonary:      Effort: Pulmonary effort is normal. No accessory muscle usage or respiratory distress. Breath sounds: Normal breath sounds. No stridor or decreased air movement. No decreased breath sounds, wheezing, rhonchi or rales. Abdominal:      General: Bowel sounds are normal. There is no distension. Tenderness: There is no abdominal tenderness. There is no right CVA tenderness, left CVA tenderness, guarding or rebound. Hernia: No hernia is present. Musculoskeletal:         General: No swelling. Skin:     General: Skin is warm and dry. Capillary Refill: Capillary refill takes less than 2 seconds. Neurological:      Mental Status: She is alert and oriented to person, place, and time.            LABS:  Labs Reviewed   COMPREHENSIVE METABOLIC PANEL - Abnormal; Notable for the following components:       Result Value    Sodium 131 (*)     Chloride 91 (*)     Glucose 122 (*)     BUN 26 (*)     CREATININE 1.33 (*)     GFR Non- 37.9 (*)     GFR  45.8 (*)     AST 45 (*)     All other components within normal limits   CBC WITH AUTO DIFFERENTIAL - Abnormal; Notable for the following components:    Lymphocytes Absolute 0.5 (*)     All other components within normal limits   CULTURE, BLOOD 2   CULTURE, BLOOD 1   BRAIN NATRIURETIC PEPTIDE   LACTIC ACID, Procedures      FINAL IMPRESSION      1. Abdominal pain, unspecified abdominal location    2.  Cough          DISPOSITION/PLAN   DISPOSITION Decision To Discharge 02/22/2020 02:46:32 AM          Johanna Solis PA-C (electronically signed)         Johanna Solis PA-C  02/22/20 [id number]

## 2020-02-22 NOTE — ED TRIAGE NOTES
Patient presents to ED via [facility]. States she was seen here on Monday and diagnosed with Pneumonia and a UTI. States she doesn't feel any better. Has c/o shortness of breath and increased fatigue. Lungs are clear, bases dim, expiratoy wheezes anteriorly. RR 22. SPO2 95%RA. Breathing unlabored. Equal rise and fall of chest. Patient has c/o generalized body aches.

## 2020-02-24 ENCOUNTER — APPOINTMENT (OUTPATIENT)
Dept: PHARMACY | Age: 85
End: 2020-02-24
Payer: MEDICARE

## 2020-02-24 ENCOUNTER — HOSPITAL ENCOUNTER (OUTPATIENT)
Dept: PHARMACY | Age: 85
Setting detail: THERAPIES SERIES
Discharge: HOME | End: 2020-02-24
Payer: MEDICARE

## 2020-02-24 LAB
EKG ATRIAL RATE: 326 BPM
EKG Q-T INTERVAL: 498 MS
EKG QRS DURATION: 154 MS
EKG QTC CALCULATION (BAZETT): 537 MS
EKG R AXIS: 89 DEGREES
EKG T AXIS: 84 DEGREES
EKG VENTRICULAR RATE: 70 BPM
INTERNATIONAL NORMALIZATION RATIO, POC: 1.5

## 2020-02-24 PROCEDURE — 99211 OFF/OP EST MAY X REQ PHY/QHP: CPT

## 2020-02-24 PROCEDURE — 85610 PROTHROMBIN TIME: CPT

## 2020-02-24 PROCEDURE — 93010 ELECTROCARDIOGRAM REPORT: CPT | Performed by: INTERNAL MEDICINE

## 2020-02-24 NOTE — PROGRESS NOTES
Ms. Napoleon Rizo is a 80 y.o. y/o female with history of Afib who presents today for anticoagulation monitoring and adjustment.     NOTE: Patient disoriented to place and time (did not know date -- thinks it is March 02 and does not know day of the week)     INR 1.5 is sub-therapeutic for this patient (goal range 2-3) and is reflective of 20 mg TWD-- due to pt missing dosage-- normal maintenance dosage is 22.5mg TWD    Patient verifies current dosing regimen, patient able to verbally recall dose  Patient reports 1  missed doses since last INR  -- on Thursday, 02/20/20   Patient denies s/sx clotting and/or stroke  Patient denies hematuria, epistaxis, rectal bleeding  Patient denies changes in diet, alcohol, or tobacco use  Reviewed medication list and drug allergies with patient, updated any medication additions or modifications accordingly    02/18/20: patient to ED with UTI -- received Keflex 500mg PO BID for 10 days and Zithromax 500mg PO X 3  doses total (one given in ED)   02/21/20: patient back to ED with pneumonia and received Proair and [employer]     Patient also denies any pending medical or dental procedures scheduled at this time  Patient was instructed to take warfarin 7.5mg today only, then warfarin 5mg tomorrow only - then continue with current therapy of warfarin 22.5mg TWD and RTC 1 weeks     Kaley Hogan PharmD   2/24/2020 1:32 PM

## 2020-02-27 LAB
BLOOD CULTURE, ROUTINE: NORMAL
CULTURE, BLOOD 2: NORMAL

## 2020-03-03 ENCOUNTER — TELEPHONE (OUTPATIENT)
Dept: PHARMACY | Age: 85
End: 2020-03-03

## 2020-03-03 NOTE — TELEPHONE ENCOUNTER
Updated POC INR date to reflect patient's next scheduled appointment date on 03/05/20    Meagan Lowe PharmDOMINGO   3/3/2020 1:45 PM

## 2020-03-05 ENCOUNTER — HOSPITAL ENCOUNTER (OUTPATIENT)
Dept: PHARMACY | Age: 85
Setting detail: THERAPIES SERIES
Discharge: HOME | End: 2020-03-05
Payer: MEDICARE

## 2020-03-05 LAB — INTERNATIONAL NORMALIZATION RATIO, POC: 3

## 2020-03-05 PROCEDURE — 85610 PROTHROMBIN TIME: CPT | Performed by: PHARMACIST

## 2020-03-05 PROCEDURE — 99211 OFF/OP EST MAY X REQ PHY/QHP: CPT | Performed by: PHARMACIST

## 2020-03-05 NOTE — PROGRESS NOTES
Ms. Sarah Holguin is a 80 y.o. y/o female with history of Afib who presents today for anticoagulation monitoring and adjustment. INR 3 is therapeutic for this patient (goal range 2-3) and is reflective of 22.5 mg TWD  Patient verifies current dosing regimen, patient able to verbally recall dose  Patient reports no  missed doses since last INR   Patient denies s/sx clotting and/or stroke  Patient denies hematuria, epistaxis, rectal bleeding  Patient denies changes in diet, alcohol, or tobacco use  Reviewed medication list and drug allergies with patient, updated any medication additions or modifications according  Patient states she currently has an URI and has finished her antibiotics, but is still not feeling well. She has not had much of an appetite.    Patient also denies any pending medical or dental procedures scheduled at this time  Patient was instructed to continue 22.5 mg TWD and RTC 2 weeks

## 2020-03-19 ENCOUNTER — HOSPITAL ENCOUNTER (OUTPATIENT)
Dept: PHARMACY | Age: 85
Setting detail: THERAPIES SERIES
Discharge: HOME | End: 2020-03-19
Payer: MEDICARE

## 2020-03-19 LAB — INTERNATIONAL NORMALIZATION RATIO, POC: 2.4

## 2020-03-19 PROCEDURE — 99211 OFF/OP EST MAY X REQ PHY/QHP: CPT

## 2020-03-19 PROCEDURE — 85610 PROTHROMBIN TIME: CPT

## 2020-03-19 NOTE — PROGRESS NOTES
Ms. Selma Camacho is a 80 y.o. y/o female with history of Afib who presents today for anticoagulation monitoring and adjustment.   INR 2.4 is therapeutic for this patient (goal range 2-3) and is reflective of 22.5 mg TWD  Patient verifies current dosing regimen, patient able to verbally recall dose  Patient reports 0  missed doses since last INR   Patient denies s/sx clotting and/or stroke  Patient denies hematuria, epistaxis, rectal bleeding  Patient denies changes in diet, alcohol, or tobacco use  Reviewed medication list and drug allergies with patient, updated any medication additions or modifications accordingly  Patient also denies any pending medical or dental procedures scheduled at this time  Patient was instructed to continue with current TWD and RTC 4 weeks

## 2020-03-24 ENCOUNTER — VIRTUAL VISIT (OUTPATIENT)
Dept: ENDOCRINOLOGY | Age: 85
End: 2020-03-24
Payer: MEDICARE

## 2020-03-24 PROCEDURE — 99442 PR PHYS/QHP TELEPHONE EVALUATION 11-20 MIN: CPT | Performed by: INTERNAL MEDICINE

## 2020-03-24 NOTE — PROGRESS NOTES
Christiano Sprague MD   levothyroxine (SYNTHROID) 75 MCG tablet take 1 tablet by mouth once daily  Leta Castelan MD   isosorbide mononitrate (IMDUR) 60 MG extended release tablet Take 1 tablet by mouth daily  Scott Carrasco MD   pantoprazole (PROTONIX) 40 MG tablet take 1 tablet by mouth once daily  Scott Carrasco MD   meclizine (ANTIVERT) 12.5 MG tablet Take 1 tablet by mouth 3 times daily as needed for Dizziness  Scott Carrasco MD   insulin lispro (HUMALOG KWIKPEN) 100 UNIT/ML pen inject 4 units subcutaneously IF GLUCOSE  6 UNITS -200 8 UNITS -250 -300 12 UNITS -360 14 UNITS 361-400 MAX 42  Chris Golden MD   insulin glargine (LANTUS SOLOSTAR) 100 UNIT/ML injection pen inject 30 units subcutaneously every morning  Chris Golden MD   FREESTYLE LANCETS MISC TEST three times a day  Leta Castelan MD   warfarin (COUMADIN) 5 MG tablet Take as directed by [facility] [place] [facility]. Quantity equals 90 day supply.   Deborah Way MD   RA PEN NEEDLES 31G X 5 MM [address]   Historical Provider, MD   blood glucose test strips (FREESTYLE INSULINX TEST) strip TEST five times a day  Chris Golden MD   acetaminophen (TYLENOL) 325 MG tablet Take 650 mg by mouth every 6 hours as needed for Pain  Historical Provider, MD   Insulin Pen Needle (NOVOFINE) 32G X 6 MM MISC use four times a day  Patient taking differently: 1 each 3 times daily use four times a day  Virginia Razo MD   metoprolol succinate (TOPROL XL) 50 MG extended release tablet Take 50 mg by mouth 2 times daily  Historical Provider, MD   magnesium oxide (MAG-OX) 400 MG tablet Take 400 mg by mouth daily  Historical Provider, MD   sotalol (BETAPACE) 120 MG tablet Take 1 tablet by mouth 2 times daily  Deborah Way MD   Alcohol Swabs PADS 1 each by Does not apply route   Historical Provider, MD   pravastatin (PRAVACHOL) 40 MG tablet Take 1 tablet by mouth daily  Virgiina Razo MD telmisartan (MICARDIS) 40 MG tablet Take 1 tablet by mouth daily  Kevan Davila MD   furosemide (LASIX) 40 MG tablet Take 40 mg by mouth daily Except take 1 tablet by mouth twice daily on Monday, Wednesday, and Friday. Rest of days takes daily. Historical Provider, MD   nitroGLYCERIN (NITROSTAT) 0.4 MG SL tablet Place 1 tablet under the tongue every 5 minutes as needed. Kevan Davila MD   Cholecalciferol (VITAMIN D3) 1000 UNITS TABS Take 1,000 Units by mouth daily. Historical Provider, MD   Multiple Vitamins-Minerals (CENTRUM SILVER PO) Take 1 tablet by mouth daily   Historical Provider, MD   aspirin 81 MG EC tablet Take 81 mg by mouth daily. Historical Provider, MD       Social History     Tobacco Use    Smoking status: Never Smoker    Smokeless tobacco: Never Used   Substance Use Topics    Alcohol use: No     Comment: denies    Drug use: No        Results for Sea Martinez (MRN [id number]) as of 3/24/2020 13:23   Ref.  Range 2/22/2020 00:15   Sodium Latest Ref Range: 135 - 144 mEq/L 131 (L)   Potassium Latest Ref Range: 3.4 - 4.9 mEq/L 4.9   Chloride Latest Ref Range: 95 - 107 mEq/L 91 (L)   CO2 Latest Ref Range: 20 - 31 mEq/L 29   BUN Latest Ref Range: 8 - 23 mg/dL 26 (H)   Creatinine Latest Ref Range: 0.50 - 0.90 mg/dL 1.33 (H)   Anion Gap Latest Ref Range: 9 - 15 mEq/L 11   GFR Non- Latest Ref Range: >60  37.9 (L)   GFR  Latest Ref Range: >60  45.8 (L)   Magnesium Latest Ref Range: 1.7 - 2.4 mg/dL 2.1   Lactic Acid Latest Ref Range: 0.5 - 2.2 mmol/L 1.7   Glucose Latest Ref Range: 70 - 99 mg/dL 122 (H)   Calcium Latest Ref Range: 8.5 - 9.9 mg/dL 9.3   Total Protein Latest Ref Range: 6.3 - 8.0 g/dL 6.8           PHYSICAL EXAMINATION:  [ INSTRUCTIONS:  \"[x]\" Indicates a positive item  \"[]\" Indicates a negative item  -- DELETE ALL ITEMS NOT EXAMINED]  [] Alert  [] Oriented to person/place/time    [] No apparent distress  [] Toxic appearing    [] Face flushed appearing [] Sclera clear  [] Lips are cyanotic      [] Breathing appears normal  [] Appears tachypneic      [] Rash on visible skin    [] Cranial Nerves II-XII grossly intact    [] Motor grossly intact in visible upper extremities    [] Motor grossly intact in visible lower extremities    [] Normal Mood  [] Anxious appearing    [] Depressed appearing  [] Confused appearing      [] Poor short term memory  [] Poor long term memory    [] OTHER:      Due to this being a TeleHealth encounter, evaluation of the following organ systems is limited: Vitals/Constitutional/EENT/Resp/CV/GI//MS/Neuro/Skin/Heme-Lymph-Imm. ASSESSMENT/PLAN:     Diagnosis Orders   1. Uncontrolled type 2 diabetes mellitus without complication, with long-term current use of insulin ([facility])  Basic Metabolic Panel    Hemoglobin A1C   2. Hypothyroidism, unspecified type  T4, Free    TSH without Reflex     Continue current dose of Humalog sliding scale and Lantus 30 units at bedtime plus Synthroid 75 mcg daily repeat labs for BMP A1c free T4 TSH in 3 months time    Orders Placed This Encounter   Procedures    T4, Free     Standing Status:   Future     Standing Expiration Date:   3/24/2021    TSH without Reflex     Standing Status:   Future     Standing Expiration Date:   3/24/2021    Basic Metabolic Panel     Standing Status:   Future     Standing Expiration Date:   3/24/2021    Hemoglobin A1C     Standing Status:   Future     Standing Expiration Date:   3/24/2021       3 months   An  electronic signature was used to authenticate this note.     --Dickson De Paz MD on 3/24/2020 at 1:23 PM        Pursuant to the emergency declaration under the [facility], [facility] waiver authority and the [employer] and [employer] Act, this Virtual  Visit was conducted, with patient's consent, to reduce the patient's risk of exposure to COVID-19 and provide continuity of care for an established patient. Services were provided through a video synchronous discussion virtually to substitute for in-person clinic visit.

## 2020-03-31 ENCOUNTER — HOSPITAL ENCOUNTER (OUTPATIENT)
Dept: CARDIOLOGY | Age: 85
Discharge: HOME | End: 2020-03-31
Payer: MEDICARE

## 2020-03-31 PROCEDURE — 93296 REM INTERROG EVL PM/IDS: CPT

## 2020-04-06 RX ORDER — LEVOTHYROXINE SODIUM 0.07 MG/1
TABLET ORAL
Qty: 30 TABLET | Refills: 5 | Status: SHIPPED | OUTPATIENT
Start: 2020-04-06 | End: 2020-10-12

## 2020-04-16 ENCOUNTER — HOSPITAL ENCOUNTER (OUTPATIENT)
Dept: PHARMACY | Age: 85
Setting detail: THERAPIES SERIES
Discharge: HOME | End: 2020-04-16
Payer: MEDICARE

## 2020-04-16 DIAGNOSIS — I48.19 OTHER PERSISTENT ATRIAL FIBRILLATION (HCC): ICD-10-CM

## 2020-04-16 DIAGNOSIS — E03.9 HYPOTHYROIDISM, UNSPECIFIED TYPE: ICD-10-CM

## 2020-04-16 LAB
ANION GAP SERPL CALCULATED.3IONS-SCNC: 14 MEQ/L (ref 9–15)
BUN BLDV-MCNC: 20 MG/DL (ref 8–23)
CALCIUM SERPL-MCNC: 9.8 MG/DL (ref 8.5–9.9)
CHLORIDE BLD-SCNC: 99 MEQ/L (ref 95–107)
CO2: 30 MEQ/L (ref 20–31)
CREAT SERPL-MCNC: 1.14 MG/DL (ref 0.5–0.9)
GFR AFRICAN AMERICAN: 54.7
GFR NON-AFRICAN AMERICAN: 45.2
GLUCOSE BLD-MCNC: 160 MG/DL (ref 70–99)
HBA1C MFR BLD: 7.1 % (ref 4.8–5.9)
INR BLD: 2.1
POTASSIUM SERPL-SCNC: 4.2 MEQ/L (ref 3.4–4.9)
PROTHROMBIN TIME: 24.6 SEC (ref 12.3–14.9)
SODIUM BLD-SCNC: 143 MEQ/L (ref 135–144)
T4 FREE: 1.14 NG/DL (ref 0.84–1.68)
TSH SERPL DL<=0.05 MIU/L-ACNC: 3.08 UIU/ML (ref 0.44–3.86)

## 2020-04-16 PROCEDURE — 99211 OFF/OP EST MAY X REQ PHY/QHP: CPT

## 2020-04-16 NOTE — PROGRESS NOTES
Ms. Rissa Mendoza is a 80 y.o. y/o female with history of Afib who presents today for anticoagulation monitoring and adjustment.   INR 2.4 is therapeutic for this patient (goal range 2-3) and is reflective of 22.5 mg TWD  Patient verifies current dosing regimen, patient able to verbally recall dose  Patient reports 0  missed doses since last INR   Patient denies s/sx clotting and/or stroke  Patient denies hematuria, epistaxis, rectal bleeding  Patient denies changes in diet, alcohol, or tobacco use  Reviewed medication list and drug allergies with patient, updated any medication additions or modifications accordingly  Patient also denies any pending medical or dental procedures scheduled at this time  Patient was instructed to continue 22.5 TWD and RTC 6 weeks    Telephone visit per COVID protocol

## 2020-04-23 ENCOUNTER — TELEPHONE (OUTPATIENT)
Dept: ENDOCRINOLOGY | Age: 85
End: 2020-04-23

## 2020-04-24 ENCOUNTER — TELEPHONE (OUTPATIENT)
Dept: ENDOCRINOLOGY | Age: 85
End: 2020-04-24

## 2020-04-24 NOTE — TELEPHONE ENCOUNTER
Patient calling to inform you that she is not interested in starting [facility] and will discuss this with you at next appointment.

## 2020-05-06 ENCOUNTER — VIRTUAL VISIT (OUTPATIENT)
Dept: FAMILY MEDICINE CLINIC | Age: 85
End: 2020-05-06
Payer: MEDICARE

## 2020-05-06 PROBLEM — F51.04 PSYCHOPHYSIOLOGICAL INSOMNIA: Status: ACTIVE | Noted: 2020-05-06

## 2020-05-06 PROBLEM — F41.8 DEPRESSION WITH ANXIETY: Status: ACTIVE | Noted: 2020-05-06

## 2020-05-06 PROCEDURE — 99443 PR PHYS/QHP TELEPHONE EVALUATION 21-30 MIN: CPT | Performed by: FAMILY MEDICINE

## 2020-05-06 RX ORDER — MIRTAZAPINE 15 MG/1
15 TABLET, FILM COATED ORAL NIGHTLY
Qty: 90 TABLET | Refills: 1 | Status: SHIPPED | OUTPATIENT
Start: 2020-05-06 | End: 2020-06-03

## 2020-05-06 NOTE — PROGRESS NOTES
Karo York is a 80 y.o. female evaluated via telephone on 2020. Consent:  She and/or health care decision maker is aware that that she may receive a bill for this telephone service, depending on her insurance coverage, and has provided verbal consent to proceed: Yes      Documentation:  I communicated with the patient and/or health care decision maker about see below. Details of this discussion including any medical advice provided: see below    I affirm this is a Patient Initiated Episode with an Established Patient who has not had a related appointment within my department in the past 7 days or scheduled within the next 24 hours. Total Time: 23 minutes    Note: not billable if this call serves to triage the patient into an appointment for the relevant concern      [place]     2020    TELEHEALTH EVALUATION -- Audio (During DWICF-05 public health emergency)    HPI:    Karo York (:  1934) has requested an audio evaluation for the following concern(s):  Patient is here for DM f/u. Sugars have been very well-controlled and patient has not been experiencing hyper- or hypoglycemic symptoms. Compliance with meds is good and there are no side effects. Patient exercises occasionally and tries to eat healthy. Is up to date on foot and eye exams and immunizations. Most recent A1C 7.1 (improved).     Patient is here for f/u HTN, CHF, afib. Is compliant with meds and has no side effects from them. Avoids added salt. Tries to eat healthy. Exercises lightly occasionally. Works out in her yard. Has no chest pain, shortness of breath, palpitations or edema.     Hypothyroidism.  Compliant with levothyroxine which is taken correctly.  No diarrhea, constipation, palpitations, dry skin, depression, difficulty sleeping or fatigue. No weight gain or loss.     Patient is here for hyperlipidemia. No indication for meds.     GERD.  Well-controlled with PPI, caffeine restriction, diet

## 2020-05-06 NOTE — PATIENT INSTRUCTIONS
the same time every morning, even if you feel tired. What to avoid  · Limit caffeine (coffee, tea, caffeinated sodas) during the day, and don't have any for at least 4 to 6 hours before bedtime. · Don't drink alcohol before bedtime. Alcohol can cause you to wake up more often during the night. · Don't smoke or use tobacco, especially in the evening. Nicotine can keep you awake. · Don't take naps during the day, especially close to bedtime. · Don't lie in bed awake for too long. If you can't fall asleep, or if you wake up in the middle of the night and can't get back to sleep within 15 minutes or so, get out of bed and go to another room until you feel sleepy. · Don't take medicine right before bed that may keep you awake or make you feel hyper or energized. Your doctor can tell you if your medicine may do this and if you can take it earlier in the day. If you can't sleep  · Imagine yourself in a peaceful, pleasant scene. Focus on the details and feelings of being in a place that is relaxing. · Get up and do a quiet or boring activity until you feel sleepy. · Don't drink any liquids after 6 p.m. if you wake up often because you have to go to the bathroom. Where can you learn more? Go to https://[employer]TVCast. org and sign in to your [employer] account. Enter Y897 in the [facility] box to learn more about \"Learning About Sleeping Well. \"     If you do not have an account, please click on the \"Sign Up Now\" link. Current as of: May 28, 2019Content Version: 12.4  © 0819-9475 Healthwise, Incorporated. Care instructions adapted under license by [facility] ([facility]). If you have questions about a medical condition or this instruction, always ask your healthcare professional. [address] any warranty or liability for your use of this information.

## 2020-05-14 RX ORDER — WARFARIN SODIUM 5 MG/1
TABLET ORAL
Qty: 80 TABLET | Refills: 1 | Status: SHIPPED | OUTPATIENT
Start: 2020-05-14 | End: 2021-03-05 | Stop reason: SDUPTHER

## 2020-05-28 ENCOUNTER — HOSPITAL ENCOUNTER (OUTPATIENT)
Dept: PHARMACY | Age: 85
Setting detail: THERAPIES SERIES
Discharge: HOME | End: 2020-05-28
Payer: MEDICARE

## 2020-05-28 DIAGNOSIS — I48.19 OTHER PERSISTENT ATRIAL FIBRILLATION (HCC): ICD-10-CM

## 2020-05-28 LAB
INR BLD: 2.7
PROTHROMBIN TIME: 28.2 SEC (ref 12.3–14.9)

## 2020-05-28 PROCEDURE — 99211 OFF/OP EST MAY X REQ PHY/QHP: CPT | Performed by: PHARMACIST

## 2020-06-03 ENCOUNTER — VIRTUAL VISIT (OUTPATIENT)
Dept: FAMILY MEDICINE CLINIC | Age: 85
End: 2020-06-03
Payer: MEDICARE

## 2020-06-03 PROCEDURE — 99442 PR PHYS/QHP TELEPHONE EVALUATION 11-20 MIN: CPT | Performed by: FAMILY MEDICINE

## 2020-06-03 NOTE — PROGRESS NOTES
and is in no acute distress. Respiratory effort appears normal. Mood appears stable and speech and thought are grossly normal.    ASSESSMENT/PLAN:  Chen was seen today for other. Diagnoses and all orders for this visit:    Psychophysiological insomnia  Comments:  Improvong and well-controlled with good sleep hygiene. Depression with anxiety  Comments:  Improving and well-controlled       Return in about 3 months (around 9/3/2020) for DM, HTN, HLD, CAD -OV. Ralph Mckenzie is a 80 y.o. female being evaluated by a Virtual Visit (telephonic visit) encounter to address concerns as mentioned above. A caregiver was present when appropriate. Due to this being a TeleHealth encounter (During TLJQR-83 public health emergency), evaluation of the following organ systems was limited: Vitals/Constitutional/EENT/Resp/CV/GI//MS/Neuro/Skin/Heme-Lymph-Imm. Pursuant to the emergency declaration under the [address] [place] [address] authority and the [employer] and [employer] Act, this Virtual Visit was conducted with patient's (and/or legal guardian's) consent, to reduce the patient's risk of exposure to COVID-19 and provide necessary medical care. The patient (and/or legal guardian) has also been advised to contact this office for worsening conditions or problems, and seek emergency medical treatment and/or call 911 if deemed necessary. Services were provided through a telephonic synchronous discussion virtually to substitute for in-person clinic visit. Patient and provider were located at their individual homes. --Jermaine Valladares MD on 6/3/2020 at 3:18 PM    An electronic signature was used to authenticate this note.

## 2020-06-30 RX ORDER — INSULIN LISPRO 100 [IU]/ML
INJECTION, SOLUTION INTRAVENOUS; SUBCUTANEOUS
Qty: 30 ML | Refills: 3 | Status: SHIPPED | OUTPATIENT
Start: 2020-06-30 | End: 2022-04-08 | Stop reason: SDUPTHER

## 2020-07-01 ENCOUNTER — HOSPITAL ENCOUNTER (OUTPATIENT)
Dept: CARDIOLOGY | Age: 85
Discharge: HOME | End: 2020-07-01
Payer: MEDICARE

## 2020-07-01 PROCEDURE — 93280 PM DEVICE PROGR EVAL DUAL: CPT

## 2020-07-09 ENCOUNTER — HOSPITAL ENCOUNTER (OUTPATIENT)
Dept: PHARMACY | Age: 85
Setting detail: THERAPIES SERIES
Discharge: HOME | End: 2020-07-09
Payer: MEDICARE

## 2020-07-09 VITALS — TEMPERATURE: 96.6 F

## 2020-07-09 LAB — INTERNATIONAL NORMALIZATION RATIO, POC: 3

## 2020-07-09 PROCEDURE — 85610 PROTHROMBIN TIME: CPT

## 2020-07-09 PROCEDURE — 99211 OFF/OP EST MAY X REQ PHY/QHP: CPT

## 2020-07-09 NOTE — PROGRESS NOTES
Ms. Isaura Lopez is a 80 y.o. y/o female with history of Afib who presents today for anticoagulation monitoring and adjustment.   INR 3.0 is therapeutic for this patient (goal range 2-3) and is reflective of 22.5 mg TWD  Patient verifies current dosing regimen, patient able to verbally recall dose  Patient reports 0  missed doses since last INR   Patient denies s/sx clotting and/or stroke  Patient denies hematuria, epistaxis, rectal bleeding  Patient denies changes in diet, alcohol, or tobacco use  Reviewed medication list and drug allergies with patient, updated any medication additions or modifications accordingly  Patient also denies any pending medical or dental procedures scheduled at this time  Patient was instructed to continue TWD and RTC 6 weeks    CLINICAL PHARMACY CONSULT: MED RECONCILIATION/REVIEW [address]: No  Total # of Interventions Recommended: 1  - Maintenance Safety Lab Monitoring #: 1  Total Interventions Accepted: 1  Time Spent (min): Neelima 6, PharmD  [address]

## 2020-07-28 RX ORDER — INSULIN GLARGINE 100 [IU]/ML
INJECTION, SOLUTION SUBCUTANEOUS
Qty: 45 ML | Refills: 3 | Status: SHIPPED | OUTPATIENT
Start: 2020-07-28 | End: 2021-05-12 | Stop reason: SDUPTHER

## 2020-08-10 RX ORDER — SYRINGE-NEEDLE,INSULIN,0.5 ML 30 GX5/16"
SYRINGE, EMPTY DISPOSABLE MISCELLANEOUS
Qty: 300 EACH | Refills: 1 | Status: SHIPPED | OUTPATIENT
Start: 2020-08-10

## 2020-08-20 ENCOUNTER — HOSPITAL ENCOUNTER (OUTPATIENT)
Dept: PHARMACY | Age: 85
Setting detail: THERAPIES SERIES
Discharge: HOME | End: 2020-08-20
Payer: MEDICARE

## 2020-08-20 VITALS — TEMPERATURE: 96.8 F

## 2020-08-20 LAB — INTERNATIONAL NORMALIZATION RATIO, POC: 2.9

## 2020-08-20 PROCEDURE — 99211 OFF/OP EST MAY X REQ PHY/QHP: CPT

## 2020-08-20 PROCEDURE — 85610 PROTHROMBIN TIME: CPT

## 2020-08-20 NOTE — PROGRESS NOTES
Ms. Hawa Monterroso is a 80 y.o. y/o female with history of Afib who presents today for anticoagulation monitoring and adjustment. INR 2.9 is therapeutic for this patient (goal range 2-3) and is reflective of 22.5 mg TWD  Patient verifies current dosing regimen, patient able to verbally recall dose  Patient reports 0  missed doses since last INR   Patient denies s/sx clotting and/or stroke  Patient denies hematuria, epistaxis, rectal bleeding  Patient denies changes in diet, alcohol, or tobacco use  Reviewed medication list and drug allergies with patient, updated any medication additions or modifications accordingly  Patient also denies any pending medical or dental procedures scheduled at this time  Patient was instructed to continue current regimen of 22.5 mg TWD and RTC 6 weeks    Face to face visit completed, procedural mask and face shield worn by myself and Talisha (APPE student) as instructed; surgical mask worn by patient, room cleaned pre and post visit with Virex Plus spray.     CLINICAL PHARMACY CONSULT: MED RECONCILIATION/REVIEW ADDENDUM  For Pharmacy Admin Tracking Only  PHSO: No  Total # of Interventions Recommended: 1  - Maintenance Safety Lab Monitoring #: 1  Total Interventions Accepted: 1  Time Spent (min): Markie Mcnamara PharmD  [id number] [facility] Irina ByrdD   8/20/2020 1:50 PM

## 2020-09-03 ENCOUNTER — OFFICE VISIT (OUTPATIENT)
Dept: FAMILY MEDICINE CLINIC | Age: 85
End: 2020-09-03
Payer: MEDICARE

## 2020-09-03 VITALS
RESPIRATION RATE: 16 BRPM | HEIGHT: 62 IN | SYSTOLIC BLOOD PRESSURE: 132 MMHG | BODY MASS INDEX: 37.73 KG/M2 | WEIGHT: 205 LBS | TEMPERATURE: 97.8 F | DIASTOLIC BLOOD PRESSURE: 70 MMHG | OXYGEN SATURATION: 99 % | HEART RATE: 74 BPM

## 2020-09-03 PROCEDURE — 99214 OFFICE O/P EST MOD 30 MIN: CPT | Performed by: FAMILY MEDICINE

## 2020-09-03 PROCEDURE — 1036F TOBACCO NON-USER: CPT | Performed by: FAMILY MEDICINE

## 2020-09-03 PROCEDURE — G8417 CALC BMI ABV UP PARAM F/U: HCPCS | Performed by: FAMILY MEDICINE

## 2020-09-03 PROCEDURE — 3051F HG A1C>EQUAL 7.0%<8.0%: CPT | Performed by: FAMILY MEDICINE

## 2020-09-03 PROCEDURE — G0438 PPPS, INITIAL VISIT: HCPCS | Performed by: FAMILY MEDICINE

## 2020-09-03 PROCEDURE — 4040F PNEUMOC VAC/ADMIN/RCVD: CPT | Performed by: FAMILY MEDICINE

## 2020-09-03 PROCEDURE — 1090F PRES/ABSN URINE INCON ASSESS: CPT | Performed by: FAMILY MEDICINE

## 2020-09-03 PROCEDURE — G8427 DOCREV CUR MEDS BY ELIG CLIN: HCPCS | Performed by: FAMILY MEDICINE

## 2020-09-03 PROCEDURE — 1123F ACP DISCUSS/DSCN MKR DOCD: CPT | Performed by: FAMILY MEDICINE

## 2020-09-03 RX ORDER — MIRTAZAPINE 15 MG/1
TABLET, FILM COATED ORAL
Status: ON HOLD | COMMUNITY
Start: 2020-05-06 | End: 2020-10-29

## 2020-09-03 RX ORDER — IBUPROFEN 200 MG
200 TABLET ORAL EVERY 6 HOURS PRN
COMMUNITY

## 2020-09-03 RX ORDER — PRAVASTATIN SODIUM 40 MG
TABLET ORAL
COMMUNITY
Start: 2020-06-01 | End: 2020-09-03

## 2020-09-03 ASSESSMENT — LIFESTYLE VARIABLES: HOW OFTEN DO YOU HAVE A DRINK CONTAINING ALCOHOL: 0

## 2020-09-03 ASSESSMENT — PATIENT HEALTH QUESTIONNAIRE - PHQ9
SUM OF ALL RESPONSES TO PHQ QUESTIONS 1-9: 1
SUM OF ALL RESPONSES TO PHQ QUESTIONS 1-9: 1

## 2020-09-08 RX ORDER — PANTOPRAZOLE SODIUM 40 MG/1
TABLET, DELAYED RELEASE ORAL
Qty: 90 TABLET | Refills: 1 | Status: SHIPPED | OUTPATIENT
Start: 2020-09-08 | End: 2021-03-22

## 2020-09-26 NOTE — PROGRESS NOTES
Subjective  Chen Wolf, 80 y.o. female presents today with:  Chief Complaint   Patient presents with    Diabetes     follow up for DM, HTN, HLD, CAD     Discuss Medications     asking for a 90 day refill on Pantoprazole           HPI    Patient is here for DM f/u. Sugars have been moderately well-controlled and patient has not been experiencing hyper- or hypoglycemic symptoms. Compliance with meds is good and there are no side effects. Patient exercises occasionally and tries to eat healthy. Is up to date on foot and eye exams and immunizations. Patient is here for follow-up of CAD/HTN/CHF/a fib. No chest pain, shortness of breath, palpitations, edema, paroxysmal nocturnal dyspnea, orthopnea. Is compliant with meds which do not cause significant side effects. Avoids added salt; exercises occasionally and tries to eat a healthy diet. Hypothyroidism. Compliant with levothyroxine which is taken correctly. No diarrhea, constipation, palpitations, dry skin, depression, difficulty sleeping or fatigue. No weight gain or loss. Patient is being treated for depression and has been compliant with meds which do not cause side effects. Mood is improved. No suicidal ideation. Sleep is normal.    Patient is here for hyperlipidemia. Is compliant with medications and has no apparent side effects from them. No other questions and or concerns for today's visit      Review of Systems  No fevers, chills, sweats. No unintended weight loss. No abdominal pain, nausea, vomiting, diarrhea, constipation, bloody stools, black tarry stools. No rashes. No swollen glands. Past Medical History:   Diagnosis Date    Abnormal liver ultrasound 5/15/2019    Allergic contact dermatitis due to plants, except food 8/19/2019    Chronic kidney disease (CKD), stage II (mild)     Hyperlipidemia     Hypertension     Hypothyroidism     Interstitial cystitis     Dr. Tia Madrid diagnosed this for michele.     Type II or day 100 each 11    blood glucose test strips (FREESTYLE INSULINX TEST) strip TEST five times a day 200 strip 3    Insulin Pen Needle (NOVOFINE) 32G X 6 MM MISC use four times a day (Patient taking differently: 1 each 3 times daily use four times a day) 300 each 3    metoprolol succinate (TOPROL XL) 50 MG extended release tablet Take 50 mg by mouth 2 times daily      magnesium oxide (MAG-OX) 400 MG tablet Take 400 mg by mouth daily      sotalol (BETAPACE) 120 MG tablet Take 1 tablet by mouth 2 times daily 60 tablet 3    Alcohol Swabs PADS 1 each by Does not apply route       pravastatin (PRAVACHOL) 40 MG tablet Take 1 tablet by mouth daily 90 tablet 3    telmisartan (MICARDIS) 40 MG tablet Take 1 tablet by mouth daily 30 tablet 11    furosemide (LASIX) 40 MG tablet Take 40 mg by mouth daily Except take 1 tablet by mouth twice daily on Monday, Wednesday, and Friday. Rest of days takes daily.  nitroGLYCERIN (NITROSTAT) 0.4 MG SL tablet Place 1 tablet under the tongue every 5 minutes as needed. 25 tablet 1    Cholecalciferol (VITAMIN D3) 1000 UNITS TABS Take 1,000 Units by mouth daily.  Multiple Vitamins-Minerals (CENTRUM SILVER PO) Take 1 tablet by mouth daily       aspirin 81 MG EC tablet Take 81 mg by mouth daily.  famotidine (PEPCID) 20 MG tablet Take 1 tablet by mouth 2 times daily 60 tablet 3    levothyroxine (SYNTHROID) 75 MCG tablet take 1 tablet by mouth once daily 30 tablet 5    acetaminophen (TYLENOL) 325 MG tablet Take 650 mg by mouth every 6 hours as needed for Pain       No current facility-administered medications for this visit. PMH, Surgical Hx, Family Hx, and Social Hxreviewed and updated. Health Maintenance reviewed.     Objective    Vitals:    09/03/20 1226   BP: 132/70   Site: Left Upper Arm   Position: Sitting   Cuff Size: Large Adult   Pulse: 74   Resp: 16   Temp: 97.8 °F (36.6 °C)   TempSrc: Tympanic   SpO2: 99%   Weight: 205 lb (93 kg)   Height: 5' 2\" (1.575 m)        Physical Exam  Constitutional:       Comments: Obese no acute distress   HENT:      Head: Normocephalic and atraumatic. Eyes:      Conjunctiva/sclera: Conjunctivae normal.   Neck:      Musculoskeletal: Neck supple. Thyroid: No thyromegaly. Vascular: No carotid bruit. Cardiovascular:      Rate and Rhythm: Normal rate and regular rhythm. Heart sounds: S1 normal and S2 normal.   Pulmonary:      Effort: Pulmonary effort is normal.      Breath sounds: No wheezing or rales. Abdominal:      General: Bowel sounds are normal.      Palpations: Abdomen is soft. Musculoskeletal:      Right lower leg: No edema. Left lower leg: No edema. Lymphadenopathy:      Cervical: No cervical adenopathy. Skin:     General: Skin is warm and dry. Neurological:      Mental Status: She is alert and oriented to person, place, and time. Lab Results   Component Value Date    LABA1C 7.1 (H) 04/16/2020    LABA1C 7.9 (H) 09/30/2019    LABA1C 7.5 09/25/2019     Lab Results   Component Value Date    LABMICR <1.20 09/30/2019    CREATININE 1.17 (H) 10/29/2020     Lab Results   Component Value Date    ALT 11 10/29/2020    AST 22 10/29/2020     Lab Results   Component Value Date    CHOL 135 10/30/2020    TRIG 127 10/30/2020    HDL 37 (L) 10/30/2020    LDLCALC 73 10/30/2020        Assessment & Plan   Visit Diagnoses and Associated Orders     Chronic congestive heart failure, unspecified heart failure type ([facility] [address]    -  Primary    Stable and well-controlled on current medication. Gastroesophageal reflux disease without esophagitis        Stable and well-controlled on PPI. On Fosamax for osteoporosis. Reviewed symptom reduction strategies. pantoprazole (PROTONIX) 40 MG tablet [[id number]]           Atrial fibrillation, unspecified type ([address]        Stable and well-controlled on current medication.          Class 2 severe obesity due to excess calories with serious comorbidity and body mass index (BMI) of 38.0 to 38.9 in adult [facility])        Stable and well-controlled on current medication. CKD (chronic kidney disease) stage 3, GFR 30-59 ml/min (HCC)        Stable and well-controlled on current medication. Uncontrolled type 2 diabetes mellitus without complication, with long-term current use of insulin (HCC)        Stable and well-controlled on current medication. Mixed hyperlipidemia        Stable and well-controlled on current medication. Hypothyroidism, unspecified type        Stable and well-controlled on current medication. Essential hypertension        Stable and well-controlled on current medication. Depression with anxiety        Stable and well-controlled on current medication. ORDERS WITHOUT AN ASSOCIATED DIAGNOSIS    ibuprofen (ADVIL;MOTRIN) 200 MG tablet [[id number]]              Reviewed with the patient: all disease processes, current clinical status, medications, activities and diet.      Side effects, adverse effects of the medication prescribed today, as well as treatment plan/ rationale and result expectations have been discussed with the patient who expresses understanding and desires to proceed.     Close follow up to evaluate treatment results and for coordination of care. I have reviewed the patient's medical history in detail and updated the computerized patient record. More than 50% of the appointment was spent in face-to-face counseling, education and care coordination. No orders of the defined types were placed in this encounter.     Orders Placed This Encounter   Medications    pantoprazole (PROTONIX) 40 MG tablet     Sig: One tablet daily     Dispense:  90 tablet     Refill:  1     Medications Discontinued During This Encounter   Medication Reason    pravastatin (PRAVACHOL) 40 MG tablet     pantoprazole (PROTONIX) 40 MG tablet REORDER     Return in about 3 months (around 12/3/2020) for DM, HTN, HLD, Obesity, Mood Disorder, GERD, Thyroid, CAD - vv. Controlled Substance Monitoring:    Acute and Chronic Pain Monitoring:   RX Monitoring 12/6/2017   Attestation The Prescription Monitoring Report for this patient was reviewed today. Periodic Controlled Substance Monitoring Possible medication side effects, risk of tolerance and/or dependence, and alternative treatments discussed. ;Potential drug abuse or diversion identified, see note documentation.            Drew Cross MD

## 2020-09-26 NOTE — PATIENT INSTRUCTIONS
diet is one that limits sodium , certain types of fat , and cholesterol . This type of diet is recommended for:   People with any form of cardiovascular disease (eg, coronary heart disease , peripheral vascular disease , previous heart attack , previous stroke )   People with risk factors for cardiovascular disease, such as high blood pressure , high cholesterol , or diabetes   Anyone who wants to lower their risk of developing cardiovascular disease   Sodium    Sodium is a mineral found in many foods. In general, most people consume much more sodium than they need. Diets high in sodium can increase blood pressure and lead to edema (water retention). On a heart-healthy diet, you should consume no more than 2,300 mg (milligrams) of sodium per dayabout the amount in one teaspoon of table salt. The foods highest in sodium include table salt (about 50% sodium), processed foods, convenience foods, and preserved foods. Cholesterol    Cholesterol is a fat-like, waxy substance in your blood. Our bodies make some cholesterol. It is also found in animal products, with the highest amounts in fatty meat, egg yolks, whole milk, cheese, shellfish, and organ meats. On a heart-healthy diet, you should limit your cholesterol intake to less than 200 mg per day. It is normal and important to have some cholesterol in your bloodstream. But too much cholesterol can cause plaque to build up within your arteries, which can eventually lead to a heart attack or stroke. The two types of cholesterol that are most commonly referred to are:   Low-density lipoprotein (LDL) cholesterol  Also known as bad cholesterol, this is the cholesterol that tends to build up along your arteries. Bad cholesterol levels are increased by eating fats that are saturated or hydrogenated. Optimal level of this cholesterol is less than 100. Over 130 starts to get risky for heart disease.    High-density lipoprotein (HDL) cholesterol  Also known as good example, this would mean 60 grams of fat or less per day. Saturated fat and trans fat in your diet raises your blood cholesterol the most, much more than dietary cholesterol does. For this reason, on a heart-healthy diet, less than 7% of your calories should come from saturated fat and ideally 0% from trans fat. On an 1800-calorie diet, this translates into less than 14 grams of saturated fat per day, leaving 46 grams of fat to come from mono- and polyunsaturated fats.    Food Choices on a Heart Healthy Diet   Food Category   Foods Recommended   Foods to Avoid   Grains   Breads and rolls without salted tops Most dry and cooked cereals Unsalted crackers and breadsticks Low-sodium or homemade breadcrumbs or stuffing All rice and pastas   Breads, rolls, and crackers with salted tops High-fat baked goods (eg, muffins, donuts, pastries) Quick breads, self-rising flour, and biscuit mixes Regular bread crumbs Instant hot cereals Commercially prepared rice, pasta, or stuffing mixes   Vegetables   Most fresh, frozen, and low-sodium canned vegetables Low-sodium and salt-free vegetable juices Canned vegetables if unsalted or rinsed   Regular canned vegetables and juices, including sauerkraut and pickled vegetables Frozen vegetables with sauces Commercially prepared potato and vegetable mixes   Fruits   Most fresh, frozen, and canned fruits All fruit juices   Fruits processed with salt or sodium   Milk   Nonfat or low-fat (1%) milk Nonfat or low-fat yogurt Cottage cheese, low-fat ricotta, cheeses labeled as low-fat and low-sodium   Whole milk Reduced-fat (2%) milk Malted and chocolate milk Full fat yogurt Most cheeses (unless low-fat and low salt) Buttermilk (no more than 1 cup per week)   Meats and Beans   Lean cuts of fresh or frozen beef, veal, lamb, or pork (look for the word loin) Fresh or frozen poultry without the skin Fresh or frozen fish and some shellfish Egg whites and egg substitutes (Limit whole eggs to three per week) Tofu Nuts or seeds (unsalted, dry-roasted), low-sodium peanut butter Dried peas, beans, and lentils   Any smoked, cured, salted, or canned meat, fish, or poultry (including licea, chipped beef, cold cuts, hot dogs, sausages, sardines, and anchovies) Poultry skins Breaded and/or fried fish or meats Canned peas, beans, and lentils Salted nuts   Fats and Oils   Olive oil and canola oil Low-sodium, low-fat salad dressings and mayonnaise   Butter, margarine, coconut and palm oils, licea fat   Snacks, Sweets, and Condiments   Low-sodium or unsalted versions of broths, soups, soy sauce, and condiments Pepper, herbs, and spices; vinegar, lemon, or lime juice Low-fat frozen desserts (yogurt, sherbet, fruit bars) Sugar, cocoa powder, honey, syrup, jam, and preserves Low-fat, trans-fat free cookies, cakes, and pies Dario and animal crackers, fig bars, luis snaps   High-fat desserts Broth, soups, gravies, and sauces, made from instant mixes or other high-sodium ingredients Salted snack foods Canned olives Meat tenderizers, seasoning salt, and most flavored vinegars   Beverages   Low-sodium carbonated beverages Tea and coffee in moderation Soy milk   Commercially softened water   Suggestions   Make whole grains, fruits, and vegetables the base of your diet. Choose heart-healthy fats such as canola, olive, and flaxseed oil, and foods high in heart-healthy fats, such as nuts, seeds, soybeans, tofu, and fish. Eat fish at least twice per week; the fish highest in omega-3 fatty acids and lowest in mercury include salmon, herring, mackerel, sardines, and canned chunk light tuna. If you eat fish less than twice per week or have high triglycerides, talk to your doctor about taking fish oil supplements. Read food labels.    For products low in fat and cholesterol, look for fat free, low-fat, cholesterol free, saturated fat free, and trans fat freeAlso scan the Nutrition Facts Label, which lists saturated fat, trans fat, and cholesterol amounts. For products low in sodium, look for sodium free, very low sodium, low sodium, no added salt, and unsalted   Skip the salt when cooking or at the table; if food needs more flavor, get creative and try out different herbs and spices. Garlic and onion also add substantial flavor to foods. Trim any visible fat off meat and poultry before cooking, and drain the fat off after dominguez. Use cooking methods that require little or no added fat, such as grilling, boiling, baking, poaching, broiling, roasting, steaming, stir-frying, and sauting. Avoid fast food and convenience food. They tend to be high in saturated and trans fat and have a lot of added salt. Talk to a registered dietitian for individualized diet advice. Last Reviewed: March 2011 Blanca Eduardo MS, MPH, RD   Updated: 3/29/2011   ·     High-Fiber Diet     What Is Fiber? Dietary fiber is a form of carbohydrate found in plants that cannot be digested by humans. All plants contain fiber, including fruits, vegetables, grains, and legumes. Fiber is often classified into two categories: soluble and insoluble. Soluble fiber draws water into the bowel and can help slow digestion. Examples of foods that are high in soluble fiber include oatmeal, oat bran, barley, legumes (eg, beans and peas), apples, and strawberries. Insoluble fiber speeds digestion and can add bulk to the stool. Examples of foods that are high in insoluble fiber include whole-wheat products, wheat bran, cauliflower, green beans, and potatoes. Why Follow a High-Fiber Diet? A high-fiber diet is often recommended to prevent and treat constipation , hemorrhoids , diverticulitis , and irritable bowel syndrome . Eating a high-fiber diet can also help improve your cholesterol levels, lower your risk of coronary heart disease , reduce your risk of type 2 diabetes , and lower your weight.  For people with type 1 or 2 diabetes, a high-fiber diet can also help stabilize blood sugar levels. How Much Fiber Should I Eat? A high-fiber diet should contain  20-35 grams  of fiber a day. This is actually the amount recommended for the general adult population; however, most Americans eat only 15 grams of fiber per day. Digestion of Fiber   Eating a higher fiber diet than usual can take some getting used to by your body's digestive system. To avoid the side effects of sudden increases in dietary fiber (eg, gas, cramping, bloating, and diarrhea), increase fiber gradually and be sure to drink plenty of fluids every day. Tips for Increasing Fiber Intake   Whenever possible, choose whole grains over refined grains (eg, brown rice instead of white rice, whole-wheat bread instead of white bread). Include a variety of grains in your diet, such as wheat, rye, barley, oats, quinoa, and bulgur. Eat more vegetarian-based meals. Here are some ideas: black bean burgers, eggplant lasagna, and veggie tofu stir-vazquez. Choose high-fiber snacks, such as fruits, popcorn, whole-grain crackers, and nuts. Make whole-grain cereal or whole-grain toast part of your daily breakfast regime. When eating out, whether ordering a sandwich or dinner, ask for extra vegetables. When baking, replace part of the white flour with whole-wheat flour. Whole-wheat flour is particularly easy to incorporate into a recipe. High-Fiber Diet Eating Guide   Food Category   Foods Recommended   Notes   Grains   Whole-grain breads, muffins, bagels, or victor manuel bread Rye bread Whole-wheat crackers or crisp breads Whole-grain or bran cereals Oatmeal, oat bran, or grits Wheat germ Whole-wheat pasta and brown rice   Read the ingredients list on food labels. Look for products that list \"whole\" as the first ingredient (eg, whole-wheat, whole oats). Choose cereals with at least 2 grams of fiber per serving.    Vegetables   All vegetables, especially asparagus, bean sprouts, broccoli, [place] sprouts, cabbage, carrots, cauliflower, celery, corn, greens, green beans, green pepper, onions, peas, potatoes (with skin), snow peas, spinach, squash, sweet potatoes, tomatoes, zucchini   For maximum fiber intake, eat the peels of fruits and vegetablesjust be sure to wash them well first.   Fruits   All fruits, especially apples, berries, grapefruits, mangoes, nectarines, oranges, peaches, pears, dried fruits (figs, dates, prunes, raisins)   Choose raw fruits and vegetables over juice, cooked, or cannedraw fruit has more fiber. Dried fruit is also a good source of fiber. Milk   With the exception of yogurt containing inulin (a type of fiber), dairy foods provide little fiber. Add more fiber by topping your yogurt or cottage cheese with fresh fruit, whole grain or bran cereals, nuts, or seeds. Meats and Beans   All beans and peas, especially Garbanzo beans, kidney beans, lentils, lima beans, split peas, and vieyra beans All nuts and seeds, especially almonds, peanuts, [place] nuts, cashews, peanut butter, walnuts, sesame and sunflower seeds All meat, poultry, fish, and eggs   Increase fiber in meat dishes by adding vieyra beans, kidney beans, black-eyed peas, bran, or oatmeal. If you are following a low-fat diet, use nuts and seeds only in moderation. Fats and Oils   All in moderation   Fats and oils do not provide fiber   Snacks, Sweets, and Condiments   Fruit Nuts Popcorn, whole-wheat pretzels, or trail mix made with dried fruits, nuts, and seeds Cakes, breads, and cookies made with oatmeal or whole-wheat flour   Most snack foods do not provide much fiber. Choose snacks with at least 2 grams of fiber per serving. Last Reviewed: March 2011 Edna Ureña MS, MPH, RD   Updated: 3/29/2011   ·     Keep Your Memory Rosa [place]       Many factors can affect your ability to remembera hectic lifestyle, aging, stress, chronic disease, and certain medicines.  But, there are steps you can take to sharpen your mind and help preserve your memory. Challenge Your Brain   Regularly challenging your mind may help keeps it in top shape. Good mental exercises include:   Crossword puzzlesUse a dictionary if you need it; you will learn more that way. Brainteasers Try some! Crafts, such as wood working and sewing   Hobbies, such as gardening and building model airplanes   SocializingVisit old friends or join groups to meet new ones. Reading   Learning a new language   Taking a class, whether it be art history or irasema chi   TravelingExperience the food, history, and culture of your destination   Learning to use a computer   Going to museums, the theater, or thought-provoking movies   Changing things in your daily life, such as reversing your pattern in the grocery store or brushing your teeth using your nondominant hand   Use Memory Aids   There is no need to remember every detail on your own. These memory aids can help:   Calendars and day planners   Electronic organizers to store all sorts of helpful informationThese devices can \"beep\" to remind you of appointments. A book of days to record birthdays, anniversaries, and other occasions that occur on the same date every year   Detailed \"to-do\" lists and strategically placed sticky notes   Quick \"study\" sessionsBefore a gathering, review who will be there so their names will be fresh in your mind. Establish routinesFor example, keep your keys, wallet, and umbrella in the same place all the time or take medicine with your 8:00 AM glass of juice   Live a Healthy Life   Many actions that will keep your body strong will do the same for your mind. For example:   Talk to Your Doctor About Herbs and Supplements    Malnutrition and vitamin deficiencies can impair your mental function. For example, vitamin B12 deficiency can cause a range of symptoms, including confusion. But, what if your nutritional needs are being met? Can herbs and supplements still offer a benefit?  Researchers have investigated a range of natural remedies, such as ginkgo , ginseng , and the supplement phosphatidylserine (PS). So far, though, the evidence is inconsistent as to whether these products can improve memory or thinking. If you are interested in taking herbs and supplements, talk to your doctor first because they may interact with other medicines that you are taking. Exercise Regularly    Among the many benefits of regular exercise are increased blood flow to the brain and decreased risk of certain diseases that can interfere with memory function. One study found that even moderate exercise has a beneficial effect. Examples of \"moderate\" exercise include:   Playing 18 holes of golf once a week, without a cart   Playing tennis twice a week   Walking one mile per day   Manage Stress    It can be tough to remember what is important when your mind is cluttered. Make time for relaxation. Choose activities that calm you down, and make it routine. Manage Chronic Conditions    Side effects of high blood pressure , diabetes, and heart disease can interfere with mental function. Many of the lifestyle steps discussed here can help manage these conditions. Strive to eat a healthy diet, exercise regularly, get stress under control, and follow your doctor's advice for your condition. Minimize Medications    Talk to your doctor about the medicines that you take. Some may be unnecessary. Also, healthy lifestyle habits may lower the need for certain drugs. Last Reviewed: April 2010 Salvador Rodgers MD   Updated: 4/13/2010   ·     [address] [place] [address]       As we get older, changes in balance, gait, strength, vision, hearing, and cognition make even the most youthful senior more prone to accidents. Falls are one of the leading health risks for older people.  This increased risk of falling is related to:   Aging process (eg, decreased muscle strength, slowed reflexes)   Higher incidence of chronic health problems (eg, arthritis, diabetes) that may limit mobility, agility or sensory awareness   Side effects of medicine (eg, dizziness, blurred vision)especially medicines like prescription pain medicines and drugs used to treat mental health conditions   Depending on the brittleness of your bones, the consequences of a fall can be serious and long lasting. Home Life   Research by the Association of Aging [facility]) shows that some home accidents among older adults can be prevented by making simple lifestyle changes and basic modifications and repairs to the home environment. Here are some lifestyle changes that experts recommend:   Have your hearing and vision checked regularly. Be sure to wear prescription glasses that are right for you. Speak to your doctor or pharmacist about the possible side effects of your medicines. A number of medicines can cause dizziness. If you have problems with sleep, talk to your doctor. Limit your intake of alcohol. If necessary, use a cane or walker to help maintain your balance. Wear supportive, rubber-soled shoes, even at home. If you live in a region that gets wintry weather, you may want to put special cleats on your shoes to prevent you from slipping on the snow and ice. Exercise regularly to help maintain muscle tone, agility, and balance. Always hold the banister when going up or down stairs. Also, use  bars when getting in or out of the bath or shower, or using the toilet. To avoid dizziness, get up slowly from a lying down position. Sit up first, dangling your legs for a minute or two before rising to a standing position. Overall Home Safety Check   According to the Consumer Product Safety Commision's \"Older Consumer Home Safety Checklist,\" it is important to check for potential hazards in each room. And remember, proper lighting is an essential factor in home safety. If you cannot see clearly, you are more likely to fall.    Important questions to ask yourself include:   Are lamp, electric, extension, and telephone cords placed out of the flow of traffic and maintained in good condition? Have frayed cords been replaced? Are all small rugs and runners slip resistant? If not, you can secure them to the floor with a special double-sided carpet tape. Are smoke detectors properly locatedone on every floor of your home and one outside of every sleeping area? Are they in good working order? Are batteries replaced at least once a year? Do you have a well-maintained carbon monoxide detector outside every sleeping are in your home? Does your furniture layout leave plenty of space to maneuver between and around chairs, tables, beds, and sofas? Are hallways, stairs and passages between rooms well lit? Can you reach a lamp without getting out of bed? Are floor surfaces well maintained? Shag rugs, high-pile carpeting, tile floors, and polished wood floors can be particularly slippery. Stairs should always have handrails and be carpeted or fitted with a non-skid tread. Is your telephone easily reachable. Is the cord safely tucked away? Room by Room   According to the Association of Aging, bathrooms and des are the two most potentially hazardous rooms in your home. In the Kitchen    Be sure your stove is in proper working order and always make sure burners and the oven are off before you go out or go to sleep. Keep pots on the back burners, turn handles away from the front of the stove, and keep stove clean and free of grease build-up. Kitchen ventilation systems and range exhausts should be working properly. Keep flammable objects such as towels and pot holders away from the cooking area except when in use. Make sure kitchen curtains are tied back. Move cords and appliances away from the sink and hot surfaces. If extension cords are needed, install wiring guides so they do not hang over the sink, range, or working areas.     Look for coffee pots, kettles and toaster ovens with automatic shut-offs. Keep a mop handy in the kitchen so you can wipe up spills instantly. You should also have a small fire extinguisher. Arrange your kitchen with frequently used items on lower shelves to avoid the need to stand on a stepstool to reach them. Make sure countertops are well-lit to avoid injuries while cutting and preparing food. In the Bathroom    Use a non-slip mat or decals in the tub and shower, since wet, soapy tile or porcelain surfaces are extremely slippery. Make sure bathroom rugs are non-skid or tape them firmly to the floor. Bathtubs should have at least one, preferably two, grab bars, firmly attached to structural supports in the wall. (Do not use built-in soap holders or glass shower doors as grab bars.)    Tub seats fitted with non-slip material on the legs allow you to wash sitting down. For people with limited mobility, bathtub transfer benches allow you to slide safely into the tub. Raised toilet seats and toilet safety rails are helpful for those with knee or hip problems. In the [facility]    Make sure you use a nightlight and that the area around your bed is clear of potential obstacles. Be careful with electric blankets and never go to sleep with a heating pad, which can cause serious burns even if on a low setting. Use fire-resistant mattress covers and pillows, and NEVER smoke in bed. Keep a phone next to the bed that is programmed to dial 911 at the push of a button. If you have a chronic condition, you may want to sign on with an automatic call-in service. Typically the system includes a small pendant that connects directly to an emergency medical voice-response system. You should also make arrangements to stay in contact with someonefriend, neighbor, family memberon a regular schedule.    Fire Prevention   According to the [employer]. (Smoke Alarms for Every) [address] [place] [address], senior citizens are one of the two highest risk groups for death and serious injuries due to residential fires. When cooking, wear short-sleeved items, never a bulky long-sleeved robe. The [facility]'s Safety Checklist for Older Consumers emphasizes the importance of checking basements, garages, workshops and storage areas for fire hazards, such as volatile liquids, piles of old rags or clothing and overloaded circuits. Never smoke in bed or when lying down on a couch or recliner chair. Small portable electric or kerosene heaters are responsible for many home fires and should be used cautiously if at all. If you do use one, be sure to keep them away from flammable materials. In case of fire, make sure you have a pre-established emergency exit plan. Have a professional check your fireplace and other fuel-burning appliances yearly. [employer]   Baby boomers entering the sprague years will continue to see the development of new products to help older adults live safely and independently in spite of age-related changes. Making Life More Livable  , by [employer], lists over 1,000 products for \"living well in the mature years,\" such as bathing and mobility aids, household security devices, ergonomically designed knives and peelers, and faucet valves and knobs for temperature control. Medical supply stores and organizations are good sources of information about products that improve your quality of life and insure your safety.      Last Reviewed: November 2009 Aline Boyle MD   Updated: 3/7/2011     ·

## 2020-09-26 NOTE — PROGRESS NOTES
lungs 4 times daily as needed for Wheezing or Shortness of Breath (cough)  Laura Medrano PA-C   warfarin (COUMADIN) 5 MG tablet Take as directed by [facility] EM[facility] MEDICAL [place] AT [place] [facility]. Quantity equals 90 day supply.   Kitty Hopkins MD   sodium chloride (OCEAN, BABY AYR) 0.65 % nasal spray 1 spray by Nasal route as needed for Congestion  Slim Koo, APRN - CNP   CALMOSEPTINE 0.44-20.6 % OINT ointment   Historical Provider, MD   Probiotic Product (RA PROBIOTIC COMPLEX) CAPS   Historical Provider, MD   alendronate (FOSAMAX) 70 MG tablet take 1 tablet by mouth every week on empty stomach with 6 OUNCES OF WATER NO FOOD OR MEDS FOR AN HOUR REMAIN UPRIGHT  Reba Cabral MD   meclizine (ANTIVERT) 12.5 MG tablet Take 1 tablet by mouth 3 times daily as needed for Dizziness  Reba Cabral MD   FREESTYLE LANCETS MISC TEST three times a day  Elysia Perez MD   blood glucose test strips (FREESTYLE INSULINX TEST) strip TEST five times a day  Elysia Perez MD   acetaminophen (TYLENOL) 325 MG tablet Take 650 mg by mouth every 6 hours as needed for Pain  Historical Provider, MD   Insulin Pen Needle (NOVOFINE) 32G X 6 MM MISC use four times a day  Patient taking differently: 1 each 3 times daily use four times a day  Teagan Austin MD   metoprolol succinate (TOPROL XL) 50 MG extended release tablet Take 50 mg by mouth 2 times daily  Historical Provider, MD   magnesium oxide (MAG-OX) 400 MG tablet Take 400 mg by mouth daily  Historical Provider, MD   sotalol (BETAPACE) 120 MG tablet Take 1 tablet by mouth 2 times daily  Kitty Hopkins MD   Alcohol Swabs PADS 1 each by Does not apply route   Historical Provider, MD   pravastatin (PRAVACHOL) 40 MG tablet Take 1 tablet by mouth daily  Teagan Austin MD   telmisartan (MICARDIS) 40 MG tablet Take 1 tablet by mouth daily  Teagan Austin MD   furosemide (LASIX) 40 MG tablet Take 40 mg by mouth daily Except take 1 tablet by mouth calculate BMI. Based upon direct observation of the patient, evaluation of cognition reveals recent and remote memory intact. Patient's complete Health Risk Assessment and screening values have been reviewed and are found in Flowsheets. The following problems were reviewed today and where indicated follow up appointments were made and/or referrals ordered. Positive Risk Factor Screenings with Interventions:     General Health:  General  In general, how would you say your health is?: Good  In the past 7 days, have you experienced any of the following? New or Increased Pain, New or Increased Fatigue, Loneliness, Social Isolation, Stress or Anger?: (!) Loneliness, Stress  Do you get the social and emotional support that you need?: Yes  Do you have a Living Will?: Yes  General Health Risk Interventions:  · Fatigue: regular exercise recommended- 3-5 times per week, 30-45 minutes per session    Health Habits/Nutrition:  Health Habits/Nutrition  Do you exercise for at least 20 minutes 2-3 times per week?: (!) No  Have you lost any weight without trying in the past 3 months?: No  Do you eat fewer than 2 meals per day?: No  Have you seen a dentist within the past year?: (!) No  There is no height or weight on file to calculate BMI.   Health Habits/Nutrition Interventions:  · Inadequate physical activity:  educational materials provided to promote increased physical activity  · Nutritional issues:  educational materials for healthy, well-balanced diet provided  · Dental exam overdue:  patient encouraged to make appointment with his/her dentist    Hearing/Vision:  No exam data present  Hearing/Vision  Do you or your family notice any trouble with your hearing?: (!) Yes  Do you have difficulty driving, watching TV, or doing any of your daily activities because of your eyesight?: (!) Yes  Have you had an eye exam within the past year?: Yes  Hearing/Vision Interventions:  · Hearing concerns:  patient declines any

## 2020-09-30 ENCOUNTER — HOSPITAL ENCOUNTER (OUTPATIENT)
Dept: CARDIOLOGY | Age: 85
Discharge: HOME | End: 2020-09-30
Payer: MEDICARE

## 2020-09-30 PROCEDURE — 93296 REM INTERROG EVL PM/IDS: CPT

## 2020-10-01 ENCOUNTER — HOSPITAL ENCOUNTER (OUTPATIENT)
Dept: PHARMACY | Age: 85
Setting detail: THERAPIES SERIES
Discharge: HOME | End: 2020-10-01
Payer: MEDICARE

## 2020-10-01 VITALS — TEMPERATURE: 96 F

## 2020-10-01 LAB — INTERNATIONAL NORMALIZATION RATIO, POC: 4.1

## 2020-10-01 PROCEDURE — 99211 OFF/OP EST MAY X REQ PHY/QHP: CPT

## 2020-10-01 PROCEDURE — 85610 PROTHROMBIN TIME: CPT

## 2020-10-01 NOTE — PROGRESS NOTES
Ms. Michelle Yoder is a 80 y.o. y/o female with history of Afib who presents today for anticoagulation monitoring and adjustment.   INR 4.1 is supratherapeutic for this patient (goal range 2-3) and is reflective of 22.5 mg TWD  Patient verifies current dosing regimen, patient able to verbally recall dose  Patient reports no  missed doses since last INR   Patient denies s/sx clotting and/or stroke  Patient denies hematuria, epistaxis, rectal bleeding  Patient denies changes in diet, alcohol, or tobacco use  Reviewed medication list and drug allergies with patient, updated any medication additions or modifications accordingly  Patient also denies any pending medical or dental procedures scheduled at this time  Patient was instructed to hold today and tomorrow then restart 22.5 mg TWD and RTC 3 weeks    CLINICAL PHARMACY CONSULT: MED RECONCILIATION/REVIEW [address]: No  Total # of Interventions Recommended: 2  - Decreased Dose #: 1  - Maintenance Safety Lab Monitoring #: 1  Total Interventions Accepted: 2  Time Spent (min): 30    Irina MontanoD, [facility]  Staff Pharmacist  10/1/2020 2:07 PM

## 2020-10-12 RX ORDER — LEVOTHYROXINE SODIUM 0.07 MG/1
TABLET ORAL
Qty: 30 TABLET | Refills: 5 | Status: SHIPPED | OUTPATIENT
Start: 2020-10-12 | End: 2021-04-08 | Stop reason: SDUPTHER

## 2020-10-23 ENCOUNTER — HOSPITAL ENCOUNTER (OUTPATIENT)
Dept: PHARMACY | Age: 85
Setting detail: THERAPIES SERIES
Discharge: HOME | End: 2020-10-23
Payer: MEDICARE

## 2020-10-23 VITALS — TEMPERATURE: 97 F

## 2020-10-23 LAB — INTERNATIONAL NORMALIZATION RATIO, POC: 3

## 2020-10-23 PROCEDURE — 99211 OFF/OP EST MAY X REQ PHY/QHP: CPT | Performed by: PHARMACIST

## 2020-10-23 PROCEDURE — 85610 PROTHROMBIN TIME: CPT | Performed by: PHARMACIST

## 2020-10-23 NOTE — PROGRESS NOTES
Ms. Mervin Antunez is a 80 y.o. y/o female with history of Afib who presents today for anticoagulation monitoring and adjustment.   INR 3.0 is therapeutic for this patient (goal range 2-3) and is reflective of 22.5 mg TWD  Patient verifies current dosing regimen, patient able to verbally recall dose  Patient reports NO  missed doses since last INR   Patient denies s/sx clotting and/or stroke  Patient denies hematuria, epistaxis, rectal bleeding  Patient denies changes in diet, alcohol, or tobacco use  Reviewed medication list and drug allergies with patient, updated any medication additions or modifications accordingly  Patient also denies any pending medical or dental procedures scheduled at this time  Patient was instructed to continue 22.5 mg TWD and RTC 4 weeks    CLINICAL PHARMACY CONSULT: MED RECONCILIATION/REVIEW [address]: No  Total # of Interventions Recommended: 1  - Increased Dose #: 0  - Maintenance Safety Lab Monitoring #: 1  Total Interventions Accepted: 1  Time Spent (min): [address], [address] [facility]

## 2020-10-29 ENCOUNTER — APPOINTMENT (OUTPATIENT)
Dept: GENERAL RADIOLOGY | Age: 85
End: 2020-10-29
Payer: MEDICARE

## 2020-10-29 ENCOUNTER — HOSPITAL ENCOUNTER (OUTPATIENT)
Age: 85
Setting detail: OBSERVATION
Discharge: HOME | End: 2020-10-30
Attending: INTERNAL MEDICINE | Admitting: INTERNAL MEDICINE
Payer: MEDICARE

## 2020-10-29 PROBLEM — R07.9 CHEST PAIN: Status: ACTIVE | Noted: 2020-10-29

## 2020-10-29 LAB
ALBUMIN SERPL-MCNC: 4 G/DL (ref 3.5–4.6)
ALP BLD-CCNC: 57 U/L (ref 40–130)
ALT SERPL-CCNC: 11 U/L (ref 0–33)
ANION GAP SERPL CALCULATED.3IONS-SCNC: 9 MEQ/L (ref 9–15)
AST SERPL-CCNC: 22 U/L (ref 0–35)
BASOPHILS ABSOLUTE: 0.1 K/UL (ref 0–0.2)
BASOPHILS RELATIVE PERCENT: 0.7 %
BILIRUB SERPL-MCNC: 0.7 MG/DL (ref 0.2–0.7)
BUN BLDV-MCNC: 23 MG/DL (ref 8–23)
CALCIUM SERPL-MCNC: 9.1 MG/DL (ref 8.5–9.9)
CHLORIDE BLD-SCNC: 98 MEQ/L (ref 95–107)
CO2: 32 MEQ/L (ref 20–31)
CREAT SERPL-MCNC: 1.17 MG/DL (ref 0.5–0.9)
EOSINOPHILS ABSOLUTE: 0.2 K/UL (ref 0–0.7)
EOSINOPHILS RELATIVE PERCENT: 3.1 %
GFR AFRICAN AMERICAN: 53
GFR NON-AFRICAN AMERICAN: 43.8
GLOBULIN: 2.6 G/DL (ref 2.3–3.5)
GLUCOSE BLD-MCNC: 240 MG/DL (ref 60–115)
GLUCOSE BLD-MCNC: 250 MG/DL (ref 70–99)
HCT VFR BLD CALC: 38.9 % (ref 37–47)
HEMOGLOBIN: 13.3 G/DL (ref 12–16)
INR BLD: 2.5
LYMPHOCYTES ABSOLUTE: 1.5 K/UL (ref 1–4.8)
LYMPHOCYTES RELATIVE PERCENT: 18.9 %
MAGNESIUM: 2.3 MG/DL (ref 1.7–2.4)
MCH RBC QN AUTO: 29.4 PG (ref 27–31.3)
MCHC RBC AUTO-ENTMCNC: 34.2 % (ref 33–37)
MCV RBC AUTO: 86.1 FL (ref 82–100)
MONOCYTES ABSOLUTE: 0.8 K/UL (ref 0.2–0.8)
MONOCYTES RELATIVE PERCENT: 9.9 %
NEUTROPHILS ABSOLUTE: 5.3 K/UL (ref 1.4–6.5)
NEUTROPHILS RELATIVE PERCENT: 67.4 %
PDW BLD-RTO: 14.1 % (ref 11.5–14.5)
PERFORMED ON: ABNORMAL
PLATELET # BLD: 190 K/UL (ref 130–400)
POTASSIUM SERPL-SCNC: 5.2 MEQ/L (ref 3.4–4.9)
PROTHROMBIN TIME: 27 SEC (ref 12.3–14.9)
RBC # BLD: 4.52 M/UL (ref 4.2–5.4)
SODIUM BLD-SCNC: 139 MEQ/L (ref 135–144)
TOTAL PROTEIN: 6.6 G/DL (ref 6.3–8)
TROPONIN: <0.01 NG/ML (ref 0–0.01)
TROPONIN: <0.01 NG/ML (ref 0–0.01)
WBC # BLD: 7.8 K/UL (ref 4.8–10.8)

## 2020-10-29 PROCEDURE — 85610 PROTHROMBIN TIME: CPT

## 2020-10-29 PROCEDURE — 85025 COMPLETE CBC W/AUTO DIFF WBC: CPT

## 2020-10-29 PROCEDURE — 71045 X-RAY EXAM CHEST 1 VIEW: CPT

## 2020-10-29 PROCEDURE — 80053 COMPREHEN METABOLIC PANEL: CPT

## 2020-10-29 PROCEDURE — 6370000000 HC RX 637 (ALT 250 FOR IP): Performed by: INTERNAL MEDICINE

## 2020-10-29 PROCEDURE — 84484 ASSAY OF TROPONIN QUANT: CPT

## 2020-10-29 PROCEDURE — G0378 HOSPITAL OBSERVATION PER HR: HCPCS

## 2020-10-29 PROCEDURE — 99284 EMERGENCY DEPT VISIT MOD MDM: CPT

## 2020-10-29 PROCEDURE — 2580000003 HC RX 258: Performed by: INTERNAL MEDICINE

## 2020-10-29 PROCEDURE — 36415 COLL VENOUS BLD VENIPUNCTURE: CPT

## 2020-10-29 PROCEDURE — 93005 ELECTROCARDIOGRAM TRACING: CPT

## 2020-10-29 PROCEDURE — 93005 ELECTROCARDIOGRAM TRACING: CPT | Performed by: EMERGENCY MEDICINE

## 2020-10-29 PROCEDURE — 83735 ASSAY OF MAGNESIUM: CPT

## 2020-10-29 RX ORDER — WARFARIN SODIUM 5 MG/1
2.5 TABLET ORAL ONCE
Status: COMPLETED | OUTPATIENT
Start: 2020-10-29 | End: 2020-10-29

## 2020-10-29 RX ORDER — INSULIN GLARGINE 100 [IU]/ML
10 INJECTION, SOLUTION SUBCUTANEOUS NIGHTLY
Status: DISCONTINUED | OUTPATIENT
Start: 2020-10-29 | End: 2020-10-29

## 2020-10-29 RX ORDER — NITROGLYCERIN 0.4 MG/1
0.4 TABLET SUBLINGUAL EVERY 5 MIN PRN
Status: DISCONTINUED | OUTPATIENT
Start: 2020-10-29 | End: 2020-10-30 | Stop reason: HOSPADM

## 2020-10-29 RX ORDER — LOSARTAN POTASSIUM 50 MG/1
50 TABLET ORAL DAILY
Status: DISCONTINUED | OUTPATIENT
Start: 2020-10-29 | End: 2020-10-30 | Stop reason: HOSPADM

## 2020-10-29 RX ORDER — POTASSIUM CHLORIDE 20 MEQ/1
40 TABLET, EXTENDED RELEASE ORAL PRN
Status: DISCONTINUED | OUTPATIENT
Start: 2020-10-29 | End: 2020-10-30 | Stop reason: HOSPADM

## 2020-10-29 RX ORDER — SOTALOL HYDROCHLORIDE 120 MG/1
120 TABLET ORAL 2 TIMES DAILY
Status: DISCONTINUED | OUTPATIENT
Start: 2020-10-29 | End: 2020-10-30 | Stop reason: HOSPADM

## 2020-10-29 RX ORDER — ONDANSETRON 2 MG/ML
4 INJECTION INTRAMUSCULAR; INTRAVENOUS EVERY 6 HOURS PRN
Status: DISCONTINUED | OUTPATIENT
Start: 2020-10-29 | End: 2020-10-30 | Stop reason: HOSPADM

## 2020-10-29 RX ORDER — PROMETHAZINE HYDROCHLORIDE 12.5 MG/1
12.5 TABLET ORAL EVERY 6 HOURS PRN
Status: DISCONTINUED | OUTPATIENT
Start: 2020-10-29 | End: 2020-10-30 | Stop reason: HOSPADM

## 2020-10-29 RX ORDER — DEXTROSE MONOHYDRATE 25 G/50ML
12.5 INJECTION, SOLUTION INTRAVENOUS PRN
Status: DISCONTINUED | OUTPATIENT
Start: 2020-10-29 | End: 2020-10-30 | Stop reason: HOSPADM

## 2020-10-29 RX ORDER — INSULIN GLARGINE 100 [IU]/ML
20 INJECTION, SOLUTION SUBCUTANEOUS NIGHTLY
Status: DISCONTINUED | OUTPATIENT
Start: 2020-10-29 | End: 2020-10-30 | Stop reason: HOSPADM

## 2020-10-29 RX ORDER — ACETAMINOPHEN 650 MG/1
650 SUPPOSITORY RECTAL EVERY 6 HOURS PRN
Status: DISCONTINUED | OUTPATIENT
Start: 2020-10-29 | End: 2020-10-30 | Stop reason: HOSPADM

## 2020-10-29 RX ORDER — ASPIRIN 325 MG
325 TABLET ORAL DAILY
Status: DISCONTINUED | OUTPATIENT
Start: 2020-10-30 | End: 2020-10-30 | Stop reason: HOSPADM

## 2020-10-29 RX ORDER — ATORVASTATIN CALCIUM 40 MG/1
40 TABLET, FILM COATED ORAL NIGHTLY
Status: DISCONTINUED | OUTPATIENT
Start: 2020-10-29 | End: 2020-10-30 | Stop reason: HOSPADM

## 2020-10-29 RX ORDER — ACETAMINOPHEN 325 MG/1
650 TABLET ORAL EVERY 6 HOURS PRN
Status: DISCONTINUED | OUTPATIENT
Start: 2020-10-29 | End: 2020-10-30 | Stop reason: HOSPADM

## 2020-10-29 RX ORDER — DEXTROSE MONOHYDRATE 50 MG/ML
100 INJECTION, SOLUTION INTRAVENOUS PRN
Status: DISCONTINUED | OUTPATIENT
Start: 2020-10-29 | End: 2020-10-30 | Stop reason: HOSPADM

## 2020-10-29 RX ORDER — POLYETHYLENE GLYCOL 3350 17 G/17G
17 POWDER, FOR SOLUTION ORAL DAILY PRN
Status: DISCONTINUED | OUTPATIENT
Start: 2020-10-29 | End: 2020-10-30 | Stop reason: HOSPADM

## 2020-10-29 RX ORDER — METOPROLOL SUCCINATE 50 MG/1
50 TABLET, EXTENDED RELEASE ORAL 2 TIMES DAILY
Status: DISCONTINUED | OUTPATIENT
Start: 2020-10-29 | End: 2020-10-30 | Stop reason: HOSPADM

## 2020-10-29 RX ORDER — LEVOTHYROXINE SODIUM 0.07 MG/1
75 TABLET ORAL DAILY
Status: DISCONTINUED | OUTPATIENT
Start: 2020-10-29 | End: 2020-10-30 | Stop reason: HOSPADM

## 2020-10-29 RX ORDER — SODIUM CHLORIDE 0.9 % (FLUSH) 0.9 %
10 SYRINGE (ML) INJECTION EVERY 12 HOURS SCHEDULED
Status: DISCONTINUED | OUTPATIENT
Start: 2020-10-29 | End: 2020-10-30 | Stop reason: HOSPADM

## 2020-10-29 RX ORDER — NICOTINE POLACRILEX 4 MG
15 LOZENGE BUCCAL PRN
Status: DISCONTINUED | OUTPATIENT
Start: 2020-10-29 | End: 2020-10-30 | Stop reason: HOSPADM

## 2020-10-29 RX ORDER — FAMOTIDINE 20 MG/1
20 TABLET, FILM COATED ORAL 2 TIMES DAILY
Status: DISCONTINUED | OUTPATIENT
Start: 2020-10-29 | End: 2020-10-30 | Stop reason: HOSPADM

## 2020-10-29 RX ORDER — POTASSIUM CHLORIDE 7.45 MG/ML
10 INJECTION INTRAVENOUS PRN
Status: DISCONTINUED | OUTPATIENT
Start: 2020-10-29 | End: 2020-10-30 | Stop reason: HOSPADM

## 2020-10-29 RX ORDER — SODIUM CHLORIDE 0.9 % (FLUSH) 0.9 %
10 SYRINGE (ML) INJECTION PRN
Status: DISCONTINUED | OUTPATIENT
Start: 2020-10-29 | End: 2020-10-30 | Stop reason: HOSPADM

## 2020-10-29 RX ADMIN — Medication 10 ML: at 21:05

## 2020-10-29 RX ADMIN — WARFARIN SODIUM 2.5 MG: 5 TABLET ORAL at 21:06

## 2020-10-29 RX ADMIN — FAMOTIDINE 20 MG: 20 TABLET ORAL at 21:04

## 2020-10-29 RX ADMIN — SOTALOL HYDROCHLORIDE 120 MG: 120 TABLET ORAL at 21:04

## 2020-10-29 RX ADMIN — NITROGLYCERIN 0.5 INCH: 20 OINTMENT TOPICAL at 18:11

## 2020-10-29 RX ADMIN — METOPROLOL SUCCINATE 50 MG: 50 TABLET, EXTENDED RELEASE ORAL at 21:04

## 2020-10-29 RX ADMIN — INSULIN GLARGINE 20 UNITS: 100 INJECTION, SOLUTION SUBCUTANEOUS at 21:04

## 2020-10-29 RX ADMIN — ATORVASTATIN CALCIUM 40 MG: 40 TABLET, FILM COATED ORAL at 21:04

## 2020-10-29 ASSESSMENT — ENCOUNTER SYMPTOMS
COUGH: 0
DIARRHEA: 0
COLOR CHANGE: 0
RHINORRHEA: 0
BACK PAIN: 0
ABDOMINAL PAIN: 0
EYE DISCHARGE: 0
WHEEZING: 0
SORE THROAT: 0
CONSTIPATION: 0
VOMITING: 0
EYE PAIN: 0
TROUBLE SWALLOWING: 0
BLOOD IN STOOL: 0
NAUSEA: 0
EYE REDNESS: 0
SHORTNESS OF BREATH: 0

## 2020-10-29 ASSESSMENT — PAIN SCALES - GENERAL
PAINLEVEL_OUTOF10: 0
PAINLEVEL_OUTOF10: 0
PAINLEVEL_OUTOF10: 2

## 2020-10-29 NOTE — PROGRESS NOTES
Clinical Pharmacy Note    Ramon Chester is a 80 y.o. female for whom pharmacy has been asked to manage warfarin therapy. Reason for Admission: chest pain    Consulting Physician: Basilia Ross MD  Warfarin dose prior to admission: 22.5 mg TWD (5 mg M,F; 2.5 mg all other days)  Warfarin Indication: A-Fib  Target INR range: 2-3  Order for concomitant anticoagulant therapy:  mg    Outpatient warfarin provider: [facility]    Past Medical History:   Diagnosis Date    Abnormal liver ultrasound 5/15/2019    Allergic contact dermatitis due to plants, except food 8/19/2019    Chronic kidney disease (CKD), stage II (mild)     Hyperlipidemia     Hypertension     Hypothyroidism     Interstitial cystitis     Dr. Jessy Parrish diagnosed this for michele. Type II or unspecified type diabetes mellitus without mention of complication, not stated as uncontrolled                No results for input(s): INR in the last 72 hours. Recent Labs     10/29/20  1430   HGB 13.3   HCT 38.9          Current warfarin drug-drug interactions: ASA    Current diet order/intake: N/A    Date INR Warfarin Dose   10/29/20 ordered 2.5 mg                                   This patient is followed by [facility] ([facility]). Stat INR order placed. Patient's home dose of 2.5 mg of warfarin ordered for today. Daily PT/INR during pharmacy consult to monitor and dose warfarin therapy. Thank you for the consult. MICAH Wray. Ph.  10/29/2020  6:48 PM

## 2020-10-29 NOTE — ED NOTES
Bed: 01  Expected date:   Expected time:   Means of arrival:   Comments:     Sari Monk RN  10/29/20 3072

## 2020-10-29 NOTE — H&P
Hospital Medicine  History and Physical    Patient:  Serena Guerra  MRN: [id number]    CHIEF COMPLAINT:    Chief Complaint   Patient presents with    Chest Pain     since 0600 radiating to left shoulder blade. NTG x2, took ASA at home does not know dose. History Obtained From:  Patient, EMR  Primary Care Physician: Yecenia Guerin MD    HISTORY OF PRESENT ILLNESS:   The patient is a 80 y.o. female with PMHx of CKD, HTN, HLD, hypothyroidism, DMII who presents with shoulder pain. The patient awoke and had to get out of bed; she said it was awkward trying to get out and she hurt what she believes her left shoulder blade and states she does not think its her heart but more her shoulder . Denies fevers, chills, sweats, CP, SOB. Past Medical History:      Diagnosis Date    Abnormal liver ultrasound 5/15/2019    Allergic contact dermatitis due to plants, except food 8/19/2019    Chronic kidney disease (CKD), stage II (mild)     Hyperlipidemia     Hypertension     Hypothyroidism     Interstitial cystitis     Dr. Patrick Vann diagnosed this for patinet.  Type II or unspecified type diabetes mellitus without mention of complication, not stated as uncontrolled      Past Surgical History:      Procedure Laterality Date    ABDOMEN SURGERY      CATARACT REMOVAL WITH IMPLANT      both eyes    CORONARY ANGIOPLASTY WITH STENT PLACEMENT      HYSTERECTOMY      OTHER SURGICAL HISTORY  09/07/2016    GENERATOR CHANGE     PACEMAKER PLACEMENT       Medications Prior to Admission:    Prior to Admission medications    Medication Sig Start Date End Date Taking?  Authorizing Provider   levothyroxine (SYNTHROID) 75 MCG tablet take 1 tablet by mouth once daily 10/12/20   Fiona Lee MD   pantoprazole (PROTONIX) 40 MG tablet One tablet daily 9/8/20   Laura Santoyo MD   mirtazapine (REMERON) 15 MG tablet Take by mouth 5/6/20   Historical Provider, MD   ibuprofen (ADVIL;MOTRIN) 200 MG tablet Take 200 mg by mouth every 6 hours as needed for Pain    Historical Provider, MD   isosorbide mononitrate (IMDUR) 60 MG extended release tablet take 1 tablet by mouth once daily 8/18/20   Arslan Main MD   RA PEN NEEDLES 31G X 5 MM MISC USE FOUR TIMES A DAY 8/10/20   Aubrey Cobrun MD   insulin glargine (LANTUS SOLOSTAR) 100 UNIT/ML injection pen inject 30 unit subcutaneously every morning 7/28/20   Aubrey Coburn MD   insulin lispro, 1 Unit Dial, (HUMALOG KWIKPEN) 100 UNIT/ML SOPN inject 4 unit subcutaneously IF GLUCOSE  6 UNITS 151-200 8 UNITS 201-250 10 UNITS 251-300 12 UNITS 301-360 14 UNITS 361-400 MAX 42 6/30/20   Aubrey Coburn MD   warfarin (COUMADIN) 5 MG tablet Take as directed by [facility] EM[facility] [place] [facility]. Quantity equals 90 day supply. 5/14/20   Zenobia Agee MD   albuterol sulfate  (90 Base) MCG/ACT inhaler Inhale 2 puffs into the lungs 4 times daily as needed for Wheezing or Shortness of Breath (cough) 2/22/20 9/3/20  Laura Medrano PA-C   warfarin (COUMADIN) 5 MG tablet Take as directed by [facility] [place] [facility]. Quantity equals 90 day supply.  2/18/20   Zenobia Agee MD   sodium chloride (OCEAN, BABY AYR) 0.65 % nasal spray 1 spray by Nasal route as needed for Congestion 1/6/20   KRISTA Pugh - CNP   CALMOSEPTINE 0.44-20.6 % OINT ointment  11/15/19   Historical Provider, MD   Probiotic Product (RA PROBIOTIC COMPLEX) CAPS  12/15/19   Historical Provider, MD   alendronate (FOSAMAX) 70 MG tablet take 1 tablet by mouth every week on empty stomach with 6 OUNCES OF WATER NO FOOD OR MEDS FOR AN HOUR REMAIN UPRIGHT 12/1/19   Arslan Main MD   meclizine (ANTIVERT) 12.5 MG tablet Take 1 tablet by mouth 3 times daily as needed for Dizziness 8/13/19   Arslan Main MD   FREESTYLE LANCETS MISC TEST three times a day 5/29/19   Aubrey Coburn MD   blood glucose test strips (FREESTYLE INSULINX TEST) strip TEST five times a day 4/19/19   Fiona Lee MD   acetaminophen (TYLENOL) 325 MG tablet Take 650 mg by mouth every 6 hours as needed for Pain    Historical Provider, MD   Insulin Pen Needle (NOVOFINE) 32G X 6 MM MISC use four times a day  Patient taking differently: 1 each 3 times daily use four times a day 9/4/18   Jerrell Friday, MD   metoprolol succinate (TOPROL XL) 50 MG extended release tablet Take 50 mg by mouth 2 times daily    Historical Provider, MD   magnesium oxide (MAG-OX) 400 MG tablet Take 400 mg by mouth daily    Historical Provider, MD   sotalol (BETAPACE) 120 MG tablet Take 1 tablet by mouth 2 times daily 4/10/18   Neda Basurto MD   Alcohol Swabs PADS 1 each by Does not apply route     Historical Provider, MD   pravastatin (PRAVACHOL) 40 MG tablet Take 1 tablet by mouth daily 5/18/17   Jerrell Friday, MD   telmisartan (MICARDIS) 40 MG tablet Take 1 tablet by mouth daily 11/30/16   Jerrell Friday, MD   furosemide (LASIX) 40 MG tablet Take 40 mg by mouth daily Except take 1 tablet by mouth twice daily on Monday, Wednesday, and Friday. Rest of days takes daily. Historical Provider, MD   nitroGLYCERIN (NITROSTAT) 0.4 MG SL tablet Place 1 tablet under the tongue every 5 minutes as needed. 6/25/14   Jerrell FriMD ludivina   Cholecalciferol (VITAMIN D3) 1000 UNITS TABS Take 1,000 Units by mouth daily. Historical Provider, MD   Multiple Vitamins-Minerals (CENTRUM SILVER PO) Take 1 tablet by mouth daily     Historical Provider, MD   aspirin 81 MG EC tablet Take 81 mg by mouth daily. Historical Provider, MD     Allergies:  Latex; Avelox [moxifloxacin hcl in nacl]; Penicillins; and Adhesive tape    Social History:   TOBACCO:   reports that she has never smoked. She has never used smokeless tobacco.  ETOH:   reports no history of alcohol use.     Family History:       Problem Relation Age of Onset    Arthritis Other     Diabetes Other     Heart Disease Other      REVIEW OF SYSTEMS:  Ten systems reviewed and negative except for stated in HPI    Physical Exam:    Vitals: BP (!) 152/72   Pulse 70   Temp 98.2 °F (36.8 °C)   Resp 17   Ht 5' 2\" (1.575 m)   Wt 202 lb (91.6 kg)   LMP  (LMP Unknown)   SpO2 100%   BMI 36.95 kg/m²   General appearance: alert, appears stated age and cooperative  Skin: . No rashes or lesions  HEENT: Head: Normal, normocephalic, atraumatic. Tiffanie Dye Neck: supple, symmetrical, trachea midline  Lungs: clear to auscultation bilaterally  Heart: regular rate and rhythm  Abdomen: soft, non-tender; bowel sounds normal; no masses,  no organomegaly  Extremities: extremities normal, atraumatic, no cyanosis or edema  Neurologic: Non focal    Recent Labs     10/29/20  1430   WBC 7.8   HGB 13.3        Recent Labs     10/29/20  1430      K 5.2*   CL 98   CO2 32*   BUN 23   CREATININE 1.17*   GLUCOSE 250*   AST 22   ALT 11   BILITOT 0.7   ALKPHOS 57     Troponin T:   Recent Labs     10/29/20  1430   TROPONINI <0.010     ABGs:   Lab Results   Component Value Date    PHART 7.411 03/12/2014    PO2ART 181 03/12/2014    SNV1KXX 51 03/12/2014     INR: No results for input(s): INR in the last 72 hours. URINALYSIS:No results for input(s): NITRITE, COLORU, PHUR, LABCAST, WBCUA, RBCUA, MUCUS, TRICHOMONAS, YEAST, BACTERIA, CLARITYU, SPECGRAV, LEUKOCYTESUR, UROBILINOGEN, BILIRUBINUR, BLOODU, GLUCOSEU, AMORPHOUS in the last 72 hours. Invalid input(s): Yoli Luna  -----------------------------------------------------------------   Xr Chest Portable    Result Date: 10/29/2020  Exam: XR CHEST PORTABLE History:  CP Technique: AP portable view of the chest obtained. Comparison: Chest x-ray from February 22, 2020   Findings: There is no change in the left oblique radiopaque suture The cardiac silhouette is at the upper limits of normal in size. There are no infiltrates, consolidations or effusions. Bones of the thorax appear intact.      No radiographic evidence of acute intrathoracic process. Assessment and Plan   1. Chest pain:  Low suspicion; cardiology consult; asa, statin, trend troponins  2. CKD:  Renally dose meds  3. HTN/HLD/hypothroidism/A Fib:  Continue home meds  4. DMII:  SSI  5. Functional Status: Fall precautions. Up with assistance. PT OT  6. Diet: Cardiac Diabetic   7. DVT ppx: Lovenox SCDs  8. Disposition: Dependent on hospital course. Will discharge once medically stable. SW on board for discharge planning.      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    CHF (congestive heart failure) ([facility]) I50.9    Senile cataract H25.9    Uncontrolled type 2 diabetes mellitus without complication, with long-term current use of insulin [id number]    Atrial fibrillation ([facility]) I48.91    Anticoagulated on Coumadin Z79.01    At high risk for falls Z91.81    Gastroesophageal reflux disease without esophagitis K21.9    Vertigo R42    Age-related osteoporosis without current pathological fracture M81.0    Chronic right shoulder pain M25.511, G89.29    Pacemaker Z95.0    Intertrigo L30.4    RUQ pain R10.11    Class 2 severe obesity due to excess calories with serious comorbidity and body mass index (BMI) of 38.0 to 38.9 in adult ([facility]) E66.01, Z68.38    Pressure injury of buttock, stage 2 ([facility]) L89.302    Depression with anxiety F41.8    Psychophysiological insomnia F51.04    Chest pain R07.9       Jean Story MD  Admitting Hospitalist    Emergency Contact:

## 2020-10-29 NOTE — ED PROVIDER NOTES
[address] [facility] ED  EMERGENCY DEPARTMENT ENCOUNTER      Pt Name: Charles Bowman  MRN: [id number]  [place] 1934  Date of evaluation: 10/29/2020  Provider: KRISTA Cruz CNP    CHIEF COMPLAINT       Chief Complaint   Patient presents with    Chest Pain     since 0600 radiating to left shoulder blade. NTG x2, took ASA at home does not know dose. HISTORY OF PRESENT ILLNESS   (Location/Symptom, Timing/Onset,Context/Setting, Quality, Duration, Modifying Factors, Severity)  Note limiting factors. Charles Bowman is a 80 y.o. female who presents to the emergency department with a chart reviewed past medical history of chronic kidney disease, hyperlipidemia, hypertension, diabetes, hypothyroidism for chief complaint of chest pain. Patient states that a few hours ago she was try to get up and then she developed a 4 out of 10 midsternal chest pain radiating to her shoulder blade. She took 2 nitroglycerin sublingual and one baby aspirin which she states has alleviated her pain at this time. She did not get nauseous with it, dizzy, or diaphoretic. She denies pain at this time. She has no alleviating or modifying factors. Nursing Notes were reviewed. REVIEW OF SYSTEMS    (2-9 systems for level 4, 10 or more for level 5)     Review of Systems   Constitutional: Negative for activity change, appetite change, fatigue and fever. HENT: Negative for congestion, ear pain, rhinorrhea, sore throat and trouble swallowing. Eyes: Negative for pain, discharge and redness. Respiratory: Negative for cough, shortness of breath and wheezing. Cardiovascular: Positive for chest pain. Negative for palpitations. Gastrointestinal: Negative for abdominal pain, blood in stool, constipation, diarrhea, nausea and vomiting. Endocrine: Negative for polydipsia and polyuria. Genitourinary: Negative for decreased urine volume, dysuria, flank pain and hematuria.    Musculoskeletal: Negative for arthralgias, back pain and myalgias. Skin: Negative for color change, rash and wound. Neurological: Negative for dizziness, syncope, weakness, light-headedness and headaches. Psychiatric/Behavioral: Negative for behavioral problems. All other systems reviewed and are negative. Except as noted above the remainder of the review of systems was reviewed and negative. PAST MEDICAL HISTORY     Past Medical History:   Diagnosis Date    Abnormal liver ultrasound 5/15/2019    Allergic contact dermatitis due to plants, except food 8/19/2019    Chronic kidney disease (CKD), stage II (mild)     Hyperlipidemia     Hypertension     Hypothyroidism     Interstitial cystitis     Dr. Leeann York diagnosed this for patinet.  Type II or unspecified type diabetes mellitus without mention of complication, not stated as uncontrolled      Past Surgical History:   Procedure Laterality Date    ABDOMEN SURGERY      CATARACT REMOVAL WITH IMPLANT      both eyes    CORONARY ANGIOPLASTY WITH STENT PLACEMENT      HYSTERECTOMY      OTHER SURGICAL HISTORY  09/07/2016    GENERATOR CHANGE     PACEMAKER PLACEMENT       Social History     Socioeconomic History    Marital status:       Spouse name: None    Number of children: None    Years of education: None    Highest education level: None   Occupational History    None   Social Needs    Financial resource strain: None    Food insecurity     Worry: None     Inability: None    Transportation needs     Medical: None     Non-medical: None   Tobacco Use    Smoking status: Never Smoker    Smokeless tobacco: Never Used   Substance and Sexual Activity    Alcohol use: No     Comment: denies    Drug use: No    Sexual activity: Not Currently   Lifestyle    Physical activity     Days per week: None     Minutes per session: None    Stress: None   Relationships    Social connections     Talks on phone: None     Gets together: None     Attends [employer] service: None     Active member of club or organization: None     Attends meetings of clubs or organizations: None     Relationship status: None    Intimate partner violence     Fear of current or ex partner: None     Emotionally abused: None     Physically abused: None     Forced sexual activity: None   Other Topics Concern    None   Social History Narrative    None       SCREENINGS             PHYSICAL EXAM    (up to 7 for level 4, 8 or more for level 5)     ED Triage Vitals [10/29/20 1416]   BP Temp Temp src Pulse Resp SpO2 Height Weight   (!) 145/77 98.2 °F (36.8 °C) -- 79 17 98 % 5' 2\" (1.575 m) 202 lb (91.6 kg)       Physical Exam  Vitals signs and nursing note reviewed. Constitutional:       General: She is not in acute distress. Appearance: She is well-developed. She is not diaphoretic. HENT:      Head: Normocephalic and atraumatic. Nose: Nose normal.   Eyes:      Conjunctiva/sclera: Conjunctivae normal.      Pupils: Pupils are equal, round, and reactive to light. Neck:      Musculoskeletal: Normal range of motion and neck supple. Cardiovascular:      Rate and Rhythm: Normal rate and regular rhythm. Heart sounds: Normal heart sounds. Pulmonary:      Effort: Pulmonary effort is normal. No respiratory distress. Breath sounds: Normal breath sounds. No wheezing. Abdominal:      General: Bowel sounds are normal.      Palpations: Abdomen is soft. Tenderness: There is no abdominal tenderness. Skin:     General: Skin is warm and dry. Capillary Refill: Capillary refill takes less than 2 seconds. Findings: No rash. Neurological:      Mental Status: She is alert and oriented to person, place, and time. Cranial Nerves: No cranial nerve deficit.    Psychiatric:         Behavior: Behavior normal.         RESULTS     EKG: All EKG's are interpreted by the Emergency Department Physician who either signs or Co-signsthis chart in the absence of a cardiologist.    Ventricular paced rhythm rate of 85 bpm QT of 466    RADIOLOGY:   Non-plain filmimages such as CT, Ultrasound and MRI are read by the radiologist. Plain radiographic images are visualized and preliminarily interpreted by the emergency physician with the below findings:    nad    Interpretation per the Radiologist below, if available at the time ofthis note:    XR CHEST PORTABLE   Final Result   No radiographic evidence of acute intrathoracic process. ED BEDSIDE ULTRASOUND:   Performed by ED Physician - none    LABS:  Labs Reviewed   COMPREHENSIVE METABOLIC PANEL - Abnormal; Notable for the following components:       Result Value    Potassium 5.2 (*)     CO2 32 (*)     Glucose 250 (*)     CREATININE 1.17 (*)     GFR Non- 43.8 (*)     GFR  53.0 (*)     All other components within normal limits   CBC WITH AUTO DIFFERENTIAL   MAGNESIUM   TROPONIN       All other labs were within normal range or not returned as of this dictation. EMERGENCY DEPARTMENT COURSE and DIFFERENTIAL DIAGNOSIS/MDM:   Vitals:    Vitals:    10/29/20 1416   BP: (!) 145/77   Pulse: 79   Resp: 17   Temp: 98.2 °F (36.8 °C)   SpO2: 98%   Weight: 202 lb (91.6 kg)   Height: 5' 2\" (1.575 m)            MDM   Patient is an 28-year-old female presenting to the ER with complaint of chest pain. Hemodynamically stable nontoxic-appearing. Cardiac work-up initiated. She is a patient of Dr. Bolaños      She took 2 of nitroglycerin on the way and a baby aspirin which helped with her pain and she is currently pain-free. Lab work unremarkable and EKG is within normal limits plan patient is admitted for chest pain rule out to the service of the hospitalist in stable condition stable vitals. CRITICAL CARE TIME       CONSULTS:  None    PROCEDURES:  Unless otherwise noted below, none     Procedures    FINAL IMPRESSION      1.  Chest pain, unspecified type          DISPOSITION/PLAN   DISPOSITION Admitted 10/29/2020 03:56:44 PM      PATIENT REFERRED TO:  No follow-up provider specified.     DISCHARGE MEDICATIONS:  New Prescriptions    No medications on file          (Please notethat portions of this note were completed with a voice recognition program.  Efforts were made to edit the dictations but occasionally words are mis-transcribed.)    KRISTA Atwood CNP (electronically signed)  Attending Emergency Physician          KRISTA Lizama CNP  10/29/20 8875

## 2020-10-29 NOTE — ED NOTES
EKG done per protocol and given to [facility] ORTHO NP. Pt placed on continuous cardiac monitor. Tele strip printed and mounted.      Susy Kyle RN  10/29/20 6951

## 2020-10-29 NOTE — ED NOTES
Pt resting in bed quietly watching TV at this time. No distress observed or reported. Pt denies chest pain at this time.      Cale Nunez RN  10/29/20 6795

## 2020-10-29 NOTE — ED NOTES
PCXR being done. IV started x2 attempts. Pt tolerated well. Labs sent.       Peña Pina RN  10/29/20 1276

## 2020-10-30 VITALS
TEMPERATURE: 97.6 F | BODY MASS INDEX: 36.99 KG/M2 | DIASTOLIC BLOOD PRESSURE: 59 MMHG | RESPIRATION RATE: 17 BRPM | HEIGHT: 62 IN | SYSTOLIC BLOOD PRESSURE: 116 MMHG | HEART RATE: 70 BPM | WEIGHT: 201 LBS | OXYGEN SATURATION: 100 %

## 2020-10-30 LAB
CHOLESTEROL, TOTAL: 135 MG/DL (ref 0–199)
EKG ATRIAL RATE: 73 BPM
EKG ATRIAL RATE: 80 BPM
EKG Q-T INTERVAL: 466 MS
EKG Q-T INTERVAL: 506 MS
EKG QRS DURATION: 158 MS
EKG QRS DURATION: 160 MS
EKG QTC CALCULATION (BAZETT): 554 MS
EKG QTC CALCULATION (BAZETT): 557 MS
EKG R AXIS: 53 DEGREES
EKG R AXIS: 83 DEGREES
EKG T AXIS: 106 DEGREES
EKG T AXIS: 87 DEGREES
EKG VENTRICULAR RATE: 73 BPM
EKG VENTRICULAR RATE: 85 BPM
GLUCOSE BLD-MCNC: 142 MG/DL (ref 60–115)
GLUCOSE BLD-MCNC: 168 MG/DL (ref 60–115)
GLUCOSE BLD-MCNC: 175 MG/DL (ref 60–115)
HCT VFR BLD CALC: 38.9 % (ref 37–47)
HDLC SERPL-MCNC: 37 MG/DL (ref 40–59)
HEMOGLOBIN: 12.9 G/DL (ref 12–16)
INR BLD: 2.6
LDL CHOLESTEROL CALCULATED: 73 MG/DL (ref 0–129)
LV EF: 58 %
LVEF MODALITY: NORMAL
MCH RBC QN AUTO: 28.9 PG (ref 27–31.3)
MCHC RBC AUTO-ENTMCNC: 33.2 % (ref 33–37)
MCV RBC AUTO: 87.1 FL (ref 82–100)
PDW BLD-RTO: 14.1 % (ref 11.5–14.5)
PERFORMED ON: ABNORMAL
PLATELET # BLD: 184 K/UL (ref 130–400)
PROTHROMBIN TIME: 27.7 SEC (ref 12.3–14.9)
RBC # BLD: 4.47 M/UL (ref 4.2–5.4)
TRIGL SERPL-MCNC: 127 MG/DL (ref 0–150)
TROPONIN: <0.01 NG/ML (ref 0–0.01)
TROPONIN: <0.01 NG/ML (ref 0–0.01)
WBC # BLD: 8.5 K/UL (ref 4.8–10.8)

## 2020-10-30 PROCEDURE — 36415 COLL VENOUS BLD VENIPUNCTURE: CPT

## 2020-10-30 PROCEDURE — G0378 HOSPITAL OBSERVATION PER HR: HCPCS

## 2020-10-30 PROCEDURE — 93306 TTE W/DOPPLER COMPLETE: CPT

## 2020-10-30 PROCEDURE — 85027 COMPLETE CBC AUTOMATED: CPT

## 2020-10-30 PROCEDURE — 93005 ELECTROCARDIOGRAM TRACING: CPT | Performed by: INTERNAL MEDICINE

## 2020-10-30 PROCEDURE — 85610 PROTHROMBIN TIME: CPT

## 2020-10-30 PROCEDURE — 80061 LIPID PANEL: CPT

## 2020-10-30 PROCEDURE — 84484 ASSAY OF TROPONIN QUANT: CPT

## 2020-10-30 PROCEDURE — 6370000000 HC RX 637 (ALT 250 FOR IP): Performed by: INTERNAL MEDICINE

## 2020-10-30 PROCEDURE — 93010 ELECTROCARDIOGRAM REPORT: CPT | Performed by: INTERNAL MEDICINE

## 2020-10-30 RX ORDER — FAMOTIDINE 20 MG/1
20 TABLET, FILM COATED ORAL 2 TIMES DAILY
Qty: 60 TABLET | Refills: 3 | Status: SHIPPED | OUTPATIENT
Start: 2020-10-30

## 2020-10-30 RX ORDER — WARFARIN SODIUM 5 MG/1
5 TABLET ORAL ONCE
Status: COMPLETED | OUTPATIENT
Start: 2020-10-30 | End: 2020-10-30

## 2020-10-30 RX ADMIN — LOSARTAN POTASSIUM 50 MG: 50 TABLET, FILM COATED ORAL at 07:43

## 2020-10-30 RX ADMIN — WARFARIN SODIUM 5 MG: 5 TABLET ORAL at 16:46

## 2020-10-30 RX ADMIN — METOPROLOL SUCCINATE 50 MG: 50 TABLET, EXTENDED RELEASE ORAL at 07:43

## 2020-10-30 RX ADMIN — LEVOTHYROXINE SODIUM 75 MCG: 0.07 TABLET ORAL at 06:17

## 2020-10-30 RX ADMIN — NITROGLYCERIN 0.5 INCH: 20 OINTMENT TOPICAL at 06:17

## 2020-10-30 RX ADMIN — ACETAMINOPHEN 650 MG: 325 TABLET, FILM COATED ORAL at 07:43

## 2020-10-30 RX ADMIN — FAMOTIDINE 20 MG: 20 TABLET ORAL at 07:43

## 2020-10-30 RX ADMIN — ASPIRIN 325 MG: 325 TABLET, FILM COATED ORAL at 07:43

## 2020-10-30 RX ADMIN — INSULIN LISPRO 1 UNITS: 100 INJECTION, SOLUTION INTRAVENOUS; SUBCUTANEOUS at 16:47

## 2020-10-30 RX ADMIN — NITROGLYCERIN 0.5 INCH: 20 OINTMENT TOPICAL at 00:05

## 2020-10-30 ASSESSMENT — PAIN SCALES - GENERAL
PAINLEVEL_OUTOF10: 3
PAINLEVEL_OUTOF10: 6

## 2020-10-30 ASSESSMENT — PAIN DESCRIPTION - LOCATION: LOCATION: HEAD

## 2020-10-30 NOTE — CARE COORDINATION
MET WITH PATIENT TO DISCUSS DC PLAN. PT HOME ALONE, HAS WALKER. PT DRIVES. INDEPENDENT. NO BARRIERS IN HER HOME.  DENIES NEEDS AT DC.

## 2020-10-30 NOTE — DISCHARGE SUMMARY
Hospital Medicine Discharge Summary    Mariann Hoff  :  1934  MRN:  [id number]    Admit date:  10/29/2020  Discharge date:  10/30/2020    Admitting Physician:  Paris Wong MD  Primary Care Physician:  Dmitry Armando MD      Discharge Diagnoses:    Left shoulder pain    Chief Complaint   Patient presents with    Chest Pain     since 0600 radiating to left shoulder blade. NTG x2, took ASA at home does not know dose. Hospital Course:   Patient had issues adjusting in her bed at home and developed left shoulder pain that resolved. Admitted for chest pain rule out; troponins were trended; evaluated by cardiology and will be discharged with advice to follow up closely. Exam on discharge:   BP (!) 116/59   Pulse 70   Temp 97.6 °F (36.4 °C) (Oral)   Resp 17   Ht 5' 2\" (1.575 m)   Wt 201 lb (91.2 kg)   LMP  (LMP Unknown)   SpO2 100%   BMI 36.76 kg/m²   General appearance: alert, appears stated age and cooperative  Skin: . No rashes or lesions  HEENT: Head: Normal, normocephalic, atraumatic. Crystal Blessing Neck: supple, symmetrical, trachea midline  Lungs: clear to auscultation bilaterally  Heart: regular rate and rhythm  Abdomen: soft, non-tender; bowel sounds normal; no masses,  no organomegaly  Extremities: extremities normal, atraumatic, no cyanosis or edema  Neurologic: Non focal    Patient was seen by the following consultants while admitted to [facility]:   Consults:  [facility]  IP CONSULT TO PHARMACY    Significant Diagnostic Studies:    Refer to chart     Please refer to chart if no studies are shown here    Xr Chest Portable    Result Date: 10/29/2020  Exam: XR CHEST PORTABLE History:  CP Technique: AP portable view of the chest obtained. Comparison: Chest x-ray from 2020   Findings: There is no change in the left oblique radiopaque suture The cardiac silhouette is at the upper limits of normal in size.  There are no infiltrates, consolidations or effusions. Bones of the thorax appear intact. No radiographic evidence of acute intrathoracic process. Discharge Medications:       Evelyn Berman   Home Medication Instructions LIGIA:[id number]    Printed on:10/30/20 0959   Medication Information                      acetaminophen (TYLENOL) 325 MG tablet  Take 650 mg by mouth every 6 hours as needed for Pain             albuterol sulfate  (90 Base) MCG/ACT inhaler  Inhale 2 puffs into the lungs 4 times daily as needed for Wheezing or Shortness of Breath (cough)             Alcohol Swabs PADS  1 each by Does not apply route              alendronate (FOSAMAX) 70 MG tablet  take 1 tablet by mouth every week on empty stomach with 6 OUNCES OF WATER NO FOOD OR MEDS FOR AN HOUR REMAIN UPRIGHT             aspirin 81 MG EC tablet  Take 81 mg by mouth daily. blood glucose test strips (FREESTYLE INSULINX TEST) strip  TEST five times a day             Cholecalciferol (VITAMIN D3) 1000 UNITS TABS  Take 1,000 Units by mouth daily. famotidine (PEPCID) 20 MG tablet  Take 1 tablet by mouth 2 times daily             FREESTYLE LANCETS MISC  TEST three times a day             furosemide (LASIX) 40 MG tablet  Take 40 mg by mouth daily Except take 1 tablet by mouth twice daily on Monday, Wednesday, and Friday. Rest of days takes daily.              ibuprofen (ADVIL;MOTRIN) 200 MG tablet  Take 200 mg by mouth every 6 hours as needed for Pain             insulin glargine (LANTUS SOLOSTAR) 100 UNIT/ML injection pen  inject 30 unit subcutaneously every morning             insulin lispro, 1 Unit Dial, (HUMALOG KWIKPEN) 100 UNIT/ML SOPN  inject 4 unit subcutaneously IF GLUCOSE  6 UNITS 151-200 8 UNITS 201-250 10 UNITS 251-300 12 UNITS 301-360 14 UNITS 361-400 MAX 42             Insulin Pen Needle (NOVOFINE) 32G X 6 MM MISC  use four times a day             isosorbide mononitrate (IMDUR) 60 MG extended release tablet  take 1 tablet by mouth once daily             levothyroxine (SYNTHROID) 75 MCG tablet  take 1 tablet by mouth once daily             magnesium oxide (MAG-OX) 400 MG tablet  Take 400 mg by mouth daily             meclizine (ANTIVERT) 12.5 MG tablet  Take 1 tablet by mouth 3 times daily as needed for Dizziness             metoprolol succinate (TOPROL XL) 50 MG extended release tablet  Take 50 mg by mouth 2 times daily             Multiple Vitamins-Minerals (CENTRUM SILVER PO)  Take 1 tablet by mouth daily              nitroGLYCERIN (NITROSTAT) 0.4 MG SL tablet  Place 1 tablet under the tongue every 5 minutes as needed. pantoprazole (PROTONIX) 40 MG tablet  One tablet daily             pravastatin (PRAVACHOL) 40 MG tablet  Take 1 tablet by mouth daily             RA PEN NEEDLES 31G X 5 MM MISC  USE FOUR TIMES A DAY             sodium chloride (OCEAN, BABY AYR) 0.65 % nasal spray  1 spray by Nasal route as needed for Congestion             sotalol (BETAPACE) 120 MG tablet  Take 1 tablet by mouth 2 times daily             telmisartan (MICARDIS) 40 MG tablet  Take 1 tablet by mouth daily             warfarin (COUMADIN) 5 MG tablet  Take as directed by [facility] [place] [facility]. Quantity equals 90 day supply. warfarin (COUMADIN) 5 MG tablet  Take as directed by [facility] [place] [facility]. Quantity equals 90 day supply. Disposition:   If discharged to Home, Any [facility] needs that were indicated and/or required as been addressed and set up by Social Work. Condition at discharge: good     Activity: activity as tolerated    Total time taken for discharging this patient: 40 minutes. Greater than 70% of time was spent focused exclusively on this patient. Time was taken to review chart, discuss plans with consultants, reconciling medications, discussing plan answering questions with patient.      Rose Chandra  10/30/2020, 4:25 PM  ----------------------------------------------------------------------------------------------------------------------    Franklin Martinez,

## 2020-10-30 NOTE — CONSULTS
[address] [facility] [facility] Consult Note        Date of Consult:   10/30/2020    Patient:    Jeaneth Pond    :    1934  CSN:    [id number]    Consulting Cardiologist: Darrell Nath MD     Primary Cardiologist: Francesca Escobedo MD [address]    Requesting Physician:  Erika Botello MD    Reason for Consult:  Chest Pain / Shoulder Pain    Assessment:    1. Chest Pain / Shoulder Pain, muscular skeletal in etiology  2. Negative Troponin to date, x 3  3. Coronary artery disease. History of MV coronary angioplasty, Last RCA . 4. Paroxysmal atrial fibrillation, Post DCCs, VPaced, Probable recurrent AFIB. 5. Sick sinus syndrome, status post pacemaker. Medtronic ADVISA DR CASTRO 2016. 6. Normal LV Systolic Function, LVEF 96%. 7. High Risk medications: Sotalol, Warfarin  8. LTAC, Warfarin  9. Hypertension. 10. Hyperlipidemia. 11. Diabetes. 12. Hypothyroidism. 13. Obstructive sleep apnea. 14. Obesity  15. Renal insufficiency      Plan:    1. Cardiac Supportive Care  2. Patients symptoms are non cardiac and due to shoulder injury, troponins negative, thus should be OK to DC home from CV point once ok with others. 3. Orthopedic consultation per hospitalist, if surgery needed should be reasonable CV candidate with acceptable risk. 4. Outpatient cardiac Follow up and assessment, ie, pacemaker interrogation and possible repeat stress testing. 5. Continue current cardiac medications for now. 6. No Further Recommendations at this time. 7. See Orders        HISTORY OF PRESENT ILLNESS:      Jeaneth Pond is a pleasant 80 y.o. female with the above noted past cardiac history who presented with chest pain. Cardiology asked to see in consultation. Patient History and Records, EMR reviewed. Patient Interviewed and examined. Fair historian.     She awoke and had to get out of bed; she said it was awkward trying to get out and she hurt what she believes her left shoulder blade and states she does not UNIT/ML SOPN inject 4 unit subcutaneously IF GLUCOSE  6 UNITS 151-200 8 UNITS 201-250 10 UNITS 251-300 12 UNITS 301-360 14 UNITS 361-400 MAX 42 6/30/20  Yes Fuentes Souza MD   warfarin (COUMADIN) 5 MG tablet Take as directed by [facility] EMERGENCY [facility] AT [place] [facility]. Quantity equals 90 day supply. 5/14/20  Yes Raymond Muse MD   albuterol sulfate  (90 Base) MCG/ACT inhaler Inhale 2 puffs into the lungs 4 times daily as needed for Wheezing or Shortness of Breath (cough) 2/22/20 10/29/20 Yes Sunita Rajan PA-C   warfarin (COUMADIN) 5 MG tablet Take as directed by [facility] AT [place] [facility]. Quantity equals 90 day supply. Patient taking differently: Take as directed by [facility] EMERGENCY [facility] AT [place] [facility]. Quantity equals 90 day supply.  Takes 5mg  M, F  Takes 2.5 mg  Sun, T, W, [facility] AT [facility] 2/18/20  Yes Raymond Muse MD   sodium chloride (OCEAN, BABY AYR) 0.65 % nasal spray 1 spray by Nasal route as needed for Congestion 1/6/20  Yes KRISTA De Paz CNP   alendronate (FOSAMAX) 70 MG tablet take 1 tablet by mouth every week on empty stomach with 6 OUNCES OF WATER NO FOOD OR MEDS FOR AN HOUR REMAIN UPRIGHT 12/1/19  Yes Nanette Hernandez MD   meclizine (ANTIVERT) 12.5 MG tablet Take 1 tablet by mouth 3 times daily as needed for Dizziness 8/13/19  Yes Nanette Hernandez MD   metoprolol succinate (TOPROL XL) 50 MG extended release tablet Take 50 mg by mouth 2 times daily   Yes Historical Provider, MD   magnesium oxide (MAG-OX) 400 MG tablet Take 400 mg by mouth daily   Yes Historical Provider, MD   sotalol (BETAPACE) 120 MG tablet Take 1 tablet by mouth 2 times daily 4/10/18  Yes Raymond Muse MD   pravastatin (PRAVACHOL) 40 MG tablet Take 1 tablet by mouth daily 5/18/17  Yes Marisol Olson MD   telmisartan (MICARDIS) 40 MG tablet Take 1 tablet by mouth daily 11/30/16  Yes Marisol Olson MD   furosemide (LASIX) 40 MG tablet Take 40 mg by mouth daily Except take 1 tablet by mouth twice daily on Monday, Wednesday, and Friday. Rest of days takes daily. Yes Historical Provider, MD   nitroGLYCERIN (NITROSTAT) 0.4 MG SL tablet Place 1 tablet under the tongue every 5 minutes as needed. 6/25/14  Yes Ant Rojas MD   Cholecalciferol (VITAMIN D3) 1000 UNITS TABS Take 1,000 Units by mouth daily. Yes Historical Provider, MD   Multiple Vitamins-Minerals (CENTRUM SILVER PO) Take 1 tablet by mouth daily    Yes Historical Provider, MD   aspirin 81 MG EC tablet Take 81 mg by mouth daily.      Yes Historical Provider, MD   RA PEN NEEDLES 31G X 5 MM MISC USE FOUR TIMES A DAY 8/10/20   Jeffery Zarate MD   FREESTYLE LANCETS MISC TEST three times a day 5/29/19   Jeffery Zarate MD   blood glucose test strips (FREESTYLE INSULINX TEST) strip TEST five times a day 4/19/19   Jeffery Zarate MD   acetaminophen (TYLENOL) 325 MG tablet Take 650 mg by mouth every 6 hours as needed for Pain    Historical Provider, MD   Insulin Pen Needle (NOVOFINE) 32G X 6 MM MISC use four times a day  Patient taking differently: 1 each 3 times daily use four times a day 9/4/18   Ant Rojas MD   Alcohol Swabs PADS 1 each by Does not apply route     Historical Provider, MD       Scheduled Meds:   warfarin  5 mg Oral Once    sodium chloride flush  10 mL Intravenous 2 times per day    famotidine  20 mg Oral BID    atorvastatin  40 mg Oral Nightly    aspirin  325 mg Oral Daily    insulin lispro  0-6 Units Subcutaneous TID WC    insulin lispro  0-3 Units Subcutaneous Nightly    nitroglycerin  0.5 inch Topical 4 times per day    insulin glargine  20 Units Subcutaneous Nightly    levothyroxine  75 mcg Oral Daily    metoprolol succinate  50 mg Oral BID    sotalol  120 mg Oral BID    losartan  50 mg Oral Daily    warfarin (COUMADIN) daily dosing (placeholder)   Other RX Placeholder     Continuous Infusions:   dextrose       PRN Meds:sodium chloride flush, potassium chloride **OR** potassium alternative oral replacement **OR** potassium chloride, acetaminophen **OR** acetaminophen, polyethylene glycol, promethazine **OR** ondansetron, nitroGLYCERIN, glucose, dextrose, glucagon (rDNA), dextrose    Allergies   Allergen Reactions    Latex Itching    Avelox [Moxifloxacin Hcl In Nacl] Itching and Rash    Penicillins Other (See Comments)     welt at the site of injection    Adhesive Tape Rash       Social History     Socioeconomic History    Marital status:       Spouse name: Not on file    Number of children: Not on file    Years of education: Not on file    Highest education level: Not on file   Occupational History    Not on file   Social Needs    Financial resource strain: Not on file    Food insecurity     Worry: Not on file     Inability: Not on file    Transportation needs     Medical: Not on file     Non-medical: Not on file   Tobacco Use    Smoking status: Never Smoker    Smokeless tobacco: Never Used   Substance and Sexual Activity    Alcohol use: No     Comment: denies    Drug use: No    Sexual activity: Not Currently   Lifestyle    Physical activity     Days per week: Not on file     Minutes per session: Not on file    Stress: Not on file   Relationships    Social connections     Talks on phone: Not on file     Gets together: Not on file     Attends [employer] service: Not on file     Active member of club or organization: Not on file     Attends meetings of clubs or organizations: Not on file     Relationship status: Not on file    Intimate partner violence     Fear of current or ex partner: Not on file     Emotionally abused: Not on file     Physically abused: Not on file     Forced sexual activity: Not on file   Other Topics Concern    Not on file   Social History Narrative    Not on file       Family History   Problem Relation Age of Onset    Arthritis Other     Diabetes Other     Heart Disease Other        Review Of Systems:    14 point ROS negative other than mentioned. Physical Exam:    CURRENT VITALS: BP (!) 116/59   Pulse 70   Temp 97.6 °F (36.4 °C) (Oral)   Resp 17   Ht 5' 2\" (1.575 m)   Wt 201 lb (91.2 kg)   LMP  (LMP Unknown)   SpO2 100%   BMI 36.76 kg/m²     CONSTITUTIONAL:  awake, alert, cooperative, no apparent distress,   ENT:  Normocephalic, without obvious abnormality, atraumatic, sinuses nontender on palpation, external ears without lesions,  NECK:  Supple, symmetrical, trachea midline, no adenopathy, thyroid symmetric, not enlarged and no tenderness, skin normal, No bruits. LUNGS:  No increased work of breathing, good air exchange, clear to auscultation bilaterally, no crackles, no wheezing  CHEST:  Pacemaker left chest.  CARDIOVASCULAR:  Normal apical impulse, regular rate and rhythm, normal S1 and S2,  2/6 Systolic murmur noted. ABDOMEN:  Obese, normal bowel sounds, soft, non-distended, non-tender, no masses palpated, no hepatosplenomegally  EXTREMETIES: No edema, Pulses Strong Thruout. No ulcers. Right Shoulder with catching pain on ROM. NEUROLOGIC:  Awake, alert, oriented to name, place and time. Following all commands and moving all extremties.   SKIN:  no bruising or bleeding, normal skin color, texture, turgor and no rashes    Labs:  Recent Results (from the past 24 hour(s))   Troponin    Collection Time: 10/29/20  5:26 PM   Result Value Ref Range    Troponin <0.010 0.000 - 0.010 ng/mL   PROTIME-INR    Collection Time: 10/29/20  7:38 PM   Result Value Ref Range    Protime 27.0 (H) 12.3 - 14.9 sec    INR 2.5    POCT Glucose    Collection Time: 10/29/20  7:50 PM   Result Value Ref Range    POC Glucose 240 (H) 60 - 115 mg/dl    Performed on ACCU-CHEK    EKG 12 lead    Collection Time: 10/30/20  6:06 AM   Result Value Ref Range    Ventricular Rate 73 BPM    Atrial Rate 80 BPM    QRS Duration 160 ms    Q-T Interval 506 ms    QTc Calculation (Bazett) 557 ms    R Axis 83 degrees    T Axis 87 degrees   Troponin Collection Time: 10/30/20  6:12 AM   Result Value Ref Range    Troponin <0.010 0.000 - 0.010 ng/mL   Lipid panel - fasting    Collection Time: 10/30/20  6:12 AM   Result Value Ref Range    Cholesterol, Total 135 0 - 199 mg/dL    Triglycerides 127 0 - 150 mg/dL    HDL 37 (L) 40 - 59 mg/dL    LDL Calculated 73 0 - 129 mg/dL   CBC    Collection Time: 10/30/20  6:12 AM   Result Value Ref Range    WBC 8.5 4.8 - 10.8 K/uL    RBC 4.47 4.20 - 5.40 M/uL    Hemoglobin 12.9 12.0 - 16.0 g/dL    Hematocrit 38.9 37.0 - 47.0 %    MCV 87.1 82.0 - 100.0 fL    MCH 28.9 27.0 - 31.3 pg    MCHC 33.2 33.0 - 37.0 %    RDW 14.1 11.5 - 14.5 %    Platelets 461 574 - 892 K/uL   PROTIME-INR    Collection Time: 10/30/20  6:12 AM   Result Value Ref Range    Protime 27.7 (H) 12.3 - 14.9 sec    INR 2.6    POCT Glucose    Collection Time: 10/30/20  6:44 AM   Result Value Ref Range    POC Glucose 142 (H) 60 - 115 mg/dl    Performed on ACCU-CHEK    Troponin    Collection Time: 10/30/20  9:33 AM   Result Value Ref Range    Troponin <0.010 0.000 - 0.010 ng/mL   POCT Glucose    Collection Time: 10/30/20 11:08 AM   Result Value Ref Range    POC Glucose 168 (H) 60 - 115 mg/dl    Performed on ACCU-CHEK        ECG:     Atrial Fibrillation, V Paced. ECHO:  NL LV Function, LVEF 60%  No Significant VHD. Yadira Elias MD  [facility] Cardiologist      Electronically signed on 10/30/20 at 2:54 PM EDT      -----  Subjective  PCP: Obie Bourne   Subjective:   10/23/2020 - Zara Lozano was seen in cardiac evaluation at the [facility] office 10/23/2020. The patients problems are listed in the impression below. Electronic medical records reviewed. Patient states that she has been doing well. She has been gardening. She got poison ivy. She is had no cardiovascular complaints. Patient denies Chest Pain, SOB, Lightheadedness, Dizziness, TIA or CVA symptoms. No CHF or Edema. No Palpitations. No GI,  or Bleeding Issues.  No Recent Fever or Chills. Cardiovascular and general review of systems is otherwise negative. A 14-system review is otherwise negative, other than noted. ELECTROCARDIOGRAM: Ventricular paced rhythm rate 71    CARDIAC TESTING: None    LABORATORY DATA: Cholesterol 165, triglyceride 163, HDL 36, LDL 96. A magnesium 2.3. Chem-7, CBC, liver function studies normal except for glucose 144. Creatinine 1.49, BUN 25 hemoglobin A1c 7.7. TSH 2.2. Otherwise as noted below. All above testing was personally reviewed. PHYSICAL EXAMINATION:   General: No acute distress. Vital signs as noted. Alert and  oriented. Head And Neck Examination: No jugular venous distention, no carotid  bruits, no mass. Carotid upstrokes preserved. Oral mucosa moist.   No xanthelasma. Head and neck examination otherwise unremarkable. Lungs: Clear to auscultation and percussion. No wheezes, no rales,  and no rhonchi. Chest: Excursion appeared to be normal. No chest wall tenderness on  palpation. Pacemaker, left chest (pacemaker easily moves and with  minimal effort can actually flip on at side). Heart: Normal S1 and S2. No S3. No S4. No rub. Grade 1/6  systolic murmur best heard at left sternal border. Point of maximal  impulse was within normal limits. Abdomen: Obese. Soft. Nontender. No organomegaly. No bruits. No  masses. Extremities: No edema. No clubbing. No cyanosis. Pulses are  strong throughout. No bruits. Musculoskeletal Exam: No ulcers, otherwise unremarkable. Neuro: Neurologically appeared grossly intact. IMPRESSION:     1. Cardiovascular status stable  2. Asymptomatic  3. Coronary artery disease. History of coronary angioplasty. 4. Paroxysmal atrial fibrillation. 5. Sick sinus syndrome, status post pacemaker. Medtronic MARCIN CASTRO September 2016. 6. Hypertension. 7. Hyperlipidemia. 8. Diabetes. 9. Obstructive sleep apnea. 10. Obesity. 11. As per active problem list, otherwise below.   Otherwise as per assessment below.    RECOMMENDATIONS:     Patient continues to do well overall. She is active without limitations. I suggest that she continue her current medications. Refills were provided. Exercise dietary weight reduction program was encouraged. Hydration. Follow my health portal was encouraged. We will plan to see back in 6 months with Laboratory Studies and ECG as noted below. Patient will follow up with their primary physician for general care. The patient knows to contact medical care earlier if need be. Pablo Rincon MD, [address] / [facility]      Of Note:  [employer] voice recognition dictation software was utilized partially in the preparation of this note, therefore, inaccuracies in spelling, word choice and punctuation may have occurred which were not recognized the time of signing. Chief Complaint  6 month follow up   Cardiology Reason for Visit_NOH: patient being seen for a six month follow up. Active Problems   1. CAD S/P percutaneous coronary angioplasty (414.01,V45.82) (I25.10,Z98.61)   2. [facility]   3. Diabetes mellitus (250.00) (E11.9)   4. High risk medication use (V58.69) (Z79.899)   5. History of cardiac pacemaker (V12.50,V45.01) (Z95.0)   6. Hyperlipidemia (272.4) (E78.5)   7. Hypertension (401.9) (I10)   8. Long term current use of anticoagulant therapy (V58.61) (Z79.01)   9. Never a smoker   10. Obesity (278.00) (E66.9)   11. Paroxysmal atrial fibrillation (427.31) (I48.0)   · post [facility] 5/17, and 4/18 post [facility], AV paced,      Recurrent 6/18 4/05 [facility]   12. Sick sinus syndrome (427.81) (I49.5)   13. Sleep apnea (780.57) (G47.30)   · No AC per Patient Preference. Family History   1. Family history of cardiac disorder (V17.49) (Z82.49) : Mother   2. FHx: Parkinson's disease (V17.2) (Z82.0) : Brother   3.  No pertinent family history : Father, Sister    Social History   · Daily caffeine consumption, 4-5 servings a day   · Never a smoker   · No alcohol use   · No illicit drug use    Surgical History   1. History of Atrial Cardioversion   2. History of Cataract Surgery   3. History of Complete Colonoscopy   4. History of Hysterectomy total   5. History of Pacemaker Placement   6. History of Tonsillectomy    Denies any surgery or colonoscopy since last appt. ANoble, LPN     Current Meds   1. Alendronate Sodium 70 MG Oral Tablet; TAKE 1 TABLET ONCE WEEKLY; Therapy: (Recorded:28Nha3167) to Recorded   2. Aspirin Adult Low Dose 81 MG Oral Tablet Delayed Release; take 1 tablet daily; Therapy: 96GIR1681 to (Evaluate:19Jan2019)  Requested for: 27Jan2018; Last   Rx:24Jan2018 Ordered   3. Centrum Silver Tab; take 1 tablet daily; Therapy: (Laveta Champagne) to Recorded   4. Furosemide 40 MG Oral Tablet; Take 1 tablet twice daily on Monday, Wednesday and   Friday. take 1 tablet daily rest of the days  Requested for: 16MJV7472; Last Rx:08Mar2019   Ordered   5. HumaLOG KwikPen 100 UNIT/ML SOLN; sliding scale at 12 noon and 6:30 p.m. and 9   pm;   Therapy: (Recorded:06Ylj2544) to Recorded   6. Isosorbide Mononitrate ER 60 MG Oral Tablet Extended Release 24 Hour; take 1 tablet   by mouth once daily; Therapy: 76VVJ6320 to (Evaluate:16Nov2019)  Requested for: 65XJG4994; Last   Rx:21Nov2018 Ordered   7. Lantus SoloStar 100 UNIT/ML Subcutaneous Solution Pen-injector; as directed in the   morning; Therapy: (Recorded:46Ook6069) to Recorded   8. Levothyroxine Sodium 75 MCG Oral Tablet; TAKE 1 TABLET DAILY; Therapy: 46QTL1740 to (Evaluate:28Mar2014) Recorded   9. Magnesium Oxide 400 MG Oral Tablet; take 1 tablet by mouth once daily; Therapy: 66RRA8044 to (Evaluate:71Sge2114)  Requested for: 61Jky5436; Last   Rx:17Dxf6581 Ordered   10. Meclizine HCl - 12.5 MG Oral Tablet; TAKE 1 TABLET 3 TIMES DAILY AS NEEDED; Therapy: (Recorded:44Sge0323) to Recorded   11.  Metoprolol Succinate ER 50 MG Oral Tablet Extended Release 24 Hour; TAKE 1 TABLET    BY MOUTH TWICE A DAY; Therapy: 89Mwr2670 to (Nitin Ortega)  Requested for: 97Eqc8982; Last    Rx:95Vni7581 Ordered   12. Nitroglycerin 0.4 MG Sublingual Tablet Sublingual; PLACE 1 TABLET UNDER THE    TONGUE IF NEEDED EVERY 5 MINUTES FOR CHEST PAIN FOR 3 DOSES; Therapy: 53Nqp7810 to (Evaluate:48Rjr1314)  Requested for: 45Zmy5306; Last    Rx:90Jvz3685 Ordered   13. Pantoprazole Sodium 40 MG Oral Tablet Delayed Release; TAKE 1 TABLET DAILY; Therapy: (Recorded:46Pxl6624) to Recorded   14. Pravastatin Sodium 40 MG Oral Tablet; take 1 tablet by mouth at bedtime; Therapy: 76LDT0515 to (Evaluate:46Ghx9852)  Requested for: 92Ppk1099; Last    Rx:60Jsa4193 Ordered   15. ProAir  (90 Base) MCG/ACT Inhalation Aerosol Solution; INHALE 1 TO 2 PUFFS    EVERY 4 TO 6 HOURS AS NEEDED; Therapy: (Recorded:17Yzm3630) to Recorded   16. Sotalol HCl - 120 MG Oral Tablet; TAKE 1 TABLET BY MOUTH TWICE A DAY; Therapy: 97OWZ8773 to (Dyanne Habermann)  Requested for: 36Mul3583; Last    Rx:70Bex0929 Ordered   17. Telmisartan 40 MG Oral Tablet; take 1 tablet by mouth once daily; Therapy: 10XDF3729 to (Rexene Racer)  Requested for: 12ZSZ7804; Last    Rx:22Nyq6264 Ordered   18. Tussin DM SYRP; as directed as needed; Therapy: (97 283747) to Recorded   19. Vitamin D3 25 MCG (1000 UT) Oral Tablet; TAKE 1 TABLET DAILY; Therapy: (Recorded:52Yqi5290) to Recorded   20. Warfarin Sodium 5 MG Oral Tablet; Take one tab  5mg Monday, Friday and 2.5mg 1/2 tab    all other days. -  [facility]; Therapy: (Recorded:56Jqa9726) to  Requested for: 85YRL1801; Status: ACTIVE -    Renewal Denied Recorded    Pt says she has to many medications to read off. She will bring in all rx bottles to appt and to be reviewed and updated at time of appt. ANoble, LPN    medications verbally reviewed with the patient with medication bottles brought to apt. Sridhar Bishop MA     Allergies   1.  Adhesive Tape TAPE Recorded By: Salima Godinez; 09/11/2007 9:29:12 AM   2. Avelox TABS   Recorded By: Salima Godinez; 09/11/2007 9:28:56 AM   3. Penicillins   Recorded By: Neha Lu; 02/11/1999 10:51:23 AM    Immunizations  FLU --- Dori Pheasant: 01-Oct-2018; Carine Dears: [id number] Mantel: 01-Sep-2020   Influenza --- Dori Pheasant: 01-Oct-2010; Carine Dears: [id number] Mantel: [id number] Guanakoowsky:  01-Oct-2013; Series5: 01-Jan-2015; Jesse Hernandezs: 16-Aug-2016; Series7: 01-Aug-2017   PCV --- Series1: 07-Nov-2016   PPSV --- Series1: 01-Jan-2015     See above. ANoble, LPN     Vitals  Vital Signs   Recorded: 15LAM5709 11:23AM   Height: 5 ft 2 in  Weight: 205 lb   BMI Calculated: 37.5 kg/m2  BSA Calculated: 1.93  Falls Screening: No falls within the past year  Blood Pressure: 101 / 65, LUE, Sitting  Heart Rate: 71, Apical    patient weighed with shoes on. Results/Data  Basic Metabolic Profile [id number] 09:22AM [address] [place], [place] [id number]     Test Name Result Flag Reference   Glucose 144 mg/dL H 74 - 99   Sodium 140 mmol/L  136 - 145   POTASSIUM 4.8 mmol/L  3.5 - 5.3   Chloride 100 mmol/L  98 - 107   BICARBONATE 33 mmol/L H 21 - 32   Anion Gap 12 mmol/L  10 - 20   Urea Nitrogen 25 mg/dL H 6 - 23   CREATININE 1.49 mg/dL H 0.50 - 1.05   GFR-NON AFRICAN AM. 33 mL/min/1.73m2 A >60   GFR- AM. 40 mL/min/1.73m2 A >60   CALCULATIONS OF ESTIMATED GFR ARE PERFORMED   USING THE MDRD STUDY EQUATION FOR THE   IDMS-TRACEABLE CREATININE METHODS.    CLIN CHEM 2007;53:766-72   Calcium 9.7 mg/dL  8.6 - 10.3     HEPATIC FUNCTION PANEL 49Sex6303 09:22AM [facility]~[address], [place] [id number]     Test Name Result Flag Reference   Albumin 4.1 g/dL  3.4 - 5.0   TBILI 0.6 mg/dL  0.0 - 1.2   BILIRUBIN,DIRECT 0.2 mg/dL  0.0 - 0.3   ALKALINE PHOSPHATASE 65 U/L  33 - 136   ALT (SGPT) 13 U/L  7 - 45   Patients treated with Sulfasalazine may generate falsely decreased results for ALT. AST (SGOT) 18 U/L  9 - 39   Total Protein 7.3 g/dL  6.4 - 8.2     Magnesium 36Fum0735 09:22AM [facility] [address], [place] [id number]     Test Name Result Flag Reference   Magnesium 2.30 mg/dL  1.60 - 2.40     Lipid Panel 71HMB1534 09:22AM [facility] [address] [place], [place] [id number]     Test Name Result Flag Reference   CHOLESTEROL/HDL RATIO 4.6     REF VALUES  DESIRABLE  < 3.4  HIGH RISK  > 5.0   LDL 96 mg/dL  0 - 99   . NEAR      BORD      AGE      DESIRABLE  OPTIMAL    HIGH     HIGH     VERY HIGH     0-19 Y     0 - 109     ---    110-129   >/= 130     ----    20-24 Y     0 - 119     ---    120-159   >/= 160     ----      >24 Y     0 -  99   100-129  130-159   160-189     >/=190  . VLDL Cholesterol (Calcula 33 mg/dL  0 - 40   Triglycerides 163 mg/dL H 0 - 149   . AGE      DESIRABLE   BORDERLINE HIGH   HIGH     VERY HIGH   0 D-90 D    19 - 174         ----         ----        ----  91 D- 9 Y     0 -  74        75 -  99     >/= 100      ----    10-19 Y     0 -  89        90 - 129     >/= 130      ----    20-24 Y     0 - 114       115 - 149     >/= 150      ----      >24 Y     0 - 149       150 - 199    200- 499    >/= 500  . Venipuncture immediately after or during the   administration of Metamizole may lead to falsely   low results. Testing should be performed immediately   prior to Metamizole dosing. Cholesterol 165 mg/dL  0 - 199   . AGE      DESIRABLE   BORDERLINE HIGH   HIGH     0-19 Y     0 - 169       170 - 199     >/= 200    20-24 Y     0 - 189       190 - 224     >/= 225      >24 Y     0 - 199       200 - 239     >/= 240   **All ranges are based on fasting samples. Specific   therapeutic targets will vary based on patient-specific   cardiac risk. .   Pediatric guidelines reference:Pediatrics 2011, 128(S5).    Adult guidelines reference: NCEP ATPIII Guidelines,    STIVEN 2001, [id number]  . Venipuncture immediately after or during the   administration of Metamizole may lead to falsely   low results. Testing should be performed immediately   prior to Metamizole dosing. HDL Cholesterol 36.0 mg/dL A    . AGE      VERY LOW   LOW     NORMAL    HIGH     0-19 Y       < 35   < 40     40-45     ----    20-24 Y       ----   < 40       >45     ----      >24 Y       ----   < 40     40-60      >60  . TSH 08Oct2020 09:22AM [facility] [address] [facility] [address] [place], [place] [id number]     Test Name Result Flag Reference   TSH 2.25 mIU/L  0.44 - 3.98   TSH testing is performed using different testing   methodology at [facility] than at other   [facility] hospitals. Direct result comparisons should   only be made within the same method. Complete Blood Count 08Oct2020 09:22AM [facility] [address] [place], [place] [id number]     Test Name Result Flag Reference   WBC 10.3 x10E9/L  4.4 - 11.3   RBC 4.74 x10E12/L  4.00 - 5.20   HGB 13.6 g/dL  12.0 - 16.0   HCT 43.9 %  36.0 - 46.0   MCV 93 fL  80 - 100   MCHC 31.0 g/dL L 32.0 - 36.0    x10E9/L  150 - 450   RDW CV 13.7 %  11.5 - 14.5     HEMOGLOBIN A1C 08Oct2020 09:22AM MELANIE DRAKE   [facility]~[address]~[place], [place] [address]     Test Name Result Flag Reference   HGB A1C 7.7 %     Diagnosis of Diabetes-Adults   Non-Diabetic: < or = 5.6%   Increased risk for developing diabetes: 5.7-6.4%   Diagnostic of diabetes: > or = 6.5%  . Monitoring of Diabetes                Age (y)     Therapeutic Goal (%)   Adults:          >18           <7.0   Pediatrics:    13-18           <7.5                   7-12           <8.0                   0- 6            7.5-8.5   American Diabetes Association. Diabetes Care 33(S1), Jan 2010. Estimated Average Glucose 174 MG/DL         Past Medical History   1. History of Cough (786.2) (R05)   2.  History of FISCHER (dyspnea on exertion) (786.09) (R06.00)   3. History of FISCHER (dyspnea on exertion) (786.09) (R06.00)   4. History of fatigue (V13.89) (Z87.898)   5. History of fatigue (V13.89) (Z87.898)   6. History of fatigue (V13.89) (Z87.898)   7. History of hyperkalemia (V12.29) (Z86.39)   8. History of leukocytosis (V12.3) (Z86.2)   9. History of shortness of breath (V13.89) (Z87.898)   10. History of Pacemaker generator end of life (V53.31) (Z45.010)   11. History of Persistent atrial fibrillation with RVR (427.31) (I48.19)    Procedure    EKG done in office today; :   .     Assessment   1. Paroxysmal atrial fibrillation (427.31) (I48.0)   2. CAD S/P percutaneous coronary angioplasty (414.01,V45.82) (I25.10,Z98.61)   3. Sick sinus syndrome (427.81) (I49.5)   4. History of cardiac pacemaker (V12.50,V45.01) (Z95.0)   5. Hypertension (401.9) (I10)   6. Hyperlipidemia (272.4) (E78.5)   7. Diabetes mellitus (250.00) (E11.9)   8. High risk medication use (V58.69) (Z79.899)   9. Obesity (278.00) (E66.9)   10. Long term current use of anticoagulant therapy (V58.61) (Z79.01)   11. Never a smoker   12. Sleep apnea (780.57) (G47.30)    Plan   1. Renew: Furosemide 40 MG Oral Tablet; Take 1 tablet twice daily on Monday, Wednesday   and Friday. take 1 tablet daily rest of the days   2. Renew: Isosorbide Mononitrate ER 60 MG Oral Tablet Extended Release 24 Hour; take 1   tablet by mouth once daily   3. Renew: Metoprolol Succinate ER 50 MG Oral Tablet Extended Release 24 Hour (Toprol   XL); TAKE 1 TABLET BY MOUTH TWICE A DAY   4. Renew: Pravastatin Sodium 40 MG Oral Tablet (Pravachol); take 1 tablet by mouth at   bedtime   5. Renew: Sotalol HCl - 120 MG Oral Tablet; TAKE 1 TABLET BY MOUTH TWICE A DAY   6. Renew: Telmisartan 40 MG Oral Tablet; take 1 tablet by mouth once daily   7. 12 Lead EKG; Status:Hold For - Exact Date; Requested for:35End5772 ekg done in office   today;    8. Basic Metabolic Panel; Status:Active;  Requested for:46Con6637; Call if Potassium result is <3.5 or >5.0 : YES   9. CBC; Status:Active; Requested for:74Wof3032;    10. EKG at next office visit.; Status:Active; Requested for:70Yft1177;    11. Lipid Panel w/Reflex LDL; Status:Active; Requested for:83Xzy9101;    12. LIVER PANEL; Status:Active; Requested for:81Jkb3512;    13. Tobacco Use Screening; Status:Complete;   Done: 41JZP7766   21. TSH; Status:Active; Requested for:51Rfb7651;    15. 6 month follow-up/call if interval problems. Evaluation and Treatment  Follow-up  Status:    Active  Requested for: 51GIH7819   53. Follow up per family physician. Evaluation and Treatment  Follow-up  Status: Active     Requested for: 77MZY6857   73. Access your medical record, request prescriptions, view laboratory and testing done in    our office, request appointments, view immunization records and message your provider    through our safe and secure patient portal. Ask a staff member how    to register or visit us at [email or web address] to get started today.;    Status:Complete;   Done: 23Oct2020   18. Begin or continue regular aerobic exercise. Gradually work up to at least 3 sessions of    30 minutes of exercise a week.; Status:Active; Requested HRE:19AMX4495;    19. Continue same medications/treatment.; Status:Active; Requested NLS:29PTA4407;    20. Diets that are low in carbohydrates and high in protein are very popular for weight loss.;    Status:Active; Requested NTU:59BKJ7928;    21. Drink plenty of fluids.; Status:Active; Requested for:23Oct2020;    22. Please bring all medicines, vitamins, and herbal supplements with you when you come    to the office.; Status:Active; Requested QID:89JHF5580;    23. Please bring any lab results from other providers/physicians to your next appointment.;    Status:Active; Requested AHW:22TPX5243;    24.  Prescriptions will not be filled unless you are compliant with your follow up appointments    or have a follow up appointments scheduled as per the instruction of your physician. Refills for medications should be requested at the time of your office visit.; Status:Active; Requested GGV:30IEQ5434;    25. Thank you for being a patient at [facility]. Our goal is to    provide high quality medical care. Following today's visit, please check your email for an    invitation to complete our patient satisfaction survey. By sharing your feedback, you will    help us continue our mission to provide excellent medical care. Your participation in the    survey is voluntary and completely confidential. We appreciate your thoughts regarding    today's visit.; Status:Active; Requested DIXIE:[id number];    26. Tobacco use Hx Reported; Status:Complete;   Done: 51TJU7659   15. You are overweight. Please increase activity level and reduce calorie intake. Please    inform the staff if you are willing to go for dietary counseling; Status:Active;  Requested    OGV:70PGL5222;     Signatures  Electronically signed by : Johana Rider LPN; Oct 20 2553 85:48NY EST                        Electronically signed by : Carl Ortega, ; Oct 23 2020 11:30AM EST                        Electronically signed by : Johana Rider LPN; Oct 23 7786 87:93HA EST                        Electronically signed by : Melissa Kumar M.D.; Oct 26 2020 11:21PM EST

## 2020-10-30 NOTE — PROGRESS NOTES
Clinical Pharmacy Note    Warfarin consult follow-up    Recent Labs     10/30/20  0612   INR 2.6     Recent Labs     10/29/20  1430 10/30/20  0612   HGB 13.3 12.9   HCT 38.9 38.9    184       Current warfarin drug-drug interactions: aspirin, acetaminophen, atorvastatin     Current diet order/intake: N/A     Date INR Warfarin Dose   10/29/20 Ordered (2.5) 2.5 mg   10/30/20  2.6  5 mg                                              INR is therapeutic at 2.6, goal 2-3 for A.fib. Will give patient's home dose warfarin 5 mg x today. Daily PT/INR until stable within therapeutic range. Xiomara Moya, IrinaD Candidate  10/30/2020  12:04 PM     Reviewed:   ASA 325mg dose started today. INR in range will continue normal home dose of 5mg.      Rony ArevaloD

## 2020-11-02 ENCOUNTER — TELEPHONE (OUTPATIENT)
Dept: INTERNAL MEDICINE CLINIC | Age: 85
End: 2020-11-02

## 2020-11-02 NOTE — TELEPHONE ENCOUNTER
[facility] Transitions Initial Follow Up Call    Outreach made within 2 business days of discharge: Yes    Patient: Chong Caballero Patient : 1934   MRN: [id number]  Reason for Admission: There are no discharge diagnoses documented for the most recent discharge. Discharge Date: 10/30/20       Spoke with: Patient    Discharge department/facility: [address] [facility] Patient Contact:  Was patient able to fill all prescriptions: Yes  Was patient instructed to bring all medications to the follow-up visit: Yes  Is patient taking all medications as directed in the discharge summary? Yes  Does patient understand their discharge instructions: Yes  Does patient have questions or concerns that need addressed prior to 7-14 day follow up office visit: no    Scheduled appointment with PCP within 7-14 days - patient states she is in process of making Nov doctor appts, advised of recommendation from [facility] to make follow-up PCP visit but declined to do so at this time. Patient states she is working with her heart doctor and Lexi Fuentes.      Follow Up  Future Appointments   Date Time Provider Kwabena Morgan   2020 11:45 AM Jeison Thomas MD [facility]   2020 11:00 AM [facility] [address]

## 2020-11-03 ENCOUNTER — HOSPITAL ENCOUNTER (OUTPATIENT)
Dept: CARDIOLOGY | Age: 85
Discharge: HOME | End: 2020-11-03
Payer: MEDICARE

## 2020-11-03 PROCEDURE — 93296 REM INTERROG EVL PM/IDS: CPT

## 2020-11-11 NOTE — PROGRESS NOTES
Navya Luna has chronic conditions which require routine follow-up. Please schedule virtual visit in the next 45 days.

## 2020-11-12 ENCOUNTER — OFFICE VISIT (OUTPATIENT)
Dept: ENDOCRINOLOGY | Age: 85
End: 2020-11-12
Payer: MEDICARE

## 2020-11-12 VITALS
BODY MASS INDEX: 36.58 KG/M2 | SYSTOLIC BLOOD PRESSURE: 138 MMHG | OXYGEN SATURATION: 95 % | HEART RATE: 73 BPM | WEIGHT: 200 LBS | DIASTOLIC BLOOD PRESSURE: 78 MMHG

## 2020-11-12 LAB
CHP ED QC CHECK: NORMAL
GLUCOSE BLD-MCNC: 252 MG/DL
HBA1C MFR BLD: 7.9 %

## 2020-11-12 PROCEDURE — G8484 FLU IMMUNIZE NO ADMIN: HCPCS | Performed by: INTERNAL MEDICINE

## 2020-11-12 PROCEDURE — 1123F ACP DISCUSS/DSCN MKR DOCD: CPT | Performed by: INTERNAL MEDICINE

## 2020-11-12 PROCEDURE — G8417 CALC BMI ABV UP PARAM F/U: HCPCS | Performed by: INTERNAL MEDICINE

## 2020-11-12 PROCEDURE — 99213 OFFICE O/P EST LOW 20 MIN: CPT | Performed by: INTERNAL MEDICINE

## 2020-11-12 PROCEDURE — 82962 GLUCOSE BLOOD TEST: CPT | Performed by: INTERNAL MEDICINE

## 2020-11-12 PROCEDURE — 1090F PRES/ABSN URINE INCON ASSESS: CPT | Performed by: INTERNAL MEDICINE

## 2020-11-12 PROCEDURE — G8427 DOCREV CUR MEDS BY ELIG CLIN: HCPCS | Performed by: INTERNAL MEDICINE

## 2020-11-12 PROCEDURE — 1036F TOBACCO NON-USER: CPT | Performed by: INTERNAL MEDICINE

## 2020-11-12 PROCEDURE — 4040F PNEUMOC VAC/ADMIN/RCVD: CPT | Performed by: INTERNAL MEDICINE

## 2020-11-12 PROCEDURE — 83036 HEMOGLOBIN GLYCOSYLATED A1C: CPT | Performed by: INTERNAL MEDICINE

## 2020-11-12 PROCEDURE — 3051F HG A1C>EQUAL 7.0%<8.0%: CPT | Performed by: INTERNAL MEDICINE

## 2020-11-12 NOTE — PROGRESS NOTES
Subjective:      Patient ID: Zaheer Krause is a 80 y.o. female. Follow-up on type 2 diabetes lab review A1c was 7.9 average blood sugars are in the upper 249-831 range complicated diabetes include chronic kidney disease heart failure  Hypothyroidism on levothyroxine 75 mcg daily  Diabetes   She presents for her follow-up diabetic visit. She has type 2 diabetes mellitus. Symptoms are stable. Diabetic complications include heart disease and nephropathy. Risk factors for coronary artery disease include obesity. Current diabetic treatment includes insulin injections. She is currently taking insulin pre-breakfast, pre-lunch, pre-dinner and at bedtime. Her overall blood glucose range is 180-200 mg/dl. (Lab Results       Component                Value               Date                       LABA1C                   7.9                 11/12/2020              )     Results for Nolberto Nicholson (MRN [id number]) as of 11/22/2020 11:12   Ref. Range 11/13/2020 10:12 11/19/2020 14:05   Sodium Latest Ref Range: 135 - 144 mEq/L 137    Potassium Latest Ref Range: 3.4 - 4.9 mEq/L 4.6    Chloride Latest Ref Range: 95 - 107 mEq/L 98    CO2 Latest Ref Range: 20 - 31 mEq/L 30    BUN Latest Ref Range: 8 - 23 mg/dL 24 (H)    Creatinine Latest Ref Range: 0.50 - 0.90 mg/dL 1.28 (H)    Anion Gap Latest Ref Range: 9 - 15 mEq/L 9    GFR Non- Latest Ref Range: >60  39.5 (L)    GFR  Latest Ref Range: >60  47.8 (L)    Glucose Latest Ref Range: 70 - 99 mg/dL 159 (H)    Calcium Latest Ref Range: 8.5 - 9.9 mg/dL 9.5    TSH Latest Ref Range: 0.440 - 3.860 uIU/mL 1.750    T4 Free Latest Ref Range: 0.84 - 1.68 ng/dL 1.23        Results for Nolberto Nicholson (MRN [id number]) as of 11/12/2020 11:50   Ref.  Range 11/12/2020 11:36 11/12/2020 11:41   Glucose Latest Units: mg/dL 252    Hemoglobin A1C Latest Units: %  7.9     Patient Active Problem List   Diagnosis    Essential hypertension    Mixed hyperlipidemia    Hypothyroidism    CKD (chronic kidney disease) stage 3, GFR 30-59 ml/min ([facility])    Incontinence of urine    Degenerative arthritis of lumbar spine    CHF (congestive heart failure) ([facility])    Senile cataract    Uncontrolled type 2 diabetes mellitus without complication, with long-term current use of insulin    Atrial fibrillation ([facility])    Anticoagulated on Coumadin    At high risk for falls    Gastroesophageal reflux disease without esophagitis    Vertigo    Age-related osteoporosis without current pathological fracture    Chronic right shoulder pain    Pacemaker    Intertrigo    RUQ pain    Class 2 severe obesity due to excess calories with serious comorbidity and body mass index (BMI) of 38.0 to 38.9 in adult ([facility])    Pressure injury of buttock, stage 2 ([facility])    Depression with anxiety    Psychophysiological insomnia    Chest pain         Review of Systems   Cardiovascular: Negative. Endocrine: Negative. Psychiatric/Behavioral: Positive for dysphoric mood. All other systems reviewed and are negative. Vitals:    11/12/20 1137   BP: 138/78   Pulse: 73   SpO2: 95%   Weight: 200 lb (90.7 kg)       Objective:   Physical Exam  Vitals signs reviewed. Constitutional:       Appearance: Normal appearance. She is obese. HENT:      Head: Normocephalic and atraumatic. Neck:      Musculoskeletal: Normal range of motion and neck supple. Cardiovascular:      Rate and Rhythm: Normal rate. Musculoskeletal: Normal range of motion. Neurological:      General: No focal deficit present. Mental Status: She is alert. Assessment:       Diagnosis Orders   1. Type 2 diabetes mellitus with other specified complication, with long-term current use of insulin ([facility])  POCT Glucose    POCT glycosylated hemoglobin (Hb A1C)   2.  Hypothyroidism, unspecified type  Basic Metabolic Panel    T4, Free    TSH without Reflex           Plan:      Orders Placed This Encounter   Procedures    Basic Metabolic Panel     Standing Status:   Future     Number of Occurrences:   1     Standing Expiration Date:   11/12/2021    T4, Free     Standing Status:   Future     Number of Occurrences:   1     Standing Expiration Date:   11/12/2021    TSH without Reflex     Standing Status:   Future     Number of Occurrences:   1     Standing Expiration Date:   11/12/2021    POCT Glucose    POCT glycosylated hemoglobin (Hb A1C)     Continue Lantus 30 units at bedtime plus Humalog sliding scale continue levothyroxine 75 mcg daily A1c goal of less than 8 follow-up in 3 to 6 months time        Lizette Jay MD

## 2020-11-13 DIAGNOSIS — E03.9 HYPOTHYROIDISM, UNSPECIFIED TYPE: ICD-10-CM

## 2020-11-13 LAB
ANION GAP SERPL CALCULATED.3IONS-SCNC: 9 MEQ/L (ref 9–15)
BUN BLDV-MCNC: 24 MG/DL (ref 8–23)
CALCIUM SERPL-MCNC: 9.5 MG/DL (ref 8.5–9.9)
CHLORIDE BLD-SCNC: 98 MEQ/L (ref 95–107)
CO2: 30 MEQ/L (ref 20–31)
CREAT SERPL-MCNC: 1.28 MG/DL (ref 0.5–0.9)
GFR AFRICAN AMERICAN: 47.8
GFR NON-AFRICAN AMERICAN: 39.5
GLUCOSE BLD-MCNC: 159 MG/DL (ref 70–99)
POTASSIUM SERPL-SCNC: 4.6 MEQ/L (ref 3.4–4.9)
SODIUM BLD-SCNC: 137 MEQ/L (ref 135–144)
T4 FREE: 1.23 NG/DL (ref 0.84–1.68)
TSH SERPL DL<=0.05 MIU/L-ACNC: 1.75 UIU/ML (ref 0.44–3.86)

## 2020-11-18 ENCOUNTER — NURSE ONLY (OUTPATIENT)
Dept: PRIMARY CARE CLINIC | Age: 85
End: 2020-11-18

## 2020-11-20 ENCOUNTER — HOSPITAL ENCOUNTER (OUTPATIENT)
Dept: PHARMACY | Age: 85
Setting detail: THERAPIES SERIES
Discharge: HOME | End: 2020-11-20
Payer: MEDICARE

## 2020-11-20 VITALS — TEMPERATURE: 96.8 F

## 2020-11-20 LAB — INTERNATIONAL NORMALIZATION RATIO, POC: 3

## 2020-11-20 PROCEDURE — 85610 PROTHROMBIN TIME: CPT

## 2020-11-20 PROCEDURE — 99211 OFF/OP EST MAY X REQ PHY/QHP: CPT

## 2020-11-20 NOTE — PROGRESS NOTES
Ms. Shweta Douglass is a 80 y.o. y/o female with history of Afib who presents today for anticoagulation monitoring and adjustment.   INR 3.0 is therapeutic for this patient (goal range 2-3) and is reflective of 22.5 mg TWD  Patient verifies current dosing regimen, patient able to verbally recall dose  Patient reports 0  missed doses since last INR   Patient denies s/sx clotting and/or stroke  Patient denies hematuria, epistaxis, rectal bleeding  Patient denies changes in diet, alcohol, or tobacco use  Reviewed medication list and drug allergies with patient, updated any medication additions or modifications accordingly  Patient also denies any pending medical or dental procedures scheduled at this time  Patient was instructed to continue 22.5 mg TWD and RTC 4 weeks    CLINICAL PHARMACY CONSULT: MED RECONCILIATION/REVIEW [address]: No  Total # of Interventions Recommended: 1    - Maintenance Safety Lab Monitoring #: 1    Total Interventions Accepted: 1  Time Spent (min): [address] [facility]

## 2020-11-22 LAB
SARS-COV-2: NOT DETECTED
SOURCE: NORMAL

## 2020-11-23 ENCOUNTER — HOSPITAL ENCOUNTER (OUTPATIENT)
Dept: CARDIAC CATH/INVASIVE PROCEDURES | Age: 85
Discharge: HOME | End: 2020-11-23
Attending: INTERNAL MEDICINE | Admitting: INTERNAL MEDICINE
Payer: MEDICARE

## 2020-11-23 VITALS
BODY MASS INDEX: 36.8 KG/M2 | HEART RATE: 76 BPM | DIASTOLIC BLOOD PRESSURE: 105 MMHG | RESPIRATION RATE: 20 BRPM | OXYGEN SATURATION: 92 % | WEIGHT: 200 LBS | SYSTOLIC BLOOD PRESSURE: 135 MMHG | HEIGHT: 62 IN

## 2020-11-23 LAB
ANION GAP SERPL CALCULATED.3IONS-SCNC: 10 MEQ/L (ref 9–15)
BUN BLDV-MCNC: 22 MG/DL (ref 8–23)
CALCIUM SERPL-MCNC: 9.1 MG/DL (ref 8.5–9.9)
CHLORIDE BLD-SCNC: 99 MEQ/L (ref 95–107)
CO2: 30 MEQ/L (ref 20–31)
CREAT SERPL-MCNC: 1.17 MG/DL (ref 0.5–0.9)
EKG ATRIAL RATE: 159 BPM
EKG Q-T INTERVAL: 494 MS
EKG QRS DURATION: 162 MS
EKG QTC CALCULATION (BAZETT): 551 MS
EKG R AXIS: 82 DEGREES
EKG T AXIS: 82 DEGREES
EKG VENTRICULAR RATE: 75 BPM
GFR AFRICAN AMERICAN: 53
GFR NON-AFRICAN AMERICAN: 43.8
GLUCOSE BLD-MCNC: 157 MG/DL (ref 70–99)
INR BLD: 2.6
POTASSIUM SERPL-SCNC: 4.9 MEQ/L (ref 3.4–4.9)
PROTHROMBIN TIME: 27.6 SEC (ref 12.3–14.9)
SODIUM BLD-SCNC: 139 MEQ/L (ref 135–144)

## 2020-11-23 PROCEDURE — 80048 BASIC METABOLIC PNL TOTAL CA: CPT

## 2020-11-23 PROCEDURE — 93005 ELECTROCARDIOGRAM TRACING: CPT | Performed by: INTERNAL MEDICINE

## 2020-11-23 PROCEDURE — 92960 CARDIOVERSION ELECTRIC EXT: CPT | Performed by: INTERNAL MEDICINE

## 2020-11-23 PROCEDURE — 6360000002 HC RX W HCPCS

## 2020-11-23 PROCEDURE — 85610 PROTHROMBIN TIME: CPT

## 2020-11-23 RX ORDER — SODIUM CHLORIDE 0.9 % (FLUSH) 0.9 %
10 SYRINGE (ML) INJECTION EVERY 12 HOURS SCHEDULED
Status: DISCONTINUED | OUTPATIENT
Start: 2020-11-23 | End: 2020-11-23 | Stop reason: HOSPADM

## 2020-11-23 RX ORDER — SODIUM CHLORIDE 9 MG/ML
INJECTION, SOLUTION INTRAVENOUS CONTINUOUS
Status: DISCONTINUED | OUTPATIENT
Start: 2020-11-23 | End: 2020-11-23 | Stop reason: HOSPADM

## 2020-11-23 RX ORDER — SODIUM CHLORIDE 0.9 % (FLUSH) 0.9 %
10 SYRINGE (ML) INJECTION PRN
Status: DISCONTINUED | OUTPATIENT
Start: 2020-11-23 | End: 2020-11-23 | Stop reason: HOSPADM

## 2020-11-23 NOTE — PROGRESS NOTES
Eating lunch and tolerating well. No complaints offered at this time. Remains alert and oriented times three.

## 2020-11-23 NOTE — PROGRESS NOTES
Discharge instruction explained to pt . Verbalizes understanding. Awaiting ride home. Was very difficult to find ride home.

## 2020-11-23 NOTE — PROCEDURES
Cardioversion Procedure Note      Patient Name:   Mariann Hoff     Contact Serial Number:  [id number]    Date:     11/23/2020      Procedure: Elective synchronized cardioversion, moderate sedation. Indication: atrial fibrillation, history of pacemaker chamber. Consent: Mariann Hoff counseled regarding the procedure, its indications, risks, potential complications and alternatives, and any questions were answered. Consent was obtained to proceed. Pre-Medication:   3 mg intravenous Versed, 50 mg of intravenous fentanyl. Procedure: The patient was placed in the supine position and the chest area was exposed. The cardioversion pads were applied in the standard manner and configuration. Pacemaker Interrogations: Pre, during and post [facility] pacemaker interrogations performed. At end of procedure, pacemaker ATP mode was turned off. Pacemaker showed that she was in rhythm 85% of the time therefore other pacemaker settings were not changed. Attempt #1: The defibrillator was set on the synchronous mode and charged to 250 joules. A charge was then delivered which resulted in no change in rhythm. Attempt #2: The defibrillator was set on the synchronous and charged to 300 joules. A charge was then delivered which resulted in  no change in rhythm. Attempt #3: The defibrillator was set on the synchronous and charged to 360 joules. A charge was then delivered which resulted in  no change in rhythm. The patient tolerated the procedure well. Complications: None    Impression: Unsuccessful elective synchronized cardioversion, patient remains in atrial fibrillation with ventricular paced rhythm. Recommendations: Continue Current Medications. Follow up with [facility] as noted.       Mattie Severe, MD  [facility] CARDIOLOGY  11/23/2020      Procedures

## 2020-11-23 NOTE — PROGRESS NOTES
Resting quietly at present time. No complaints offered at this time. Tissue Cultured Epidermal Autograft Text: The defect edges were debeveled with a #15 scalpel blade.  Given the location of the defect, shape of the defect and the proximity to free margins a tissue cultured epidermal autograft was deemed most appropriate.  The graft was then trimmed to fit the size of the defect.  The graft was then placed in the primary defect and oriented appropriately.

## 2020-11-23 NOTE — PROGRESS NOTES
Resting quietly at present time. No complaints offered at this time. Is alert and oriented times three.

## 2020-12-08 LAB
ANION GAP SERPL CALCULATED.3IONS-SCNC: 12 MEQ/L (ref 9–15)
BUN BLDV-MCNC: 22 MG/DL (ref 8–23)
CALCIUM SERPL-MCNC: 9.2 MG/DL (ref 8.5–9.9)
CHLORIDE BLD-SCNC: 99 MEQ/L (ref 95–107)
CO2: 30 MEQ/L (ref 20–31)
CREAT SERPL-MCNC: 1.26 MG/DL (ref 0.5–0.9)
GFR AFRICAN AMERICAN: 48.7
GFR NON-AFRICAN AMERICAN: 40.2
GLUCOSE BLD-MCNC: 141 MG/DL (ref 70–99)
HCT VFR BLD CALC: 42.4 % (ref 37–47)
HEMOGLOBIN: 14.3 G/DL (ref 12–16)
MAGNESIUM: 2.3 MG/DL (ref 1.7–2.4)
MCH RBC QN AUTO: 29.6 PG (ref 27–31.3)
MCHC RBC AUTO-ENTMCNC: 33.7 % (ref 33–37)
MCV RBC AUTO: 87.6 FL (ref 82–100)
PDW BLD-RTO: 14.4 % (ref 11.5–14.5)
PLATELET # BLD: 208 K/UL (ref 130–400)
POTASSIUM SERPL-SCNC: 4.4 MEQ/L (ref 3.4–4.9)
RBC # BLD: 4.84 M/UL (ref 4.2–5.4)
SODIUM BLD-SCNC: 141 MEQ/L (ref 135–144)
WBC # BLD: 8.3 K/UL (ref 4.8–10.8)

## 2020-12-17 ENCOUNTER — HOSPITAL ENCOUNTER (OUTPATIENT)
Dept: PHARMACY | Age: 85
Setting detail: THERAPIES SERIES
Discharge: HOME | End: 2020-12-17
Payer: MEDICARE

## 2020-12-17 VITALS — TEMPERATURE: 97.7 F

## 2020-12-17 LAB — INTERNATIONAL NORMALIZATION RATIO, POC: 3.9

## 2020-12-17 PROCEDURE — 85610 PROTHROMBIN TIME: CPT

## 2020-12-17 PROCEDURE — 99211 OFF/OP EST MAY X REQ PHY/QHP: CPT

## 2020-12-17 NOTE — PROGRESS NOTES
Ms. Jeaneth Pond is a 80 y.o. y/o female with history of Afib who presents today for anticoagulation monitoring and adjustment.   INR 3.9 is supratherapeutic for this patient (goal range 2-3) and is reflective of 22.5 mg TWD  Patient verifies current dosing regimen, patient able to verbally recall dose  Patient reports no  missed doses since last INR   Patient denies s/sx clotting and/or stroke  Patient denies hematuria, epistaxis, rectal bleeding  Patient denies changes in diet, alcohol, or tobacco use  Reviewed medication list and drug allergies with patient, updated any medication additions or modifications accordingly    Patient has been taking motrin pretty regularly for both shoulders     Patient also denies any pending medical or dental procedures scheduled at this time  Patient was instructed to hold today and tomorrow then decrease TWD to 20 mg and RTC 4 weeks    CLINICAL PHARMACY CONSULT: MED RECONCILIATION/REVIEW [address]: No  Total # of Interventions Recommended: 2  - Decreased Dose #: 1  - Maintenance Safety Lab Monitoring #: 1  Total Interventions Accepted: 2  Time Spent (min): 30    Amisha Castañeda, PharmD, [facility]  Staff Pharmacist  12/17/2020 1:28 PM

## 2020-12-28 NOTE — TELEPHONE ENCOUNTER
Rx request   Requested Prescriptions     Pending Prescriptions Disp Refills    alendronate (FOSAMAX) 70 MG tablet [Pharmacy Med Name: ALENDRONATE SODIUM 70 MG TAB] 12 tablet 4     Sig: take 1 tablet by mouth every week on an empty stomach with 6 OUNCES of water. No food OR meds for 1 HOUR.  Remain Upright     LOV 9/3/2020  Next Visit Date:  Future Appointments   Date Time Provider Kwabena Morgan   1/14/2021  1:00 PM [address]   5/12/2021 10:45 AM Macie Hopper MD [facility]

## 2021-01-04 RX ORDER — ALENDRONATE SODIUM 70 MG/1
TABLET ORAL
Qty: 12 TABLET | Refills: 4 | Status: SHIPPED | OUTPATIENT
Start: 2021-01-04

## 2021-01-14 ENCOUNTER — HOSPITAL ENCOUNTER (OUTPATIENT)
Dept: PHARMACY | Age: 86
Setting detail: THERAPIES SERIES
Discharge: HOME | End: 2021-01-14
Payer: MEDICARE

## 2021-01-14 VITALS — TEMPERATURE: 96.6 F

## 2021-01-14 DIAGNOSIS — I48.91 ATRIAL FIBRILLATION, UNSPECIFIED TYPE (HCC): ICD-10-CM

## 2021-01-14 LAB — INTERNATIONAL NORMALIZATION RATIO, POC: 1.9

## 2021-01-14 PROCEDURE — 99211 OFF/OP EST MAY X REQ PHY/QHP: CPT

## 2021-01-14 PROCEDURE — 85610 PROTHROMBIN TIME: CPT

## 2021-01-14 NOTE — PROGRESS NOTES
Ms. Kaushal Hess is a 80 y.o. y/o female with history of Afib who presents today for anticoagulation monitoring and adjustment. Face to face visit completed, procedural mask and face shield worn by myself as instructed; procedural mask worn by patient, room cleaned pre and post visit with Virex Plus spray, patient temperature check result: 96.6*F      INR 1.9 is sub-therapeutic for this patient (goal range 2-3) and is reflective of 20 mg TWD  Patient verifies current dosing regimen, patient able to verbally recall dose  Patient reports 0  missed doses since last INR   Patient denies s/sx clotting and/or stroke  Patient denies hematuria, epistaxis, rectal bleeding  Patient denies changes in diet, alcohol, or tobacco use  Reviewed medication list and drug allergies with patient, updated any medication additions or modifications accordingly  Patient also denies any pending medical or dental procedures scheduled at this time  Patient was instructed to boost with 5mg today only, then continue with 20mg TWD and RTC 2 weeks    Of note, patient stated she takes 23 pills per day and some have been changed by cardiology and patient seems confused.      CLINICAL PHARMACY CONSULT: MED RECONCILIATION/REVIEW ADDENDUM    For Pharmacy Admin Tracking Only    PHSO: No  Total # of Interventions Recommended: 2  - Increased Dose #: 1  - Maintenance Safety Lab Monitoring #: 1    Total Interventions Accepted: 2  Time Spent (min): [address] PharmD   1/14/2021 1:01 PM

## 2021-01-29 ENCOUNTER — TELEPHONE (OUTPATIENT)
Dept: PHARMACY | Age: 86
End: 2021-01-29

## 2021-01-29 DIAGNOSIS — I48.91 ATRIAL FIBRILLATION, UNSPECIFIED TYPE (HCC): ICD-10-CM

## 2021-02-01 ENCOUNTER — TELEPHONE (OUTPATIENT)
Dept: PHARMACY | Age: 86
End: 2021-02-01

## 2021-02-01 DIAGNOSIS — I48.91 ATRIAL FIBRILLATION, UNSPECIFIED TYPE (HCC): ICD-10-CM

## 2021-02-04 ENCOUNTER — HOSPITAL ENCOUNTER (OUTPATIENT)
Dept: PHARMACY | Age: 86
Setting detail: THERAPIES SERIES
Discharge: HOME | End: 2021-02-04
Payer: MEDICARE

## 2021-02-04 ENCOUNTER — APPOINTMENT (OUTPATIENT)
Dept: PHARMACY | Age: 86
End: 2021-02-04
Payer: MEDICARE

## 2021-02-04 VITALS — TEMPERATURE: 96.8 F

## 2021-02-04 DIAGNOSIS — I48.91 ATRIAL FIBRILLATION, UNSPECIFIED TYPE (HCC): ICD-10-CM

## 2021-02-04 LAB — INTERNATIONAL NORMALIZATION RATIO, POC: 2.6

## 2021-02-04 PROCEDURE — 99211 OFF/OP EST MAY X REQ PHY/QHP: CPT

## 2021-02-04 PROCEDURE — 85610 PROTHROMBIN TIME: CPT

## 2021-02-04 NOTE — PROGRESS NOTES
Ms. Kimberli Quiñonez is a 80 y.o. y/o female with history of Afib who presents today for anticoagulation monitoring and adjustment. Due to COVID protocol, extensive cleaning with Virex performed before and after visit. Temperature assessed. Masks worn by both parties the entire visit. Faceshield/googles worn by me.     INR 2.6 is therapeutic for this patient (goal range 2-3) and is reflective of 20 mg TWD  Patient verifies current dosing regimen, patient able to verbally recall dose  Patient reports 0 missed doses since last INR   Patient denies s/sx clotting and/or stroke  Patient denies hematuria, epistaxis, rectal bleeding  Patient denies changes in diet, alcohol, or tobacco use  Reviewed medication list and drug allergies with patient, updated any medication additions or modifications accordingly  Patient also denies any pending medical or dental procedures scheduled at this time  Patient was instructed to continue 20 mg TWD and RTC 4 weeks    CLINICAL PHARMACY CONSULT: MED RECONCILIATION/REVIEW [address]: No  Total # of Interventions Recommended: 0  Total Interventions Accepted: 0  Time Spent (min): [address] [facility]  Staff Pharmacist, [facility]  2/4/2021  12:59 PM

## 2021-02-22 ENCOUNTER — HOSPITAL ENCOUNTER (OUTPATIENT)
Dept: CARDIOLOGY | Age: 86
Discharge: HOME | End: 2021-02-22
Payer: MEDICARE

## 2021-02-22 PROCEDURE — 93296 REM INTERROG EVL PM/IDS: CPT

## 2021-03-04 ENCOUNTER — HOSPITAL ENCOUNTER (OUTPATIENT)
Dept: PHARMACY | Age: 86
Setting detail: THERAPIES SERIES
Discharge: HOME | End: 2021-03-04
Payer: MEDICARE

## 2021-03-04 VITALS — TEMPERATURE: 97.3 F

## 2021-03-04 DIAGNOSIS — I48.91 ATRIAL FIBRILLATION, UNSPECIFIED TYPE (HCC): ICD-10-CM

## 2021-03-04 LAB — INTERNATIONAL NORMALIZATION RATIO, POC: 3.1

## 2021-03-04 PROCEDURE — 85610 PROTHROMBIN TIME: CPT

## 2021-03-04 PROCEDURE — 99211 OFF/OP EST MAY X REQ PHY/QHP: CPT

## 2021-03-04 NOTE — PROGRESS NOTES
Ms. Dorys Hutton is a 80 y.o. y/o female with history of Afib who presents today for anticoagulation monitoring and adjustment.   INR 3.1 is sl supratherapeutic for this patient (goal range 2-3) and is reflective of 20 mg TWD  Patient verifies current dosing regimen, patient able to verbally recall dose  Patient reports no  missed doses since last INR   Patient denies s/sx clotting and/or stroke  Patient denies hematuria, epistaxis, rectal bleeding  Patient denies changes in diet, alcohol, or tobacco use  Reviewed medication list and drug allergies with patient, updated any medication additions or modifications accordingly  Patient also denies any pending medical or dental procedures scheduled at this time  Patient was instructed to continue current regimen of 20 mg TWD and RTC 4 weeks    CLINICAL PHARMACY CONSULT: MED RECONCILIATION/REVIEW [address]: No  Total # of Interventions Recommended: 1  - Maintenance Safety Lab Monitoring #: 1  Total Interventions Accepted: 1  Time Spent (min): 30    Irina NesbittD, [facility]  Staff Pharmacist  3/4/2021 1:10 PM

## 2021-03-05 RX ORDER — WARFARIN SODIUM 5 MG/1
TABLET ORAL
Qty: 80 TABLET | Refills: 1 | Status: SHIPPED | OUTPATIENT
Start: 2021-03-05 | End: 2021-11-19

## 2021-04-01 ENCOUNTER — HOSPITAL ENCOUNTER (OUTPATIENT)
Dept: PHARMACY | Age: 86
Setting detail: THERAPIES SERIES
Discharge: HOME | End: 2021-04-01
Payer: MEDICARE

## 2021-04-01 VITALS — TEMPERATURE: 96.8 F

## 2021-04-01 DIAGNOSIS — I48.91 ATRIAL FIBRILLATION, UNSPECIFIED TYPE (HCC): ICD-10-CM

## 2021-04-01 LAB — INTERNATIONAL NORMALIZATION RATIO, POC: 3.2

## 2021-04-01 PROCEDURE — 85610 PROTHROMBIN TIME: CPT | Performed by: PHARMACIST

## 2021-04-01 PROCEDURE — 99211 OFF/OP EST MAY X REQ PHY/QHP: CPT | Performed by: PHARMACIST

## 2021-04-01 NOTE — PROGRESS NOTES
Ms. Haley Meyer is a 80 y.o. y/o female with history of Afib who presents today for anticoagulation monitoring and adjustment.   INR 3.2 is slightly supra-therapeutic for this patient (goal range 2-3) and is reflective of 20 mg TWD  Patient verifies current dosing regimen, patient able to verbally recall dose  Patient reports NO  missed doses since last INR   Patient denies s/sx clotting and/or stroke  Patient denies hematuria, epistaxis, rectal bleeding  Patient denies changes in diet, alcohol, or tobacco use  Reviewed medication list and drug allergies with patient, updated any medication additions or modifications accordingly  Patient also denies any pending medical or dental procedures scheduled at this time  Patient was instructed to continue 20 mg TWD and RTC 4 weeks    CLINICAL PHARMACY CONSULT: MED RECONCILIATION/REVIEW [address]: No  Total # of Interventions Recommended: 1  - Increased Dose #: 0  - Maintenance Safety Lab Monitoring #: 1  Total Interventions Accepted: 1  Time Spent (min): [address], [place] [address]

## 2021-04-08 RX ORDER — LEVOTHYROXINE SODIUM 0.07 MG/1
TABLET ORAL
Qty: 30 TABLET | Refills: 5 | Status: SHIPPED | OUTPATIENT
Start: 2021-04-08 | End: 2021-08-12 | Stop reason: SDUPTHER

## 2021-04-08 NOTE — TELEPHONE ENCOUNTER
Pharmacy is  requesting medication refill.  Please approve or deny this request.    Rx requested:  Requested Prescriptions     Pending Prescriptions Disp Refills    levothyroxine (SYNTHROID) 75 MCG tablet 30 tablet 5         Last Office Visit:   11/12/2020      Next Visit Date:  Future Appointments   Date Time Provider Kwabena Morgan   4/29/2021  1:00 PM [address]   5/12/2021 10:45 AM Debi Altman  [address]

## 2021-04-23 LAB
ALBUMIN SERPL-MCNC: 4.5 G/DL (ref 3.5–4.6)
ALP BLD-CCNC: 62 U/L (ref 40–130)
ALT SERPL-CCNC: 15 U/L (ref 0–33)
ANION GAP SERPL CALCULATED.3IONS-SCNC: 10 MEQ/L (ref 9–15)
AST SERPL-CCNC: 20 U/L (ref 0–35)
BILIRUB SERPL-MCNC: 1 MG/DL (ref 0.2–0.7)
BILIRUBIN DIRECT: 0.3 MG/DL (ref 0–0.4)
BILIRUBIN, INDIRECT: 0.7 MG/DL (ref 0–0.6)
BUN BLDV-MCNC: 30 MG/DL (ref 8–23)
CALCIUM SERPL-MCNC: 9.9 MG/DL (ref 8.5–9.9)
CHLORIDE BLD-SCNC: 97 MEQ/L (ref 95–107)
CHOLESTEROL, TOTAL: 144 MG/DL (ref 0–199)
CO2: 31 MEQ/L (ref 20–31)
CREAT SERPL-MCNC: 2.02 MG/DL (ref 0.5–0.9)
GFR AFRICAN AMERICAN: 28.2
GFR NON-AFRICAN AMERICAN: 23.3
GLUCOSE BLD-MCNC: 194 MG/DL (ref 70–99)
HCT VFR BLD CALC: 46.5 % (ref 37–47)
HDLC SERPL-MCNC: 42 MG/DL (ref 40–59)
HEMOGLOBIN: 15.3 G/DL (ref 12–16)
LDL CHOLESTEROL CALCULATED: 70 MG/DL (ref 0–129)
MCH RBC QN AUTO: 29.3 PG (ref 27–31.3)
MCHC RBC AUTO-ENTMCNC: 33 % (ref 33–37)
MCV RBC AUTO: 89 FL (ref 82–100)
PDW BLD-RTO: 14.3 % (ref 11.5–14.5)
PLATELET # BLD: 237 K/UL (ref 130–400)
POTASSIUM SERPL-SCNC: 5.1 MEQ/L (ref 3.4–4.9)
RBC # BLD: 5.22 M/UL (ref 4.2–5.4)
SODIUM BLD-SCNC: 138 MEQ/L (ref 135–144)
TOTAL PROTEIN: 7.9 G/DL (ref 6.3–8)
TRIGL SERPL-MCNC: 160 MG/DL (ref 0–150)
TSH SERPL DL<=0.05 MIU/L-ACNC: 2.08 UIU/ML (ref 0.44–3.86)
WBC # BLD: 11.7 K/UL (ref 4.8–10.8)

## 2021-05-04 ENCOUNTER — TELEPHONE (OUTPATIENT)
Dept: PHARMACY | Age: 86
End: 2021-05-04

## 2021-05-06 ENCOUNTER — HOSPITAL ENCOUNTER (OUTPATIENT)
Dept: PHARMACY | Age: 86
Setting detail: THERAPIES SERIES
Discharge: HOME | End: 2021-05-06
Payer: MEDICARE

## 2021-05-06 DIAGNOSIS — I48.91 ATRIAL FIBRILLATION, UNSPECIFIED TYPE (HCC): ICD-10-CM

## 2021-05-06 LAB — INTERNATIONAL NORMALIZATION RATIO, POC: 2.5

## 2021-05-06 PROCEDURE — 99211 OFF/OP EST MAY X REQ PHY/QHP: CPT

## 2021-05-06 PROCEDURE — 85610 PROTHROMBIN TIME: CPT

## 2021-05-06 NOTE — PROGRESS NOTES
Ms. Gerson Valdez is a 80 y.o. y/o female with history of Afib who presents today for anticoagulation monitoring and adjustment. Face to face visit completed, procedural mask and face shield worn by myself as instructed; procedural mask worn by patient, room cleaned pre and post visit with Virex Plus spray and/anti-viral wipe.        INR 2.5 is therapeutic for this patient (goal range 2-3) and is reflective of 20 mg TWD  Patient verifies current dosing regimen, patient able to verbally recall dose  Patient reports 1  missed doses since last INR - patient reported that she found a half tablet of warfarin laying there about 4 days ago and isn't certain if she missed a dose or not   Patient denies s/sx clotting and/or stroke  Patient denies hematuria, epistaxis, rectal bleeding  Patient denies changes in diet, alcohol, or tobacco use  Reviewed medication list and drug allergies with patient, updated any medication additions or modifications accordingly  Patient also denies any pending medical or dental procedures scheduled at this time  Patient was instructed to continue with current therapy of warfarin 20mg TWD and RTC 4 weeks    For Pharmacy Admin Tracking Only     Intervention Detail: Lab(s) Ordered   Total # of Interventions Recommended: 1   Total # of Interventions Accepted: 1   Time Spent (min): [address] PharmD   5/6/2021 12:36 PM

## 2021-05-10 ENCOUNTER — TELEPHONE (OUTPATIENT)
Dept: FAMILY MEDICINE CLINIC | Age: 86
End: 2021-05-10

## 2021-05-10 NOTE — TELEPHONE ENCOUNTER
Renée,  at [facility] is calling to clarify information on patient like her diagnosis, medication and any other recommendations that the doctor would like to add. Please call Renée at [phone], Fax [phone]    Please advise and thank you!

## 2021-05-12 ENCOUNTER — OFFICE VISIT (OUTPATIENT)
Dept: ENDOCRINOLOGY | Age: 86
End: 2021-05-12
Payer: MEDICARE

## 2021-05-12 VITALS
OXYGEN SATURATION: 98 % | WEIGHT: 197 LBS | HEIGHT: 64 IN | DIASTOLIC BLOOD PRESSURE: 78 MMHG | SYSTOLIC BLOOD PRESSURE: 149 MMHG | BODY MASS INDEX: 33.63 KG/M2 | HEART RATE: 76 BPM

## 2021-05-12 DIAGNOSIS — E03.9 HYPOTHYROIDISM, UNSPECIFIED TYPE: ICD-10-CM

## 2021-05-12 DIAGNOSIS — E11.69 TYPE 2 DIABETES MELLITUS WITH OTHER SPECIFIED COMPLICATION, WITH LONG-TERM CURRENT USE OF INSULIN (HCC): Primary | ICD-10-CM

## 2021-05-12 DIAGNOSIS — Z79.4 TYPE 2 DIABETES MELLITUS WITH OTHER SPECIFIED COMPLICATION, WITH LONG-TERM CURRENT USE OF INSULIN (HCC): Primary | ICD-10-CM

## 2021-05-12 LAB
CHP ED QC CHECK: NORMAL
GLUCOSE BLD-MCNC: 182 MG/DL
HBA1C MFR BLD: 7.9 %

## 2021-05-12 PROCEDURE — 1090F PRES/ABSN URINE INCON ASSESS: CPT | Performed by: INTERNAL MEDICINE

## 2021-05-12 PROCEDURE — 1036F TOBACCO NON-USER: CPT | Performed by: INTERNAL MEDICINE

## 2021-05-12 PROCEDURE — G8427 DOCREV CUR MEDS BY ELIG CLIN: HCPCS | Performed by: INTERNAL MEDICINE

## 2021-05-12 PROCEDURE — 99213 OFFICE O/P EST LOW 20 MIN: CPT | Performed by: INTERNAL MEDICINE

## 2021-05-12 PROCEDURE — 82962 GLUCOSE BLOOD TEST: CPT | Performed by: INTERNAL MEDICINE

## 2021-05-12 PROCEDURE — 4040F PNEUMOC VAC/ADMIN/RCVD: CPT | Performed by: INTERNAL MEDICINE

## 2021-05-12 PROCEDURE — 3051F HG A1C>EQUAL 7.0%<8.0%: CPT | Performed by: INTERNAL MEDICINE

## 2021-05-12 PROCEDURE — G8417 CALC BMI ABV UP PARAM F/U: HCPCS | Performed by: INTERNAL MEDICINE

## 2021-05-12 PROCEDURE — 83036 HEMOGLOBIN GLYCOSYLATED A1C: CPT | Performed by: INTERNAL MEDICINE

## 2021-05-12 PROCEDURE — 1123F ACP DISCUSS/DSCN MKR DOCD: CPT | Performed by: INTERNAL MEDICINE

## 2021-05-12 RX ORDER — INSULIN GLARGINE 100 [IU]/ML
INJECTION, SOLUTION SUBCUTANEOUS
Qty: 45 ML | Refills: 3 | Status: SHIPPED | OUTPATIENT
Start: 2021-05-12 | End: 2021-08-12 | Stop reason: SDUPTHER

## 2021-05-12 NOTE — PROGRESS NOTES
5/12/2021    Assessment:       Diagnosis Orders   1. Type 2 diabetes mellitus with other specified complication, with long-term current use of insulin (HCC)  POCT Glucose    POCT glycosylated hemoglobin (Hb A1C)   2.  Hypothyroidism, unspecified type  Basic Metabolic Panel, Fasting    T4, Free    TSH with Reflex         PLAN:     Orders Placed This Encounter   Procedures    Basic Metabolic Panel, Fasting     Standing Status:   Future     Standing Expiration Date:   5/12/2022    T4, Free     Standing Status:   Future     Standing Expiration Date:   5/12/2022    TSH with Reflex     Standing Status:   Future     Standing Expiration Date:   5/12/2022    Hemoglobin A1C     Standing Status:   Future     Standing Expiration Date:   5/12/2022    POCT Glucose    POCT glycosylated hemoglobin (Hb A1C)     Orders Placed This Encounter   Medications    insulin glargine (LANTUS SOLOSTAR) 100 UNIT/ML injection pen     Sig: inject 30 unit subcutaneously every morning     Dispense:  45 mL     Refill:  3     Continue Humalog sliding scale and Synthroid 75 mcg daily follow-up in 3 to 6 months time        Orders Placed This Encounter   Procedures    POCT Glucose    POCT glycosylated hemoglobin (Hb A1C)       Subjective:     Chief Complaint   Patient presents with    Diabetes    Hypothyroidism     Vitals:    05/12/21 1023 05/12/21 1026   BP: (!) 153/80 (!) 149/78   Pulse: 76    SpO2: 98%    Weight: 197 lb (89.4 kg)    Height: 5' 4\" (1.626 m)      Wt Readings from Last 3 Encounters:   05/12/21 197 lb (89.4 kg)   11/23/20 200 lb (90.7 kg)   11/12/20 200 lb (90.7 kg)     BP Readings from Last 3 Encounters:   05/12/21 (!) 149/78   11/23/20 (!) 135/105   11/12/20 138/78     Follow-up on type 2 diabetes hypothyroidism patient is on Lantus/Basaglar 30 units in the morning Humalog sliding scale left levothyroxine 75 mcg for hypothyroidism testing blood sugar 2-3 times daily no severe hypoglycemia  Average blood sugar in the upper 100 range  Complications include chronic kidney disease    Diabetes  She presents for her follow-up diabetic visit. She has type 2 diabetes mellitus. Diabetic complications include nephropathy. Risk factors for coronary artery disease include obesity. Current diabetic treatment includes insulin injections. She is currently taking insulin pre-breakfast, pre-lunch, pre-dinner and at bedtime. Her overall blood glucose range is 180-200 mg/dl. 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  Type II or unspecified type diabetes mellitus without mention of complication, not stated as uncontrolled      Past Surgical History:   Procedure Laterality Date    ABDOMEN SURGERY      CATARACT REMOVAL WITH IMPLANT      both eyes    CORONARY ANGIOPLASTY WITH STENT PLACEMENT      HYSTERECTOMY      OTHER SURGICAL HISTORY  09/07/2016    GENERATOR CHANGE     PACEMAKER PLACEMENT       Social History     Socioeconomic History    Marital status:       Spouse name: Not on file    Number of children: Not on file    Years of education: Not on file    Highest education level: Not on file   Occupational History    Not on file   Social Needs    Financial resource strain: Not on file    Food insecurity     Worry: Not on file     Inability: Not on file    Transportation needs     Medical: Not on file     Non-medical: Not on file   Tobacco Use    Smoking status: Never Smoker    Smokeless tobacco: Never Used   Substance and Sexual Activity    Alcohol use: No     Comment: denies    Drug use: No    Sexual activity: Not Currently   Lifestyle    Physical activity     Days per week: Not on file     Minutes per session: Not on file    Stress: Not on file   Relationships    Social connections     Talks on phone: Not on file     Gets together: Not on file     Attends Spiritism service: Not on file     Active member of club or organization: Not on file     Attends meetings of clubs or organizations: Not on file     Relationship status: Not on file    Intimate partner violence     Fear of current or ex partner: Not on file     Emotionally abused: Not on file     Physically abused: Not on file     Forced sexual activity: Not on file   Other Topics Concern    Not on file   Social History Narrative    Not on file     Family History   Problem Relation Age of Onset    Arthritis Other     Diabetes Other     Heart Disease Other      Allergies   Allergen Reactions    Latex Itching    Avelox [Moxifloxacin Hcl In Nacl] Itching and Rash    Penicillins Other (See Comments)     welt at the site of injection    Adhesive Tape Rash       Current Outpatient Medications:     levothyroxine (SYNTHROID) 75 MCG tablet, Once a day, Disp: 30 tablet, Rfl: 5    pantoprazole (PROTONIX) 40 MG tablet, take 1 tablet by mouth once daily, Disp: 90 tablet, Rfl: 0    warfarin (COUMADIN) 5 MG tablet, Take as directed by [facility] [place] [facility]. Quantity equals 90 day supply. , Disp: 80 tablet, Rfl: 1    alendronate (FOSAMAX) 70 MG tablet, take 1 tablet by mouth every week on an empty stomach with 6 OUNCES of water. No food OR meds for 1 HOUR.  Remain Upright, Disp: 12 tablet, Rfl: 4    famotidine (PEPCID) 20 MG tablet, Take 1 tablet by mouth 2 times daily, Disp: 60 tablet, Rfl: 3    ibuprofen (ADVIL;MOTRIN) 200 MG tablet, Take 200 mg by mouth every 6 hours as needed for Pain, Disp: , Rfl:     isosorbide mononitrate (IMDUR) 60 MG extended release tablet, take 1 tablet by mouth once daily, Disp: 90 tablet, Rfl: 0    RA PEN NEEDLES 31G X 5 MM MISC, USE FOUR TIMES A DAY, Disp: 300 each, Rfl: 1    insulin glargine (LANTUS SOLOSTAR) 100 UNIT/ML injection pen, inject 30 unit subcutaneously every morning, Disp: 45 mL, Rfl: 3    insulin lispro, 1 Unit Dial, (HUMALOG KWIKPEN) 100 UNIT/ML SOPN, inject 4 unit subcutaneously IF GLUCOSE  6 UNITS 151-200 8 UNITS 201-250 10 UNITS 251-300 12 UNITS 301-360 14 UNITS 361-400 MAX 42, Disp: 30 mL, Rfl: 3    warfarin (COUMADIN) 5 MG tablet, Take as directed by [facility] AT [facility]. Quantity equals 90 day supply. (Patient taking differently: Take as directed by [facility] AT [facility]. Quantity equals 90 day supply.  Takes 5mg  M, F  Takes 2.5 mg  Sun, T, W, [facility] AT [place], Sat), Disp: 70 tablet, Rfl: 0    sodium chloride (OCEAN, BABY AYR) 0.65 % nasal spray, 1 spray by Nasal route as needed for Congestion, Disp: 1 Bottle, Rfl: 0    meclizine (ANTIVERT) 12.5 MG tablet, Take 1 tablet by mouth 3 times daily as needed for Dizziness, Disp: 60 tablet, Rfl: 3    FREESTYLE LANCETS MISC, TEST three times a day, Disp: 100 each, Rfl: 11    blood glucose test strips (FREESTYLE INSULINX TEST) strip, TEST five times a day, Disp: 200 strip, Rfl: 3    Insulin Pen Needle (NOVOFINE) 32G X 6 MM MISC, use four times a day (Patient taking differently: 1 each 3 times daily use four times a day), Disp: 300 each, Rfl: 3    metoprolol succinate (TOPROL XL) 50 MG extended release tablet, Take 100 mg by mouth 2 times daily , Disp: , Rfl:     magnesium oxide (MAG-OX) 400 MG tablet, Take 400 mg by mouth daily, Disp: , Rfl:     sotalol (BETAPACE) 120 MG tablet, Take 1 tablet by mouth 2 times daily, Disp: 60 tablet, Rfl: 3    Alcohol Swabs PADS, 1 each by Does not apply route , Disp: , Rfl:     pravastatin (PRAVACHOL) 40 MG tablet, Take 1 tablet by mouth daily, Disp: 90 tablet, Rfl: 3    telmisartan (MICARDIS) 40 MG tablet, Take 1 tablet by mouth daily, Disp: 30 tablet, Rfl: 11    furosemide (LASIX) 40 MG tablet, Take 40 mg by mouth daily Except take 1 tablet by mouth twice daily on Monday, Wednesday, and Friday. Rest of days takes daily. , Disp: , Rfl:     nitroGLYCERIN (NITROSTAT) 0.4 MG SL tablet, Place 1 tablet under the tongue every 5 minutes as needed. , Disp: 25 tablet, Rfl: 1    Cholecalciferol (VITAMIN D3) 1000 UNITS TABS, Take 1,000 Units by mouth daily. , Disp: , Rfl:     Multiple Vitamins-Minerals (CENTRUM SILVER PO), Take 1 tablet by mouth daily , Disp: , Rfl:     aspirin 81 MG EC tablet, Take 81 mg by mouth daily.   , Disp: , Rfl:     albuterol sulfate  (90 Base) MCG/ACT inhaler, Inhale 2 puffs into the lungs 4 times daily as needed for Wheezing or Shortness of Breath (cough), Disp: 1 Inhaler, Rfl: 0  Lab Results   Component Value Date     04/23/2021    K 5.1 (H) 04/23/2021    CL 97 04/23/2021    CO2 31 04/23/2021    BUN 30 (H) 04/23/2021    CREATININE 2.02 (H) 04/23/2021    GLUCOSE 182 05/12/2021    CALCIUM 9.9 04/23/2021    PROT 7.9 04/23/2021    LABALBU 4.5 04/23/2021    BILITOT 1.0 (H) 04/23/2021    ALKPHOS 62 04/23/2021    AST 20 04/23/2021    ALT 15 04/23/2021    LABGLOM 23.3 (L) 04/23/2021    GFRAA 28.2 (L) 04/23/2021    GLOB 2.6 10/29/2020     Lab Results   Component Value Date    WBC 11.7 (H) 04/23/2021    HGB 15.3 04/23/2021    HCT 46.5 04/23/2021    MCV 89.0 04/23/2021     04/23/2021     Lab Results   Component Value Date    LABA1C 7.9 05/12/2021    LABA1C 7.9 11/12/2020    LABA1C 7.1 (H) 04/16/2020     Lab Results   Component Value Date    HDL 42 04/23/2021    HDL 37 (L) 10/30/2020    HDL 50 09/07/2019    LDLCALC 70 04/23/2021    LDLCALC 73 10/30/2020    LDLCALC 90 09/07/2019    CHOL 144 04/23/2021    CHOL 135 10/30/2020    CHOL 170 09/07/2019    TRIG 160 (H) 04/23/2021    TRIG 127 10/30/2020    TRIG 152 (H) 09/07/2019       Lab Results   Component Value Date    TSH 2.080 04/23/2021    TSH 1.750 11/13/2020 TSH 3.080 04/16/2020    TSHREFLEX 1.770 06/25/2019    FT3 1.6 (L) 03/07/2014    T4FREE 1.23 11/13/2020    T4FREE 1.14 04/16/2020    T4FREE 1.41 06/25/2019       Review of Systems   Cardiovascular: Negative. Endocrine: Negative. All other systems reviewed and are negative. Objective:   Physical Exam  Vitals reviewed. Constitutional:       Appearance: Normal appearance. She is obese. HENT:      Head: Normocephalic and atraumatic. Hair is normal.      Right Ear: External ear normal.      Left Ear: External ear normal.      Nose: Nose normal.   Eyes:      General: No scleral icterus. Right eye: No discharge. Left eye: No discharge. Extraocular Movements: Extraocular movements intact. Conjunctiva/sclera: Conjunctivae normal.   Neck:      Trachea: Trachea normal.   Cardiovascular:      Rate and Rhythm: Normal rate. Musculoskeletal:         General: Normal range of motion. Cervical back: Normal range of motion and neck supple. Neurological:      General: No focal deficit present. Mental Status: She is alert.    Psychiatric:         Mood and Affect: Mood normal.         Behavior: Behavior normal.

## 2021-05-14 NOTE — TELEPHONE ENCOUNTER
Faxed over the last two office visits to [facility].

## 2021-05-17 ENCOUNTER — CARE COORDINATION (OUTPATIENT)
Dept: CARE COORDINATION | Age: 86
End: 2021-05-17

## 2021-05-17 ENCOUNTER — TELEPHONE (OUTPATIENT)
Dept: FAMILY MEDICINE CLINIC | Age: 86
End: 2021-05-17

## 2021-05-17 NOTE — CARE COORDINATION
Telephone call to [facility] [phone]). Renée indicated that patient: Medicare, [id number] and Adams Dancer [id number]. Renée indicated that pateint did not know what insurances she had and she had to spend a great time to get insurance straightened out. She indicated that they did look into Social Security Extra Help Program and her income was too high. Discussed investigating patient assistance programs for Lantuss and Humalog with drug .

## 2021-05-17 NOTE — TELEPHONE ENCOUNTER
I definitely appreciate her concern about the expense of insulin. Eufemia Leong is her attending for her diabetes. I will forward this note to him.

## 2021-05-17 NOTE — LETTER
[address] [place] [phone]      Dear Roman Flores,    I hope this letter finds you doing well! I am sending you out [employer]/Lantus and Ayse/Humalog patient assistance program applications. .  I hope you find the information helpful. Please look them over and let me know if you have any questions or need further assistance. You can contact me at any of the numbers listed below. Sincerely,    JYOTI Cartwright  , [facility]  Cell Phone: [phone]  Email: [email or web address]

## 2021-05-17 NOTE — CARE COORDINATION
Telephone call to patient. Discussed that this writer spoke with [facility]. Explained would be sending her out applications for patient assistance programs (Lantus)(Humalog). Patient is not sure if she wants to do the applications. She feels that people are asking too much personal information. She became agitated by phone. She stated that all she wanted from [facility] on [facility] was to get help with getting her lawn mowed.

## 2021-05-17 NOTE — TELEPHONE ENCOUNTER
Renée from [employer] [facility] called (VAISHALI on file) asking if you have any recommendations for the patient. Also Renée stated patients insulin is very pricey even with insurance and is wondering if there is any type of resources to help patient out.    I have included Radha on this for maybe help with any resources for cost for patients insulin    Laurie phone number is [phone]  Fax Number is [phone]     Thank you

## 2021-05-24 ENCOUNTER — HOSPITAL ENCOUNTER (OUTPATIENT)
Dept: CARDIOLOGY | Age: 86
Discharge: HOME | End: 2021-05-24
Payer: MEDICARE

## 2021-05-24 ENCOUNTER — CARE COORDINATION (OUTPATIENT)
Dept: CARE COORDINATION | Age: 86
End: 2021-05-24

## 2021-05-24 PROCEDURE — 93296 REM INTERROG EVL PM/IDS: CPT

## 2021-05-24 NOTE — CARE COORDINATION
Telephone call with patient who is not infavor of doing patient assistance programs forms for her insulin. She is stating that she overwhelmed with paperwork and does not have the information needed for the forms. She claims that she has no one to help her. Offered to meet patient to do applications but she feels still would not do the application. Patient want to continue the same way in getting her insulins. [address] [facility] did look into to [address] [place] [address] but her income was too high.

## 2021-06-02 ENCOUNTER — CARE COORDINATION (OUTPATIENT)
Dept: CARE COORDINATION | Age: 86
End: 2021-06-02

## 2021-06-02 NOTE — CARE COORDINATION
Telephone call to Dr. Nancy Hairston. Representative indicated that they have the patient assistance forms and will complete and fax to this writer.

## 2021-06-02 NOTE — CARE COORDINATION
Telephone call to patient. Phone continually rang with no answer or answering machine available to leave a message.

## 2021-06-03 ENCOUNTER — HOSPITAL ENCOUNTER (OUTPATIENT)
Dept: PHARMACY | Age: 86
Setting detail: THERAPIES SERIES
Discharge: HOME | End: 2021-06-03
Payer: MEDICARE

## 2021-06-03 ENCOUNTER — CARE COORDINATION (OUTPATIENT)
Dept: CARE COORDINATION | Age: 86
End: 2021-06-03

## 2021-06-03 DIAGNOSIS — I48.91 ATRIAL FIBRILLATION, UNSPECIFIED TYPE (HCC): Primary | ICD-10-CM

## 2021-06-03 LAB — INTERNATIONAL NORMALIZATION RATIO, POC: 2.9

## 2021-06-03 PROCEDURE — 85610 PROTHROMBIN TIME: CPT

## 2021-06-03 PROCEDURE — 99211 OFF/OP EST MAY X REQ PHY/QHP: CPT

## 2021-06-03 NOTE — PROGRESS NOTES
Ms. Cinda Mckoy is a 80 y.o. y/o female with history of Afib who presents today for anticoagulation monitoring and adjustment.   INR 2.9 is therapeutic for this patient (goal range 2-3) and is reflective of 20 mg TWD  Patient verifies current dosing regimen, patient able to verbally recall dose  Patient reports 0 missed doses since last INR   Patient denies s/sx clotting and/or stroke  Patient denies hematuria, epistaxis, rectal bleeding  Patient denies changes in diet, alcohol, or tobacco use  Reviewed medication list and drug allergies with patient, updated any medication additions or modifications accordingly  Patient also denies any pending medical or dental procedures scheduled at this time  Patient was instructed to continue 20 mg TWD and RTC 5 weeks    For Pharmacy Admin Tracking Only     Intervention Detail: Adherence Monitorin   Total # of Interventions Recommended: 0   Total # of Interventions Accepted: 0   Time Spent (min): 15

## 2021-06-03 NOTE — CARE COORDINATION
Received incoming fax from Dr. Florida Jacinto for patient assistance programs for [employer] and [employer]. Jazzmine Blanchard

## 2021-06-04 ENCOUNTER — CARE COORDINATION (OUTPATIENT)
Dept: CARE COORDINATION | Age: 86
End: 2021-06-04

## 2021-06-04 NOTE — CARE COORDINATION
Telephone call with [facility] on [facility]. Explained patient's need for assistance with patient assistance program applications for insulin. Renée relayed she could meet with patient to do the forms. She has an appointment to meet with patient next Tuesday 6/8/2021. Renée asked that forms be faxed to her at [phone].

## 2021-06-04 NOTE — CARE COORDINATION
Telephone call to patient. She was concerned about her bowel movements today. Encouraged her to contact her PCP. Discussed patient assistance program applications for insulins. Patient was offered option of meeting to do forms at [address] [facility]. Patient was not sure about the forms mailed to her home. .  Discussed contacting [address] [facility] to see if she could help patient with these forms.

## 2021-06-04 NOTE — CARE COORDINATION
[facility] on [facility], patient assistance application for [facility] and [employer]. Received confirmation that fax was sent successfully.

## 2021-06-21 ENCOUNTER — CARE COORDINATION (OUTPATIENT)
Dept: CARE COORDINATION | Age: 86
End: 2021-06-21

## 2021-06-21 NOTE — CARE COORDINATION
Telephone call to [facility] on [facility]. Left voicemail of nature of call with request for return phone call. Call back number was provided.

## 2021-06-22 ENCOUNTER — CARE COORDINATION (OUTPATIENT)
Dept: CARE COORDINATION | Age: 86
End: 2021-06-22

## 2021-06-22 NOTE — CARE COORDINATION
Telephone call with [facility] on [facility]. She indicated that she did complete patient assistance applications with patient. She called [facility] for information on out of pocket expenses for medications. [facility] had been in contact with Ladonna/granddaughter about obtaining verification of income from [employer]. Discussed plan to reach out to Ladonna/granddaughter.

## 2021-06-22 NOTE — CARE COORDINATION
Telephone call with [facility] on [facility]. She was wondering if PCP had concerns about patient ability to care for self and manage her needs. Relayed that this writer had not heard anything regarding this issues. Explained could send PCP an in basket message about her concerns. Renée expressed plan to speak with her supervisor about patient and discussed possibility of Adult Protective Referral.  In- basket message sent to Dr. Tom Zimmerman.

## 2021-06-28 RX ORDER — BLOOD SUGAR DIAGNOSTIC
STRIP MISCELLANEOUS
Qty: 200 STRIP | Refills: 3 | Status: SHIPPED | OUTPATIENT
Start: 2021-06-28 | End: 2022-07-08 | Stop reason: SDUPTHER

## 2021-06-29 ENCOUNTER — CARE COORDINATION (OUTPATIENT)
Dept: CARE COORDINATION | Age: 86
End: 2021-06-29

## 2021-07-09 ENCOUNTER — TELEPHONE (OUTPATIENT)
Dept: PHARMACY | Age: 86
End: 2021-07-09

## 2021-07-09 DIAGNOSIS — I48.91 ATRIAL FIBRILLATION, UNSPECIFIED TYPE (HCC): Primary | ICD-10-CM

## 2021-07-12 DIAGNOSIS — E11.69 TYPE 2 DIABETES MELLITUS WITH OTHER SPECIFIED COMPLICATION, WITH LONG-TERM CURRENT USE OF INSULIN (HCC): ICD-10-CM

## 2021-07-12 DIAGNOSIS — Z79.4 TYPE 2 DIABETES MELLITUS WITH OTHER SPECIFIED COMPLICATION, WITH LONG-TERM CURRENT USE OF INSULIN (HCC): ICD-10-CM

## 2021-07-12 DIAGNOSIS — E03.9 HYPOTHYROIDISM, UNSPECIFIED TYPE: ICD-10-CM

## 2021-07-12 LAB
ANION GAP SERPL CALCULATED.3IONS-SCNC: 10 MEQ/L (ref 9–15)
BUN BLDV-MCNC: 22 MG/DL (ref 8–23)
CALCIUM SERPL-MCNC: 9.8 MG/DL (ref 8.5–9.9)
CHLORIDE BLD-SCNC: 97 MEQ/L (ref 95–107)
CO2: 31 MEQ/L (ref 20–31)
CREAT SERPL-MCNC: 1.11 MG/DL (ref 0.5–0.9)
GFR AFRICAN AMERICAN: 56.2
GFR NON-AFRICAN AMERICAN: 46.5
GLUCOSE FASTING: 240 MG/DL (ref 70–99)
HBA1C MFR BLD: 9.2 % (ref 4.8–5.9)
POTASSIUM SERPL-SCNC: 4.3 MEQ/L (ref 3.4–4.9)
SODIUM BLD-SCNC: 138 MEQ/L (ref 135–144)
T4 FREE: 1.18 NG/DL (ref 0.84–1.68)
TSH REFLEX: 2.93 UIU/ML (ref 0.44–3.86)

## 2021-07-13 DIAGNOSIS — K21.9 GASTROESOPHAGEAL REFLUX DISEASE WITHOUT ESOPHAGITIS: Chronic | ICD-10-CM

## 2021-07-13 NOTE — TELEPHONE ENCOUNTER
Rx request   Requested Prescriptions     Pending Prescriptions Disp Refills    pantoprazole (PROTONIX) 40 MG tablet [Pharmacy Med Name: PANTOPRAZOLE SOD DR 40 MG TAB] 90 tablet 0     Sig: take 1 tablet by mouth once daily     LOV 9/3/2020  Next Visit Date:  Future Appointments   Date Time Provider Kwabena Morgan   7/19/2021  1:30 PM [address]   8/12/2021  2:30 PM Chris Guerrero MD [facility]

## 2021-07-14 RX ORDER — PANTOPRAZOLE SODIUM 40 MG/1
TABLET, DELAYED RELEASE ORAL
Qty: 90 TABLET | Refills: 0 | OUTPATIENT
Start: 2021-07-14

## 2021-07-15 ENCOUNTER — CARE COORDINATION (OUTPATIENT)
Dept: CARE COORDINATION | Age: 86
End: 2021-07-15

## 2021-07-15 NOTE — TELEPHONE ENCOUNTER
Spoke with patient and she said she has changed providers and will call pharmacy to notify of provider change

## 2021-07-19 ENCOUNTER — HOSPITAL ENCOUNTER (OUTPATIENT)
Dept: PHARMACY | Age: 86
Setting detail: THERAPIES SERIES
Discharge: HOME | End: 2021-07-19
Payer: MEDICARE

## 2021-07-19 DIAGNOSIS — I48.0 PAROXYSMAL ATRIAL FIBRILLATION (HCC): Primary | ICD-10-CM

## 2021-07-19 LAB — INTERNATIONAL NORMALIZATION RATIO, POC: 2.6

## 2021-07-19 PROCEDURE — 99211 OFF/OP EST MAY X REQ PHY/QHP: CPT

## 2021-07-19 PROCEDURE — 85610 PROTHROMBIN TIME: CPT

## 2021-07-19 NOTE — PROGRESS NOTES
Ms. Katy Bucio is a 80 y.o. y/o female with history of Afib who presents today for anticoagulation monitoring and adjustment. INR 2.6 is therapeutic for this patient (goal range 2-3) and is reflective of 20 mg TWD  Patient verifies current dosing regimen, patient able to verbally recall dose  Patient reports 0  missed doses since last INR   Patient denies s/sx clotting and/or stroke  Patient denies hematuria, epistaxis, rectal bleeding  Patient states broken blood vessel OD. Seeing ophthalmologist for OS (injections) soon  Patient denies changes in diet, alcohol, or tobacco use  Reviewed medication list and drug allergies with patient, updated any medication additions or modifications accordingly  Patient also denies any pending dental procedures scheduled at this time  Patient states procedure to remove skin lesion this week. Denies holding warfarin prior to procedure.   Patient was instructed to continue 20 mg TWD and RTC 5 weeks  For Pharmacy Admin Tracking Only     Intervention Detail: Adherence Monitorin   Total # of Interventions Recommended: 1   Total # of Interventions Accepted: 1   Time Spent (min): 20

## 2021-07-24 ENCOUNTER — APPOINTMENT (OUTPATIENT)
Dept: GENERAL RADIOLOGY | Age: 86
End: 2021-07-24
Payer: MEDICARE

## 2021-07-24 ENCOUNTER — HOSPITAL ENCOUNTER (EMERGENCY)
Age: 86
Discharge: HOME | End: 2021-07-24
Attending: EMERGENCY MEDICINE
Payer: MEDICARE

## 2021-07-24 VITALS
RESPIRATION RATE: 19 BRPM | HEART RATE: 82 BPM | WEIGHT: 192 LBS | OXYGEN SATURATION: 97 % | BODY MASS INDEX: 35.33 KG/M2 | TEMPERATURE: 98.7 F | DIASTOLIC BLOOD PRESSURE: 72 MMHG | SYSTOLIC BLOOD PRESSURE: 125 MMHG | HEIGHT: 62 IN

## 2021-07-24 DIAGNOSIS — M25.511 ARTHRALGIA OF RIGHT SHOULDER REGION: ICD-10-CM

## 2021-07-24 DIAGNOSIS — M75.81 ROTATOR CUFF TENDONITIS, RIGHT: Primary | ICD-10-CM

## 2021-07-24 PROCEDURE — 99285 EMERGENCY DEPT VISIT HI MDM: CPT

## 2021-07-24 PROCEDURE — 6370000000 HC RX 637 (ALT 250 FOR IP): Performed by: EMERGENCY MEDICINE

## 2021-07-24 PROCEDURE — 73030 X-RAY EXAM OF SHOULDER: CPT

## 2021-07-24 RX ORDER — HYDROCODONE BITARTRATE AND ACETAMINOPHEN 5; 325 MG/1; MG/1
1 TABLET ORAL ONCE
Status: COMPLETED | OUTPATIENT
Start: 2021-07-24 | End: 2021-07-24

## 2021-07-24 RX ORDER — HYDROCODONE BITARTRATE AND ACETAMINOPHEN 5; 325 MG/1; MG/1
1 TABLET ORAL EVERY 8 HOURS PRN
Qty: 9 TABLET | Refills: 0 | Status: SHIPPED | OUTPATIENT
Start: 2021-07-24 | End: 2021-07-27

## 2021-07-24 RX ADMIN — HYDROCODONE BITARTRATE AND ACETAMINOPHEN 1 TABLET: 5; 325 TABLET ORAL at 17:30

## 2021-07-24 ASSESSMENT — PAIN SCALES - GENERAL
PAINLEVEL_OUTOF10: 4
PAINLEVEL_OUTOF10: 3

## 2021-07-24 NOTE — ED TRIAGE NOTES
Pt presents to ED from home via EMS with c/o right shoulder pain. Pt states that pain has been ongoing for years, however states that pain increased this afternoon while reaching for her TV remote. Pt denies injury, or trauma. Upon assessment, pt is A/Ox4, skin p/w/d, resp even and unlabored, msp's intact. Pt denies cp, sob, n/v/d, fever, or chills. Right radial pulse palp; cap refill < 3 seconds. No deformity noted.

## 2021-07-24 NOTE — ED PROVIDER NOTES
[facility] ED  eMERGENCY dEPARTMENT eNCOUnter      Pt Name: Ramon Chester  MRN: [id number]  [place] 1934  Date of evaluation: 7/24/2021  Provider: Shilpa Gonzalez MD    [address] [place] [address]       Chief Complaint   Patient presents with    Shoulder Pain     right shoulder; ongoing for years, however worsened today after reaching for her TV remote - denies further trauma         HISTORY OF PRESENT ILLNESS   (Location/Symptom, Timing/Onset,Context/Setting, Quality, Duration, Modifying Factors, Severity)  Note limiting factors. Ramon Chester is a 80 y.o. female who presents to the emergency department with complaint of right shoulder pain. Patient admits that she was sitting in her recliner and dropped remote control. Patient admits she did reach for remote control and as she was reaching she snapped her shoulder in a way that was very abrupt and brisk and had significant pain. Patient admits painful rotation of movement of the right shoulder at this time. Patient is seeking analgesia relief and evaluation. Patient denies other acute constitutional symptomatology such as chest pain shortness of breath fever chills nausea vomiting or other complaints at this time. Patient admits she does have chronic right shoulder pain for last several years that seems to slowly be getting worse. HPI    NursingNotes were reviewed. REVIEW OF SYSTEMS    (2-9 systems for level 4, 10 or more for level 5)     Review of Systems   Constitutional: Negative for activity change, appetite change and chills. HENT: Negative. Respiratory: Negative. Cardiovascular: Negative. Musculoskeletal: Positive for arthralgias. Skin: Negative. Neurological: Negative. Except as noted above the remainder of the review of systems was reviewed and negative.        PAST MEDICAL HISTORY     Past Medical History:   Diagnosis Date    Abnormal liver ultrasound 5/15/2019    Allergic contact dermatitis due to plants, except food 8/19/2019    Chronic kidney disease (CKD), stage II (mild)     Hyperlipidemia     Hypertension     Hypothyroidism     Interstitial cystitis     Dr. Mustapha Hartley diagnosed this for michele.  Type II or unspecified type diabetes mellitus without mention of complication, not stated as uncontrolled          SURGICALHISTORY       Past Surgical History:   Procedure Laterality Date    ABDOMEN SURGERY      CATARACT REMOVAL WITH IMPLANT      both eyes    CORONARY ANGIOPLASTY WITH STENT PLACEMENT      HYSTERECTOMY      OTHER SURGICAL HISTORY  09/07/2016    GENERATOR CHANGE     PACEMAKER PLACEMENT           CURRENT MEDICATIONS       Discharge Medication List as of 7/24/2021  6:20 PM      CONTINUE these medications which have NOT CHANGED    Details   blood glucose test strips (FREESTYLE INSULINX TEST) strip TEST five times a day, Disp-200 strip, R-3Normal      insulin glargine (LANTUS SOLOSTAR) 100 UNIT/ML injection pen inject 30 unit subcutaneously every morning, Disp-45 mL, R-3Normal      levothyroxine (SYNTHROID) 75 MCG tablet Once a day, Disp-30 tablet, R-5Normal      pantoprazole (PROTONIX) 40 MG tablet take 1 tablet by mouth once daily, Disp-90 tablet, R-0Normal      !! warfarin (COUMADIN) 5 MG tablet Take as directed by [facility] EMERGENCY [facility]. Quantity equals 90 day supply. , Disp-80 tablet, R-1Normal      alendronate (FOSAMAX) 70 MG tablet take 1 tablet by mouth every week on an empty stomach with 6 OUNCES of water. No food OR meds for 1 HOUR.  Remain Upright, Disp-12 tablet, R-4Normal      famotidine (PEPCID) 20 MG tablet Take 1 tablet by mouth 2 times daily, Disp-60 tablet,R-3Normal      ibuprofen (ADVIL;MOTRIN) 200 MG tablet Take 200 mg by mouth every 6 hours as needed for PainHistorical Med      isosorbide mononitrate (IMDUR) 60 MG extended release tablet take 1 tablet by mouth once daily, Disp-90 tablet,R-0Normal      !! RA PEN NEEDLES 31G X 5 MM MISC Disp-300 each,R-1, Normal      insulin lispro, 1 Unit Dial, (HUMALOG KWIKPEN) 100 UNIT/ML SOPN inject 4 unit subcutaneously IF GLUCOSE  6 UNITS 151-200 8 UNITS 201-250 10 UNITS 251-300 12 UNITS 301-360 14 UNITS 361-400 MAX 42, Disp-30 mL, R-3Normal      albuterol sulfate  (90 Base) MCG/ACT inhaler Inhale 2 puffs into the lungs 4 times daily as needed for Wheezing or Shortness of Breath (cough), Disp-1 Inhaler,R-0Print      !! warfarin (COUMADIN) 5 MG tablet Take as directed by [facility] EMERGENCY [facility] [place] [facility]. Quantity equals 90 day supply. , Disp-70 tablet, R-0Normal      sodium chloride (OCEAN, BABY AYR) 0.65 % nasal spray 1 spray by Nasal route as needed for Congestion, Disp-1 Bottle, R-0Print      meclizine (ANTIVERT) 12.5 MG tablet Take 1 tablet by mouth 3 times daily as needed for Dizziness, Disp-60 tablet, R-3Normal      FREESTYLE LANCETS MISC Disp-100 each, R-11, NormalTEST three times a day      !! Insulin Pen Needle (NOVOFINE) 32G X 6 MM MISC Disp-300 each, R-3, Normaluse four times a day      metoprolol succinate (TOPROL XL) 50 MG extended release tablet Take 100 mg by mouth 2 times daily Historical Med      magnesium oxide (MAG-OX) 400 MG tablet Take 400 mg by mouth dailyHistorical Med      sotalol (BETAPACE) 120 MG tablet Take 1 tablet by mouth 2 times daily, Disp-60 tablet, R-3Normal      Alcohol Swabs PADS Historical Med      pravastatin (PRAVACHOL) 40 MG tablet Take 1 tablet by mouth daily, Disp-90 tablet, R-3Normal      telmisartan (MICARDIS) 40 MG tablet Take 1 tablet by mouth daily, Disp-30 tablet, R-11      furosemide (LASIX) 40 MG tablet Take 40 mg by mouth daily Except take 1 tablet by mouth twice daily on Monday, Wednesday, and Friday. Rest of days takes daily. Historical Med      nitroGLYCERIN (NITROSTAT) 0.4 MG SL tablet Place 1 tablet under the tongue every 5 minutes as needed. , Disp-25 tablet, R-1      Cholecalciferol (VITAMIN D3) 1000 UNITS TABS Take 1,000 Units by mouth daily.        Multiple Vitamins-Minerals (CENTRUM SILVER PO) Take 1 tablet by mouth daily Historical Med      aspirin 81 MG EC tablet Take 81 mg by mouth daily. !! - Potential duplicate medications found. Please discuss with provider. ALLERGIES     Latex, Avelox [moxifloxacin hcl in nacl], Penicillins, and Adhesive tape    FAMILY HISTORY       Family History   Problem Relation Age of Onset    Arthritis Other     Diabetes Other     Heart Disease Other           SOCIAL HISTORY       Social History     Socioeconomic History    Marital status:      Spouse name: None    Number of children: None    Years of education: None    Highest education level: None   Occupational History    None   Tobacco Use    Smoking status: Never Smoker    Smokeless tobacco: Never Used   Vaping Use    Vaping Use: Never used   Substance and Sexual Activity    Alcohol use: No     Comment: denies    Drug use: No    Sexual activity: Not Currently   Other Topics Concern    None   Social History Narrative    None     Social Determinants of Health     Financial Resource Strain:     Difficulty of Paying Living Expenses:    Food Insecurity:     Worried About Running Out of Food in the Last Year:     Ran Out of Food in the Last Year:    Transportation Needs:     Lack of Transportation (Medical):      Lack of Transportation (Non-Medical):    Physical Activity:     Days of Exercise per Week:     Minutes of Exercise per Session:    Stress:     Feeling of Stress :    Social Connections:     Frequency of Communication with Friends and Family:     Frequency of Social Gatherings with Friends and Family:     Attends Worship Services:     Active Member of Clubs or Organizations:     Attends Club or Organization Meetings:     Marital Status:    Intimate Partner Violence:     Fear of Current or Ex-Partner:     Emotionally Abused:     Physically Abused:     Sexually Abused:        SCREENINGS    Silver Coma Scale  Eye Opening: Spontaneous  Best Verbal Response: Oriented  Best Motor Response: Obeys commands  Silver Coma Scale Score: 15        PHYSICAL EXAM    (up to 7 for level 4, 8 or more for level 5)     ED Triage Vitals [07/24/21 1619]   BP Temp Temp Source Pulse Resp SpO2 Height Weight   (!) 141/97 98.7 °F (37.1 °C) Oral 81 19 96 % 5' 2\" (1.575 m) 192 lb (87.1 kg)       Physical Exam  Vitals and nursing note reviewed. Constitutional:       General: She is not in acute distress. Appearance: Normal appearance. She is not ill-appearing. HENT:      Head: Normocephalic. Right Ear: External ear normal.      Left Ear: External ear normal.      Nose: Nose normal.      Mouth/Throat:      Mouth: Mucous membranes are moist.   Eyes:      General:         Right eye: No discharge. Extraocular Movements: Extraocular movements intact. Pupils: Pupils are equal, round, and reactive to light. Cardiovascular:      Rate and Rhythm: Normal rate and regular rhythm. Heart sounds: No friction rub. No gallop. Pulmonary:      Effort: Pulmonary effort is normal. No respiratory distress. Breath sounds: Normal breath sounds. No wheezing. Musculoskeletal:      Right shoulder: Tenderness present. No bony tenderness or crepitus. Decreased range of motion. Normal strength. Normal pulse. Left shoulder: Normal.        Arms:       Cervical back: No rigidity or tenderness. Comments: Patient with significant tenderness over deltoid and anterior shoulder. Patient with significant difficulty with range of motion. Findings limited to the shoulder joint itself distal joint examination including elbow wrist hands fingers are unremarkable. Patient with full range of motion distally. Intact radial ulnar pulses. Intact sensation. Significant difficulty with elevation, abduction, and favoring internal rotation. Skin:     General: Skin is warm and dry.       Capillary Refill: Capillary refill takes less than 2 seconds. Neurological:      General: No focal deficit present. Mental Status: She is alert and oriented to person, place, and time. Cranial Nerves: No cranial nerve deficit. Sensory: No sensory deficit. DIAGNOSTIC RESULTS     EKG: All EKG's are interpreted by the Emergency Department Physician who either signs or Co-signsthis chart in the absence of a cardiologist.    -    RADIOLOGY:   Erick Cordial such as CT, Ultrasound and MRI are read by the radiologist. Plain radiographic images are visualized and preliminarily interpreted by the emergency physician with the below findings:    Interviews right shoulder demonstrate evidence of degenerative changes. There is no evidence of acute bony pathology. Interpretation per the Radiologist below, if available at the time ofthis note:    XR SHOULDER RIGHT (MIN 2 VIEWS)    (Results Pending)         ED BEDSIDE ULTRASOUND:   Performed by ED Physician - none    LABS:  Labs Reviewed - No data to display    All other labs were within normal range or not returned as of this dictation. EMERGENCY DEPARTMENT COURSE and DIFFERENTIAL DIAGNOSIS/MDM:   Vitals:    Vitals:    07/24/21 1619 07/24/21 1900   BP: (!) 141/97 125/72   Pulse: 81 82   Resp: 19    Temp: 98.7 °F (37.1 °C)    TempSrc: Oral    SpO2: 96% 97%   Weight: 192 lb (87.1 kg)    Height: 5' 2\" (1.575 m)        Patient with evidence of rotator cuff pathology. Patient does not demonstrate evidence of acute bony pathology. No bony erosions are noted on x-ray. There is no evidence of neurovascular compromise. Patient denies analgesia as noted. Significant interval improvement of symptoms with return to normal range of motion status post 1 Norco tablet. Advised for close follow-up and reevaluation. Advised signs and worsening condition. Advised on opiate precautions. Patient expressed good understanding. Patient very pleased with care.     MDM    CRITICAL CARE TIME   Total Critical Care time was - minutes, excluding separately reportableprocedures. There was a high probability of clinicallysignificant/life threatening deterioration in the patient's condition which required my urgent intervention.  -    CONSULTS:  None    PROCEDURES:  Unless otherwise noted below, none     Procedures    FINAL IMPRESSION      1. Rotator cuff tendonitis, right    2. Arthralgia of right shoulder region        DISPOSITION/PLAN   DISPOSITION Decision To Discharge 07/24/2021 06:17:57 PM      PATIENT REFERRED TO:  Smith Gonzalez MD  [address] [facility], [facility], [address]  [address] [place] [address]  [phone]    Call today  for follow up Emergency Department visit      DISCHARGE MEDICATIONS:  Discharge Medication List as of 7/24/2021  6:20 PM      START taking these medications    Details   HYDROcodone-acetaminophen (NORCO) 5-325 MG per tablet Take 1 tablet by mouth every 8 hours as needed for Pain for up to 3 days. Intended supply: 3 days.  Take lowest dose possible to manage pain, Disp-9 tablet, R-0Print                (Please note that portions of this note were completed with a voice recognitionprogram.  Efforts were made to edit the dictations but occasionally words are mis-transcribed.)    Mateo Giordano MD (electronically signed)  Attending Emergency Physician          Mateo Giordano MD  07/25/21 0900

## 2021-07-24 NOTE — ED NOTES
Bed: 09  Expected date: 7/24/21  Expected time:   Means of arrival:   Comments:  81 yo female with c/o right shoulder pain after reaching for remote  155/100, 92, 18, 98%     Brooklyn Gaming RN  07/24/21 9223

## 2021-07-24 NOTE — ED NOTES
Pt returned from xray and is sitting in bed. No sign of distress at this time.       Dalia Barahona RN  07/24/21 7919

## 2021-07-24 NOTE — ED NOTES
Discharge education reviewed verbally and in writing. Instructed to follow up with PCP and come back to the ED with any new or worsening symptoms. No questions or concerns at this time. No s/s of distress noted at this time. Pt given sling and educated omn how to apply to right arm. Pt called ex son in law for ride home at this time and pt went to the waiting room to wait for ride. Pt states improvement in pain on arm. Pt ambulates with no difficulties. Pt denies any SOB or distress at this time.        Eleuterio Roman RN  07/24/21 6445

## 2021-07-25 ASSESSMENT — ENCOUNTER SYMPTOMS: RESPIRATORY NEGATIVE: 1

## 2021-07-26 ENCOUNTER — CARE COORDINATION (OUTPATIENT)
Dept: CARE COORDINATION | Age: 86
End: 2021-07-26

## 2021-07-26 NOTE — CARE COORDINATION
Telephone call with Cammie. She indicated that she has not heard back from [facility] for income verification.

## 2021-08-03 ENCOUNTER — CARE COORDINATION (OUTPATIENT)
Dept: CARE COORDINATION | Age: 86
End: 2021-08-03

## 2021-08-10 LAB
ALBUMIN SERPL-MCNC: 4.2 G/DL (ref 3.5–4.6)
ALP BLD-CCNC: 61 U/L (ref 40–130)
ALT SERPL-CCNC: 13 U/L (ref 0–33)
ANION GAP SERPL CALCULATED.3IONS-SCNC: 13 MEQ/L (ref 9–15)
AST SERPL-CCNC: 16 U/L (ref 0–35)
BILIRUB SERPL-MCNC: 0.9 MG/DL (ref 0.2–0.7)
BILIRUBIN DIRECT: <0.2 MG/DL (ref 0–0.4)
BILIRUBIN, INDIRECT: ABNORMAL MG/DL (ref 0–0.6)
BUN BLDV-MCNC: 26 MG/DL (ref 8–23)
CALCIUM SERPL-MCNC: 9.7 MG/DL (ref 8.5–9.9)
CHLORIDE BLD-SCNC: 97 MEQ/L (ref 95–107)
CHOLESTEROL, TOTAL: 177 MG/DL (ref 0–199)
CO2: 29 MEQ/L (ref 20–31)
CREAT SERPL-MCNC: 1.47 MG/DL (ref 0.5–0.9)
GFR AFRICAN AMERICAN: 40.7
GFR NON-AFRICAN AMERICAN: 33.6
GLUCOSE BLD-MCNC: 163 MG/DL (ref 70–99)
HBA1C MFR BLD: 9 % (ref 4.8–5.9)
HCT VFR BLD CALC: 41.2 % (ref 37–47)
HDLC SERPL-MCNC: 41 MG/DL (ref 40–59)
HEMOGLOBIN: 14 G/DL (ref 12–16)
LDL CHOLESTEROL CALCULATED: 100 MG/DL (ref 0–129)
MAGNESIUM: 2.3 MG/DL (ref 1.7–2.4)
MCH RBC QN AUTO: 30.3 PG (ref 27–31.3)
MCHC RBC AUTO-ENTMCNC: 33.9 % (ref 33–37)
MCV RBC AUTO: 89.5 FL (ref 82–100)
PDW BLD-RTO: 14 % (ref 11.5–14.5)
PLATELET # BLD: 199 K/UL (ref 130–400)
POTASSIUM SERPL-SCNC: 4.3 MEQ/L (ref 3.4–4.9)
RBC # BLD: 4.61 M/UL (ref 4.2–5.4)
SODIUM BLD-SCNC: 139 MEQ/L (ref 135–144)
TOTAL PROTEIN: 7.2 G/DL (ref 6.3–8)
TRIGL SERPL-MCNC: 182 MG/DL (ref 0–150)
TSH SERPL DL<=0.05 MIU/L-ACNC: 2.42 UIU/ML (ref 0.44–3.86)
WBC # BLD: 8.8 K/UL (ref 4.8–10.8)

## 2021-08-12 ENCOUNTER — OFFICE VISIT (OUTPATIENT)
Dept: ENDOCRINOLOGY | Age: 86
End: 2021-08-12
Payer: MEDICARE

## 2021-08-12 VITALS
HEART RATE: 70 BPM | HEIGHT: 62 IN | SYSTOLIC BLOOD PRESSURE: 102 MMHG | BODY MASS INDEX: 36.62 KG/M2 | DIASTOLIC BLOOD PRESSURE: 65 MMHG | WEIGHT: 199 LBS | OXYGEN SATURATION: 96 %

## 2021-08-12 DIAGNOSIS — E03.9 HYPOTHYROIDISM, UNSPECIFIED TYPE: Primary | ICD-10-CM

## 2021-08-12 DIAGNOSIS — Z79.4 TYPE 2 DIABETES MELLITUS WITH OTHER SPECIFIED COMPLICATION, WITH LONG-TERM CURRENT USE OF INSULIN (HCC): ICD-10-CM

## 2021-08-12 DIAGNOSIS — E11.69 TYPE 2 DIABETES MELLITUS WITH OTHER SPECIFIED COMPLICATION, WITH LONG-TERM CURRENT USE OF INSULIN (HCC): ICD-10-CM

## 2021-08-12 LAB
CHP ED QC CHECK: NORMAL
GLUCOSE BLD-MCNC: 164 MG/DL

## 2021-08-12 PROCEDURE — 1036F TOBACCO NON-USER: CPT | Performed by: INTERNAL MEDICINE

## 2021-08-12 PROCEDURE — 1090F PRES/ABSN URINE INCON ASSESS: CPT | Performed by: INTERNAL MEDICINE

## 2021-08-12 PROCEDURE — 82962 GLUCOSE BLOOD TEST: CPT | Performed by: INTERNAL MEDICINE

## 2021-08-12 PROCEDURE — 4040F PNEUMOC VAC/ADMIN/RCVD: CPT | Performed by: INTERNAL MEDICINE

## 2021-08-12 PROCEDURE — G8427 DOCREV CUR MEDS BY ELIG CLIN: HCPCS | Performed by: INTERNAL MEDICINE

## 2021-08-12 PROCEDURE — 3052F HG A1C>EQUAL 8.0%<EQUAL 9.0%: CPT | Performed by: INTERNAL MEDICINE

## 2021-08-12 PROCEDURE — 1123F ACP DISCUSS/DSCN MKR DOCD: CPT | Performed by: INTERNAL MEDICINE

## 2021-08-12 PROCEDURE — 99213 OFFICE O/P EST LOW 20 MIN: CPT | Performed by: INTERNAL MEDICINE

## 2021-08-12 PROCEDURE — G8417 CALC BMI ABV UP PARAM F/U: HCPCS | Performed by: INTERNAL MEDICINE

## 2021-08-12 RX ORDER — INSULIN GLARGINE 100 [IU]/ML
INJECTION, SOLUTION SUBCUTANEOUS
Qty: 45 ML | Refills: 3 | Status: SHIPPED | OUTPATIENT
Start: 2021-08-12 | End: 2022-06-01 | Stop reason: SDUPTHER

## 2021-08-12 RX ORDER — LEVOTHYROXINE SODIUM 0.07 MG/1
TABLET ORAL
Qty: 30 TABLET | Refills: 5 | Status: SHIPPED | OUTPATIENT
Start: 2021-08-12 | End: 2022-04-24

## 2021-08-12 NOTE — PROGRESS NOTES
8/12/2021    Assessment:       Diagnosis Orders   1. Hypothyroidism, unspecified type  TSH without Reflex    T4, Free   2.  Type 2 diabetes mellitus with other specified complication, with long-term current use of insulin ([facility])  POCT Glucose    Hemoglobin H5N    Basic Metabolic Panel         PLAN:     Orders Placed This Encounter   Procedures    Hemoglobin A1C     Standing Status:   Future     Standing Expiration Date:   8/12/2022    Basic Metabolic Panel     Standing Status:   Future     Standing Expiration Date:   8/12/2022    TSH without Reflex     Standing Status:   Future     Standing Expiration Date:   8/12/2022    T4, Free     Standing Status:   Future     Standing Expiration Date:   8/12/2022    POCT Glucose     Orders Placed This Encounter   Medications    insulin glargine (LANTUS SOLOSTAR) 100 UNIT/ML injection pen     Sig: inject 30 unit subcutaneously every morning     Dispense:  45 mL     Refill:  3    Insulin Pen Needle (NOVOFINE) 32G X 6 MM MISC     Sig: use four times a day     Dispense:  300 each     Refill:  3    levothyroxine (SYNTHROID) 75 MCG tablet     Sig: Once a day     Dispense:  30 tablet     Refill:  5     Continue current dose of Synthroid 75 mcg daily Humalog sliding scale A1c goal of 9 or lower avoid hypoglycemia        Orders Placed This Encounter   Procedures    POCT Glucose       Subjective:     Chief Complaint   Patient presents with    Diabetes    Hypothyroidism     Vitals:    08/12/21 1437   BP: 102/65   Pulse: 70   SpO2: 96%   Weight: 199 lb (90.3 kg)   Height: 5' 2\" (1.575 m)     Wt Readings from Last 3 Encounters:   08/12/21 199 lb (90.3 kg)   07/24/21 192 lb (87.1 kg)   05/12/21 197 lb (89.4 kg)     BP Readings from Last 3 Encounters:   08/12/21 102/65   07/24/21 125/72   05/12/21 (!) 149/78     Follow-up on type 2 diabetes hypothyroidism lab review patient on Lantus to 30 units at lunch log sliding scale also taking Synthroid 75 mcg daily testing blood sugars 2 times daily denies severe hypoglycemia    Diabetes  She presents for her follow-up diabetic visit. She has type 2 diabetes mellitus. Her disease course has been stable. Associated symptoms include fatigue. Diabetic complications include nephropathy. Current diabetic treatment includes insulin injections. She is currently taking insulin pre-lunch, pre-dinner, at bedtime and pre-breakfast. Her overall blood glucose range is >200 mg/dl. (Lab Results       Component                Value               Date                       LABA1C                   9.0 (H)             08/10/2021            )        Results for Matt White (MRN [id number]) as of 8/12/2021 14:57   Ref.  Range 7/12/2021 10:36 7/19/2021 13:32 7/24/2021 17:39 8/10/2021 10:02 8/12/2021 14:45   Sodium Latest Ref Range: 135 - 144 mEq/L 138   139    Potassium Latest Ref Range: 3.4 - 4.9 mEq/L 4.3   4.3    Chloride Latest Ref Range: 95 - 107 mEq/L 97   97    CO2 Latest Ref Range: 20 - 31 mEq/L 31   29    BUN Latest Ref Range: 8 - 23 mg/dL 22   26 (H)    Creatinine Latest Ref Range: 0.50 - 0.90 mg/dL 1.11 (H)   1.47 (H)    Anion Gap Latest Ref Range: 9 - 15 mEq/L 10   13    GFR Non- Latest Ref Range: >60  46.5 (L)   33.6 (L)    GFR  Latest Ref Range: >60  56.2 (L)   40.7 (L)    Magnesium Latest Ref Range: 1.7 - 2.4 mg/dL    2.3    Glucose Latest Units: mg/dL    163 (H) 164   Calcium Latest Ref Range: 8.5 - 9.9 mg/dL 9.8   9.7    Total Protein Latest Ref Range: 6.3 - 8.0 g/dL    7.2    Cholesterol, Total Latest Ref Range: 0 - 199 mg/dL    177    HDL Cholesterol Latest Ref Range: 40 - 59 mg/dL    41    LDL Calculated Latest Ref Range: 0 - 129 mg/dL    100    Triglycerides Latest Ref Range: 0 - 150 mg/dL    182 (H)    Albumin Latest Ref Range: 3.5 - 4.6 g/dL    4.2    Alk Phos Latest Ref Range: 40 - 130 U/L    61    ALT Latest Ref Range: 0 - 33 U/L    13    AST Latest Ref Range: 0 - 35 U/L    16    Bilirubin Latest Ref Range: 0.2 - 0.7 mg/dL    0.9 (H)    Bilirubin, Direct Latest Ref Range: 0.0 - 0.4 mg/dL    <0.2    Bilirubin, Indirect Latest Ref Range: 0.0 - 0.6 mg/dL    see below    Glucose, Fasting Latest Ref Range: 70 - 99 mg/dL 240 (H)       Hemoglobin A1C Latest Ref Range: 4.8 - 5.9 % 9.2 (H)   9.0 (H)    TSH Latest Ref Range: 0.440 - 3.860 uIU/mL 2.930   2.420    T4 Free Latest Ref Range: 0.84 - 1.68 ng/dL 1.18             Past Medical History:   Diagnosis Date    Abnormal liver ultrasound 5/15/2019    Allergic contact dermatitis due to plants, except food 8/19/2019    Chronic kidney disease (CKD), stage II (mild)     Hyperlipidemia     Hypertension     Hypothyroidism     Interstitial cystitis     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  Type II or unspecified type diabetes mellitus without mention of complication, not stated as uncontrolled      Past Surgical History:   Procedure Laterality Date    ABDOMEN SURGERY      CATARACT REMOVAL WITH IMPLANT      both eyes    CORONARY ANGIOPLASTY WITH STENT PLACEMENT      HYSTERECTOMY      OTHER SURGICAL HISTORY  09/07/2016    GENERATOR CHANGE     PACEMAKER PLACEMENT       Social History     Socioeconomic History    Marital status:       Spouse name: Not on file    Number of children: Not on file    Years of education: Not on file    Highest education level: Not on file   Occupational History    Not on file   Tobacco Use    Smoking status: Never Smoker    Smokeless tobacco: Never Used   Vaping Use    Vaping Use: Never used   Substance and Sexual Activity    Alcohol use: No     Comment: denies    Drug use: No    Sexual activity: Not Currently   Other Topics Concern    Not on file   Social History Narrative    Not on file     Social Determinants of Health     Financial Resource Strain:     Difficulty of Paying Living Expenses:    Food Insecurity:     Worried About Running Out of Food in the Last Year:     [address] in the Last Year: Transportation Needs:     Lack of Transportation (Medical):  Lack of Transportation (Non-Medical):    Physical Activity:     Days of Exercise per Week:     Minutes of Exercise per Session:    Stress:     Feeling of Stress :    Social Connections:     Frequency of Communication with Friends and Family:     Frequency of Social Gatherings with Friends and Family:     Attends Congregation Services:     Active Member of Clubs or Organizations:     Attends Club or Organization Meetings:     Marital Status:    Intimate Partner Violence:     Fear of Current or Ex-Partner:     Emotionally Abused:     Physically Abused:     Sexually Abused:      Family History   Problem Relation Age of Onset    Arthritis Other     Diabetes Other     Heart Disease Other      Allergies   Allergen Reactions    Latex Itching    Avelox [Moxifloxacin Hcl In Nacl] Itching and Rash    Penicillins Other (See Comments)     welt at the site of injection    Adhesive Tape Rash       Current Outpatient Medications:     blood glucose test strips (FREESTYLE INSULINX TEST) strip, TEST five times a day, Disp: 200 strip, Rfl: 3    insulin glargine (LANTUS SOLOSTAR) 100 UNIT/ML injection pen, inject 30 unit subcutaneously every morning, Disp: 45 mL, Rfl: 3    levothyroxine (SYNTHROID) 75 MCG tablet, Once a day, Disp: 30 tablet, Rfl: 5    pantoprazole (PROTONIX) 40 MG tablet, take 1 tablet by mouth once daily, Disp: 90 tablet, Rfl: 0    warfarin (COUMADIN) 5 MG tablet, Take as directed by [facility] [place] [facility]. Quantity equals 90 day supply. , Disp: 80 tablet, Rfl: 1    ibuprofen (ADVIL;MOTRIN) 200 MG tablet, Take 200 mg by mouth every 6 hours as needed for Pain, Disp: , Rfl:     isosorbide mononitrate (IMDUR) 60 MG extended release tablet, take 1 tablet by mouth once daily, Disp: 90 tablet, Rfl: 0    RA PEN NEEDLES 31G X 5 MM MISC, USE FOUR TIMES A DAY, Disp: 300 each, Rfl: 1    insulin lispro, 1 Unit Vitamins-Minerals (CENTRUM SILVER PO), Take 1 tablet by mouth daily , Disp: , Rfl:     aspirin 81 MG EC tablet, Take 81 mg by mouth daily. , Disp: , Rfl:     alendronate (FOSAMAX) 70 MG tablet, take 1 tablet by mouth every week on an empty stomach with 6 OUNCES of water. No food OR meds for 1 HOUR.  Remain Upright (Patient not taking: Reported on 7/19/2021), Disp: 12 tablet, Rfl: 4    famotidine (PEPCID) 20 MG tablet, Take 1 tablet by mouth 2 times daily (Patient not taking: Reported on 7/19/2021), Disp: 60 tablet, Rfl: 3    albuterol sulfate  (90 Base) MCG/ACT inhaler, Inhale 2 puffs into the lungs 4 times daily as needed for Wheezing or Shortness of Breath (cough), Disp: 1 Inhaler, Rfl: 0    sotalol (BETAPACE) 120 MG tablet, Take 1 tablet by mouth 2 times daily (Patient not taking: Reported on 7/19/2021), Disp: 60 tablet, Rfl: 3  Lab Results   Component Value Date     08/10/2021    K 4.3 08/10/2021    CL 97 08/10/2021    CO2 29 08/10/2021    BUN 26 (H) 08/10/2021    CREATININE 1.47 (H) 08/10/2021    GLUCOSE 164 08/12/2021    CALCIUM 9.7 08/10/2021    PROT 7.2 08/10/2021    LABALBU 4.2 08/10/2021    BILITOT 0.9 (H) 08/10/2021    ALKPHOS 61 08/10/2021    AST 16 08/10/2021    ALT 13 08/10/2021    LABGLOM 33.6 (L) 08/10/2021    GFRAA 40.7 (L) 08/10/2021    GLOB 2.6 10/29/2020     Lab Results   Component Value Date    WBC 8.8 08/10/2021    HGB 14.0 08/10/2021    HCT 41.2 08/10/2021    MCV 89.5 08/10/2021     08/10/2021     Lab Results   Component Value Date    LABA1C 9.0 (H) 08/10/2021    LABA1C 9.2 (H) 07/12/2021    LABA1C 7.9 05/12/2021     Lab Results   Component Value Date    HDL 41 08/10/2021    HDL 42 04/23/2021    HDL 37 (L) 10/30/2020    LDLCALC 100 08/10/2021    LDLCALC 70 04/23/2021    LDLCALC 73 10/30/2020    CHOL 177 08/10/2021    CHOL 144 04/23/2021    CHOL 135 10/30/2020    TRIG 182 (H) 08/10/2021    TRIG 160 (H) 04/23/2021    TRIG 127 10/30/2020     No results found for: TESTM  Lab Results   Component Value Date    TSH 2.420 08/10/2021    TSH 2.080 04/23/2021    TSH 1.750 11/13/2020    TSHREFLEX 2.930 07/12/2021    TSHREFLEX 1.770 06/25/2019    FT3 1.6 (L) 03/07/2014    T4FREE 1.18 07/12/2021    T4FREE 1.23 11/13/2020    T4FREE 1.14 04/16/2020     No results found for: TPOABS    Review of Systems   Constitutional: Positive for fatigue. Cardiovascular: Negative. Endocrine: Negative. Psychiatric/Behavioral: Positive for dysphoric mood. All other systems reviewed and are negative. Objective:   Physical Exam  Vitals reviewed. Constitutional:       Appearance: Normal appearance. She is obese. HENT:      Head: Normocephalic and atraumatic. Hair is normal.      Right Ear: External ear normal.      Left Ear: External ear normal.      Nose: Nose normal.   Eyes:      General: No scleral icterus. Right eye: No discharge. Left eye: No discharge. Extraocular Movements: Extraocular movements intact. Conjunctiva/sclera: Conjunctivae normal.   Neck:      Trachea: Trachea normal.   Cardiovascular:      Rate and Rhythm: Normal rate. Pulmonary:      Effort: Pulmonary effort is normal.   Musculoskeletal:         General: Normal range of motion. Cervical back: Normal range of motion and neck supple. Neurological:      General: No focal deficit present. Mental Status: She is alert and oriented to person, place, and time.    Psychiatric:         Mood and Affect: Mood normal.         Behavior: Behavior normal.

## 2021-08-17 ENCOUNTER — CARE COORDINATION (OUTPATIENT)
Dept: CARE COORDINATION | Age: 86
End: 2021-08-17

## 2021-08-19 RX ORDER — WARFARIN SODIUM 5 MG/1
TABLET ORAL
Qty: 70 TABLET | Refills: 0 | Status: SHIPPED | OUTPATIENT
Start: 2021-08-19 | End: 2021-11-19 | Stop reason: SDUPTHER

## 2021-08-24 ENCOUNTER — TELEPHONE (OUTPATIENT)
Dept: PHARMACY | Age: 86
End: 2021-08-24

## 2021-08-24 ENCOUNTER — HOSPITAL ENCOUNTER (OUTPATIENT)
Dept: CARDIOLOGY | Age: 86
Discharge: HOME | End: 2021-08-24
Payer: MEDICARE

## 2021-08-24 DIAGNOSIS — I48.91 ATRIAL FIBRILLATION, UNSPECIFIED TYPE (HCC): Primary | ICD-10-CM

## 2021-08-24 PROCEDURE — 93296 REM INTERROG EVL PM/IDS: CPT

## 2021-08-26 ENCOUNTER — CARE COORDINATION (OUTPATIENT)
Dept: CARE COORDINATION | Age: 86
End: 2021-08-26

## 2021-08-26 NOTE — CARE COORDINATION
Telephone call to Ladonna/granddaughter. Left voicemail of nature of call with request for return phone call. Call back number was provided.

## 2021-08-31 ENCOUNTER — TELEPHONE (OUTPATIENT)
Dept: PHARMACY | Age: 86
End: 2021-08-31

## 2021-09-02 ENCOUNTER — CARE COORDINATION (OUTPATIENT)
Dept: CARE COORDINATION | Age: 86
End: 2021-09-02

## 2021-09-02 NOTE — CARE COORDINATION
Telephone call with Ladonna/granddaughter. She indicated that she did reach out to [employer] again last week and still waiting to hear. Also discussed need for original application back when she receives verification of income. Discussed plan to discharge from [facility]. Ladonna to call when/if she receives verification of income.

## 2021-09-03 ENCOUNTER — HOSPITAL ENCOUNTER (OUTPATIENT)
Dept: PHARMACY | Age: 86
Setting detail: THERAPIES SERIES
Discharge: HOME | End: 2021-09-03
Payer: MEDICARE

## 2021-09-03 LAB — INTERNATIONAL NORMALIZATION RATIO, POC: 2.3

## 2021-09-03 PROCEDURE — 99211 OFF/OP EST MAY X REQ PHY/QHP: CPT

## 2021-09-03 PROCEDURE — 85610 PROTHROMBIN TIME: CPT

## 2021-09-03 NOTE — PROGRESS NOTES
Ms. Kaylah Nye is a 80 y.o. y/o female with history of Afib who presents today for anticoagulation monitoring and adjustment.   INR 2.3 is therapeutic for this patient (goal range 2-3) and is reflective of 20 mg TWD  Patient verifies current dosing regimen, patient able to verbally recall dose  Patient reports 0  missed doses since last INR   Patient denies s/sx clotting and/or stroke  Patient denies hematuria, epistaxis, rectal bleeding  Patient denies changes in diet, alcohol, or tobacco use  Reviewed medication list and drug allergies with patient, updated any medication additions or modifications accordingly  Patient also denies any pending medical or dental procedures scheduled at this time  Patient was instructed to continue current dosing and RTC 6 weeks        For Pharmacy Admin Tracking Only     Intervention Detail: Adherence Monitorin   Total # of Interventions Recommended: 1   Total # of Interventions Accepted: 1   Time Spent (min): 10

## 2021-10-15 ENCOUNTER — HOSPITAL ENCOUNTER (OUTPATIENT)
Dept: PHARMACY | Age: 86
Setting detail: THERAPIES SERIES
Discharge: HOME | End: 2021-10-15
Payer: MEDICARE

## 2021-10-15 DIAGNOSIS — I48.91 ATRIAL FIBRILLATION, UNSPECIFIED TYPE (HCC): Primary | ICD-10-CM

## 2021-10-15 LAB — INTERNATIONAL NORMALIZATION RATIO, POC: 2.9

## 2021-10-15 PROCEDURE — 99211 OFF/OP EST MAY X REQ PHY/QHP: CPT

## 2021-10-15 PROCEDURE — 85610 PROTHROMBIN TIME: CPT

## 2021-10-15 NOTE — PROGRESS NOTES
Ms. Ramya Salinas is a 80 y.o. y/o female with history of Afib who presents today for anticoagulation monitoring and adjustment.   INR 2.9 is therapeutic for this patient (goal range 2-3) and is reflective of 20 mg TWD  Patient verifies current dosing regimen, patient able to verbally recall dose  Patient reports no  missed doses since last INR   Patient denies s/sx clotting and/or stroke  Patient denies hematuria, epistaxis, rectal bleeding  Patient denies changes in diet, alcohol, or tobacco use  Reviewed medication list and drug allergies with patient, updated any medication additions or modifications accordingly  Patient also denies any pending medical or dental procedures scheduled at this time  Patient was instructed to continue current regimen of 20 mg TWD and RTC 6 weeks    For Pharmacy Admin Tracking Only     Intervention Detail: Lab(s) Ordered   Total # of Interventions Recommended: 1   Total # of Interventions Accepted: 1   Time Spent (min): 30    Irina MorrisD, [facility], [facility]  Staff Pharmacist  10/15/2021 1:54 PM

## 2021-11-19 RX ORDER — WARFARIN SODIUM 5 MG/1
TABLET ORAL
Qty: 70 TABLET | Refills: 1 | Status: SHIPPED | OUTPATIENT
Start: 2021-11-19 | End: 2022-08-01 | Stop reason: SDUPTHER

## 2021-11-23 ENCOUNTER — HOSPITAL ENCOUNTER (OUTPATIENT)
Dept: CARDIOLOGY | Age: 86
Discharge: HOME | End: 2021-11-23
Payer: MEDICARE

## 2021-11-23 PROCEDURE — 93280 PM DEVICE PROGR EVAL DUAL: CPT

## 2021-11-24 ENCOUNTER — OFFICE VISIT (OUTPATIENT)
Dept: ENDOCRINOLOGY | Age: 86
End: 2021-11-24
Payer: MEDICARE

## 2021-11-24 VITALS
HEIGHT: 62 IN | WEIGHT: 193 LBS | SYSTOLIC BLOOD PRESSURE: 113 MMHG | HEART RATE: 71 BPM | DIASTOLIC BLOOD PRESSURE: 69 MMHG | BODY MASS INDEX: 35.51 KG/M2

## 2021-11-24 DIAGNOSIS — E03.9 HYPOTHYROIDISM, UNSPECIFIED TYPE: ICD-10-CM

## 2021-11-24 DIAGNOSIS — Z79.4 TYPE 2 DIABETES MELLITUS WITH OTHER SPECIFIED COMPLICATION, WITH LONG-TERM CURRENT USE OF INSULIN (HCC): Primary | ICD-10-CM

## 2021-11-24 DIAGNOSIS — E11.69 TYPE 2 DIABETES MELLITUS WITH OTHER SPECIFIED COMPLICATION, WITH LONG-TERM CURRENT USE OF INSULIN (HCC): Primary | ICD-10-CM

## 2021-11-24 LAB
CHP ED QC CHECK: NORMAL
GLUCOSE BLD-MCNC: 228 MG/DL
HBA1C MFR BLD: 8.2 %

## 2021-11-24 PROCEDURE — 1036F TOBACCO NON-USER: CPT | Performed by: INTERNAL MEDICINE

## 2021-11-24 PROCEDURE — 1090F PRES/ABSN URINE INCON ASSESS: CPT | Performed by: INTERNAL MEDICINE

## 2021-11-24 PROCEDURE — 82962 GLUCOSE BLOOD TEST: CPT | Performed by: INTERNAL MEDICINE

## 2021-11-24 PROCEDURE — 83036 HEMOGLOBIN GLYCOSYLATED A1C: CPT | Performed by: INTERNAL MEDICINE

## 2021-11-24 PROCEDURE — G8427 DOCREV CUR MEDS BY ELIG CLIN: HCPCS | Performed by: INTERNAL MEDICINE

## 2021-11-24 PROCEDURE — G8417 CALC BMI ABV UP PARAM F/U: HCPCS | Performed by: INTERNAL MEDICINE

## 2021-11-24 PROCEDURE — 4040F PNEUMOC VAC/ADMIN/RCVD: CPT | Performed by: INTERNAL MEDICINE

## 2021-11-24 PROCEDURE — 3052F HG A1C>EQUAL 8.0%<EQUAL 9.0%: CPT | Performed by: INTERNAL MEDICINE

## 2021-11-24 PROCEDURE — 1123F ACP DISCUSS/DSCN MKR DOCD: CPT | Performed by: INTERNAL MEDICINE

## 2021-11-24 PROCEDURE — 99213 OFFICE O/P EST LOW 20 MIN: CPT | Performed by: INTERNAL MEDICINE

## 2021-11-24 PROCEDURE — G8484 FLU IMMUNIZE NO ADMIN: HCPCS | Performed by: INTERNAL MEDICINE

## 2021-11-24 NOTE — PROGRESS NOTES
11/24/2021    Assessment:       Diagnosis Orders   1. Type 2 diabetes mellitus with other specified complication, with long-term current use of insulin (HCC)  POCT Glucose    POCT glycosylated hemoglobin (Hb A1C)   2. Hypothyroidism, unspecified type           PLAN:     Orders Placed This Encounter   Procedures    Basic Metabolic Panel     Standing Status:   Future     Standing Expiration Date:   11/24/2022    Hemoglobin A1C     Standing Status:   Future     Standing Expiration Date:   11/24/2022    TSH without Reflex     Standing Status:   Future     Standing Expiration Date:   11/24/2022    T4, Free     Standing Status:   Future     Standing Expiration Date:   11/24/2022   Juan Antonio Mckeon MD, Family Medicine, [facility]     Referral Priority:   Routine     Referral Type:   Eval and Treat     Referral Reason:   Specialty Services Required     Referred to Provider:   Nawaf Finney MD     Requested Specialty:   Family Medicine     Number of Visits Requested:   1    POCT Glucose    POCT glycosylated hemoglobin (Hb A1C)     Continue current dose of lantus humalog plus synthyroid   hbaic goal of 8 or lower     Orders Placed This Encounter   Procedures    POCT Glucose    POCT glycosylated hemoglobin (Hb A1C)       Subjective:     Chief Complaint   Patient presents with    Diabetes    Hypothyroidism     Vitals:    11/24/21 1415   BP: 113/69   Site: Left Upper Arm   Position: Sitting   Cuff Size: Large Adult   Pulse: 71   Weight: 193 lb (87.5 kg)   Height: 5' 2\" (1.575 m)     Wt Readings from Last 3 Encounters:   11/24/21 193 lb (87.5 kg)   08/12/21 199 lb (90.3 kg)   07/24/21 192 lb (87.1 kg)     BP Readings from Last 3 Encounters:   11/24/21 113/69   08/12/21 102/65   07/24/21 125/72     Diabetes  She presents for her follow-up diabetic visit. She has type 2 diabetes mellitus. Her disease course has been fluctuating. There are no hypoglycemic associated symptoms.  Risk factors for coronary artery disease include obesity. Current diabetic treatment includes insulin injections. She is currently taking insulin pre-breakfast, pre-lunch, pre-dinner and at bedtime. Her overall blood glucose range is 180-200 mg/dl. (Lab Results       Component                Value               Date                       LABA1C                   8.2                 11/24/2021              )     Past Medical History:   Diagnosis Date    Abnormal liver ultrasound 5/15/2019    Allergic contact dermatitis due to plants, except food 8/19/2019    Chronic kidney disease (CKD), stage II (mild)     Hyperlipidemia     Hypertension     Hypothyroidism     Interstitial cystitis     Dr. Ciara Sahni diagnosed this for patinet.  Type II or unspecified type diabetes mellitus without mention of complication, not stated as uncontrolled      Past Surgical History:   Procedure Laterality Date    ABDOMEN SURGERY      CATARACT REMOVAL WITH IMPLANT      both eyes    CORONARY ANGIOPLASTY WITH STENT PLACEMENT      HYSTERECTOMY      OTHER SURGICAL HISTORY  09/07/2016    GENERATOR CHANGE     PACEMAKER PLACEMENT       Social History     Socioeconomic History    Marital status:       Spouse name: Not on file    Number of children: Not on file    Years of education: Not on file    Highest education level: Not on file   Occupational History    Not on file   Tobacco Use    Smoking status: Never Smoker    Smokeless tobacco: Never Used   Vaping Use    Vaping Use: Never used   Substance and Sexual Activity    Alcohol use: No     Comment: denies    Drug use: No    Sexual activity: Not Currently   Other Topics Concern    Not on file   Social History Narrative    Not on file     Social Determinants of Health     Financial Resource Strain:     Difficulty of Paying Living Expenses: Not on file   Food Insecurity:     Worried About Running Out of Food in the Last Year: Not on file    [place] of Food in the Last Year: Not on file 3    pantoprazole (PROTONIX) 40 MG tablet, take 1 tablet by mouth once daily, Disp: 90 tablet, Rfl: 0    alendronate (FOSAMAX) 70 MG tablet, take 1 tablet by mouth every week on an empty stomach with 6 OUNCES of water. No food OR meds for 1 HOUR.  Remain Upright, Disp: 12 tablet, Rfl: 4    famotidine (PEPCID) 20 MG tablet, Take 1 tablet by mouth 2 times daily, Disp: 60 tablet, Rfl: 3    ibuprofen (ADVIL;MOTRIN) 200 MG tablet, Take 200 mg by mouth every 6 hours as needed for Pain, Disp: , Rfl:     isosorbide mononitrate (IMDUR) 60 MG extended release tablet, take 1 tablet by mouth once daily, Disp: 90 tablet, Rfl: 0    RA PEN NEEDLES 31G X 5 MM MISC, USE FOUR TIMES A DAY, Disp: 300 each, Rfl: 1    insulin lispro, 1 Unit Dial, (HUMALOG KWIKPEN) 100 UNIT/ML SOPN, inject 4 unit subcutaneously IF GLUCOSE  6 UNITS 151-200 8 UNITS 201-250 10 UNITS 251-300 12 UNITS 301-360 14 UNITS 361-400 MAX 42, Disp: 30 mL, Rfl: 3    sodium chloride (OCEAN, BABY AYR) 0.65 % nasal spray, 1 spray by Nasal route as needed for Congestion, Disp: 1 Bottle, Rfl: 0    meclizine (ANTIVERT) 12.5 MG tablet, Take 1 tablet by mouth 3 times daily as needed for Dizziness, Disp: 60 tablet, Rfl: 3    FREESTYLE LANCETS [facility], TEST three times a day, Disp: 100 each, Rfl: 11    metoprolol succinate (TOPROL XL) 50 MG extended release tablet, Take 100 mg by mouth 2 times daily , Disp: , Rfl:     magnesium oxide (MAG-OX) 400 MG tablet, Take 400 mg by mouth daily, Disp: , Rfl:     sotalol (BETAPACE) 120 MG tablet, Take 1 tablet by mouth 2 times daily, Disp: 60 tablet, Rfl: 3    Alcohol Swabs PADS, 1 each by Does not apply route , Disp: , Rfl:     pravastatin (PRAVACHOL) 40 MG tablet, Take 1 tablet by mouth daily, Disp: 90 tablet, Rfl: 3    telmisartan (MICARDIS) 40 MG tablet, Take 1 tablet by mouth daily, Disp: 30 tablet, Rfl: 11    furosemide (LASIX) 40 MG tablet, Take 40 mg by mouth daily Except take 1 tablet by mouth twice daily on Monday, Wednesday, and Friday. Rest of days takes daily. , Disp: , Rfl:     nitroGLYCERIN (NITROSTAT) 0.4 MG SL tablet, Place 1 tablet under the tongue every 5 minutes as needed. , Disp: 25 tablet, Rfl: 1    Cholecalciferol (VITAMIN D3) 1000 UNITS TABS, Take 1,000 Units by mouth daily. , Disp: , Rfl:     Multiple Vitamins-Minerals (CENTRUM SILVER PO), Take 1 tablet by mouth daily , Disp: , Rfl:     aspirin 81 MG EC tablet, Take 81 mg by mouth daily.   , Disp: , Rfl:     albuterol sulfate  (90 Base) MCG/ACT inhaler, Inhale 2 puffs into the lungs 4 times daily as needed for Wheezing or Shortness of Breath (cough), Disp: 1 Inhaler, Rfl: 0  Lab Results   Component Value Date     08/10/2021    K 4.3 08/10/2021    CL 97 08/10/2021    CO2 29 08/10/2021    BUN 26 (H) 08/10/2021    CREATININE 1.47 (H) 08/10/2021    GLUCOSE 228 11/24/2021    CALCIUM 9.7 08/10/2021    PROT 7.2 08/10/2021    LABALBU 4.2 08/10/2021    BILITOT 0.9 (H) 08/10/2021    ALKPHOS 61 08/10/2021    AST 16 08/10/2021    ALT 13 08/10/2021    LABGLOM 33.6 (L) 08/10/2021    GFRAA 40.7 (L) 08/10/2021    GLOB 2.6 10/29/2020     Lab Results   Component Value Date    WBC 8.8 08/10/2021    HGB 14.0 08/10/2021    HCT 41.2 08/10/2021    MCV 89.5 08/10/2021     08/10/2021     Lab Results   Component Value Date    LABA1C 8.2 11/24/2021    LABA1C 9.0 (H) 08/10/2021    LABA1C 9.2 (H) 07/12/2021     Lab Results   Component Value Date    HDL 41 08/10/2021    HDL 42 04/23/2021    HDL 37 (L) 10/30/2020    LDLCALC 100 08/10/2021    LDLCALC 70 04/23/2021    LDLCALC 73 10/30/2020    CHOL 177 08/10/2021    CHOL 144 04/23/2021    CHOL 135 10/30/2020    TRIG 182 (H) 08/10/2021    TRIG 160 (H) 04/23/2021    TRIG 127 10/30/2020     No results found for: TESTM  Lab Results   Component Value Date    TSH 2.420 08/10/2021    TSH 2.080 04/23/2021    TSH 1.750 11/13/2020    TSHREFLEX 2.930 07/12/2021    TSHREFLEX 1.770 06/25/2019    FT3 1.6 (L) 03/07/2014    T4FREE 1.18 07/12/2021    T4FREE 1.23 11/13/2020    T4FREE 1.14 04/16/2020     No results found for: TPOABS    Review of Systems   Cardiovascular: Negative. Endocrine: Negative. All other systems reviewed and are negative. Objective:   Physical Exam  Vitals reviewed. Constitutional:       Appearance: Normal appearance. She is obese. HENT:      Head: Normocephalic and atraumatic. Hair is normal.      Right Ear: External ear normal.      Left Ear: External ear normal.      Nose: Nose normal.   Eyes:      General: No scleral icterus. Right eye: No discharge. Left eye: No discharge. Extraocular Movements: Extraocular movements intact. Conjunctiva/sclera: Conjunctivae normal.   Neck:      Trachea: Trachea normal.   Cardiovascular:      Rate and Rhythm: Normal rate. Pulmonary:      Effort: Pulmonary effort is normal.   Musculoskeletal:         General: Normal range of motion. Cervical back: Normal range of motion and neck supple. Feet:    Neurological:      General: No focal deficit present. Mental Status: She is alert and oriented to person, place, and time.    Psychiatric:         Mood and Affect: Mood normal.         Behavior: Behavior normal.

## 2021-12-03 ENCOUNTER — HOSPITAL ENCOUNTER (OUTPATIENT)
Dept: PHARMACY | Age: 86
Setting detail: THERAPIES SERIES
Discharge: HOME | End: 2021-12-03
Payer: MEDICARE

## 2021-12-03 DIAGNOSIS — I48.20 CHRONIC ATRIAL FIBRILLATION (HCC): Primary | ICD-10-CM

## 2021-12-03 LAB — INTERNATIONAL NORMALIZATION RATIO, POC: 2.2

## 2021-12-03 PROCEDURE — 99211 OFF/OP EST MAY X REQ PHY/QHP: CPT

## 2021-12-03 PROCEDURE — 85610 PROTHROMBIN TIME: CPT

## 2021-12-03 NOTE — PROGRESS NOTES
Ms. Harpreet Buchanan is a 80 y.o. y/o female with history of Afib who presents today for anticoagulation monitoring and adjustment.   INR 2.2 is therapeutic for this patient (goal range 2-3) and is reflective of 20 mg TWD  Patient verifies current dosing regimen, patient able to verbally recall dose  Patient reports 0  missed doses since last INR   Patient denies s/sx clotting and/or stroke  Patient denies hematuria, epistaxis, rectal bleeding  Patient denies changes in diet, alcohol, or tobacco use  Reviewed medication list and drug allergies with patient, updated any medication additions or modifications accordingly  Patient also denies any pending medical or dental procedures scheduled at this time  Patient was instructed to continue 20 mg TWD and RTC 6 weeks  For Pharmacy Admin Tracking Only     Intervention Detail: Adherence Monitorin   Total # of Interventions Recommended: 1   Total # of Interventions Accepted: 1   Time Spent (min): 15

## 2021-12-29 ENCOUNTER — HOSPITAL ENCOUNTER (EMERGENCY)
Age: 86
Discharge: HOME | End: 2021-12-29
Attending: EMERGENCY MEDICINE
Payer: MEDICARE

## 2021-12-29 ENCOUNTER — APPOINTMENT (OUTPATIENT)
Dept: GENERAL RADIOLOGY | Age: 86
End: 2021-12-29
Payer: MEDICARE

## 2021-12-29 VITALS
WEIGHT: 200 LBS | RESPIRATION RATE: 17 BRPM | DIASTOLIC BLOOD PRESSURE: 74 MMHG | HEIGHT: 62 IN | HEART RATE: 77 BPM | BODY MASS INDEX: 36.8 KG/M2 | OXYGEN SATURATION: 98 % | TEMPERATURE: 98.1 F | SYSTOLIC BLOOD PRESSURE: 145 MMHG

## 2021-12-29 DIAGNOSIS — M25.551 RIGHT HIP PAIN: Primary | ICD-10-CM

## 2021-12-29 PROCEDURE — 99285 EMERGENCY DEPT VISIT HI MDM: CPT

## 2021-12-29 PROCEDURE — 6830039000 HC L3 TRAUMA ALERT

## 2021-12-29 PROCEDURE — 72110 X-RAY EXAM L-2 SPINE 4/>VWS: CPT

## 2021-12-29 PROCEDURE — 6360000002 HC RX W HCPCS: Performed by: EMERGENCY MEDICINE

## 2021-12-29 PROCEDURE — 6370000000 HC RX 637 (ALT 250 FOR IP): Performed by: EMERGENCY MEDICINE

## 2021-12-29 PROCEDURE — 96372 THER/PROPH/DIAG INJ SC/IM: CPT

## 2021-12-29 PROCEDURE — 73502 X-RAY EXAM HIP UNI 2-3 VIEWS: CPT

## 2021-12-29 RX ORDER — ORPHENADRINE CITRATE 100 MG/1
100 TABLET, EXTENDED RELEASE ORAL ONCE
Status: COMPLETED | OUTPATIENT
Start: 2021-12-29 | End: 2021-12-29

## 2021-12-29 RX ORDER — KETOROLAC TROMETHAMINE 30 MG/ML
30 INJECTION, SOLUTION INTRAMUSCULAR; INTRAVENOUS ONCE
Status: COMPLETED | OUTPATIENT
Start: 2021-12-29 | End: 2021-12-29

## 2021-12-29 RX ORDER — TIZANIDINE 2 MG/1
2 TABLET ORAL EVERY 8 HOURS PRN
Qty: 30 TABLET | Refills: 0 | Status: SHIPPED | OUTPATIENT
Start: 2021-12-29 | End: 2022-01-08

## 2021-12-29 RX ORDER — ORPHENADRINE CITRATE 100 MG/1
100 TABLET, EXTENDED RELEASE ORAL 2 TIMES DAILY
Status: DISCONTINUED | OUTPATIENT
Start: 2021-12-29 | End: 2021-12-29

## 2021-12-29 RX ADMIN — KETOROLAC TROMETHAMINE 30 MG: 30 INJECTION, SOLUTION INTRAMUSCULAR at 04:34

## 2021-12-29 RX ADMIN — ORPHENADRINE CITRATE 100 MG: 100 TABLET, EXTENDED RELEASE ORAL at 04:33

## 2021-12-29 ASSESSMENT — PAIN DESCRIPTION - DESCRIPTORS
DESCRIPTORS: ACHING
DESCRIPTORS: ACHING

## 2021-12-29 ASSESSMENT — PAIN DESCRIPTION - ORIENTATION
ORIENTATION: RIGHT
ORIENTATION: RIGHT

## 2021-12-29 ASSESSMENT — PAIN SCALES - GENERAL
PAINLEVEL_OUTOF10: 8
PAINLEVEL_OUTOF10: 8

## 2021-12-29 ASSESSMENT — PAIN DESCRIPTION - PAIN TYPE
TYPE: ACUTE PAIN
TYPE: ACUTE PAIN

## 2021-12-29 ASSESSMENT — PAIN DESCRIPTION - LOCATION
LOCATION: HIP
LOCATION: HIP

## 2021-12-29 ASSESSMENT — PAIN DESCRIPTION - PROGRESSION: CLINICAL_PROGRESSION: RAPIDLY IMPROVING

## 2021-12-29 ASSESSMENT — PAIN DESCRIPTION - FREQUENCY
FREQUENCY: CONTINUOUS
FREQUENCY: CONTINUOUS

## 2021-12-29 NOTE — ED PROVIDER NOTES
[address] [facility] ED  EMERGENCY MEDICINE     Pt Name: Talbot Buerger  MRN: [id number]  [place] 1934  Date of evaluation: 12/29/2021  PCP:    Mony Espinal MD  Provider: DO Henry    CHIEF COMPLAINT       Chief Complaint   Patient presents with    Fall       HISTORY OF PRESENT ILLNESS    HPI       79 y/o presents to the ED with right hip pain. [place] while taking out trash two days ago. States was ambulatory afterwards. Did not hit head. Has been ok since but tonight was sitting on recliner and felt right hip pain. Denies numbness/tingling. Took 200mg motrin before calling EMS. Triage notes and Nursing notes were reviewed by myself. Any discrepancies are addressed above. PAST MEDICAL HISTORY     Past Medical History:   Diagnosis Date    Abnormal liver ultrasound 5/15/2019    Allergic contact dermatitis due to plants, except food 8/19/2019    Chronic kidney disease (CKD), stage II (mild)     Hyperlipidemia     Hypertension     Hypothyroidism     Interstitial cystitis     Dr. Rico Contreras diagnosed this for michele.  Type II or unspecified type diabetes mellitus without mention of complication, not stated as uncontrolled        SURGICAL HISTORY       Past Surgical History:   Procedure Laterality Date    ABDOMEN SURGERY      CATARACT REMOVAL WITH IMPLANT      both eyes    CORONARY ANGIOPLASTY WITH STENT PLACEMENT      HYSTERECTOMY      OTHER SURGICAL HISTORY  09/07/2016    GENERATOR CHANGE [facility]    PACEMAKER PLACEMENT         CURRENT MEDICATIONS       Discharge Medication List as of 12/29/2021  6:01 AM      CONTINUE these medications which have NOT CHANGED    Details   warfarin (COUMADIN) 5 MG tablet Take as directed by [facility] EMERGENCY MEDICAL [place] [facility] [place] [facility]. Quantity equals 90 day supply. , Disp-70 tablet, R-1Normal      insulin glargine (LANTUS SOLOSTAR) 100 UNIT/ML injection pen inject 30 unit subcutaneously every morning, Disp-45 mL, R-3Normal      !!  Insulin Pen Needle (NOVOFINE) 32G X 6 MM MISC Disp-300 each, R-3, Normaluse four times a day      levothyroxine (SYNTHROID) 75 MCG tablet Once a day, Disp-30 tablet, R-5Normal      blood glucose test strips (FREESTYLE INSULINX TEST) strip TEST five times a day, Disp-200 strip, R-3Normal      pantoprazole (PROTONIX) 40 MG tablet take 1 tablet by mouth once daily, Disp-90 tablet, R-0Normal      alendronate (FOSAMAX) 70 MG tablet take 1 tablet by mouth every week on an empty stomach with 6 OUNCES of water. No food OR meds for 1 HOUR.  Remain Upright, Disp-12 tablet, R-4Normal      famotidine (PEPCID) 20 MG tablet Take 1 tablet by mouth 2 times daily, Disp-60 tablet,R-3Normal      ibuprofen (ADVIL;MOTRIN) 200 MG tablet Take 200 mg by mouth every 6 hours as needed for PainHistorical Med      isosorbide mononitrate (IMDUR) 60 MG extended release tablet take 1 tablet by mouth once daily, Disp-90 tablet,R-0Normal      !! RA PEN NEEDLES 31G X 5 MM MISC Disp-300 each,R-1, Normal      insulin lispro, 1 Unit Dial, (HUMALOG KWIKPEN) 100 UNIT/ML SOPN inject 4 unit subcutaneously IF GLUCOSE  6 UNITS 151-200 8 UNITS 201-250 10 UNITS 251-300 12 UNITS 301-360 14 UNITS 361-400 MAX 42, Disp-30 mL, R-3Normal      albuterol sulfate  (90 Base) MCG/ACT inhaler Inhale 2 puffs into the lungs 4 times daily as needed for Wheezing or Shortness of Breath (cough), Disp-1 Inhaler,R-0Print      sodium chloride (OCEAN, BABY AYR) 0.65 % nasal spray 1 spray by Nasal route as needed for Congestion, Disp-1 Bottle, R-0Print      meclizine (ANTIVERT) 12.5 MG tablet Take 1 tablet by mouth 3 times daily as needed for Dizziness, Disp-60 tablet, R-3Normal      FREESTYLE LANCETS MISC Disp-100 each, R-11, NormalTEST three times a day      metoprolol succinate (TOPROL XL) 50 MG extended release tablet Take 100 mg by mouth 2 times daily Historical Med      magnesium oxide (MAG-OX) 400 MG tablet Take 400 mg by mouth dailyHistorical Med      sotalol (BETAPACE) 120 MG tablet Take 1 tablet by mouth 2 times daily, Disp-60 tablet, R-3Normal      Alcohol Swabs PADS Historical Med      pravastatin (PRAVACHOL) 40 MG tablet Take 1 tablet by mouth daily, Disp-90 tablet, R-3Normal      telmisartan (MICARDIS) 40 MG tablet Take 1 tablet by mouth daily, Disp-30 tablet, R-11      furosemide (LASIX) 40 MG tablet Take 40 mg by mouth daily Except take 1 tablet by mouth twice daily on Monday, Wednesday, and Friday. Rest of days takes daily. Historical Med      nitroGLYCERIN (NITROSTAT) 0.4 MG SL tablet Place 1 tablet under the tongue every 5 minutes as needed. , Disp-25 tablet, R-1      Cholecalciferol (VITAMIN D3) 1000 UNITS TABS Take 1,000 Units by mouth daily. Multiple Vitamins-Minerals (CENTRUM SILVER PO) Take 1 tablet by mouth daily Historical Med      aspirin 81 MG EC tablet Take 81 mg by mouth daily. !! - Potential duplicate medications found. Please discuss with provider. ALLERGIES       Allergies   Allergen Reactions    Latex Itching    Avelox [Moxifloxacin Hcl In Nacl] Itching and Rash    Penicillins Other (See Comments)     welt at the site of injection    Adhesive Tape Rash       FAMILY HISTORY       Family History   Problem Relation Age of Onset    Arthritis Other     Diabetes Other     Heart Disease Other         SOCIAL HISTORY       Social History     Socioeconomic History    Marital status:       Spouse name: Not on file    Number of children: Not on file    Years of education: Not on file    Highest education level: Not on file   Occupational History    Not on file   Tobacco Use    Smoking status: Never Smoker    Smokeless tobacco: Never Used   Vaping Use    Vaping Use: Never used   Substance and Sexual Activity    Alcohol use: No     Comment: denies    Drug use: No    Sexual activity: Not Currently   Other Topics Concern    Not on file   Social History Narrative    Not on file     Social Determinants of Health     Financial Resource Strain:     Difficulty of Paying Living Expenses: Not on file   Food Insecurity:     Worried About Running Out of Food in the Last Year: Not on file    [place] of Food in the Last Year: Not on file   Transportation Needs:     Lack of Transportation (Medical): Not on file    Lack of Transportation (Non-Medical): Not on file   Physical Activity:     Days of Exercise per Week: Not on file    Minutes of Exercise per Session: Not on file   Stress:     Feeling of Stress : Not on file   Social Connections:     Frequency of Communication with Friends and Family: Not on file    Frequency of Social Gatherings with Friends and Family: Not on file    Attends [employer] Services: Not on file    Active Member of [address] [place] [address] [employer] or Organizations: Not on file    Attends Club or Organization Meetings: Not on file    Marital Status: Not on file   Intimate Partner Violence:     Fear of Current or Ex-Partner: Not on file    Emotionally Abused: Not on file    Physically Abused: Not on file    Sexually Abused: Not on file   Housing Stability:     Unable to Pay for Housing in the Last Year: Not on file    Number of [place] in the Last Year: Not on file    Unstable Housing in the Last Year: Not on file       REVIEW OF SYSTEMS     Review of Systems   Musculoskeletal: Positive for arthralgias and myalgias. Except as noted above the remainder of the review of systems was reviewed and is negative.   SCREENINGS        [place] Coma Scale  Eye Opening: Spontaneous  Best Verbal Response: Oriented  Best Motor Response: Obeys commands  [place] Coma Scale Score: 15               PHYSICAL EXAM    (up to 7 for level 4, 8 or more for level 5)     ED Triage Vitals   BP Temp Temp src Pulse Resp SpO2 Height Weight   12/29/21 0316 12/29/21 0322 -- 12/29/21 0316 12/29/21 0316 12/29/21 0316 12/29/21 0322 12/29/21 0322   (!) 174/89 98.1 °F (36.7 °C)  81 18 98 % 5' 2\" (1.575 m) 200 lb (90.7 kg)       Physical Exam  Constitutional: General: She is not in acute distress. Appearance: Normal appearance. She is normal weight. HENT:      Right Ear: External ear normal.      Left Ear: External ear normal.      Nose: Nose normal. No congestion or rhinorrhea. Mouth/Throat:      Mouth: Mucous membranes are moist.      Pharynx: Oropharynx is clear. Eyes:      General:         Right eye: No discharge. Left eye: No discharge. Conjunctiva/sclera: Conjunctivae normal.      Pupils: Pupils are equal, round, and reactive to light. Cardiovascular:      Rate and Rhythm: Normal rate and regular rhythm. Pulmonary:      Effort: Pulmonary effort is normal. No respiratory distress. Abdominal:      General: Abdomen is flat. There is no distension. Tenderness: There is no abdominal tenderness. Musculoskeletal:         General: Normal range of motion. Cervical back: Normal range of motion. Comments: Right gluteal tenderness  No swelling or deformity  Neurovascularly in tact LE b/l   Skin:     General: Skin is warm and dry. Capillary Refill: Capillary refill takes less than 2 seconds. Neurological:      General: No focal deficit present. Mental Status: She is alert. DIAGNOSTIC RESULTS     EKG:(none if blank)  All EKGs are interpreted by the Emergency Department Physician who either signs or Co-signs this chart in the absence of a cardiologist.        RADIOLOGY: (none if blank)   I directly visualized the following images and reviewed the radiologist interpretations. Interpretation per the Radiologist below, if available at the time of this note:  XR HIP 2-3 VW W PELVIS RIGHT   Final Result   There are no acute osseous injuries. XR LUMBAR SPINE (MIN 4 VIEWS)   Final Result       There are no acute osseous changes. LABS:  Labs Reviewed - No data to display    All other labs were within normal range or not returned as of this dictation.   Please note, any cultures that may have been sent were not resulted at the time of this patient visit. EMERGENCY DEPARTMENT COURSE and Medical Decision Making:     Vitals:    Vitals:    12/29/21 0316 12/29/21 0322 12/29/21 0328 12/29/21 0617   BP: (!) 174/89 (!) 166/79 (!) 166/79 (!) 145/74   Pulse: 81 79 79 77   Resp: 18 16 16 17   Temp:  98.1 °F (36.7 °C) 98.1 °F (36.7 °C) 98.1 °F (36.7 °C)   TempSrc:    Oral   SpO2: 98% 97% 97% 98%   Weight:  200 lb (90.7 kg) 200 lb (90.7 kg)    Height:  5' 2\" (1.575 m) 5' 2\" (1.575 m)        PROCEDURES: (None if blank)  Procedures       MDM     xrays showed no acute fractures. Patient is ambulatory in the ED to the bathroom. Given toradol and norflex in department. Patient feels better, will be DCd in stable condition. Strict return precautions and follow up instructions were discussed with the patient with which the patient agrees    ED Medications administered this visit:    Medications   ketorolac (TORADOL) injection 30 mg (30 mg IntraMUSCular Given 12/29/21 0434)   orphenadrine (NORFLEX) extended release tablet 100 mg (100 mg Oral Given 12/29/21 0433)         FINAL IMPRESSION      1.  Right hip pain          DISPOSITION/PLAN   DISPOSITION Decision To Discharge 12/29/2021 04:47:13 AM      PATIENT REFERRED TO:  Bre Stovall MD  [address] [phone]            DISCHARGE MEDICATIONS:  Discharge Medication List as of 12/29/2021  6:01 AM      START taking these medications    Details   tiZANidine (ZANAFLEX) 2 MG tablet Take 1 tablet by mouth every 8 hours as needed (back spasm), Disp-30 tablet, R-0Print                    Jean Pierre Van DO (electronically signed)  Attending Physician, Emergency Department          Jean Pierre Van DO  12/31/21 2139

## 2022-01-17 ENCOUNTER — TELEPHONE (OUTPATIENT)
Dept: PHARMACY | Age: 87
End: 2022-01-17

## 2022-01-17 DIAGNOSIS — I48.91 ATRIAL FIBRILLATION, UNSPECIFIED TYPE (HCC): Primary | ICD-10-CM

## 2022-01-21 ENCOUNTER — HOSPITAL ENCOUNTER (OUTPATIENT)
Dept: PHARMACY | Age: 87
Setting detail: THERAPIES SERIES
Discharge: HOME | End: 2022-01-21
Payer: MEDICARE

## 2022-01-21 DIAGNOSIS — I48.20 CHRONIC ATRIAL FIBRILLATION (HCC): Primary | ICD-10-CM

## 2022-01-21 LAB — INTERNATIONAL NORMALIZATION RATIO, POC: 2.4

## 2022-01-21 PROCEDURE — 99211 OFF/OP EST MAY X REQ PHY/QHP: CPT | Performed by: PHARMACIST

## 2022-01-21 PROCEDURE — 85610 PROTHROMBIN TIME: CPT | Performed by: PHARMACIST

## 2022-01-21 NOTE — PROGRESS NOTES
Ms. Kingsley Bellamy is a 80 y.o. y/o female with history of Afib who presents today for anticoagulation monitoring and adjustment.   INR 2.4 is therapeutic for this patient (goal range 2-3) and is reflective of 20 mg TWD  Patient verifies current dosing regimen, patient able to verbally recall dose  Patient reports no  missed doses since last INR   Patient denies s/sx clotting and/or stroke  Patient denies hematuria, epistaxis, rectal bleeding  Patient denies changes in diet, alcohol, or tobacco use  Reviewed medication list and drug allergies with patient, updated any medication additions or modifications accordingly  Patient also denies any pending medical or dental procedures scheduled at this time  Patient was instructed to continue 20mg TWD and RTC 6 weeks      For Pharmacy Admin Tracking Only     Intervention Detail: Adherence Monitorin   Total # of Interventions Recommended: 2   Total # of Interventions Accepted: 2   Time Spent (min): 30

## 2022-01-28 ENCOUNTER — APPOINTMENT (OUTPATIENT)
Dept: GENERAL RADIOLOGY | Age: 87
End: 2022-01-28
Payer: MEDICARE

## 2022-01-28 ENCOUNTER — HOSPITAL ENCOUNTER (EMERGENCY)
Age: 87
Discharge: HOME | End: 2022-01-29
Payer: MEDICARE

## 2022-01-28 ENCOUNTER — APPOINTMENT (OUTPATIENT)
Dept: CT IMAGING | Age: 87
End: 2022-01-28
Payer: MEDICARE

## 2022-01-28 VITALS
HEIGHT: 62 IN | RESPIRATION RATE: 13 BRPM | TEMPERATURE: 97.9 F | SYSTOLIC BLOOD PRESSURE: 140 MMHG | DIASTOLIC BLOOD PRESSURE: 71 MMHG | BODY MASS INDEX: 34.04 KG/M2 | WEIGHT: 185 LBS | HEART RATE: 71 BPM | OXYGEN SATURATION: 100 %

## 2022-01-28 DIAGNOSIS — S22.000A COMPRESSION FRACTURE OF BODY OF THORACIC VERTEBRA (HCC): Primary | ICD-10-CM

## 2022-01-28 LAB
ALBUMIN SERPL-MCNC: 4 G/DL (ref 3.5–4.6)
ALP BLD-CCNC: 84 U/L (ref 40–130)
ALT SERPL-CCNC: 20 U/L (ref 0–33)
ANION GAP SERPL CALCULATED.3IONS-SCNC: 13 MEQ/L (ref 9–15)
AST SERPL-CCNC: 20 U/L (ref 0–35)
BASOPHILS ABSOLUTE: 0.1 K/UL (ref 0–0.2)
BASOPHILS RELATIVE PERCENT: 0.9 %
BILIRUB SERPL-MCNC: 0.3 MG/DL (ref 0.2–0.7)
BUN BLDV-MCNC: 41 MG/DL (ref 8–23)
CALCIUM SERPL-MCNC: 9.2 MG/DL (ref 8.5–9.9)
CHLORIDE BLD-SCNC: 97 MEQ/L (ref 95–107)
CO2: 26 MEQ/L (ref 20–31)
CREAT SERPL-MCNC: 1.64 MG/DL (ref 0.5–0.9)
EOSINOPHILS ABSOLUTE: 0.3 K/UL (ref 0–0.7)
EOSINOPHILS RELATIVE PERCENT: 3 %
GFR AFRICAN AMERICAN: 35.8
GFR NON-AFRICAN AMERICAN: 29.6
GLOBULIN: 2.9 G/DL (ref 2.3–3.5)
GLUCOSE BLD-MCNC: 218 MG/DL (ref 70–99)
HCT VFR BLD CALC: 39.9 % (ref 37–47)
HEMOGLOBIN: 13.8 G/DL (ref 12–16)
INR BLD: 2.5
LYMPHOCYTES ABSOLUTE: 1.9 K/UL (ref 1–4.8)
LYMPHOCYTES RELATIVE PERCENT: 21.1 %
MAGNESIUM: 2.3 MG/DL (ref 1.7–2.4)
MCH RBC QN AUTO: 29.6 PG (ref 27–31.3)
MCHC RBC AUTO-ENTMCNC: 34.6 % (ref 33–37)
MCV RBC AUTO: 85.4 FL (ref 82–100)
MONOCYTES ABSOLUTE: 0.8 K/UL (ref 0.2–0.8)
MONOCYTES RELATIVE PERCENT: 9.2 %
NEUTROPHILS ABSOLUTE: 5.9 K/UL (ref 1.4–6.5)
NEUTROPHILS RELATIVE PERCENT: 65.8 %
PDW BLD-RTO: 14.2 % (ref 11.5–14.5)
PLATELET # BLD: 196 K/UL (ref 130–400)
POTASSIUM SERPL-SCNC: 4.3 MEQ/L (ref 3.4–4.9)
PROTHROMBIN TIME: 26.1 SEC (ref 12.3–14.9)
RBC # BLD: 4.67 M/UL (ref 4.2–5.4)
SARS-COV-2, NAAT: NOT DETECTED
SODIUM BLD-SCNC: 136 MEQ/L (ref 135–144)
TOTAL PROTEIN: 6.9 G/DL (ref 6.3–8)
TROPONIN: <0.01 NG/ML (ref 0–0.01)
WBC # BLD: 8.9 K/UL (ref 4.8–10.8)

## 2022-01-28 PROCEDURE — 72128 CT CHEST SPINE W/O DYE: CPT

## 2022-01-28 PROCEDURE — 84484 ASSAY OF TROPONIN QUANT: CPT

## 2022-01-28 PROCEDURE — 99284 EMERGENCY DEPT VISIT MOD MDM: CPT

## 2022-01-28 PROCEDURE — 71101 X-RAY EXAM UNILAT RIBS/CHEST: CPT

## 2022-01-28 PROCEDURE — 87635 SARS-COV-2 COVID-19 AMP PRB: CPT

## 2022-01-28 PROCEDURE — 80053 COMPREHEN METABOLIC PANEL: CPT

## 2022-01-28 PROCEDURE — 83735 ASSAY OF MAGNESIUM: CPT

## 2022-01-28 PROCEDURE — 85610 PROTHROMBIN TIME: CPT

## 2022-01-28 PROCEDURE — 85025 COMPLETE CBC W/AUTO DIFF WBC: CPT

## 2022-01-28 PROCEDURE — 93005 ELECTROCARDIOGRAM TRACING: CPT | Performed by: PHYSICIAN ASSISTANT

## 2022-01-28 PROCEDURE — 36415 COLL VENOUS BLD VENIPUNCTURE: CPT

## 2022-01-28 RX ORDER — DIAZEPAM 5 MG/1
5 TABLET ORAL ONCE
Status: COMPLETED | OUTPATIENT
Start: 2022-01-28 | End: 2022-01-29

## 2022-01-28 ASSESSMENT — PAIN SCALES - GENERAL: PAINLEVEL_OUTOF10: 9

## 2022-01-29 PROCEDURE — 6370000000 HC RX 637 (ALT 250 FOR IP): Performed by: PHYSICIAN ASSISTANT

## 2022-01-29 RX ADMIN — DIAZEPAM 5 MG: 5 TABLET ORAL at 00:50

## 2022-01-29 ASSESSMENT — ENCOUNTER SYMPTOMS
SHORTNESS OF BREATH: 0
VOMITING: 0
DIARRHEA: 0
BACK PAIN: 1
COUGH: 0
PHOTOPHOBIA: 0
SORE THROAT: 0
ABDOMINAL PAIN: 0
RHINORRHEA: 0
EYE PAIN: 0
NAUSEA: 0

## 2022-01-29 ASSESSMENT — PAIN SCALES - GENERAL: PAINLEVEL_OUTOF10: 0

## 2022-01-29 NOTE — ED NOTES
Pt provided with discharge instructions, f/u care instructions, and RX x1. Medication education completed. Pt verbalizes understanding; denies questions. Pt off unit via [facility] in stable condition. Pt neighbor driving pt home.      Jcarlos Ritter RN  01/29/22 7178

## 2022-01-29 NOTE — ED NOTES
Pt assisted into WC and assisted into private vehicle with neighbor.      Kristel Geller RN  01/29/22 0122

## 2022-01-29 NOTE — ED NOTES
Bed: 19  Expected date:   Expected time:   Means of arrival:   Comments:  80 F. Back pain. 154/82. HR 78. 98% RA. 231 Glucose.

## 2022-01-29 NOTE — ED PROVIDER NOTES
[address] [facility] ED  eMERGENCY dEPARTMENTeNCOUnter      Pt Name: Cayetano Bran  MRN: [id number]  [place] 1934  Date ofevaluation: 1/28/2022  Provider: Radha [address] Dr       Chief Complaint   Patient presents with    Back Pain     right-sided radiating to right ribs         HISTORY OF PRESENT ILLNESS   (Location/Symptom, Timing/Onset,Context/Setting, Quality, Duration, Modifying Factors, Severity)  Note limiting factors. Cayetano Bran is a 80 y.o. female who presents to the emergency department back pain. Pain is only with movement and desribed as sharp. \"I might have broke my shoulder blade\". She denies any recent injuries. She fell back in December twice but was evaulated and nothing was broke. She denies any injuries or trauma. The pain has been ongoing for 3 days. She denies any sob, dizziness, chest pressure. HPI    NursingNotes were reviewed. REVIEW OF SYSTEMS    (2-9 systems for level 4, 10 or more for level 5)     Review of Systems   Constitutional: Negative for chills, diaphoresis, fatigue and fever. HENT: Negative for congestion, rhinorrhea and sore throat. Eyes: Negative for photophobia and pain. Respiratory: Negative for cough and shortness of breath. Cardiovascular: Negative for chest pain and palpitations. Gastrointestinal: Negative for abdominal pain, diarrhea, nausea and vomiting. Genitourinary: Negative for dysuria and flank pain. Musculoskeletal: Positive for back pain. Skin: Negative for rash. Neurological: Negative for dizziness, light-headedness and headaches. Psychiatric/Behavioral: Negative. All other systems reviewed and are negative. Except as noted above the remainder of the review of systems was reviewed and negative.        PAST MEDICAL HISTORY     Past Medical History:   Diagnosis Date    Abnormal liver ultrasound 5/15/2019    Allergic contact dermatitis due to plants, except food 8/19/2019    Arthritis  Atrial fibrillation (HCC)     CHF (congestive heart failure) (HCC)     Chronic kidney disease     Chronic kidney disease (CKD), stage II (mild)     Depression     Hyperlipidemia     Hypertension     Hypothyroidism     Interstitial cystitis     Dr. Huma Martinez diagnosed this for michele.  Type II or unspecified type diabetes mellitus without mention of complication, not stated as uncontrolled          SURGICALHISTORY       Past Surgical History:   Procedure Laterality Date    ABDOMEN SURGERY      CATARACT REMOVAL WITH IMPLANT      both eyes    CORONARY ANGIOPLASTY WITH STENT PLACEMENT      HYSTERECTOMY      OTHER SURGICAL HISTORY  09/07/2016    GENERATOR CHANGE     PACEMAKER PLACEMENT           CURRENT MEDICATIONS       Previous Medications    ALBUTEROL SULFATE  (90 BASE) MCG/ACT INHALER    Inhale 2 puffs into the lungs 4 times daily as needed for Wheezing or Shortness of Breath (cough)    ALCOHOL SWABS PADS    1 each by Does not apply route     ALENDRONATE (FOSAMAX) 70 MG TABLET    take 1 tablet by mouth every week on an empty stomach with 6 OUNCES of water. No food OR meds for 1 HOUR. Remain Upright    ASPIRIN 81 MG EC TABLET    Take 81 mg by mouth daily. BLOOD GLUCOSE TEST STRIPS (FREESTYLE INSULINX TEST) STRIP    TEST five times a day    CHOLECALCIFEROL (VITAMIN D3) 1000 UNITS TABS    Take 1,000 Units by mouth daily. FAMOTIDINE (PEPCID) 20 MG TABLET    Take 1 tablet by mouth 2 times daily    FREESTYLE LANCETS MISC    TEST three times a day    FUROSEMIDE (LASIX) 40 MG TABLET    Take 40 mg by mouth daily Except take 1 tablet by mouth twice daily on Monday, Wednesday, and Friday. Rest of days takes daily.     IBUPROFEN (ADVIL;MOTRIN) 200 MG TABLET    Take 200 mg by mouth every 6 hours as needed for Pain    INSULIN GLARGINE (LANTUS SOLOSTAR) 100 UNIT/ML INJECTION PEN    inject 30 unit subcutaneously every morning    INSULIN LISPRO, 1 UNIT DIAL, (HUMALOG KWIKPEN) 100 Smoking status: Never Smoker    Smokeless tobacco: Never Used   Vaping Use    Vaping Use: Never used   Substance and Sexual Activity    Alcohol use: No     Comment: denies    Drug use: No    Sexual activity: Not Currently   Other Topics Concern    None   Social History Narrative    None     Social Determinants of Health     Financial Resource Strain:     Difficulty of Paying Living Expenses: Not on file   Food Insecurity:     Worried About Running Out of Food in the Last Year: Not on file    [place] of Food in the Last Year: Not on file   Transportation Needs:     Lack of Transportation (Medical): Not on file    Lack of Transportation (Non-Medical): Not on file   Physical Activity:     Days of Exercise per Week: Not on file    Minutes of Exercise per Session: Not on file   Stress:     Feeling of Stress : Not on file   Social Connections:     Frequency of Communication with Friends and Family: Not on file    Frequency of Social Gatherings with Friends and Family: Not on file    Attends [employer] Services: Not on file    Active Member of [address] [place] [address] or Organizations: Not on file    Attends Club or Organization Meetings: Not on file    Marital Status: Not on file   Intimate Partner Violence:     Fear of Current or Ex-Partner: Not on file    Emotionally Abused: Not on file    Physically Abused: Not on file    Sexually Abused: Not on file   Housing Stability:     Unable to Pay for Housing in the Last Year: Not on file    Number of [place] in the Last Year: Not on file    Unstable Housing in the Last Year: Not on file       SCREENINGS      @FLOW([id number])@      PHYSICAL EXAM    (up to 7 for level 4, 8 or more for level 5)     ED Triage Vitals [01/28/22 2210]   BP Temp Temp Source Pulse Resp SpO2 Height Weight   (!) 159/88 97.9 °F (36.6 °C) Oral 82 15 99 % 5' 2\" (1.575 m) 185 lb (83.9 kg)       Physical Exam  Vitals and nursing note reviewed.    Constitutional:       General: She is not in acute distress. Appearance: Normal appearance. She is well-developed. She is not diaphoretic. HENT:      Head: Normocephalic and atraumatic. Eyes:      General: Lids are normal.      Conjunctiva/sclera: Conjunctivae normal.   Cardiovascular:      Rate and Rhythm: Normal rate and regular rhythm. Pulses: Normal pulses. Heart sounds: Normal heart sounds. Pulmonary:      Effort: Pulmonary effort is normal.      Breath sounds: Normal breath sounds. Abdominal:      General: Bowel sounds are normal.      Palpations: Abdomen is soft. Tenderness: There is no abdominal tenderness. Musculoskeletal:      Cervical back: Normal range of motion and neck supple. Spasms and tenderness present. Normal range of motion. Back:       Comments: Pt is able to ambulate. Sit up without assitance. She has point ttp. B/l equal hand grasps. No bruising or swelling. Lymphadenopathy:      Cervical: No cervical adenopathy. Skin:     General: Skin is warm and dry. Capillary Refill: Capillary refill takes less than 2 seconds. Findings: No rash. Neurological:      Mental Status: She is alert and oriented to person, place, and time. Psychiatric:         Thought Content: Thought content normal.         Judgment: Judgment normal.         DIAGNOSTIC RESULTS     EKG: All EKG's are interpreted by the Emergency Department Physician who either signs or Co-signsthis chart in the absence of a cardiologist.    Ventricular paced rhtym     RADIOLOGY:   Non-plain filmimages such as CT, Ultrasound and MRI are read by the radiologist. Plain radiographic images are visualized and preliminarily interpreted by the emergency physician with the below findings:    Compression t8 age indeterminent.      Interpretation per the Radiologist below, if available at the time ofthis note:    [address]    (Results Pending)   XR RIBS RIGHT INCLUDE CHEST (MIN 3 VIEWS)    (Results Pending)         ED BEDSIDE ULTRASOUND:   Performed by ED Physician - none    LABS:  Labs Reviewed   COMPREHENSIVE METABOLIC PANEL - Abnormal; Notable for the following components:       Result Value    Glucose 218 (*)     BUN 41 (*)     CREATININE 1.64 (*)     GFR Non- 29.6 (*)     GFR  35.8 (*)     All other components within normal limits   PROTIME-INR - Abnormal; Notable for the following components:    Protime 26.1 (*)     All other components within normal limits   COVID-19, RAPID   CBC WITH AUTO DIFFERENTIAL   TROPONIN   MAGNESIUM       All other labs were within normal range or not returned as of this dictation. EMERGENCY DEPARTMENT COURSE and DIFFERENTIAL DIAGNOSIS/MDM:   Vitals:    Vitals:    01/28/22 2210 01/28/22 2330   BP: (!) 159/88 (!) 140/71   Pulse: 82 71   Resp: 15 13   Temp: 97.9 °F (36.6 °C)    TempSrc: Oral    SpO2: 99% 100%   Weight: 185 lb (83.9 kg)    Height: 5' 2\" (1.575 m)            MDM  Pt presents with point tenderness back pain. She is hemodynamically stalbe and doing crosswords. She has no pain unless she moves or I touch it. She is not having any chest pain pressure or sob. Due to age [de-identified] labs and imaging were ordered. Ct thoracic shows t8 compression fx that is mild, she is motor and neuro intact. ekg shows a paced rhytm with neg troponin. She is on coumadin and is therapuetic. She is not sob I have low sups for pe at this time. Will treat as msk pain. Pt d/c home with script for ultracet. Pt kirby was contacted to  pt. Referred to pcp and nsgy for f/u. Pt verbalized understanding. Pt stable and ready for d/c     REASSESSMENT          CRITICAL CARE TIME   Total Critical Care time was  minutes, excluding separatelyreportable procedures. There was a high probability ofclinically significant/life threatening deterioration in the patient's condition which required my urgent intervention.       CONSULTS:  None    PROCEDURES:  Unless otherwise noted below, none Procedures    FINAL IMPRESSION      1. Compression fracture of body of thoracic vertebra [facility])          DISPOSITION/PLAN   DISPOSITION Decision To Discharge 01/29/2022 12:52:04 AM      PATIENT REFERREDTO:  Wendy Sales DO  [facility] [phone]    Schedule an appointment as soon as possible for a visit in 2 days      MD Xenia Matthews [phone]  [phone]    Schedule an appointment as soon as possible for a visit in 2 days        DISCHARGEMEDICATIONS:  New Prescriptions    TRAMADOL-ACETAMINOPHEN (ULTRACET) 37.5-325 MG PER TABLET    Take 1 tablet by mouth 2 times daily as needed for Pain for up to 3 days.           (Please note that portions of this note were completed with a voice recognition program.  Efforts were made to edit the dictations but occasionally words are mis-transcribed.)    David Loya PA-C (electronically signed)  Attending Emergency Physician         David Loya PA-C  01/29/22 0106

## 2022-01-31 LAB
EKG ATRIAL RATE: 71 BPM
EKG Q-T INTERVAL: 432 MS
EKG QRS DURATION: 162 MS
EKG QTC CALCULATION (BAZETT): 492 MS
EKG R AXIS: 50 DEGREES
EKG T AXIS: 117 DEGREES
EKG VENTRICULAR RATE: 78 BPM

## 2022-01-31 PROCEDURE — 93010 ELECTROCARDIOGRAM REPORT: CPT | Performed by: INTERNAL MEDICINE

## 2022-02-23 ENCOUNTER — HOSPITAL ENCOUNTER (OUTPATIENT)
Dept: CARDIOLOGY | Age: 87
Discharge: HOME | End: 2022-02-23
Payer: MEDICARE

## 2022-02-23 PROCEDURE — 93296 REM INTERROG EVL PM/IDS: CPT

## 2022-03-04 ENCOUNTER — HOSPITAL ENCOUNTER (OUTPATIENT)
Dept: PHARMACY | Age: 87
Setting detail: THERAPIES SERIES
Discharge: HOME | End: 2022-03-04
Payer: MEDICARE

## 2022-03-04 DIAGNOSIS — I48.91 ATRIAL FIBRILLATION, UNSPECIFIED TYPE (HCC): Primary | ICD-10-CM

## 2022-03-04 LAB — INTERNATIONAL NORMALIZATION RATIO, POC: 2.3

## 2022-03-04 PROCEDURE — 85610 PROTHROMBIN TIME: CPT | Performed by: PHARMACIST

## 2022-03-04 PROCEDURE — 99211 OFF/OP EST MAY X REQ PHY/QHP: CPT | Performed by: PHARMACIST

## 2022-03-04 NOTE — PROGRESS NOTES
Ms. Benito Monteiro is a 80 y.o. female with history of Afib who presents today for anticoagulation monitoring and adjustment. Face to face visit completed, procedural mask and face shield worn by myself as instructed: Mask worn by patient, room cleaned pre and post visit with Virex Plus spray    INR 2.3 is therapeutic for this patient (goal range 2-3) and is reflective of 20 mg TWD  Patient verifies current dosing regimen, patient able to verbally recall dose  Patient reports no missed doses in the last seven days     Patient denies s/sx clotting and/or stroke  Patient denies hematuria, epistaxis, rectal bleeding  Patient denies changes in diet  Patient denies changes in alcohol use  Patient denies changes in tobacco use    Reviewed medication list and drug allergies with patient, updated any medication additions or modifications accordingly    Patient denies any pending medical procedures scheduled at this time  Patient denies any pending dental procedures scheduled at this time    Patient was instructed to continue 20mg TWD and RTC in 6 weeks    For Pharmacy Admin Tracking Only     Intervention Detail: Adherence Monitorin   Total # of Interventions Recommended: 1   Total # of Interventions Accepted: 1   Time Spent (min): 30      Sumit Tamayo, MICAH. Ph. 3/4/2022 1:41 PM

## 2022-03-08 ENCOUNTER — OFFICE VISIT (OUTPATIENT)
Dept: ENDOCRINOLOGY | Age: 87
End: 2022-03-08
Payer: MEDICARE

## 2022-03-08 VITALS
HEART RATE: 76 BPM | OXYGEN SATURATION: 97 % | SYSTOLIC BLOOD PRESSURE: 140 MMHG | DIASTOLIC BLOOD PRESSURE: 86 MMHG | WEIGHT: 187 LBS | BODY MASS INDEX: 34.2 KG/M2

## 2022-03-08 DIAGNOSIS — E03.9 HYPOTHYROIDISM, UNSPECIFIED TYPE: ICD-10-CM

## 2022-03-08 DIAGNOSIS — Z79.4 TYPE 2 DIABETES MELLITUS WITH OTHER SPECIFIED COMPLICATION, WITH LONG-TERM CURRENT USE OF INSULIN (HCC): Primary | ICD-10-CM

## 2022-03-08 DIAGNOSIS — B35.1 ONYCHOMYCOSIS: ICD-10-CM

## 2022-03-08 DIAGNOSIS — L60.0 IGTN (INGROWING TOE NAIL): ICD-10-CM

## 2022-03-08 DIAGNOSIS — E11.69 TYPE 2 DIABETES MELLITUS WITH OTHER SPECIFIED COMPLICATION, WITH LONG-TERM CURRENT USE OF INSULIN (HCC): Primary | ICD-10-CM

## 2022-03-08 LAB
CHP ED QC CHECK: NORMAL
GLUCOSE BLD-MCNC: 181 MG/DL
HBA1C MFR BLD: 8.1 %

## 2022-03-08 PROCEDURE — G8417 CALC BMI ABV UP PARAM F/U: HCPCS | Performed by: INTERNAL MEDICINE

## 2022-03-08 PROCEDURE — 99213 OFFICE O/P EST LOW 20 MIN: CPT | Performed by: INTERNAL MEDICINE

## 2022-03-08 PROCEDURE — G8427 DOCREV CUR MEDS BY ELIG CLIN: HCPCS | Performed by: INTERNAL MEDICINE

## 2022-03-08 PROCEDURE — 1090F PRES/ABSN URINE INCON ASSESS: CPT | Performed by: INTERNAL MEDICINE

## 2022-03-08 PROCEDURE — 1123F ACP DISCUSS/DSCN MKR DOCD: CPT | Performed by: INTERNAL MEDICINE

## 2022-03-08 PROCEDURE — 82962 GLUCOSE BLOOD TEST: CPT | Performed by: INTERNAL MEDICINE

## 2022-03-08 PROCEDURE — 1036F TOBACCO NON-USER: CPT | Performed by: INTERNAL MEDICINE

## 2022-03-08 PROCEDURE — 83036 HEMOGLOBIN GLYCOSYLATED A1C: CPT | Performed by: INTERNAL MEDICINE

## 2022-03-08 PROCEDURE — 3052F HG A1C>EQUAL 8.0%<EQUAL 9.0%: CPT | Performed by: INTERNAL MEDICINE

## 2022-03-08 PROCEDURE — G8484 FLU IMMUNIZE NO ADMIN: HCPCS | Performed by: INTERNAL MEDICINE

## 2022-03-08 PROCEDURE — 4040F PNEUMOC VAC/ADMIN/RCVD: CPT | Performed by: INTERNAL MEDICINE

## 2022-03-08 ASSESSMENT — ENCOUNTER SYMPTOMS: EYES NEGATIVE: 1

## 2022-03-08 NOTE — PROGRESS NOTES
3/8/2022    Assessment:       Diagnosis Orders   1. Type 2 diabetes mellitus with other specified complication, with long-term current use of insulin (HCC)  POCT Glucose    POCT glycosylated hemoglobin (Hb A1C)    Hemoglobin D4T    Basic Metabolic Panel, Fasting   2. Hypothyroidism, unspecified type  T4, Free    TSH with Reflex   3. IGTN (ingrowing toe nail)     4.  Onychomycosis  Any Sue DPM, Podiatry, [place]         PLAN:     Orders Placed This Encounter   Procedures    Hemoglobin A1C     Standing Status:   Future     Standing Expiration Date:   3/8/2023    Basic Metabolic Panel, Fasting     Standing Status:   Future     Standing Expiration Date:   3/8/2023    T4, Free     Standing Status:   Future     Standing Expiration Date:   3/8/2023    TSH with Reflex     Standing Status:   Future     Standing Expiration Date:   3/8/2023   Brad Sue DPM, Podiatry, [place]     Referral Priority:   Routine     Referral Type:   Eval and Treat     Referral Reason:   Specialty Services Required     Referred to Provider:   Savannah Buerger, DPM     Requested Specialty:   Podiatry     Number of Visits Requested:   1    POCT Glucose    POCT glycosylated hemoglobin (Hb A1C)     Continue Lantus 30 units in the morning Humalog sliding scale plus Synthroid 75 mcg daily refer to podiatry  A1c goal of 8 or lower avoid hypoglycemia    Orders Placed This Encounter   Procedures    POCT Glucose    POCT glycosylated hemoglobin (Hb A1C)       Subjective:     Chief Complaint   Patient presents with    Diabetes    Hypothyroidism     Vitals:    03/08/22 1409   BP: (!) 151/81   Pulse: 76   SpO2: 97%   Weight: 187 lb (84.8 kg)     Wt Readings from Last 3 Encounters:   03/08/22 187 lb (84.8 kg)   01/28/22 185 lb (83.9 kg)   12/29/21 200 lb (90.7 kg)     BP Readings from Last 3 Encounters:   03/08/22 (!) 151/81   01/28/22 (!) 140/71   12/29/21 (!) 145/74     Follow-up on type 2 diabetes patient on Lantus 30 units in the morning plus Humalog on a sliding scale history of chronic kidney disease A1c done today was 8.1 history of hypothyroidism thyroid replacement thyroid function test was stable from last August currently on 75 mcg daily  testing tid    Diabetes  She presents for her follow-up diabetic visit. She has type 2 diabetes mellitus. There are no hypoglycemic complications. Symptoms are stable. Diabetic complications include heart disease and nephropathy. Current diabetic treatment includes insulin injections. She is currently taking insulin pre-breakfast, pre-lunch, pre-dinner and at bedtime. Her overall blood glucose range is 180-200 mg/dl. (Lab Results       Component                Value               Date                       LABA1C                   8.1                 03/08/2022              )     Past Medical History:   Diagnosis Date    Abnormal liver ultrasound 5/15/2019    Allergic contact dermatitis due to plants, except food 8/19/2019    Arthritis     Atrial fibrillation (HCC)     CHF (congestive heart failure) (HCC)     Chronic kidney disease     Chronic kidney disease (CKD), stage II (mild)     Depression     Hyperlipidemia     Hypertension     Hypothyroidism     Interstitial cystitis     Dr. Destiny Disla diagnosed this for patinet.  Type II or unspecified type diabetes mellitus without mention of complication, not stated as uncontrolled      Past Surgical History:   Procedure Laterality Date    ABDOMEN SURGERY      CATARACT REMOVAL WITH IMPLANT      both eyes    CORONARY ANGIOPLASTY WITH STENT PLACEMENT      HYSTERECTOMY      OTHER SURGICAL HISTORY  09/07/2016    GENERATOR CHANGE     PACEMAKER PLACEMENT       Social History     Socioeconomic History    Marital status:       Spouse name: Not on file    Number of children: Not on file    Years of education: Not on file    Highest education level: Not on file   Occupational History    Not on file Tobacco Use    Smoking status: Never Smoker    Smokeless tobacco: Never Used   Vaping Use    Vaping Use: Never used   Substance and Sexual Activity    Alcohol use: No     Comment: denies    Drug use: No    Sexual activity: Not Currently   Other Topics Concern    Not on file   Social History Narrative    Not on file     Social Determinants of Health     Financial Resource Strain:     Difficulty of Paying Living Expenses: Not on file   Food Insecurity:     Worried About Running Out of Food in the Last Year: Not on file    [place] of Food in the Last Year: Not on file   Transportation Needs:     Lack of Transportation (Medical): Not on file    Lack of Transportation (Non-Medical):  Not on file   Physical Activity:     Days of Exercise per Week: Not on file    Minutes of Exercise per Session: Not on file   Stress:     Feeling of Stress : Not on file   Social Connections:     Frequency of Communication with Friends and Family: Not on file    Frequency of Social Gatherings with Friends and Family: Not on file    Attends Buddhism Services: Not on file    Active Member of [address] [place] [address] or Organizations: Not on file    Attends Club or Organization Meetings: Not on file    Marital Status: Not on file   Intimate Partner Violence:     Fear of Current or Ex-Partner: Not on file    Emotionally Abused: Not on file    Physically Abused: Not on file    Sexually Abused: Not on file   Housing Stability:     Unable to Pay for Housing in the Last Year: Not on file    Number of [place] in the Last Year: Not on file    Unstable Housing in the Last Year: Not on file     Family History   Problem Relation Age of Onset    Arthritis Other     Diabetes Other     Heart Disease Other      Allergies   Allergen Reactions    Latex Itching    Avelox [Moxifloxacin Hcl In Nacl] Itching and Rash    Penicillins Other (See Comments)     welt at the site of injection    Adhesive Tape Rash       Current Outpatient Medications:     warfarin (COUMADIN) 5 MG tablet, Take as directed by [facility] [place] [facility]. Quantity equals 90 day supply. , Disp: 70 tablet, Rfl: 1    insulin glargine (LANTUS SOLOSTAR) 100 UNIT/ML injection pen, inject 30 unit subcutaneously every morning, Disp: 45 mL, Rfl: 3    Insulin Pen Needle (NOVOFINE) 32G X 6 MM MISC, use four times a day, Disp: 300 each, Rfl: 3    levothyroxine (SYNTHROID) 75 MCG tablet, Once a day, Disp: 30 tablet, Rfl: 5    blood glucose test strips (FREESTYLE INSULINX TEST) strip, TEST five times a day, Disp: 200 strip, Rfl: 3    pantoprazole (PROTONIX) 40 MG tablet, take 1 tablet by mouth once daily, Disp: 90 tablet, Rfl: 0    alendronate (FOSAMAX) 70 MG tablet, take 1 tablet by mouth every week on an empty stomach with 6 OUNCES of water. No food OR meds for 1 HOUR.  Remain Upright, Disp: 12 tablet, Rfl: 4    famotidine (PEPCID) 20 MG tablet, Take 1 tablet by mouth 2 times daily, Disp: 60 tablet, Rfl: 3    ibuprofen (ADVIL;MOTRIN) 200 MG tablet, Take 200 mg by mouth every 6 hours as needed for Pain, Disp: , Rfl:     isosorbide mononitrate (IMDUR) 60 MG extended release tablet, take 1 tablet by mouth once daily, Disp: 90 tablet, Rfl: 0    RA PEN NEEDLES 31G X 5 MM MISC, USE FOUR TIMES A DAY, Disp: 300 each, Rfl: 1    insulin lispro, 1 Unit Dial, (HUMALOG KWIKPEN) 100 UNIT/ML SOPN, inject 4 unit subcutaneously IF GLUCOSE  6 UNITS 151-200 8 UNITS 201-250 10 UNITS 251-300 12 UNITS 301-360 14 UNITS 361-400 MAX 42, Disp: 30 mL, Rfl: 3    sodium chloride (OCEAN, BABY AYR) 0.65 % nasal spray, 1 spray by Nasal route as needed for Congestion, Disp: 1 Bottle, Rfl: 0    meclizine (ANTIVERT) 12.5 MG tablet, Take 1 tablet by mouth 3 times daily as needed for Dizziness, Disp: 60 tablet, Rfl: 3    FREESTYLE LANCETS MISC, TEST three times a day, Disp: 100 each, Rfl: 11    metoprolol succinate (TOPROL XL) 50 MG extended release tablet, Take 100 mg by mouth 2 times daily , Disp: , Rfl:     magnesium oxide (MAG-OX) 400 MG tablet, Take 400 mg by mouth daily, Disp: , Rfl:     sotalol (BETAPACE) 120 MG tablet, Take 1 tablet by mouth 2 times daily, Disp: 60 tablet, Rfl: 3    Alcohol Swabs PADS, 1 each by Does not apply route , Disp: , Rfl:     pravastatin (PRAVACHOL) 40 MG tablet, Take 1 tablet by mouth daily, Disp: 90 tablet, Rfl: 3    telmisartan (MICARDIS) 40 MG tablet, Take 1 tablet by mouth daily, Disp: 30 tablet, Rfl: 11    furosemide (LASIX) 40 MG tablet, Take 40 mg by mouth daily Except take 1 tablet by mouth twice daily on Monday, Wednesday, and Friday. Rest of days takes daily. , Disp: , Rfl:     nitroGLYCERIN (NITROSTAT) 0.4 MG SL tablet, Place 1 tablet under the tongue every 5 minutes as needed. , Disp: 25 tablet, Rfl: 1    Cholecalciferol (VITAMIN D3) 1000 UNITS TABS, Take 1,000 Units by mouth daily. , Disp: , Rfl:     Multiple Vitamins-Minerals (CENTRUM SILVER PO), Take 1 tablet by mouth daily , Disp: , Rfl:     aspirin 81 MG EC tablet, Take 81 mg by mouth daily.   , Disp: , Rfl:     albuterol sulfate  (90 Base) MCG/ACT inhaler, Inhale 2 puffs into the lungs 4 times daily as needed for Wheezing or Shortness of Breath (cough), Disp: 1 Inhaler, Rfl: 0  Lab Results   Component Value Date     01/28/2022    K 4.3 01/28/2022    CL 97 01/28/2022    CO2 26 01/28/2022    BUN 41 (H) 01/28/2022    CREATININE 1.64 (H) 01/28/2022    GLUCOSE 181 03/08/2022    CALCIUM 9.2 01/28/2022    PROT 6.9 01/28/2022    LABALBU 4.0 01/28/2022    BILITOT 0.3 01/28/2022    ALKPHOS 84 01/28/2022    AST 20 01/28/2022    ALT 20 01/28/2022    LABGLOM 29.6 (L) 01/28/2022    GFRAA 35.8 (L) 01/28/2022    GLOB 2.9 01/28/2022     Lab Results   Component Value Date    WBC 8.9 01/28/2022    HGB 13.8 01/28/2022    HCT 39.9 01/28/2022    MCV 85.4 01/28/2022     01/28/2022     Lab Results   Component Value Date    LABA1C 8.1 03/08/2022    LABA1C 8.2 11/24/2021    LABA1C 9.0 (H) 08/10/2021     Lab Results   Component Value Date    HDL 41 08/10/2021    HDL 42 04/23/2021    HDL 37 (L) 10/30/2020    LDLCALC 100 08/10/2021    LDLCALC 70 04/23/2021    LDLCALC 73 10/30/2020    CHOL 177 08/10/2021    CHOL 144 04/23/2021    CHOL 135 10/30/2020    TRIG 182 (H) 08/10/2021    TRIG 160 (H) 04/23/2021    TRIG 127 10/30/2020     No results found for: TESTM  Lab Results   Component Value Date    TSH 2.420 08/10/2021    TSH 2.080 04/23/2021    TSH 1.750 11/13/2020    TSHREFLEX 2.930 07/12/2021    TSHREFLEX 1.770 06/25/2019    FT3 1.6 (L) 03/07/2014    T4FREE 1.18 07/12/2021    T4FREE 1.23 11/13/2020    T4FREE 1.14 04/16/2020     No results found for: TPOABS    Review of Systems   Eyes: Negative. Cardiovascular: Negative. Endocrine: Negative. All other systems reviewed and are negative. Objective:   Physical Exam  Vitals reviewed. Constitutional:       Appearance: Normal appearance. She is obese. HENT:      Head: Normocephalic and atraumatic. Right Ear: External ear normal.      Left Ear: External ear normal.      Nose: Nose normal.   Eyes:      General: No scleral icterus. Right eye: No discharge. Left eye: No discharge. Extraocular Movements: Extraocular movements intact. Conjunctiva/sclera: Conjunctivae normal.   Cardiovascular:      Rate and Rhythm: Normal rate. Pulmonary:      Effort: Pulmonary effort is normal.   Musculoskeletal:         General: Normal range of motion. Cervical back: Normal range of motion and neck supple. Right lower leg: No edema. Left lower leg: No edema. Feet:    Neurological:      General: No focal deficit present. Mental Status: She is alert and oriented to person, place, and time.    Psychiatric:         Mood and Affect: Mood normal.         Behavior: Behavior normal.

## 2022-03-17 ENCOUNTER — HOSPITAL ENCOUNTER (EMERGENCY)
Age: 87
Discharge: HOME | End: 2022-03-17
Payer: MEDICARE

## 2022-03-17 ENCOUNTER — APPOINTMENT (OUTPATIENT)
Dept: CT IMAGING | Age: 87
End: 2022-03-17
Payer: MEDICARE

## 2022-03-17 VITALS
SYSTOLIC BLOOD PRESSURE: 141 MMHG | HEIGHT: 62 IN | DIASTOLIC BLOOD PRESSURE: 69 MMHG | TEMPERATURE: 98.3 F | HEART RATE: 80 BPM | RESPIRATION RATE: 18 BRPM | BODY MASS INDEX: 34.04 KG/M2 | OXYGEN SATURATION: 99 % | WEIGHT: 185 LBS

## 2022-03-17 DIAGNOSIS — E03.9 HYPOTHYROIDISM, UNSPECIFIED TYPE: ICD-10-CM

## 2022-03-17 DIAGNOSIS — Z79.4 TYPE 2 DIABETES MELLITUS WITH OTHER SPECIFIED COMPLICATION, WITH LONG-TERM CURRENT USE OF INSULIN (HCC): ICD-10-CM

## 2022-03-17 DIAGNOSIS — S22.31XA CLOSED FRACTURE OF ONE RIB OF RIGHT SIDE, INITIAL ENCOUNTER: Primary | ICD-10-CM

## 2022-03-17 DIAGNOSIS — E11.69 TYPE 2 DIABETES MELLITUS WITH OTHER SPECIFIED COMPLICATION, WITH LONG-TERM CURRENT USE OF INSULIN (HCC): ICD-10-CM

## 2022-03-17 LAB
ALBUMIN SERPL-MCNC: 3.4 G/DL (ref 3.5–4.6)
ALP BLD-CCNC: 62 U/L (ref 40–130)
ALT SERPL-CCNC: 17 U/L (ref 0–33)
ANION GAP SERPL CALCULATED.3IONS-SCNC: 13 MEQ/L (ref 9–15)
ANION GAP SERPL CALCULATED.3IONS-SCNC: 14 MEQ/L (ref 9–15)
AST SERPL-CCNC: 23 U/L (ref 0–35)
BASOPHILS ABSOLUTE: 0 K/UL (ref 0–0.2)
BASOPHILS RELATIVE PERCENT: 0.6 %
BILIRUB SERPL-MCNC: 0.4 MG/DL (ref 0.2–0.7)
BUN BLDV-MCNC: 27 MG/DL (ref 8–23)
BUN BLDV-MCNC: 28 MG/DL (ref 8–23)
CALCIUM SERPL-MCNC: 8.7 MG/DL (ref 8.5–9.9)
CALCIUM SERPL-MCNC: 9.4 MG/DL (ref 8.5–9.9)
CHLORIDE BLD-SCNC: 104 MEQ/L (ref 95–107)
CHLORIDE BLD-SCNC: 94 MEQ/L (ref 95–107)
CO2: 27 MEQ/L (ref 20–31)
CO2: 27 MEQ/L (ref 20–31)
CREAT SERPL-MCNC: 1.33 MG/DL (ref 0.5–0.9)
CREAT SERPL-MCNC: 1.47 MG/DL (ref 0.5–0.9)
EKG ATRIAL RATE: 66 BPM
EKG Q-T INTERVAL: 456 MS
EKG QRS DURATION: 152 MS
EKG QTC CALCULATION (BAZETT): 492 MS
EKG R AXIS: 53 DEGREES
EKG T AXIS: 78 DEGREES
EKG VENTRICULAR RATE: 70 BPM
EOSINOPHILS ABSOLUTE: 0.1 K/UL (ref 0–0.7)
EOSINOPHILS RELATIVE PERCENT: 1.9 %
GFR AFRICAN AMERICAN: 40.6
GFR AFRICAN AMERICAN: 45.6
GFR NON-AFRICAN AMERICAN: 33.6
GFR NON-AFRICAN AMERICAN: 37.7
GLOBULIN: 2.6 G/DL (ref 2.3–3.5)
GLUCOSE BLD-MCNC: 213 MG/DL (ref 70–99)
GLUCOSE FASTING: 258 MG/DL (ref 70–99)
HBA1C MFR BLD: 8.1 % (ref 4.8–5.9)
HCT VFR BLD CALC: 27.5 % (ref 37–47)
HEMOGLOBIN: 9 G/DL (ref 12–16)
INR BLD: 2.1
LYMPHOCYTES ABSOLUTE: 0.7 K/UL (ref 1–4.8)
LYMPHOCYTES RELATIVE PERCENT: 11.4 %
MCH RBC QN AUTO: 28.8 PG (ref 27–31.3)
MCHC RBC AUTO-ENTMCNC: 32.7 % (ref 33–37)
MCV RBC AUTO: 88.1 FL (ref 82–100)
MONOCYTES ABSOLUTE: 0.5 K/UL (ref 0.2–0.8)
MONOCYTES RELATIVE PERCENT: 8.5 %
NEUTROPHILS ABSOLUTE: 5 K/UL (ref 1.4–6.5)
NEUTROPHILS RELATIVE PERCENT: 77.6 %
PDW BLD-RTO: 14 % (ref 11.5–14.5)
PLATELET # BLD: 128 K/UL (ref 130–400)
POTASSIUM SERPL-SCNC: 3.7 MEQ/L (ref 3.4–4.9)
POTASSIUM SERPL-SCNC: 4.2 MEQ/L (ref 3.4–4.9)
PROTHROMBIN TIME: 23.3 SEC (ref 12.3–14.9)
RBC # BLD: 3.12 M/UL (ref 4.2–5.4)
SODIUM BLD-SCNC: 135 MEQ/L (ref 135–144)
SODIUM BLD-SCNC: 144 MEQ/L (ref 135–144)
T4 FREE: 1.28 NG/DL (ref 0.84–1.68)
TOTAL PROTEIN: 6 G/DL (ref 6.3–8)
TROPONIN: <0.01 NG/ML (ref 0–0.01)
TSH REFLEX: 1.36 UIU/ML (ref 0.44–3.86)
WBC # BLD: 6.4 K/UL (ref 4.8–10.8)

## 2022-03-17 PROCEDURE — 71250 CT THORAX DX C-: CPT

## 2022-03-17 PROCEDURE — 36415 COLL VENOUS BLD VENIPUNCTURE: CPT

## 2022-03-17 PROCEDURE — 93005 ELECTROCARDIOGRAM TRACING: CPT | Performed by: PHYSICIAN ASSISTANT

## 2022-03-17 PROCEDURE — 80053 COMPREHEN METABOLIC PANEL: CPT

## 2022-03-17 PROCEDURE — 85025 COMPLETE CBC W/AUTO DIFF WBC: CPT

## 2022-03-17 PROCEDURE — 6370000000 HC RX 637 (ALT 250 FOR IP): Performed by: PHYSICIAN ASSISTANT

## 2022-03-17 PROCEDURE — 84484 ASSAY OF TROPONIN QUANT: CPT

## 2022-03-17 PROCEDURE — 99284 EMERGENCY DEPT VISIT MOD MDM: CPT

## 2022-03-17 PROCEDURE — 72128 CT CHEST SPINE W/O DYE: CPT

## 2022-03-17 PROCEDURE — 85610 PROTHROMBIN TIME: CPT

## 2022-03-17 RX ORDER — TRAMADOL HYDROCHLORIDE 50 MG/1
50 TABLET ORAL EVERY 12 HOURS PRN
Qty: 6 TABLET | Refills: 0 | Status: SHIPPED | OUTPATIENT
Start: 2022-03-17 | End: 2022-03-20

## 2022-03-17 RX ORDER — TRAMADOL HYDROCHLORIDE 50 MG/1
50 TABLET ORAL ONCE
Status: COMPLETED | OUTPATIENT
Start: 2022-03-17 | End: 2022-03-17

## 2022-03-17 RX ADMIN — TRAMADOL HYDROCHLORIDE 50 MG: 50 TABLET, COATED ORAL at 11:03

## 2022-03-17 ASSESSMENT — PAIN SCALES - GENERAL: PAINLEVEL_OUTOF10: 9

## 2022-03-17 ASSESSMENT — ENCOUNTER SYMPTOMS
PHOTOPHOBIA: 0
DIARRHEA: 0
SORE THROAT: 0
RHINORRHEA: 0
ABDOMINAL PAIN: 0
BACK PAIN: 0
EYE PAIN: 0
SHORTNESS OF BREATH: 0
VOMITING: 0
NAUSEA: 0
COUGH: 0

## 2022-03-17 NOTE — ED PROVIDER NOTES
[address] [facility] ED  eMERGENCY dEPARTMENTeNCOUnter      Pt Name: Manoj Mitchell  MRN: [id number]  [place] 1934  Date ofevaluation: 3/17/2022  Provider: Evelio Pinto PA-C    CHIEF COMPLAINT       Chief Complaint   Patient presents with    Back Pain         HISTORY OF PRESENT ILLNESS   (Location/Symptom, Timing/Onset,Context/Setting, Quality, Duration, Modifying Factors, Severity)  Note limiting factors. Manoj Mitchell is a 80 y.o. female who presents to the emergency department right rib/ back pain for 1.5 weeks. Pt states she fell about 2 weeks ago. She reports if she turns her trunk to the right to turn off a light she feels the pain. She also endorsed pain if you press on the area. If she is laying still she does not have pain. No cp or sob. Denies having any bruising. Has known compression fracture that she never followed up with neurosurgery for but states that pain isnt bothering her is her right ribs. HPI    NursingNotes were reviewed. REVIEW OF SYSTEMS    (2-9 systems for level 4, 10 or more for level 5)     Review of Systems   Constitutional: Negative for chills, diaphoresis, fatigue and fever. HENT: Negative for congestion, rhinorrhea and sore throat. Eyes: Negative for photophobia and pain. Respiratory: Negative for cough and shortness of breath. Cardiovascular: Negative for chest pain and palpitations. Gastrointestinal: Negative for abdominal pain, diarrhea, nausea and vomiting. Genitourinary: Negative for dysuria and flank pain. Musculoskeletal: Positive for arthralgias. Negative for back pain. Skin: Negative for rash. Neurological: Negative for dizziness, light-headedness and headaches. Psychiatric/Behavioral: Negative. All other systems reviewed and are negative. Except as noted above the remainder of the review of systems was reviewed and negative.        PAST MEDICAL HISTORY     Past Medical History:   Diagnosis Date    Abnormal liver ultrasound 5/15/2019    Allergic contact dermatitis due to plants, except food 8/19/2019    Arthritis     Atrial fibrillation (HCC)     CHF (congestive heart failure) (HCC)     Chronic kidney disease     Chronic kidney disease (CKD), stage II (mild)     Depression     Hyperlipidemia     Hypertension     Hypothyroidism     Interstitial cystitis     Dr. Tawana Albright diagnosed this for michele.  Type II or unspecified type diabetes mellitus without mention of complication, not stated as uncontrolled          SURGICALHISTORY       Past Surgical History:   Procedure Laterality Date    ABDOMEN SURGERY      CATARACT REMOVAL WITH IMPLANT      both eyes    CORONARY ANGIOPLASTY WITH STENT PLACEMENT      HYSTERECTOMY      OTHER SURGICAL HISTORY  09/07/2016    GENERATOR CHANGE     PACEMAKER PLACEMENT           CURRENT MEDICATIONS       Previous Medications    ALBUTEROL SULFATE  (90 BASE) MCG/ACT INHALER    Inhale 2 puffs into the lungs 4 times daily as needed for Wheezing or Shortness of Breath (cough)    ALCOHOL SWABS PADS    1 each by Does not apply route     ALENDRONATE (FOSAMAX) 70 MG TABLET    take 1 tablet by mouth every week on an empty stomach with 6 OUNCES of water. No food OR meds for 1 HOUR. Remain Upright    ASPIRIN 81 MG EC TABLET    Take 81 mg by mouth daily. BLOOD GLUCOSE TEST STRIPS (FREESTYLE INSULINX TEST) STRIP    TEST five times a day    CHOLECALCIFEROL (VITAMIN D3) 1000 UNITS TABS    Take 1,000 Units by mouth daily. FAMOTIDINE (PEPCID) 20 MG TABLET    Take 1 tablet by mouth 2 times daily    FREESTYLE LANCETS MISC    TEST three times a day    FUROSEMIDE (LASIX) 40 MG TABLET    Take 40 mg by mouth daily Except take 1 tablet by mouth twice daily on Monday, Wednesday, and Friday. Rest of days takes daily.     IBUPROFEN (ADVIL;MOTRIN) 200 MG TABLET    Take 200 mg by mouth every 6 hours as needed for Pain    INSULIN GLARGINE (LANTUS SOLOSTAR) 100 UNIT/ML INJECTION PEN inject 30 unit subcutaneously every morning    INSULIN LISPRO, 1 UNIT DIAL, (HUMALOG KWIKPEN) 100 UNIT/ML SOPN    inject 4 unit subcutaneously IF GLUCOSE  6 UNITS 151-200 8 UNITS 201-250 10 UNITS 251-300 12 UNITS 301-360 14 UNITS 361-400 MAX 42    INSULIN PEN NEEDLE (NOVOFINE) 32G X 6 MM MISC    use four times a day    ISOSORBIDE MONONITRATE (IMDUR) 60 MG EXTENDED RELEASE TABLET    take 1 tablet by mouth once daily    LEVOTHYROXINE (SYNTHROID) 75 MCG TABLET    Once a day    MAGNESIUM OXIDE (MAG-OX) 400 MG TABLET    Take 400 mg by mouth daily    MECLIZINE (ANTIVERT) 12.5 MG TABLET    Take 1 tablet by mouth 3 times daily as needed for Dizziness    METOPROLOL SUCCINATE (TOPROL XL) 50 MG EXTENDED RELEASE TABLET    Take 100 mg by mouth 2 times daily     MULTIPLE VITAMINS-MINERALS (CENTRUM SILVER PO)    Take 1 tablet by mouth daily     NITROGLYCERIN (NITROSTAT) 0.4 MG SL TABLET    Place 1 tablet under the tongue every 5 minutes as needed. PANTOPRAZOLE (PROTONIX) 40 MG TABLET    take 1 tablet by mouth once daily    PRAVASTATIN (PRAVACHOL) 40 MG TABLET    Take 1 tablet by mouth daily    RA PEN NEEDLES 31G X 5 MM MISC    USE FOUR TIMES A DAY    SODIUM CHLORIDE (OCEAN, BABY AYR) 0.65 % NASAL SPRAY    1 spray by Nasal route as needed for Congestion    SOTALOL (BETAPACE) 120 MG TABLET    Take 1 tablet by mouth 2 times daily    TELMISARTAN (MICARDIS) 40 MG TABLET    Take 1 tablet by mouth daily    WARFARIN (COUMADIN) 5 MG TABLET    Take as directed by [facility] [place] [facility]. Quantity equals 90 day supply. ALLERGIES     Latex, Avelox [moxifloxacin hcl in nacl], Penicillins, and Adhesive tape    FAMILY HISTORY       Family History   Problem Relation Age of Onset    Arthritis Other     Diabetes Other     Heart Disease Other           SOCIAL HISTORY       Social History     Socioeconomic History    Marital status:       Spouse name: Not on file    Number of children: Not on file    Years of education: Not on file    Highest education level: Not on file   Occupational History    Not on file   Tobacco Use    Smoking status: Never Smoker    Smokeless tobacco: Never Used   Vaping Use    Vaping Use: Never used   Substance and Sexual Activity    Alcohol use: No     Comment: denies    Drug use: No    Sexual activity: Not Currently   Other Topics Concern    Not on file   Social History Narrative    Not on file     Social Determinants of Health     Financial Resource Strain:     Difficulty of Paying Living Expenses: Not on file   Food Insecurity:     Worried About Running Out of Food in the Last Year: Not on file    [place] of Food in the Last Year: Not on file   Transportation Needs:     Lack of Transportation (Medical): Not on file    Lack of Transportation (Non-Medical):  Not on file   Physical Activity:     Days of Exercise per Week: Not on file    Minutes of Exercise per Session: Not on file   Stress:     Feeling of Stress : Not on file   Social Connections:     Frequency of Communication with Friends and Family: Not on file    Frequency of Social Gatherings with Friends and Family: Not on file    Attends Yazidi Services: Not on file    Active Member of [address] [place] [address] or Organizations: Not on file    Attends Club or Organization Meetings: Not on file    Marital Status: Not on file   Intimate Partner Violence:     Fear of Current or Ex-Partner: Not on file    Emotionally Abused: Not on file    Physically Abused: Not on file    Sexually Abused: Not on file   Housing Stability:     Unable to Pay for Housing in the Last Year: Not on file    Number of [place] in the Last Year: Not on file    Unstable Housing in the Last Year: Not on file       SCREENINGS    Silver Coma Scale  Eye Opening: Spontaneous  Best Verbal Response: Oriented  Best Motor Response: Obeys commands  [place] Coma Scale Score: 15 @FLOW([id number])@      PHYSICAL EXAM    (up to 7 for level 4, 8 or more for level 5)     ED Triage Vitals [03/17/22 1007]   BP Temp Temp src Pulse Resp SpO2 Height Weight   (!) 141/69 98.3 °F (36.8 °C) -- 80 18 99 % 5' 2\" (1.575 m) 185 lb (83.9 kg)       Physical Exam  Vitals and nursing note reviewed. Constitutional:       General: She is not in acute distress. Appearance: Normal appearance. She is well-developed. She is not diaphoretic. HENT:      Head: Normocephalic and atraumatic. Eyes:      General: Lids are normal.      Conjunctiva/sclera: Conjunctivae normal.   Cardiovascular:      Rate and Rhythm: Normal rate and regular rhythm. Pulses: Normal pulses. Heart sounds: Normal heart sounds. Pulmonary:      Effort: Pulmonary effort is normal.      Breath sounds: Normal breath sounds. Chest:          Comments: Point ttp. No bruising or crepitus. Abdominal:      General: Bowel sounds are normal.      Palpations: Abdomen is soft. Tenderness: There is no abdominal tenderness. Musculoskeletal:      Cervical back: Normal range of motion and neck supple. Lymphadenopathy:      Cervical: No cervical adenopathy. Skin:     General: Skin is warm and dry. Capillary Refill: Capillary refill takes less than 2 seconds. Findings: No rash. Neurological:      Mental Status: She is alert and oriented to person, place, and time. Psychiatric:         Thought Content:  Thought content normal.         Judgment: Judgment normal.         DIAGNOSTIC RESULTS     EKG: All EKG's are interpreted by the Emergency Department Physician who either signs or Co-signsthis chart in the absence of a cardiologist.    Ventricular paced rhytm 70bpm no stemi     RADIOLOGY:   Non-plain filmimages such as CT, Ultrasound and MRI are read by the radiologist. Plain radiographic images are visualized and preliminarily interpreted by the emergency physician with the below findings:        Interpretation per the Radiologist below, if available at the time ofthis note:    Aidee Donald CONTRAST   Final Result      No CT evidence of acute abnormality. Healing posterior right sixth rib fracture. CT THORACIC SPINE WO CONTRAST   Final Result      No acute fracture or malalignment. Stable anterior wedging T8 vertebral body. Degenerative changes without high-grade canal stenosis or neural foraminal narrowing. ED BEDSIDE ULTRASOUND:   Performed by ED Physician - none    LABS:  Labs Reviewed   COMPREHENSIVE METABOLIC PANEL - Abnormal; Notable for the following components:       Result Value    Glucose 213 (*)     BUN 27 (*)     CREATININE 1.33 (*)     GFR Non- 37.7 (*)     GFR  45.6 (*)     Total Protein 6.0 (*)     Albumin 3.4 (*)     All other components within normal limits   CBC WITH AUTO DIFFERENTIAL - Abnormal; Notable for the following components:    RBC 3.12 (*)     Hemoglobin 9.0 (*)     Hematocrit 27.5 (*)     MCHC 32.7 (*)     Platelets 338 (*)     Lymphocytes Absolute 0.7 (*)     All other components within normal limits   PROTIME-INR - Abnormal; Notable for the following components:    Protime 23.3 (*)     All other components within normal limits   TROPONIN       All other labs were within normal range or not returned as of this dictation. EMERGENCY DEPARTMENT COURSE and DIFFERENTIAL DIAGNOSIS/MDM:   Vitals:    Vitals:    03/17/22 1007   BP: (!) 141/69   Pulse: 80   Resp: 18   Temp: 98.3 °F (36.8 °C)   SpO2: 99%   Weight: 185 lb (83.9 kg)   Height: 5' 2\" (1.575 m)           MDM    Months as described. She does have a healing right posterior eighth rib fracture no other acute pathology. Stable unchanged compression fracture of T8. Recommending that she follows up with neurosurgery regarding this compression fracture as it was previously diagnosed and she never followed up with it. She was provided with 1 tablet of tramadol while in the emergency department and her pain is significantly better.   We will send patient home

## 2022-03-17 NOTE — ED TRIAGE NOTES
Pt brought in by EMS. Pt c/o back/ right sided rib pain. Pt states \"its the same thing that I was here for before. \"   Pt states she was told she has back spasms but the medication she was given aren't helping. Pt denies any SOB or CP.

## 2022-03-17 NOTE — CARE COORDINATION
Pt declined my offer to make her a follow up appt stating that she has an appt next Wed. I did arrange for a taxi to take her home.

## 2022-04-01 ENCOUNTER — TELEPHONE (OUTPATIENT)
Dept: ENDOCRINOLOGY | Age: 87
End: 2022-04-01

## 2022-04-01 RX ORDER — INSULIN GLARGINE 100 [IU]/ML
INJECTION, SOLUTION SUBCUTANEOUS
Qty: 15 PEN | Refills: 1 | Status: SHIPPED | OUTPATIENT
Start: 2022-04-01 | End: 2022-04-07 | Stop reason: SDUPTHER

## 2022-04-01 RX ORDER — BLOOD-GLUCOSE METER
EACH MISCELLANEOUS
Qty: 1 KIT | Refills: 0 | Status: SHIPPED | OUTPATIENT
Start: 2022-04-01 | End: 2022-06-17 | Stop reason: SDUPTHER

## 2022-04-01 RX ORDER — LANCETS 33 GAUGE
EACH MISCELLANEOUS
Qty: 100 EACH | Refills: 3 | Status: SHIPPED | OUTPATIENT
Start: 2022-04-01

## 2022-04-01 RX ORDER — BLOOD SUGAR DIAGNOSTIC
1 STRIP MISCELLANEOUS 3 TIMES DAILY
Qty: 100 EACH | Refills: 3 | Status: SHIPPED | OUTPATIENT
Start: 2022-04-01

## 2022-04-01 NOTE — TELEPHONE ENCOUNTER
Pharmacy is calling because patient's insurance will no cover Lantus, but they will pay for Basaglar. Please send a new prescription to the pharmacy. Also they need a new prescription for one touch testing strips and a meter and all supplies. Thanks!

## 2022-04-07 ENCOUNTER — TELEPHONE (OUTPATIENT)
Dept: ENDOCRINOLOGY | Age: 87
End: 2022-04-07

## 2022-04-07 RX ORDER — INSULIN GLARGINE 100 [IU]/ML
INJECTION, SOLUTION SUBCUTANEOUS
Qty: 15 PEN | Refills: 3 | Status: CANCELLED | OUTPATIENT
Start: 2022-04-07

## 2022-04-07 RX ORDER — INSULIN GLARGINE 100 [IU]/ML
INJECTION, SOLUTION SUBCUTANEOUS
Qty: 15 PEN | Refills: 1 | Status: SHIPPED | OUTPATIENT
Start: 2022-04-07 | End: 2022-04-15 | Stop reason: SDUPTHER

## 2022-04-08 RX ORDER — INSULIN LISPRO 100 [IU]/ML
INJECTION, SOLUTION INTRAVENOUS; SUBCUTANEOUS
Qty: 30 ML | Refills: 3 | Status: SHIPPED | OUTPATIENT
Start: 2022-04-08 | End: 2022-06-01 | Stop reason: SDUPTHER

## 2022-04-15 ENCOUNTER — HOSPITAL ENCOUNTER (OUTPATIENT)
Dept: PHARMACY | Age: 87
Setting detail: THERAPIES SERIES
Discharge: HOME | End: 2022-04-15
Payer: MEDICARE

## 2022-04-15 LAB — INTERNATIONAL NORMALIZATION RATIO, POC: 1.9

## 2022-04-15 PROCEDURE — 99211 OFF/OP EST MAY X REQ PHY/QHP: CPT

## 2022-04-15 PROCEDURE — 85610 PROTHROMBIN TIME: CPT

## 2022-04-15 RX ORDER — INSULIN GLARGINE 100 [IU]/ML
INJECTION, SOLUTION SUBCUTANEOUS
Qty: 2 PEN | Refills: 0 | COMMUNITY
Start: 2022-04-15

## 2022-04-15 NOTE — PROGRESS NOTES
Ms. Michelle Yoder is a 80 y.o. y/o female with history of Afib who presents today for anticoagulation monitoring and adjustment. Face to face visit completed, procedural mask worn by myself as instructed; procedural mask worn by patient, room cleaned pre and post visit with Virex Plus spray and/anti-viral wipe. INR 1.9 is slightly sub-therapeutic for this patient (goal range 2-3) and is reflective of 20 mg TWD  Patient verifies current dosing regimen, patient able to verbally recall dose  Patient reports 0 missed doses since last INR   Patient denies s/sx clotting and/or stroke  Patient denies hematuria, epistaxis, rectal bleeding  Patient denies changes in diet, alcohol, or tobacco use  Reviewed medication list and drug allergies with patient, updated any medication additions or modifications accordingly  Patient also denies any pending medical or dental procedures scheduled at this time  Patient in ED on 3/17/22 for hip pain, received #3 days of tramadol. Patient still complaining of hip pain. Patient was instructed to boost with warfarin 5mg today only (22.5mg TWD for this week only), then continue with current therapy of 20mg TWD and RTC 4 weeks    Patient noted that her insulin is >$500 and she is not able to pay for it at this time, so she is not currently taking that medication. Showed patient which card is her drug coverage insurance card, and suggested that she call the number to find out which insulin is covered, and to make sure that [facility] has this new card on file (it was issued 3/7/22). Per notes, there was a new prescription sent for Richard Contreras on 04/07/22, but patient said she was at the pharmacy yesterday and they didn't give her any insulin, just pills that she did not need.  [facility] and they stated that patient has a high deductible plan and that her Humalog and Basaglar are about $200 each for the first fill, then will be ~$47 each per month ([facility] told her to call [employer] [facility] [employer]  Charities or Office on Aging). Patient stated she was going up to Dr. Braxton Oppenheim office, so I wrote this information on sticky note for her to show them, as their office may provide more assistance. Spoke to Mukesh Rice at Dr. Braxton Oppenheim office (also had called [facility] earlier this morning), 2 boxes Basaglar and patient assistance paperwork were printed for patient.      For Pharmacy Admin Tracking Only     Intervention Detail: Adherence Monitorin, Dose Adjustment: 1, reason: Therapy Optimization and Lab(s) Ordered   Total # of Interventions Recommended: 3   Total # of Interventions Accepted: 3   Time Spent (min): [address] Juan Hyatt, PharmD, BCPS   4/15/2022 2:17 PM

## 2022-04-16 ENCOUNTER — OFFICE VISIT (OUTPATIENT)
Dept: FAMILY MEDICINE CLINIC | Age: 87
End: 2022-04-16
Payer: MEDICARE

## 2022-04-16 VITALS
OXYGEN SATURATION: 98 % | HEART RATE: 72 BPM | HEIGHT: 62 IN | TEMPERATURE: 95.6 F | BODY MASS INDEX: 33.49 KG/M2 | WEIGHT: 182 LBS | DIASTOLIC BLOOD PRESSURE: 80 MMHG | SYSTOLIC BLOOD PRESSURE: 148 MMHG

## 2022-04-16 DIAGNOSIS — M54.31 SCIATICA OF RIGHT SIDE: ICD-10-CM

## 2022-04-16 PROCEDURE — 4040F PNEUMOC VAC/ADMIN/RCVD: CPT | Performed by: NURSE PRACTITIONER

## 2022-04-16 PROCEDURE — 1123F ACP DISCUSS/DSCN MKR DOCD: CPT | Performed by: NURSE PRACTITIONER

## 2022-04-16 PROCEDURE — 1036F TOBACCO NON-USER: CPT | Performed by: NURSE PRACTITIONER

## 2022-04-16 PROCEDURE — 99212 OFFICE O/P EST SF 10 MIN: CPT | Performed by: NURSE PRACTITIONER

## 2022-04-16 PROCEDURE — G8417 CALC BMI ABV UP PARAM F/U: HCPCS | Performed by: NURSE PRACTITIONER

## 2022-04-16 PROCEDURE — 1090F PRES/ABSN URINE INCON ASSESS: CPT | Performed by: NURSE PRACTITIONER

## 2022-04-16 PROCEDURE — G8427 DOCREV CUR MEDS BY ELIG CLIN: HCPCS | Performed by: NURSE PRACTITIONER

## 2022-04-16 SDOH — ECONOMIC STABILITY: FOOD INSECURITY: WITHIN THE PAST 12 MONTHS, THE FOOD YOU BOUGHT JUST DIDN'T LAST AND YOU DIDN'T HAVE MONEY TO GET MORE.: NEVER TRUE

## 2022-04-16 SDOH — ECONOMIC STABILITY: FOOD INSECURITY: WITHIN THE PAST 12 MONTHS, YOU WORRIED THAT YOUR FOOD WOULD RUN OUT BEFORE YOU GOT MONEY TO BUY MORE.: NEVER TRUE

## 2022-04-16 ASSESSMENT — ENCOUNTER SYMPTOMS
SHORTNESS OF BREATH: 0
DIARRHEA: 0
VOMITING: 0
EYE REDNESS: 0
COUGH: 0
ABDOMINAL PAIN: 0
NAUSEA: 0
PHOTOPHOBIA: 0
BACK PAIN: 0
EYE PAIN: 0
WHEEZING: 0
SORE THROAT: 0

## 2022-04-16 ASSESSMENT — PATIENT HEALTH QUESTIONNAIRE - PHQ9
SUM OF ALL RESPONSES TO PHQ QUESTIONS 1-9: 0
SUM OF ALL RESPONSES TO PHQ9 QUESTIONS 1 & 2: 0
SUM OF ALL RESPONSES TO PHQ QUESTIONS 1-9: 0
2. FEELING DOWN, DEPRESSED OR HOPELESS: 0
1. LITTLE INTEREST OR PLEASURE IN DOING THINGS: 0
SUM OF ALL RESPONSES TO PHQ QUESTIONS 1-9: 0
SUM OF ALL RESPONSES TO PHQ QUESTIONS 1-9: 0

## 2022-04-16 ASSESSMENT — SOCIAL DETERMINANTS OF HEALTH (SDOH): HOW HARD IS IT FOR YOU TO PAY FOR THE VERY BASICS LIKE FOOD, HOUSING, MEDICAL CARE, AND HEATING?: NOT HARD AT ALL

## 2022-04-16 NOTE — PROGRESS NOTES
Subjective:      Patient ID: Chris Tran is a 80 y.o. female who presents today for:  Chief Complaint   Patient presents with    Medication Problem     patient doesn't know how to use medication     Hip Pain     right hip pain        HPI pt states she had a fall few weeks ago- during which she strained her back as well as broke her ribs- she states she was given some pain meds- but is out of them at this time  She denies groin pain- more pain though her buttock  Pt also has questions about her insulin that she was recently stated on     Past Medical History:   Diagnosis Date    Abnormal liver ultrasound 5/15/2019    Allergic contact dermatitis due to plants, except food 8/19/2019    Arthritis     Atrial fibrillation ([address]     CHF (congestive heart failure) ([address]     Chronic kidney disease     Chronic kidney disease (CKD), stage II (mild)     Depression     Hyperlipidemia     Hypertension     Hypothyroidism     Interstitial cystitis     Dr. Marlo Hamilton diagnosed this for michele.  Type II or unspecified type diabetes mellitus without mention of complication, not stated as uncontrolled      Past Surgical History:   Procedure Laterality Date    ABDOMEN SURGERY      CATARACT REMOVAL WITH IMPLANT      both eyes    CORONARY ANGIOPLASTY WITH STENT PLACEMENT      HYSTERECTOMY      OTHER SURGICAL HISTORY  09/07/2016    GENERATOR CHANGE     PACEMAKER PLACEMENT       Family History   Problem Relation Age of Onset    Arthritis Other     Diabetes Other     Heart Disease Other      Social History     Socioeconomic History    Marital status:       Spouse name: Not on file    Number of children: Not on file    Years of education: Not on file    Highest education level: Not on file   Occupational History    Not on file   Tobacco Use    Smoking status: Never Smoker    Smokeless tobacco: Never Used   Vaping Use    Vaping Use: Never used   Substance and Sexual Activity    Alcohol use: No     Comment: denies    Drug use: No    Sexual activity: Not Currently   Other Topics Concern    Not on file   Social History Narrative    Not on file     Social Determinants of Health     Financial Resource Strain: Low Risk     Difficulty of Paying Living Expenses: Not hard at all   Food Insecurity: No Food Insecurity    Worried About Running Out of Food in the Last Year: Never true    [place] of Food in the Last Year: Never true   Transportation Needs:     Lack of Transportation (Medical): Not on file    Lack of Transportation (Non-Medical):  Not on file   Physical Activity:     Days of Exercise per Week: Not on file    Minutes of Exercise per Session: Not on file   Stress:     Feeling of Stress : Not on file   Social Connections:     Frequency of Communication with Friends and Family: Not on file    Frequency of Social Gatherings with Friends and Family: Not on file    Attends Yarsani Services: Not on file    Active Member of [address] [place] [address] or Organizations: Not on file    Attends Club or Organization Meetings: Not on file    Marital Status: Not on file   Intimate Partner Violence:     Fear of Current or Ex-Partner: Not on file    Emotionally Abused: Not on file    Physically Abused: Not on file    Sexually Abused: Not on file   Housing Stability:     Unable to Pay for Housing in the Last Year: Not on file    Number of [place] in the Last Year: Not on file    Unstable Housing in the Last Year: Not on file     Current Outpatient Medications on File Prior to Visit   Medication Sig Dispense Refill   Toan WYATT 100 UNIT/ML injection pen 2 samples given 1-Lot [id number] Exp 6/23 1-Lot [id number] Exp 6/23 2 pen 0    insulin lispro, 1 Unit Dial, (HUMALOG KWIKPEN) 100 UNIT/ML SOPN inject 4 unit subcutaneously IF GLUCOSE  6 UNITS 151-200 8 UNITS 201-250 10 UNITS 251-300 12 UNITS 301-360 14 UNITS 361-400 MAX 42 30 mL 3    blood glucose test strips (ONETOUCH VERIO) strip 1 each by In Vitro route 3 times daily As needed. 100 each 3    OneTouch Delica Lancets 47U MISC tid 100 each 3    Blood Glucose Monitoring Suppl (Adán Buitrago) w/Device KIT As directed 1 kit 0    warfarin (COUMADIN) 5 MG tablet Take as directed by [facility]. Quantity equals 90 day supply. 70 tablet 1    insulin glargine (LANTUS SOLOSTAR) 100 UNIT/ML injection pen inject 30 unit subcutaneously every morning 45 mL 3    Insulin Pen Needle (NOVOFINE) 32G X 6 MM MISC use four times a day 300 each 3    levothyroxine (SYNTHROID) 75 MCG tablet Once a day 30 tablet 5    blood glucose test strips (FREESTYLE INSULINX TEST) strip TEST five times a day 200 strip 3    pantoprazole (PROTONIX) 40 MG tablet take 1 tablet by mouth once daily 90 tablet 0    alendronate (FOSAMAX) 70 MG tablet take 1 tablet by mouth every week on an empty stomach with 6 OUNCES of water. No food OR meds for 1 HOUR.  Remain Upright 12 tablet 4    famotidine (PEPCID) 20 MG tablet Take 1 tablet by mouth 2 times daily 60 tablet 3    ibuprofen (ADVIL;MOTRIN) 200 MG tablet Take 200 mg by mouth every 6 hours as needed for Pain      isosorbide mononitrate (IMDUR) 60 MG extended release tablet take 1 tablet by mouth once daily 90 tablet 0    RA PEN NEEDLES 31G X 5 MM MISC USE FOUR TIMES A  each 1    sodium chloride (OCEAN, BABY AYR) 0.65 % nasal spray 1 spray by Nasal route as needed for Congestion 1 Bottle 0    meclizine (ANTIVERT) 12.5 MG tablet Take 1 tablet by mouth 3 times daily as needed for Dizziness 60 tablet 3    FREESTYLE LANCETS MISC TEST three times a day 100 each 11    metoprolol succinate (TOPROL XL) 50 MG extended release tablet Take 100 mg by mouth 2 times daily       magnesium oxide (MAG-OX) 400 MG tablet Take 400 mg by mouth daily      sotalol (BETAPACE) 120 MG tablet Take 1 tablet by mouth 2 times daily 60 tablet 3    Alcohol Swabs PADS 1 each by Does not apply route       pravastatin (PRAVACHOL) 40 MG tablet Take 1 tablet by mouth daily 90 tablet 3    telmisartan (MICARDIS) 40 MG tablet Take 1 tablet by mouth daily 30 tablet 11    furosemide (LASIX) 40 MG tablet Take 40 mg by mouth daily Except take 1 tablet by mouth twice daily on Monday, Wednesday, and Friday. Rest of days takes daily.  nitroGLYCERIN (NITROSTAT) 0.4 MG SL tablet Place 1 tablet under the tongue every 5 minutes as needed. 25 tablet 1    Cholecalciferol (VITAMIN D3) 1000 UNITS TABS Take 1,000 Units by mouth daily.  Multiple Vitamins-Minerals (CENTRUM SILVER PO) Take 1 tablet by mouth daily       aspirin 81 MG EC tablet Take 81 mg by mouth daily.  albuterol sulfate  (90 Base) MCG/ACT inhaler Inhale 2 puffs into the lungs 4 times daily as needed for Wheezing or Shortness of Breath (cough) 1 Inhaler 0     No current facility-administered medications on file prior to visit.     :  Latex, Avelox [moxifloxacin hcl in nacl], Penicillins, and Adhesive tape    Review of Systems   Constitutional: Negative for chills, diaphoresis and fever. Med questions   HENT: Negative for congestion, sore throat and tinnitus. Eyes: Negative for photophobia, pain and redness. Respiratory: Negative for cough, shortness of breath and wheezing. Cardiovascular: Negative for chest pain, palpitations and leg swelling. Gastrointestinal: Negative for abdominal pain, diarrhea, nausea and vomiting. Genitourinary: Negative for dysuria, flank pain, frequency and urgency. Musculoskeletal: Positive for myalgias (right buttock pain). Negative for back pain. Skin: Negative for rash. Neurological: Negative for dizziness, tremors, seizures, weakness and headaches. Hematological: Does not bruise/bleed easily. Psychiatric/Behavioral: The patient is not nervous/anxious.         Objective:   BP (!) 148/80   Pulse 72   Temp 95.6 °F (35.3 °C) (Temporal)   Ht 5' 2\" (1.575 m) Comment: per pt  Wt 182 lb (82.6 kg) Comment: per pt  LMP  (LMP Unknown)   SpO2 98%   BMI 33.29 kg/m²     Physical Exam  Constitutional:       Appearance: She is well-developed. HENT:      Head: Normocephalic and atraumatic. Eyes:      Pupils: Pupils are equal, round, and reactive to light. Neck:      Thyroid: No thyromegaly. Trachea: No tracheal deviation. Cardiovascular:      Rate and Rhythm: Normal rate and regular rhythm. Heart sounds: Normal heart sounds. No murmur heard. No gallop. Pulmonary:      Effort: Pulmonary effort is normal.      Breath sounds: Normal breath sounds. No wheezing or rales. Chest:      Chest wall: No tenderness. Abdominal:      General: Bowel sounds are normal. There is no distension. Palpations: Abdomen is soft. There is no mass. Tenderness: There is no abdominal tenderness. There is no guarding or rebound. Musculoskeletal:         General: No tenderness. Normal range of motion. Lymphadenopathy:      Cervical: No cervical adenopathy. Skin:     General: Skin is warm and dry. Findings: No erythema or rash. Neurological:      Mental Status: She is alert and oriented to person, place, and time. Coordination: Coordination normal.   Psychiatric:         Behavior: Behavior normal.         Thought Content: Thought content normal.         Procedures   :       Diagnosis Orders   1. Sciatica of right side         :      No orders of the defined types were placed in this encounter.   pt was not sure what dosing and how to take her new insulin   we spoke with her- that she will need to follow up with her endocrinologist office on Monday to go one her new meds- why they were changed and how she is suppose to administer them    Pt denies any double vision or dizziness- she has not been able to take her meds- since they were prescribe for her as she is not ure how      he right buttock is bothering her- but she has full ROM of the hip and is able to ambulate   we spoke about holding of any steroids for what appears to be sciatica pain- since she is already not able to take her insulin   we spoke about stretches and exercises    No orders of the defined types were placed in this encounter. Return in about 1 year (around 4/16/2023), or follow up on monday with endocrinology office for meds clarification. Reviewed with the patient: current clinicalstatus, medications, activities and diet. Side effects, adverse effects of the medication prescribedtoday, as well as treatment plan/ rationale and result expectations have been discussedwith the patient who expresses understanding and desires to proceed. Close follow upto evaluate treatment results and for coordination of care. I have reviewedthe patient's medical history in detail and updated the computerized patient record.     KRISTA Blue - CNP          endocrinology

## 2022-04-24 RX ORDER — LEVOTHYROXINE SODIUM 0.07 MG/1
TABLET ORAL
Qty: 30 TABLET | Refills: 5 | Status: SHIPPED | OUTPATIENT
Start: 2022-04-24 | End: 2022-06-07 | Stop reason: SDUPTHER

## 2022-05-13 ENCOUNTER — HOSPITAL ENCOUNTER (OUTPATIENT)
Dept: PHARMACY | Age: 87
Setting detail: THERAPIES SERIES
Discharge: HOME | End: 2022-05-13
Payer: MEDICARE

## 2022-05-13 DIAGNOSIS — I48.91 ATRIAL FIBRILLATION, UNSPECIFIED TYPE (HCC): Primary | ICD-10-CM

## 2022-05-13 LAB — INTERNATIONAL NORMALIZATION RATIO, POC: 3.2

## 2022-05-13 PROCEDURE — 85610 PROTHROMBIN TIME: CPT | Performed by: PHARMACIST

## 2022-05-13 PROCEDURE — 99211 OFF/OP EST MAY X REQ PHY/QHP: CPT | Performed by: PHARMACIST

## 2022-05-13 NOTE — PROGRESS NOTES
Ms. Gadiel Whitaker is a 80 y.o. y/o female with history of Afib who presents today for anticoagulation monitoring and adjustment.   INR 3.2 is slightly supra-therapeutic for this patient (goal range 2-3) and is reflective of 20 mg TWD  Patient verifies current dosing regimen, patient able to verbally recall dose  Patient reports NO  missed doses since last INR   Patient denies s/sx clotting and/or stroke  Patient denies hematuria, epistaxis, rectal bleeding  Patient denies changes in diet, alcohol, or tobacco use  Reviewed medication list and drug allergies with patient, updated any medication additions or modifications accordingly  Patient also denies any pending medical or dental procedures scheduled at this time  Patient was instructed to HOLD today's dose then continue 20 mg TWD and RTC 4 weeks    For Pharmacy Admin Tracking Only     Intervention Detail: Adherence Monitorin and Dose Adjustment: 1, reason: Improve Adherence, Therapy De-escalation   Total # of Interventions Recommended: 1   Total # of Interventions Accepted: 1   Time Spent (min): 15

## 2022-05-23 ENCOUNTER — HOSPITAL ENCOUNTER (EMERGENCY)
Age: 87
Discharge: HOME | End: 2022-05-23
Attending: EMERGENCY MEDICINE
Payer: MEDICARE

## 2022-05-23 ENCOUNTER — APPOINTMENT (OUTPATIENT)
Dept: GENERAL RADIOLOGY | Age: 87
End: 2022-05-23
Payer: MEDICARE

## 2022-05-23 VITALS
RESPIRATION RATE: 21 BRPM | BODY MASS INDEX: 33.49 KG/M2 | OXYGEN SATURATION: 96 % | SYSTOLIC BLOOD PRESSURE: 123 MMHG | HEIGHT: 62 IN | DIASTOLIC BLOOD PRESSURE: 67 MMHG | WEIGHT: 182 LBS | TEMPERATURE: 98.4 F | HEART RATE: 70 BPM

## 2022-05-23 DIAGNOSIS — B34.9 VIRAL ILLNESS: Primary | ICD-10-CM

## 2022-05-23 LAB
ALBUMIN SERPL-MCNC: 3.6 G/DL (ref 3.5–4.6)
ALP BLD-CCNC: 68 U/L (ref 40–130)
ALT SERPL-CCNC: 12 U/L (ref 0–33)
ANION GAP SERPL CALCULATED.3IONS-SCNC: 12 MEQ/L (ref 9–15)
AST SERPL-CCNC: 15 U/L (ref 0–35)
BASOPHILS ABSOLUTE: 0 K/UL (ref 0–0.2)
BASOPHILS RELATIVE PERCENT: 0.4 %
BILIRUB SERPL-MCNC: 0.7 MG/DL (ref 0.2–0.7)
BUN BLDV-MCNC: 31 MG/DL (ref 8–23)
CALCIUM SERPL-MCNC: 8.7 MG/DL (ref 8.5–9.9)
CHLORIDE BLD-SCNC: 99 MEQ/L (ref 95–107)
CO2: 26 MEQ/L (ref 20–31)
CREAT SERPL-MCNC: 1.32 MG/DL (ref 0.5–0.9)
EKG ATRIAL RATE: 394 BPM
EKG Q-T INTERVAL: 462 MS
EKG QRS DURATION: 152 MS
EKG QTC CALCULATION (BAZETT): 498 MS
EKG R AXIS: 82 DEGREES
EKG T AXIS: 77 DEGREES
EKG VENTRICULAR RATE: 70 BPM
EOSINOPHILS ABSOLUTE: 0.2 K/UL (ref 0–0.7)
EOSINOPHILS RELATIVE PERCENT: 1.9 %
GFR AFRICAN AMERICAN: 46
GFR NON-AFRICAN AMERICAN: 38
GLOBULIN: 2.9 G/DL (ref 2.3–3.5)
GLUCOSE BLD-MCNC: 247 MG/DL (ref 70–99)
HCT VFR BLD CALC: 38.2 % (ref 37–47)
HEMOGLOBIN: 12.8 G/DL (ref 12–16)
INFLUENZA A BY PCR: NEGATIVE
INFLUENZA B BY PCR: NEGATIVE
INR BLD: 2.4
LACTIC ACID: 1.5 MMOL/L (ref 0.5–2.2)
LYMPHOCYTES ABSOLUTE: 0.8 K/UL (ref 1–4.8)
LYMPHOCYTES RELATIVE PERCENT: 8.6 %
MAGNESIUM: 2.1 MG/DL (ref 1.7–2.4)
MCH RBC QN AUTO: 29 PG (ref 27–31.3)
MCHC RBC AUTO-ENTMCNC: 33.5 % (ref 33–37)
MCV RBC AUTO: 86.7 FL (ref 82–100)
MONOCYTES ABSOLUTE: 0.8 K/UL (ref 0.2–0.8)
MONOCYTES RELATIVE PERCENT: 9.2 %
NEUTROPHILS ABSOLUTE: 7.3 K/UL (ref 1.4–6.5)
NEUTROPHILS RELATIVE PERCENT: 79.9 %
PDW BLD-RTO: 13.6 % (ref 11.5–14.5)
PLATELET # BLD: 178 K/UL (ref 130–400)
POTASSIUM SERPL-SCNC: 4.1 MEQ/L (ref 3.4–4.9)
PROCALCITONIN: 0.08 NG/ML (ref 0–0.15)
PROTHROMBIN TIME: 25.6 SEC (ref 12.3–14.9)
RBC # BLD: 4.41 M/UL (ref 4.2–5.4)
SARS-COV-2, NAAT: NOT DETECTED
SODIUM BLD-SCNC: 137 MEQ/L (ref 135–144)
TOTAL PROTEIN: 6.5 G/DL (ref 6.3–8)
TROPONIN: <0.01 NG/ML (ref 0–0.01)
WBC # BLD: 9.1 K/UL (ref 4.8–10.8)

## 2022-05-23 PROCEDURE — 93005 ELECTROCARDIOGRAM TRACING: CPT | Performed by: EMERGENCY MEDICINE

## 2022-05-23 PROCEDURE — 87635 SARS-COV-2 COVID-19 AMP PRB: CPT

## 2022-05-23 PROCEDURE — 84484 ASSAY OF TROPONIN QUANT: CPT

## 2022-05-23 PROCEDURE — 36415 COLL VENOUS BLD VENIPUNCTURE: CPT

## 2022-05-23 PROCEDURE — 87502 INFLUENZA DNA AMP PROBE: CPT

## 2022-05-23 PROCEDURE — 85610 PROTHROMBIN TIME: CPT

## 2022-05-23 PROCEDURE — 87040 BLOOD CULTURE FOR BACTERIA: CPT

## 2022-05-23 PROCEDURE — 83605 ASSAY OF LACTIC ACID: CPT

## 2022-05-23 PROCEDURE — 99285 EMERGENCY DEPT VISIT HI MDM: CPT

## 2022-05-23 PROCEDURE — 84145 PROCALCITONIN (PCT): CPT

## 2022-05-23 PROCEDURE — 85025 COMPLETE CBC W/AUTO DIFF WBC: CPT

## 2022-05-23 PROCEDURE — 71045 X-RAY EXAM CHEST 1 VIEW: CPT

## 2022-05-23 PROCEDURE — 80053 COMPREHEN METABOLIC PANEL: CPT

## 2022-05-23 PROCEDURE — 83735 ASSAY OF MAGNESIUM: CPT

## 2022-05-23 ASSESSMENT — ENCOUNTER SYMPTOMS
VOMITING: 0
CHEST TIGHTNESS: 0
SORE THROAT: 0
NAUSEA: 1
SHORTNESS OF BREATH: 1
ABDOMINAL PAIN: 0
COUGH: 1
EYE PAIN: 0

## 2022-05-23 ASSESSMENT — PAIN - FUNCTIONAL ASSESSMENT: PAIN_FUNCTIONAL_ASSESSMENT: NONE - DENIES PAIN

## 2022-05-23 NOTE — ED PROVIDER NOTES
[address] [facility] ED  EMERGENCY DEPARTMENT ENCOUNTER      Pt Name: Ramon Painting  MRN: [id number]  [place] 1934  Date of evaluation: 5/23/2022  Provider: Nery Paz DO    CHIEF COMPLAINT       Chief Complaint   Patient presents with    Illness     coughing congestion body aches fatigue x 2 weeks         HISTORY OF PRESENT ILLNESS   (Location/Symptom, Timing/Onset, Context/Setting, Quality, Duration, Modifying Factors, Severity)  Note limiting factors. Ramon Painting is a 80 y.o. female who presents to the emergency department . Patient here with 2 weeks of illness consisting of cough congestion nausea runny nose. Patient is vaccinated for COVID and influenza. Short of breath at times. Does not have any lung problems to her knowledge. No chest pain. History of CHF, CKD, hyperlipidemia hypertension and type 2 diabetes    HPI    Nursing Notes were reviewed. REVIEW OF SYSTEMS    (2-9 systems for level 4, 10 or more for level 5)     Review of Systems   Constitutional: Negative for activity change, appetite change, fatigue and fever. HENT: Positive for congestion. Negative for sore throat. Eyes: Negative for pain and visual disturbance. Respiratory: Positive for cough and shortness of breath. Negative for chest tightness. Cardiovascular: Negative for chest pain. Gastrointestinal: Positive for nausea. Negative for abdominal pain and vomiting. Endocrine: Negative for polydipsia. Genitourinary: Negative for flank pain and urgency. Musculoskeletal: Negative for gait problem and neck stiffness. Skin: Negative for rash. Neurological: Negative for weakness, light-headedness and headaches. Psychiatric/Behavioral: Negative for confusion and sleep disturbance. Except as noted above the remainder of the review of systems was reviewed and negative.        PAST MEDICAL HISTORY     Past Medical History:   Diagnosis Date    Abnormal liver ultrasound 5/15/2019    Allergic contact dermatitis due to plants, except food 8/19/2019    Arthritis     Atrial fibrillation (HCC)     CHF (congestive heart failure) (HCC)     Chronic kidney disease     Chronic kidney disease (CKD), stage II (mild)     Depression     Hyperlipidemia     Hypertension     Hypothyroidism     Interstitial cystitis     Dr. Whit Huerta diagnosed this for michele.  Type II or unspecified type diabetes mellitus without mention of complication, not stated as uncontrolled          SURGICAL HISTORY       Past Surgical History:   Procedure Laterality Date    ABDOMEN SURGERY      CATARACT REMOVAL WITH IMPLANT      both eyes    CORONARY ANGIOPLASTY WITH STENT PLACEMENT      HYSTERECTOMY      OTHER SURGICAL HISTORY  09/07/2016    GENERATOR CHANGE     PACEMAKER PLACEMENT           CURRENT MEDICATIONS       Discharge Medication List as of 5/23/2022 11:33 AM      CONTINUE these medications which have NOT CHANGED    Details   levothyroxine (SYNTHROID) 75 MCG tablet take 1 tablet by mouth once daily, Disp-30 tablet, R-5Normal      !! BASAGLAR KWIKPEN 100 UNIT/ML injection pen 2 samples given 1-Lot [id number] Exp 6/23 1-Lot [id number] Exp 6/23, Disp-2 pen, R-0, DAWSample      insulin lispro, 1 Unit Dial, (HUMALOG KWIKPEN) 100 UNIT/ML SOPN inject 4 unit subcutaneously IF GLUCOSE  6 UNITS 151-200 8 UNITS 201-250 10 UNITS 251-300 12 UNITS 301-360 14 UNITS 361-400 MAX 42, Disp-30 mL, R-3Normal      !! blood glucose test strips (ONETOUCH VERIO) strip 1 each by In Vitro route 3 times daily As needed. , Disp-100 each, R-3Normal      !! OneTouch Delica Lancets 32S MISC tid, Disp-100 each, R-3Normal      Blood Glucose Monitoring Suppl (Luis Emperor) w/Device KIT As directed, Disp-1 kit, R-0Normal      warfarin (COUMADIN) 5 MG tablet Take as directed by [facility] EM[facility]GENCY [facility]. Quantity equals 90 day supply. , Disp-70 tablet, R-1Normal      !! insulin glargine (LANTUS SOLOSTAR) 100 UNIT/ML injection pen inject 30 unit subcutaneously every morning, Disp-45 mL, R-3Normal      !! Insulin Pen Needle (NOVOFINE) 32G X 6 MM MISC Disp-300 each, R-3, Normaluse four times a day      !! blood glucose test strips (FREESTYLE INSULINX TEST) strip TEST five times a day, Disp-200 strip, R-3Normal      pantoprazole (PROTONIX) 40 MG tablet take 1 tablet by mouth once daily, Disp-90 tablet, R-0Normal      alendronate (FOSAMAX) 70 MG tablet take 1 tablet by mouth every week on an empty stomach with 6 OUNCES of water. No food OR meds for 1 HOUR.  Remain Upright, Disp-12 tablet, R-4Normal      famotidine (PEPCID) 20 MG tablet Take 1 tablet by mouth 2 times daily, Disp-60 tablet,R-3Normal      ibuprofen (ADVIL;MOTRIN) 200 MG tablet Take 200 mg by mouth every 6 hours as needed for PainHistorical Med      isosorbide mononitrate (IMDUR) 60 MG extended release tablet take 1 tablet by mouth once daily, Disp-90 tablet,R-0Normal      !! RA PEN NEEDLES 31G X 5 MM MISC Disp-300 each,R-1, Normal      albuterol sulfate  (90 Base) MCG/ACT inhaler Inhale 2 puffs into the lungs 4 times daily as needed for Wheezing or Shortness of Breath (cough), Disp-1 Inhaler,R-0Print      sodium chloride (OCEAN, BABY AYR) 0.65 % nasal spray 1 spray by Nasal route as needed for Congestion, Disp-1 Bottle, R-0Print      meclizine (ANTIVERT) 12.5 MG tablet Take 1 tablet by mouth 3 times daily as needed for Dizziness, Disp-60 tablet, R-3Normal      !! FREESTYLE LANCETS MISC Disp-100 each, R-11, NormalTEST three times a day      metoprolol succinate (TOPROL XL) 50 MG extended release tablet Take 100 mg by mouth 2 times daily Historical Med      magnesium oxide (MAG-OX) 400 MG tablet Take 400 mg by mouth dailyHistorical Med      sotalol (BETAPACE) 120 MG tablet Take 1 tablet by mouth 2 times daily, Disp-60 tablet, R-3Normal      Alcohol Swabs PADS Historical Med      pravastatin (PRAVACHOL) 40 MG tablet Take 1 tablet by mouth daily, Disp-90 tablet, R-3Normal      telmisartan (MICARDIS) 40 MG tablet Take 1 tablet by mouth daily, Disp-30 tablet, R-11      furosemide (LASIX) 40 MG tablet Take 40 mg by mouth daily Except take 1 tablet by mouth twice daily on Monday, Wednesday, and Friday. Rest of days takes daily. Historical Med      nitroGLYCERIN (NITROSTAT) 0.4 MG SL tablet Place 1 tablet under the tongue every 5 minutes as needed. , Disp-25 tablet, R-1      Cholecalciferol (VITAMIN D3) 1000 UNITS TABS Take 1,000 Units by mouth daily. Multiple Vitamins-Minerals (CENTRUM SILVER PO) Take 1 tablet by mouth daily Historical Med      aspirin 81 MG EC tablet Take 81 mg by mouth daily. !! - Potential duplicate medications found. Please discuss with provider. ALLERGIES     Latex, Avelox [moxifloxacin hcl in nacl], Penicillins, and Adhesive tape    FAMILY HISTORY       Family History   Problem Relation Age of Onset    Arthritis Other     Diabetes Other     Heart Disease Other           SOCIAL HISTORY       Social History     Socioeconomic History    Marital status:      Spouse name: None    Number of children: None    Years of education: None    Highest education level: None   Occupational History    None   Tobacco Use    Smoking status: Never Smoker    Smokeless tobacco: Never Used   Vaping Use    Vaping Use: Never used   Substance and Sexual Activity    Alcohol use: No     Comment: denies    Drug use: No    Sexual activity: Not Currently   Other Topics Concern    None   Social History Narrative    None     Social Determinants of Health     Financial Resource Strain: Low Risk     Difficulty of Paying Living Expenses: Not hard at all   Food Insecurity: No Food Insecurity    Worried About Running Out of Food in the Last Year: Never true    [place] of Food in the Last Year: Never true   Transportation Needs:     Lack of Transportation (Medical): Not on file    Lack of Transportation (Non-Medical):  Not on file Physical Activity:     Days of Exercise per Week: Not on file    Minutes of Exercise per Session: Not on file   Stress:     Feeling of Stress : Not on file   Social Connections:     Frequency of Communication with Friends and Family: Not on file    Frequency of Social Gatherings with Friends and Family: Not on file    Attends Muslim Services: Not on file    Active Member of [address] [place] [address] or Organizations: Not on file    Attends Club or Organization Meetings: Not on file    Marital Status: Not on file   Intimate Partner Violence:     Fear of Current or Ex-Partner: Not on file    Emotionally Abused: Not on file    Physically Abused: Not on file    Sexually Abused: Not on file   Housing Stability:     Unable to Pay for Housing in the Last Year: Not on file    Number of [place] in the Last Year: Not on file    Unstable Housing in the Last Year: Not on file       SCREENINGS        [place] Coma Scale  Eye Opening: Spontaneous  Best Verbal Response: Oriented  Best Motor Response: Obeys commands  [place] Coma Scale Score: 15               PHYSICAL EXAM    (up to 7 for level 4, 8 or more for level 5)     ED Triage Vitals   BP Temp Temp src Pulse Resp SpO2 Height Weight   05/23/22 0940 05/23/22 0945 -- 05/23/22 0940 05/23/22 0940 05/23/22 0940 05/23/22 0940 05/23/22 0940   (!) 124/56 98.4 °F (36.9 °C)  76 16 98 % 5' 2\" (1.575 m) 182 lb (82.6 kg)       Physical Exam  Vitals and nursing note reviewed. Constitutional:       General: She is not in acute distress. Appearance: She is well-developed. She is not ill-appearing or diaphoretic. HENT:      Head: Normocephalic and atraumatic. Right Ear: External ear normal.      Left Ear: External ear normal.      Nose: Nose normal.      Mouth/Throat:      Mouth: Mucous membranes are moist.      Pharynx: No oropharyngeal exudate. Eyes:      Extraocular Movements: Extraocular movements intact.       Conjunctiva/sclera: Conjunctivae normal.      Pupils: Pupils are equal, round, and reactive to light. Neck:      Thyroid: No thyromegaly. Vascular: No JVD. Trachea: No tracheal deviation. Cardiovascular:      Rate and Rhythm: Normal rate and regular rhythm. Heart sounds: Normal heart sounds. No murmur heard. Pulmonary:      Effort: Pulmonary effort is normal. No respiratory distress. Breath sounds: Normal breath sounds. No wheezing. Abdominal:      General: Bowel sounds are normal.      Palpations: Abdomen is soft. Tenderness: There is no abdominal tenderness. There is no guarding. Musculoskeletal:         General: Normal range of motion. Cervical back: Normal range of motion and neck supple. Right lower leg: No edema. Left lower leg: No edema. Skin:     General: Skin is warm and dry. Coloration: Skin is pale. Findings: No rash. Neurological:      General: No focal deficit present. Mental Status: She is alert and oriented to person, place, and time. Cranial Nerves: No cranial nerve deficit. Psychiatric:         Behavior: Behavior normal.         DIAGNOSTIC RESULTS     EKG: All EKG's are interpreted by the Emergency Department Physician who either signs or Co-signs this chart in the absence of a cardiologist.    Ventricular paced rhythm 70 bpm    RADIOLOGY:   Non-plain film images such as CT, Ultrasound and MRI are read by the radiologist. Plain radiographic images are visualized and preliminarily interpreted by the emergency physician with the below findings:    Chest x-ray shows no acute pathology    Interpretation per the Radiologist below, if available at the time of this note:    XR CHEST PORTABLE   Final Result   Congestive changes, cannot rule out air space disease or Covid 19 disease.  Recommend clinical correlation            ED BEDSIDE ULTRASOUND:   Performed by ED Physician - none    LABS:  Labs Reviewed   COMPREHENSIVE METABOLIC PANEL - Abnormal; Notable for the following components:       Result Value    Glucose 247 (*)     BUN 31 (*)     CREATININE 1.32 (*)     GFR Non- 38.0 (*)     GFR  46.0 (*)     All other components within normal limits   CBC WITH AUTO DIFFERENTIAL - Abnormal; Notable for the following components:    Neutrophils Absolute 7.3 (*)     Lymphocytes Absolute 0.8 (*)     All other components within normal limits   PROTIME-INR - Abnormal; Notable for the following components:    Protime 25.6 (*)     All other components within normal limits   COVID-19, RAPID   RAPID INFLUENZA A/B ANTIGENS   CULTURE, BLOOD 1   CULTURE, BLOOD 2   TROPONIN   LACTIC ACID   PROCALCITONIN   MAGNESIUM   URINALYSIS WITH REFLEX TO CULTURE       All other labs were within normal range or not returned as of this dictation. EMERGENCY DEPARTMENT COURSE and DIFFERENTIAL DIAGNOSIS/MDM:   Vitals:    Vitals:    05/23/22 0940 05/23/22 0945 05/23/22 1035 05/23/22 1140   BP: (!) 124/56  123/68 123/67   Pulse: 76  71 70   Resp: 16  18 21   Temp:  98.4 °F (36.9 °C)     SpO2: 98%  96% 96%   Weight: 182 lb (82.6 kg)      Height: 5' 2\" (1.575 m)          Patient comes in with viral-like symptoms. Negative for COVID and influenza. Chest x-ray negative. Lungs clear pulse ox 96% on room air. Vitals are stable and normal.  Patient likely just has a upper respiratory infection, viral.  I can send patient home with some Zofran have her follow-up with her primary care provider. MDM      REASSESSMENT          CRITICAL CARE TIME   Total Critical Care time was 0 minutes, excluding separately reportable procedures. There was a high probability of clinically significant/life threatening deterioration in the patient's condition which required my urgent intervention. CONSULTS:  None    PROCEDURES:  Unless otherwise noted below, none     Procedures        FINAL IMPRESSION      1.  Viral illness          DISPOSITION/PLAN   DISPOSITION Decision To Discharge 05/23/2022 11:32:32 AM      PATIENT REFERRED TO:  DO Ale Lyons [phone]      As needed      DISCHARGE MEDICATIONS:  Discharge Medication List as of 5/23/2022 11:33 AM        Controlled Substances Monitoring:     RX Monitoring 12/6/2017   Attestation The Prescription Monitoring Report for this patient was reviewed today. Periodic Controlled Substance Monitoring Possible medication side effects, risk of tolerance and/or dependence, and alternative treatments discussed. ;Potential drug abuse or diversion identified, see note documentation.        (Please note that portions of this note were completed with a voice recognition program.  Efforts were made to edit the dictations but occasionally words are mis-transcribed.)    Princess Singh DO (electronically signed)  Attending Emergency Physician           Princess Singh DO  05/23/22 0220

## 2022-05-25 ENCOUNTER — HOSPITAL ENCOUNTER (OUTPATIENT)
Dept: CARDIOLOGY | Age: 87
Discharge: HOME | End: 2022-05-25
Payer: MEDICARE

## 2022-05-25 PROCEDURE — 93280 PM DEVICE PROGR EVAL DUAL: CPT

## 2022-05-28 LAB
BLOOD CULTURE, ROUTINE: NORMAL
CULTURE, BLOOD 2: NORMAL

## 2022-06-01 RX ORDER — INSULIN GLARGINE 100 [IU]/ML
INJECTION, SOLUTION SUBCUTANEOUS
Qty: 2 PEN | Refills: 0 | Status: CANCELLED | OUTPATIENT
Start: 2022-06-01

## 2022-06-01 RX ORDER — INSULIN GLARGINE 100 [IU]/ML
INJECTION, SOLUTION SUBCUTANEOUS
Qty: 45 ML | Refills: 3 | Status: SHIPPED | OUTPATIENT
Start: 2022-06-01 | End: 2022-06-07 | Stop reason: SDUPTHER

## 2022-06-01 RX ORDER — INSULIN LISPRO 100 [IU]/ML
INJECTION, SOLUTION INTRAVENOUS; SUBCUTANEOUS
Qty: 30 ML | Refills: 3 | Status: SHIPPED | OUTPATIENT
Start: 2022-06-01 | End: 2022-06-07 | Stop reason: SDUPTHER

## 2022-06-07 ENCOUNTER — OFFICE VISIT (OUTPATIENT)
Dept: ENDOCRINOLOGY | Age: 87
End: 2022-06-07
Payer: MEDICARE

## 2022-06-07 VITALS
WEIGHT: 181 LBS | SYSTOLIC BLOOD PRESSURE: 101 MMHG | BODY MASS INDEX: 33.31 KG/M2 | DIASTOLIC BLOOD PRESSURE: 64 MMHG | HEART RATE: 72 BPM | HEIGHT: 62 IN | OXYGEN SATURATION: 97 %

## 2022-06-07 DIAGNOSIS — E03.9 HYPOTHYROIDISM, UNSPECIFIED TYPE: Primary | ICD-10-CM

## 2022-06-07 DIAGNOSIS — E11.69 TYPE 2 DIABETES MELLITUS WITH OTHER SPECIFIED COMPLICATION, WITH LONG-TERM CURRENT USE OF INSULIN (HCC): ICD-10-CM

## 2022-06-07 DIAGNOSIS — Z79.4 TYPE 2 DIABETES MELLITUS WITH OTHER SPECIFIED COMPLICATION, WITH LONG-TERM CURRENT USE OF INSULIN (HCC): ICD-10-CM

## 2022-06-07 LAB
CHP ED QC CHECK: NORMAL
GLUCOSE BLD-MCNC: 334 MG/DL
HBA1C MFR BLD: 9.3 %

## 2022-06-07 PROCEDURE — 1090F PRES/ABSN URINE INCON ASSESS: CPT | Performed by: INTERNAL MEDICINE

## 2022-06-07 PROCEDURE — 3046F HEMOGLOBIN A1C LEVEL >9.0%: CPT | Performed by: INTERNAL MEDICINE

## 2022-06-07 PROCEDURE — 1123F ACP DISCUSS/DSCN MKR DOCD: CPT | Performed by: INTERNAL MEDICINE

## 2022-06-07 PROCEDURE — G8417 CALC BMI ABV UP PARAM F/U: HCPCS | Performed by: INTERNAL MEDICINE

## 2022-06-07 PROCEDURE — 1036F TOBACCO NON-USER: CPT | Performed by: INTERNAL MEDICINE

## 2022-06-07 PROCEDURE — G8427 DOCREV CUR MEDS BY ELIG CLIN: HCPCS | Performed by: INTERNAL MEDICINE

## 2022-06-07 PROCEDURE — 82962 GLUCOSE BLOOD TEST: CPT | Performed by: INTERNAL MEDICINE

## 2022-06-07 PROCEDURE — 99213 OFFICE O/P EST LOW 20 MIN: CPT | Performed by: INTERNAL MEDICINE

## 2022-06-07 PROCEDURE — 83036 HEMOGLOBIN GLYCOSYLATED A1C: CPT | Performed by: INTERNAL MEDICINE

## 2022-06-07 RX ORDER — INSULIN LISPRO 100 [IU]/ML
INJECTION, SOLUTION INTRAVENOUS; SUBCUTANEOUS
Qty: 30 ML | Refills: 3 | Status: SHIPPED | OUTPATIENT
Start: 2022-06-07

## 2022-06-07 RX ORDER — LEVOTHYROXINE SODIUM 0.07 MG/1
TABLET ORAL
Qty: 30 TABLET | Refills: 5 | Status: SHIPPED | OUTPATIENT
Start: 2022-06-07

## 2022-06-07 RX ORDER — INSULIN GLARGINE 100 [IU]/ML
INJECTION, SOLUTION SUBCUTANEOUS
Qty: 45 ML | Refills: 3 | Status: SHIPPED | OUTPATIENT
Start: 2022-06-07

## 2022-06-07 NOTE — PROGRESS NOTES
2022    Assessment:       Diagnosis Orders   1. Hypothyroidism, unspecified type     2.  Type 2 diabetes mellitus with other specified complication, with long-term current use of insulin (HCC)  POCT Glucose    POCT glycosylated hemoglobin (Hb A1C)         PLAN:     Orders Placed This Encounter   Procedures    T4, Free     Standing Status:   Future     Standing Expiration Date:   2023    TSH with Reflex     Standing Status:   Future     Standing Expiration Date:   2023    Hemoglobin A1C     Standing Status:   Future     Standing Expiration Date:   2023    Basic Metabolic Panel, Fasting     Standing Status:   Future     Standing Expiration Date:   2023    POCT Glucose    POCT glycosylated hemoglobin (Hb A1C)     Orders Placed This Encounter   Medications    insulin lispro, 1 Unit Dial, (HUMALOG KWIKPEN) 100 UNIT/ML SOPN     Sig: inject 4 unit subcutaneously IF GLUCOSE  6 UNITS 151-200 8 UNITS 201-250 10 UNITS 251-300 12 UNITS 301-360 14 UNITS 361-400 MAX 42     Dispense:  30 mL     Refill:  3    insulin glargine (LANTUS SOLOSTAR) 100 UNIT/ML injection pen     Sig: inject 30 unit subcutaneously every morning     Dispense:  45 mL     Refill:  3    levothyroxine (SYNTHROID) 75 MCG tablet     Sig: take 1 tablet by mouth once daily     Dispense:  30 tablet     Refill:  5    Insulin Pen Needle 32G X 4 MM MISC     Si each by Does not apply route 4 times daily     Dispense:  200 each     Refill:  3         Orders Placed This Encounter   Procedures    POCT Glucose    POCT glycosylated hemoglobin (Hb A1C)       Subjective:     Chief Complaint   Patient presents with    Diabetes    Hypothyroidism     Vitals:    22 1316   BP: 101/64   Pulse: 72   SpO2: 97%   Weight: 181 lb (82.1 kg)   Height: 5' 2\" (1.575 m)     Wt Readings from Last 3 Encounters:   22 181 lb (82.1 kg)   22 182 lb (82.6 kg)   22 182 lb (82.6 kg)     BP Readings from Last 3 Encounters:   22 101/64   05/23/22 123/67   04/16/22 (!) 148/80     Follow-up on type 2 diabetes hypothyroidism patient requesting refills currently on Lantus 30 units in the morning + Humalog sliding scale history of heart disease hypothyroidism on replacement levothyroxine 75 mcg daily overall blood sugars close to 220 testing periodically  Denies any hypoglycemia    Diabetes  She presents for her follow-up diabetic visit. She has type 2 diabetes mellitus. Her disease course has been fluctuating. Pertinent negatives for diabetes include no polydipsia and no polyuria. Diabetic complications include heart disease and nephropathy. Current diabetic treatment includes insulin injections. She is currently taking insulin pre-breakfast, at bedtime, pre-lunch and pre-dinner. Her overall blood glucose range is >200 mg/dl. (Lab Results       Component                Value               Date                       LABA1C                   9.3                 06/07/2022              )     Past Medical History:   Diagnosis Date    Abnormal liver ultrasound 5/15/2019    Allergic contact dermatitis due to plants, except food 8/19/2019    Arthritis     Atrial fibrillation (HCC)     CHF (congestive heart failure) (HCC)     Chronic kidney disease     Chronic kidney disease (CKD), stage II (mild)     Depression     Hyperlipidemia     Hypertension     Hypothyroidism     Interstitial cystitis     Dr. Smooth Gutierrez diagnosed this for michele.  Type II or unspecified type diabetes mellitus without mention of complication, not stated as uncontrolled      Past Surgical History:   Procedure Laterality Date    ABDOMEN SURGERY      CATARACT REMOVAL WITH IMPLANT      both eyes    CORONARY ANGIOPLASTY WITH STENT PLACEMENT      HYSTERECTOMY      OTHER SURGICAL HISTORY  09/07/2016    GENERATOR CHANGE     PACEMAKER PLACEMENT       Social History     Socioeconomic History    Marital status:       Spouse name: Not on file    Number of children: Not on file    Years of education: Not on file    Highest education level: Not on file   Occupational History    Not on file   Tobacco Use    Smoking status: Never Smoker    Smokeless tobacco: Never Used   Vaping Use    Vaping Use: Never used   Substance and Sexual Activity    Alcohol use: No     Comment: denies    Drug use: No    Sexual activity: Not Currently   Other Topics Concern    Not on file   Social History Narrative    Not on file     Social Determinants of Health     Financial Resource Strain: Low Risk     Difficulty of Paying Living Expenses: Not hard at all   Food Insecurity: No Food Insecurity    Worried About [facility] in the Last Year: Never true    [facility] in the Last Year: Never true   Transportation Needs:     Lack of Transportation (Medical): Not on file    Lack of Transportation (Non-Medical):  Not on file   Physical Activity:     Days of Exercise per Week: Not on file    Minutes of Exercise per Session: Not on file   Stress:     Feeling of Stress : Not on file   Social Connections:     Frequency of Communication with Friends and Family: Not on file    Frequency of Social Gatherings with Friends and Family: Not on file    Attends Congregation Services: Not on file    Active Member of [address] [place] [address] or Organizations: Not on file    Attends Club or Organization Meetings: Not on file    Marital Status: Not on file   Intimate Partner Violence:     Fear of Current or Ex-Partner: Not on file    Emotionally Abused: Not on file    Physically Abused: Not on file    Sexually Abused: Not on file   Housing Stability:     Unable to Pay for Housing in the Last Year: Not on file    Number of [place] in the Last Year: Not on file    Unstable Housing in the Last Year: Not on file     Family History   Problem Relation Age of Onset    Arthritis Other     Diabetes Other     Heart Disease Other      Allergies   Allergen Reactions    Latex Itching    Avelox [Moxifloxacin Hcl In Nacl] Itching and Rash    Penicillins Other (See Comments)     welt at the site of injection    Adhesive Tape Rash       Current Outpatient Medications:     insulin lispro, 1 Unit Dial, (HUMALOG KWIKPEN) 100 UNIT/ML SOPN, inject 4 unit subcutaneously IF GLUCOSE  6 UNITS 151-200 8 UNITS 201-250 10 UNITS 251-300 12 UNITS 301-360 14 UNITS 361-400 MAX 42, Disp: 30 mL, Rfl: 3    insulin glargine (LANTUS SOLOSTAR) 100 UNIT/ML injection pen, inject 30 unit subcutaneously every morning, Disp: 45 mL, Rfl: 3    levothyroxine (SYNTHROID) 75 MCG tablet, take 1 tablet by mouth once daily, Disp: 30 tablet, Rfl: 5    BASAGLAR KWIKPEN 100 UNIT/ML injection pen, 2 samples given 1-Lot [id number] Exp 6/23 1-Lot [id number] Exp 6/23, Disp: 2 pen, Rfl: 0    blood glucose test strips (ONETOUCH VERIO) strip, 1 each by In Vitro route 3 times daily As needed. , Disp: 100 each, Rfl: 3    OneTouch Delica Lancets 24X MISC, tid, Disp: 100 each, Rfl: 3    Blood Glucose Monitoring Suppl (Herve Phan) w/Device KIT, As directed, Disp: 1 kit, Rfl: 0    warfarin (COUMADIN) 5 MG tablet, Take as directed by [facility]. Quantity equals 90 day supply. , Disp: 70 tablet, Rfl: 1    Insulin Pen Needle (NOVOFINE) 32G X 6 MM MISC, use four times a day, Disp: 300 each, Rfl: 3    blood glucose test strips (FREESTYLE INSULINX TEST) strip, TEST five times a day, Disp: 200 strip, Rfl: 3    pantoprazole (PROTONIX) 40 MG tablet, take 1 tablet by mouth once daily, Disp: 90 tablet, Rfl: 0    alendronate (FOSAMAX) 70 MG tablet, take 1 tablet by mouth every week on an empty stomach with 6 OUNCES of water. No food OR meds for 1 HOUR.  Remain Upright, Disp: 12 tablet, Rfl: 4    famotidine (PEPCID) 20 MG tablet, Take 1 tablet by mouth 2 times daily, Disp: 60 tablet, Rfl: 3    ibuprofen (ADVIL;MOTRIN) 200 MG tablet, Take 200 mg by mouth every 6 hours as needed for Pain, Disp: , Rfl:     isosorbide mononitrate (IMDUR) 60 MG extended release tablet, take 1 tablet by mouth once daily, Disp: 90 tablet, Rfl: 0    RA PEN NEEDLES 31G X 5 MM MISC, USE FOUR TIMES A DAY, Disp: 300 each, Rfl: 1    sodium chloride (OCEAN, BABY AYR) 0.65 % nasal spray, 1 spray by Nasal route as needed for Congestion, Disp: 1 Bottle, Rfl: 0    meclizine (ANTIVERT) 12.5 MG tablet, Take 1 tablet by mouth 3 times daily as needed for Dizziness, Disp: 60 tablet, Rfl: 3    FREESTYLE LANCETS [facility], TEST three times a day, Disp: 100 each, Rfl: 11    metoprolol succinate (TOPROL XL) 50 MG extended release tablet, Take 100 mg by mouth 2 times daily , Disp: , Rfl:     magnesium oxide (MAG-OX) 400 MG tablet, Take 400 mg by mouth daily, Disp: , Rfl:     sotalol (BETAPACE) 120 MG tablet, Take 1 tablet by mouth 2 times daily, Disp: 60 tablet, Rfl: 3    Alcohol Swabs PADS, 1 each by Does not apply route , Disp: , Rfl:     pravastatin (PRAVACHOL) 40 MG tablet, Take 1 tablet by mouth daily, Disp: 90 tablet, Rfl: 3    telmisartan (MICARDIS) 40 MG tablet, Take 1 tablet by mouth daily, Disp: 30 tablet, Rfl: 11    furosemide (LASIX) 40 MG tablet, Take 40 mg by mouth daily Except take 1 tablet by mouth twice daily on Monday, Wednesday, and Friday. Rest of days takes daily. , Disp: , Rfl:     nitroGLYCERIN (NITROSTAT) 0.4 MG SL tablet, Place 1 tablet under the tongue every 5 minutes as needed. , Disp: 25 tablet, Rfl: 1    Cholecalciferol (VITAMIN D3) 1000 UNITS TABS, Take 1,000 Units by mouth daily. , Disp: , Rfl:     Multiple Vitamins-Minerals (CENTRUM SILVER PO), Take 1 tablet by mouth daily , Disp: , Rfl:     aspirin 81 MG EC tablet, Take 81 mg by mouth daily.   , Disp: , Rfl:     albuterol sulfate  (90 Base) MCG/ACT inhaler, Inhale 2 puffs into the lungs 4 times daily as needed for Wheezing or Shortness of Breath (cough), Disp: 1 Inhaler, Rfl: 0  Lab Results   Component Value Date     05/23/2022    K 4.1 05/23/2022    CL 99 05/23/2022    CO2 26 05/23/2022    BUN 31 (H) 05/23/2022    CREATININE 1.32 (H) 05/23/2022    GLUCOSE 334 06/07/2022    CALCIUM 8.7 05/23/2022    PROT 6.5 05/23/2022    LABALBU 3.6 05/23/2022    BILITOT 0.7 05/23/2022    ALKPHOS 68 05/23/2022    AST 15 05/23/2022    ALT 12 05/23/2022    LABGLOM 38.0 (L) 05/23/2022    GFRAA 46.0 (L) 05/23/2022    GLOB 2.9 05/23/2022     Lab Results   Component Value Date    WBC 9.1 05/23/2022    HGB 12.8 05/23/2022    HCT 38.2 05/23/2022    MCV 86.7 05/23/2022     05/23/2022     Lab Results   Component Value Date    LABA1C 9.3 06/07/2022    LABA1C 8.1 (H) 03/17/2022    LABA1C 8.1 03/08/2022     Lab Results   Component Value Date    HDL 41 08/10/2021    HDL 42 04/23/2021    HDL 37 (L) 10/30/2020    LDLCALC 100 08/10/2021    LDLCALC 70 04/23/2021    LDLCALC 73 10/30/2020    CHOL 177 08/10/2021    CHOL 144 04/23/2021    CHOL 135 10/30/2020    TRIG 182 (H) 08/10/2021    TRIG 160 (H) 04/23/2021    TRIG 127 10/30/2020     No results found for: TESTM  Lab Results   Component Value Date    TSH 2.420 08/10/2021    TSH 2.080 04/23/2021    TSH 1.750 11/13/2020    TSHREFLEX 1.360 03/17/2022    TSHREFLEX 2.930 07/12/2021    TSHREFLEX 1.770 06/25/2019    FT3 1.6 (L) 03/07/2014    T4FREE 1.28 03/17/2022    T4FREE 1.18 07/12/2021    T4FREE 1.23 11/13/2020     No results found for: TPOABS    Review of Systems   Eyes: Negative. Cardiovascular: Negative. Endocrine: Negative for polydipsia and polyuria. Psychiatric/Behavioral: Positive for dysphoric mood. All other systems reviewed and are negative. Objective:   Physical Exam  Vitals reviewed. Constitutional:       General: She is not in acute distress. Appearance: Normal appearance. She is obese. HENT:      Head: Normocephalic and atraumatic. Right Ear: External ear normal.      Left Ear: External ear normal.      Nose: Nose normal.   Eyes:      General: No scleral icterus. Right eye: No discharge.          Left

## 2022-06-07 NOTE — PROGRESS NOTES
Arrived to pre/post cath from home. Name and date of birth verified along with consents for procedure. Allergies verified.   Oriented to surroundings and rights and responsibility handout given Today's date 6/7/2022  Admission date 6/6/2022  Hospital Room /A   Referring Provider : Jeannette Bunn NP    Reason for initial hospital visit: Evaluation and management of Diabetes Mellitus - type II, hyperglycemia    History of Present Illness:  Current history is provided by The patient and The chart  This is a 55 year old Male who is admitted to ED with BG > 600 mg/dL. He has a history of heart failure on milrinone infusion, DM2. He was noted to have balanitis in ED and he was placed on Jardiance 10 mg in 12/2021 for heart failure. BG was > 900 mg/dL in late April 2022 and his Lantus was increased from 10 units to 15 units nightly inpatient. He was readmitted within 24 hours with a BG > 400 mg/dL. No bolus insulin was started. A1c 10.5% at that time. He states diet is poor. Has had yeast infection in penis \"for a minute\".     Current hospital regimen for diabetes: Lantus 45 units nightly and Humalog 12 units AC with low correction  Current oral intake status: One Time Diet Consistent Carb Moderate (45-75 Gm/meal)  One Time Diet Consistent Carb Moderate (45-75 Gm/meal); Please Bring Tray To The Er Room B9 Thank You  One Time Diet Consistent Carb Moderate (45-75 Gm/meal)  Consistent Carb Moderate (45-75 Gm/meal), Sodium 2 Gm (low Sodium) Diet  Blood sugars while hospitalized:   Recent Labs   Lab 06/06/22  1405 06/06/22  1536 06/06/22  1713 06/06/22  1811 06/06/22  2200   GLUCOSE BEDSIDE 413* 328* 279* 258* 139*     Patient has Diabetes Mellitus - diagnosed as type II, in 2000. Was on insulin in the past, \"off/on, never consistently.\" Lantus 25 units nightly lately with good compliance. Jardiance 10 mg daily started 12/2021 for heart failure. A1c > 10%. Admitted with BG > 600 mg/dL. Missed endocrine appointment at [place] on 6/3/22.    Past Medical History:    Diabetes mellitus (CMS/HCC)                                   Essential (primary) hypertension                              Other and unspecified  hyperlipidemia                          MI (myocardial infarction) (CMS/[facility])                            Comment:  2002,12/2013, S/P multiple PCI    CHF (congestive heart failure) (CMS/[facility])                      CAD (coronary artery disease)                                 History of noncompliance with medical treatment                 Comment: due to lack of insurance    Cardiomyopathy, dilated (CMS/[facility])                             Financial difficulties                                        Hemorrhoids                                                   Wears dentures                                                  Comment: full upper    SOB (shortness of breath)                                     COVID-19                                                      Uses roller walker                                              Comment: roller walker w/seat    History of use of hearing aid in both ears                    Wears reading eyeglasses                                      Wears hearing aid in both ears                                [place] (hard of hearing)                                         Erectile dysfunction                                          Paroxysmal atrial fibrillation (CMS/[facility])                      Chronic kidney disease                                        ICD (implantable cardioverter-defibrillator) i*               Bradycardia                                                   Depression                                                      Comment: pt denies history of depression  Past Surgical History:   Procedure Laterality Date    Cardiac catherization  07/01/2014    Cardiac catherization  12/26/2013    Cardioversion  10/04/2021    Cardioversion  02/11/2022    Echo m-mode/2d/doppler (routine)  02/08/2019    Echo m-mode/2d/doppler (routine)  06/14/2017    Echo m-mode/2d/doppler (routine)  05/04/2018    Extracapsular cataract removal w insert io lens prosth wo ecp Left 02/01/2021    zcboo  +21.0 Dr Huseyin Doe    Extracapsular cataract removal w insert io lens prosth wo ecp Right 02/15/2021    zcboo +20.5     Icd implant  10/21/2014    Single lead- [facility]    Mouth surgery  07/06/2016    REMOVAL OF ALL UPPER TEETH AND TEETH #17 AND 32     Ptca  12/27/2013    Stent RCA    Right and left heart cath  09/16/2019    Right heart cath  02/21/2019    Right heart catheterization  03/19/2021     Family History   Problem Relation Age of Onset    Stroke Mother     Diabetes Father     Hypertension Father     Heart disease Father     Asthma Son     Heart disease Sister     Heart disease Brother    diabetes in mother.     Social History:  Social History     Socioeconomic History    Marital status: Single     Spouse name: Not on file    Number of children: 0    Years of education: Not on file    Highest education level: Not on file   Occupational History     Employer: [employer]     Comment: part time    Occupation: unemployed     Comment: applying for disability   Tobacco Use    Smoking status: Never Smoker    Smokeless tobacco: Never Used   Vaping Use    Vaping Use: never used   Substance and Sexual Activity    Alcohol use: No    Drug use: No    Sexual activity: Not on file   Other Topics Concern    Not on file   Social History Narrative    Lives with sister Annie     Social Determinants of Health     Financial Resource Strain: Low Risk     Social Determinants: Financial Resource Strain: None   Food Insecurity: No Food Insecurity    Social Determinants: Food Insecurity: Never   Transportation Needs: No Transportation Needs    Lack of Transportation (Medical): No    Lack of Transportation (Non-Medical): No   Physical Activity: Insufficiently Active    Days of Exercise per Week: 2 days    Minutes of Exercise per Session: 10 min   Stress: Medium Risk    Social Determinants: Stress: Somewhat   Social Connections: Socially Integrated    Social Determinants: Social Connections: 5 or more times a week    Intimate Partner Violence: Not At Risk    Social Determinants: Intimate Partner Violence Past Fear: No    Social Determinants: Intimate Partner Violence Current Fear: No     ALLERGIES:   Allergen Reactions    Aspirin Other (See Comments)     Patient states his doctor told him to never take aspirin, stomach upset    Lisinopril HEADACHES and Other (See Comments)     Low BP    Spironolactone HEADACHES     Home medications:  Medications Prior to Admission   Medication Sig Dispense Refill    apixaBAN (Eliquis) 5 MG Tab Take 1 tablet by mouth every 12 hours. 180 tablet 1    insulin glargine 100 UNIT/ML pen-injector Inject 45 Units into the skin nightly. Prime 2 units before each dose. (Patient taking differently: Inject 25 Units into the skin nightly. Prime 2 units before each dose.) 15 mL 12    midodrine (PROAMATINE) 10 MG tablet Take 1 tablet by mouth 3 times daily. 90 tablet 0    blood glucose test strip Test blood sugar 1 time daily as directed DM type 2, E11.9,  Meter : One Touch Ultra 100 each 3    potassium CHLORIDE (KLOR-CON M) 20 MEQ milly ER tablet Take 3 tablets by mouth 2 times daily. 180 tablet 3    torsemide (DEMADEX) 20 MG tablet Take 4 tablets by mouth every morning AND 2 tablets every evening. 180 tablet 3    omeprazole (PriLOSEC OTC) 20 MG tablet Take 20 mg by mouth as needed.      polyethylene glycol (MiraLax) 17 GM/SCOOP powder Take 17 g by mouth daily.      tamsulosin (Flomax) 0.4 MG Cap Take 2 capsules by mouth daily. 60 capsule 1    spironolactone (ALDACTONE) 25 MG tablet Take 1 tablet by mouth daily. 30 tablet 1    milrinone (PRIMACOR) 1 MG/ML injection Inject 9.85 mcg/min into the vein continuous. Administer via ambulatory pump 100 mL 365    rOPINIRole (REQUIP) 1 MG tablet Take 1 tablet by mouth every evening. 30 tablet 1    calcitRIOL (ROCALTROL) 0.25 MCG capsule Take 1 capsule by mouth daily. 30 capsule 3    blood glucose lancets Test blood sugar 1 time daily as directed. 100 each 1    Insulin  Pen Needle 31G X 8 MM Misc Use to inject insulin 1 time daily. Remove needle cover(s) to expose needle before injecting. 100 each 1    acetaminophen (Acetaminophen Extra Strength) 500 MG tablet Take 1 tablet by mouth every 8 hours as needed for Pain. 45 tablet 1    empagliflozin (Jardiance) 10 MG tablet Take 1 tablet by mouth daily. Do not start before December 16, 2021. 60 tablet 1    atorvastatin (LIPITOR) 40 MG tablet Take 1 tablet by mouth daily. 90 tablet 2    clopidogrel (PLAVIX) 75 MG tablet Take 1 tablet by mouth daily. 90 tablet 3     Hospital medications:   insulin lispro   Subcutaneous TID WC    apixaBAN  5 mg Oral 2 times per day    atorvastatin  40 mg Oral Daily    calcitRIOL  0.25 mcg Oral Daily    clopidogrel  75 mg Oral Daily    insulin glargine  45 Units Subcutaneous Nightly    midodrine  10 mg Oral TID    pantoprazole  40 mg Oral QAM AC    spironolactone  25 mg Oral Daily    rOPINIRole  1 mg Oral Q Evening    tamsulosin  0.8 mg Oral Daily    torsemide  80 mg Oral Daily    sodium chloride (PF)  2 mL Intracatheter 2 times per day    Magnesium Standard Replacement Protocol   Does not apply See Admin Instructions    Potassium Standard Replacement Protocol   Does not apply See Admin Instructions    insulin lispro   Subcutaneous TID WC    miconazole   Topical BID    fluconazole  150 mg Oral Daily     Review of Systems:  A 10 point ROS including Constitutional, Neuro, Eyes, ENT, Cardio, Pulm, GI, , MSK, Skin, Psych, Endocrine and are negative except for yeast infection on penis.    Physical Exam:  Visit Vitals  BP 94/51 (BP Location: LUE - Left upper extremity, Patient Position: Semi-Comer's)   Pulse 60   Temp 98.2 °F (36.8 °C)   Resp 18   Ht 5' 5\" (1.651 m)   Wt 70.7 kg (155 lb 12.1 oz)   SpO2 97%   BMI 25.92 kg/m²     Constitutional: Resting, well nourished male in NAD.  Eyes: Anicteric and no scleral injection. No exophthalmos.   ENT: Moist oropharynx. Edentulate on top. No lip ulcers.   Head:  Normocephalic, atraumatic  Neck: Supple, no visible masses.   Respiratory: non-labored. No peripheral cyanosis.   Cardiac: no LE edema bilaterally.    GI: nontender, non-distended, and no masses or guarding.    Skin: Warm and dry, normal texture and temperature and no rashes.    Psych: normal mood and flat affect.  Musculoskeletal: NORIEGA x 4, no bony deformities  Neuro: Awake, alert and oriented x 3. No resting tremor. Follows commands. Speech normal.      Lab Review:  Hemoglobin A1C (%)   Date Value   04/30/2022 10.5 (H)     No components found for: CHOLHDL  Triglycerides (mg/dL)   Date Value   03/17/2022 53     HDL (mg/dL)   Date Value   03/17/2022 63     LDL (mg/dL)   Date Value   03/17/2022 38     No components found for: NONHDLCALC  Sodium (mmol/L)   Date Value   06/07/2022 135     Potassium (mmol/L)   Date Value   06/07/2022 4.2     Chloride (mmol/L)   Date Value   06/07/2022 99     Glucose (mg/dL)   Date Value   06/07/2022 188 (H)     Calcium (mg/dL)   Date Value   06/07/2022 9.6     Carbon Dioxide (mmol/L)   Date Value   06/07/2022 28     BUN (mg/dL)   Date Value   06/07/2022 24 (H)     Creatinine (mg/dL)   Date Value   06/07/2022 1.64 (H)     TSH (mcUnits/mL)   Date Value   03/17/2022 2.313     WBC (K/mcL)   Date Value   06/07/2022 3.4 (L)     RBC (mil/mcL)   Date Value   06/07/2022 3.79 (L)     HCT (%)   Date Value   06/07/2022 35.4 (L)     HGB (g/dL)   Date Value   06/07/2022 11.2 (L)     PLT (K/mcL)   Date Value   06/07/2022 154     GOT/AST (Units/L)   Date Value   06/07/2022 19     GPT/ALT (Units/L)   Date Value   06/07/2022 29     No results found for: GGTP  Alkaline Phosphatase (Units/L)   Date Value   06/07/2022 84     Bilirubin, Total (mg/dL)   Date Value   06/07/2022 1.3 (H)     Assessment:  Type 2 Diabetes Mellitus, hyperglycemia  Heart failure, ischemic cardiomyopathy  Hypertension, he is on ACE-i  Hyperlipidemia, controlled, is on statin    Plan:  We would recommend discontinuation of outpatient  empagliflozin in setting of balanitis and very high BG. He is at elevated risk of severe Cassandra's gangrene. This can be retried in a month if BG are < 200 on average. If he develops another yeast infection on empagliflozin with normal BG readings, then this drug class should be avoided.     He absolutely needs bolus insulin at home given multiple instances of BG > 400 and even > 900 at home.  - lower Lantus to 30 units nightly  - lower Humalog to 10 units AC with low correction  - he has missed endocrine appointments in the past. Needs follow-up.    CORTEZ: today  Plan: insulin as above, no SGLT2i is recommended. Heart failure team aware of this issue. Needs follow-up with endocrine at [place].    We will continue to follow and titrate/manage medications as appropriate.     Case discussed and plan developed with attending endocrinologist.    Fab Johnson NP    I have seen and examined the patient and conducted my own physical exam below.  >50% of the patient care time, including face to face contact and coordination of care-was spent by myself as the primary provider.  100% of the Medical Decision Making for this patient was done by myself.  I have fully reviewed the History and Physical Exam and agree with the full note above by MARIVEL Johnson NP.     Sitting  comfortably in bed, NAD  No accessory muscle use.   No tremors.   Abd soft, non distended.     ROS-  Balanitis.     Plan:   Lantus/humalog doses had been adjusted.     Loni Harris MD

## 2022-06-08 RX ORDER — INSULIN GLARGINE 100 [IU]/ML
INJECTION, SOLUTION SUBCUTANEOUS
Qty: 1 PEN | Refills: 0 | COMMUNITY
Start: 2022-06-08

## 2022-06-10 ENCOUNTER — TELEPHONE (OUTPATIENT)
Dept: PHARMACY | Age: 87
End: 2022-06-10

## 2022-06-12 ASSESSMENT — ENCOUNTER SYMPTOMS: EYES NEGATIVE: 1

## 2022-06-13 ENCOUNTER — HOSPITAL ENCOUNTER (OUTPATIENT)
Dept: PHARMACY | Age: 87
Setting detail: THERAPIES SERIES
Discharge: HOME | End: 2022-06-13
Payer: MEDICARE

## 2022-06-13 DIAGNOSIS — I48.91 ATRIAL FIBRILLATION, UNSPECIFIED TYPE (HCC): Primary | ICD-10-CM

## 2022-06-13 LAB — INTERNATIONAL NORMALIZATION RATIO, POC: 2.6

## 2022-06-13 PROCEDURE — 85610 PROTHROMBIN TIME: CPT | Performed by: PHARMACIST

## 2022-06-13 PROCEDURE — 99211 OFF/OP EST MAY X REQ PHY/QHP: CPT | Performed by: PHARMACIST

## 2022-06-13 NOTE — PROGRESS NOTES
Ms. Adama Garcia is a 80 y.o. female with history of Afib who presents today for anticoagulation monitoring and adjustment. Face to face visit completed, procedural mask and face shield worn by myself as instructed: Mask worn by patient, room cleaned pre and post visit with Virex Plus spray    INR 2.6 is therapeutic for this patient (goal range 2-3) and is reflective of 20 mg TWD  Patient verifies current dosing regimen, patient able to verbally recall dose  Patient reports no missed doses in the last seven days     Patient denies s/sx clotting and/or stroke  Patient denies hematuria, epistaxis, rectal bleeding  Patient denies changes in diet  Patient denies changes in alcohol use  Patient denies changes in tobacco use    Reviewed medication list and drug allergies with patient, updated any medication additions or modifications accordingly    Patient denies any pending medical procedures scheduled at this time  Patient denies any pending dental procedures scheduled at this time    Patient was instructed to continue 20mg TWD and RTC in 4 weeks    For Pharmacy Admin Tracking Only     Intervention Detail: Adherence Monitorin   Total # of Interventions Recommended: 1   Total # of Interventions Accepted: 1   Time Spent (min): 30      MICAH Tolentino. Ph. 2022 11:32 AM

## 2022-06-17 RX ORDER — BLOOD-GLUCOSE METER
EACH MISCELLANEOUS
Qty: 1 KIT | Refills: 0 | Status: SHIPPED | OUTPATIENT
Start: 2022-06-17

## 2022-06-27 ENCOUNTER — APPOINTMENT (OUTPATIENT)
Dept: ULTRASOUND IMAGING | Age: 87
End: 2022-06-27
Payer: MEDICARE

## 2022-06-27 ENCOUNTER — APPOINTMENT (OUTPATIENT)
Dept: GENERAL RADIOLOGY | Age: 87
End: 2022-06-27
Payer: MEDICARE

## 2022-06-27 ENCOUNTER — HOSPITAL ENCOUNTER (EMERGENCY)
Age: 87
Discharge: HOME | End: 2022-06-27
Attending: STUDENT IN AN ORGANIZED HEALTH CARE EDUCATION/TRAINING PROGRAM
Payer: MEDICARE

## 2022-06-27 ENCOUNTER — APPOINTMENT (OUTPATIENT)
Dept: CT IMAGING | Age: 87
End: 2022-06-27
Payer: MEDICARE

## 2022-06-27 VITALS
HEART RATE: 68 BPM | TEMPERATURE: 98.6 F | DIASTOLIC BLOOD PRESSURE: 80 MMHG | SYSTOLIC BLOOD PRESSURE: 128 MMHG | WEIGHT: 179 LBS | OXYGEN SATURATION: 100 % | HEIGHT: 62 IN | RESPIRATION RATE: 19 BRPM | BODY MASS INDEX: 32.94 KG/M2

## 2022-06-27 DIAGNOSIS — M79.604 RIGHT LEG PAIN: Primary | ICD-10-CM

## 2022-06-27 LAB
ALBUMIN SERPL-MCNC: 3.7 G/DL (ref 3.5–4.6)
ALP BLD-CCNC: 74 U/L (ref 40–130)
ALT SERPL-CCNC: 12 U/L (ref 0–33)
ANION GAP SERPL CALCULATED.3IONS-SCNC: 9 MEQ/L (ref 9–15)
AST SERPL-CCNC: 18 U/L (ref 0–35)
BACTERIA: ABNORMAL /HPF
BASOPHILS ABSOLUTE: 0 K/UL (ref 0–0.2)
BASOPHILS RELATIVE PERCENT: 0.4 %
BILIRUB SERPL-MCNC: 0.5 MG/DL (ref 0.2–0.7)
BILIRUBIN URINE: NEGATIVE
BLOOD, URINE: NEGATIVE
BUN BLDV-MCNC: 24 MG/DL (ref 8–23)
CALCIUM SERPL-MCNC: 9.3 MG/DL (ref 8.5–9.9)
CHLORIDE BLD-SCNC: 102 MEQ/L (ref 95–107)
CLARITY: CLEAR
CO2: 32 MEQ/L (ref 20–31)
COLOR: YELLOW
CREAT SERPL-MCNC: 1.17 MG/DL (ref 0.5–0.9)
EOSINOPHILS ABSOLUTE: 0.1 K/UL (ref 0–0.7)
EOSINOPHILS RELATIVE PERCENT: 1.7 %
EPITHELIAL CELLS, UA: ABNORMAL /HPF (ref 0–5)
GFR AFRICAN AMERICAN: 52.8
GFR NON-AFRICAN AMERICAN: 43.6
GLOBULIN: 2.9 G/DL (ref 2.3–3.5)
GLUCOSE BLD-MCNC: 123 MG/DL (ref 70–99)
GLUCOSE URINE: NEGATIVE MG/DL
HCT VFR BLD CALC: 36.3 % (ref 37–47)
HEMOGLOBIN: 12.1 G/DL (ref 12–16)
HYALINE CASTS: ABNORMAL /HPF (ref 0–5)
INR BLD: 2.3
KETONES, URINE: ABNORMAL MG/DL
LEUKOCYTE ESTERASE, URINE: ABNORMAL
LYMPHOCYTES ABSOLUTE: 1.8 K/UL (ref 1–4.8)
LYMPHOCYTES RELATIVE PERCENT: 21.7 %
MAGNESIUM: 2.3 MG/DL (ref 1.7–2.4)
MCH RBC QN AUTO: 29.1 PG (ref 27–31.3)
MCHC RBC AUTO-ENTMCNC: 33.5 % (ref 33–37)
MCV RBC AUTO: 86.8 FL (ref 82–100)
MONOCYTES ABSOLUTE: 0.8 K/UL (ref 0.2–0.8)
MONOCYTES RELATIVE PERCENT: 9 %
NEUTROPHILS ABSOLUTE: 5.7 K/UL (ref 1.4–6.5)
NEUTROPHILS RELATIVE PERCENT: 67.2 %
NITRITE, URINE: NEGATIVE
PDW BLD-RTO: 13.7 % (ref 11.5–14.5)
PH UA: 5 (ref 5–9)
PLATELET # BLD: 183 K/UL (ref 130–400)
POTASSIUM SERPL-SCNC: 3.6 MEQ/L (ref 3.4–4.9)
PROTEIN UA: NEGATIVE MG/DL
PROTHROMBIN TIME: 24.6 SEC (ref 12.3–14.9)
RBC # BLD: 4.18 M/UL (ref 4.2–5.4)
RBC UA: ABNORMAL /HPF (ref 0–5)
SODIUM BLD-SCNC: 143 MEQ/L (ref 135–144)
SPECIFIC GRAVITY UA: 1.02 (ref 1–1.03)
TOTAL CK: 31 U/L (ref 0–170)
TOTAL PROTEIN: 6.6 G/DL (ref 6.3–8)
TSH REFLEX: 1.89 UIU/ML (ref 0.44–3.86)
URINE REFLEX TO CULTURE: YES
UROBILINOGEN, URINE: 0.2 E.U./DL
WBC # BLD: 8.5 K/UL (ref 4.8–10.8)
WBC UA: ABNORMAL /HPF (ref 0–5)

## 2022-06-27 PROCEDURE — 97535 SELF CARE MNGMENT TRAINING: CPT

## 2022-06-27 PROCEDURE — 87077 CULTURE AEROBIC IDENTIFY: CPT

## 2022-06-27 PROCEDURE — 85025 COMPLETE CBC W/AUTO DIFF WBC: CPT

## 2022-06-27 PROCEDURE — 73502 X-RAY EXAM HIP UNI 2-3 VIEWS: CPT

## 2022-06-27 PROCEDURE — 87186 SC STD MICRODIL/AGAR DIL: CPT

## 2022-06-27 PROCEDURE — 6360000002 HC RX W HCPCS: Performed by: STUDENT IN AN ORGANIZED HEALTH CARE EDUCATION/TRAINING PROGRAM

## 2022-06-27 PROCEDURE — 87086 URINE CULTURE/COLONY COUNT: CPT

## 2022-06-27 PROCEDURE — 97165 OT EVAL LOW COMPLEX 30 MIN: CPT

## 2022-06-27 PROCEDURE — 97161 PT EVAL LOW COMPLEX 20 MIN: CPT

## 2022-06-27 PROCEDURE — 82550 ASSAY OF CK (CPK): CPT

## 2022-06-27 PROCEDURE — 73562 X-RAY EXAM OF KNEE 3: CPT

## 2022-06-27 PROCEDURE — 6370000000 HC RX 637 (ALT 250 FOR IP): Performed by: STUDENT IN AN ORGANIZED HEALTH CARE EDUCATION/TRAINING PROGRAM

## 2022-06-27 PROCEDURE — 80053 COMPREHEN METABOLIC PANEL: CPT

## 2022-06-27 PROCEDURE — 84443 ASSAY THYROID STIM HORMONE: CPT

## 2022-06-27 PROCEDURE — 73700 CT LOWER EXTREMITY W/O DYE: CPT

## 2022-06-27 PROCEDURE — 6370000000 HC RX 637 (ALT 250 FOR IP): Performed by: EMERGENCY MEDICINE

## 2022-06-27 PROCEDURE — 96374 THER/PROPH/DIAG INJ IV PUSH: CPT

## 2022-06-27 PROCEDURE — 85610 PROTHROMBIN TIME: CPT

## 2022-06-27 PROCEDURE — 83735 ASSAY OF MAGNESIUM: CPT

## 2022-06-27 PROCEDURE — 81001 URINALYSIS AUTO W/SCOPE: CPT

## 2022-06-27 PROCEDURE — 93971 EXTREMITY STUDY: CPT

## 2022-06-27 PROCEDURE — 36415 COLL VENOUS BLD VENIPUNCTURE: CPT

## 2022-06-27 PROCEDURE — 99284 EMERGENCY DEPT VISIT MOD MDM: CPT

## 2022-06-27 RX ORDER — KETOROLAC TROMETHAMINE 15 MG/ML
15 INJECTION, SOLUTION INTRAMUSCULAR; INTRAVENOUS ONCE
Status: COMPLETED | OUTPATIENT
Start: 2022-06-27 | End: 2022-06-27

## 2022-06-27 RX ORDER — HYDROCODONE BITARTRATE AND ACETAMINOPHEN 5; 325 MG/1; MG/1
1 TABLET ORAL ONCE
Status: COMPLETED | OUTPATIENT
Start: 2022-06-27 | End: 2022-06-27

## 2022-06-27 RX ORDER — ACETAMINOPHEN 500 MG
1000 TABLET ORAL ONCE
Status: COMPLETED | OUTPATIENT
Start: 2022-06-27 | End: 2022-06-27

## 2022-06-27 RX ORDER — HYDROCODONE BITARTRATE AND ACETAMINOPHEN 5; 325 MG/1; MG/1
1 TABLET ORAL EVERY 6 HOURS PRN
Qty: 12 TABLET | Refills: 0 | Status: SHIPPED | OUTPATIENT
Start: 2022-06-27 | End: 2022-06-30

## 2022-06-27 RX ADMIN — ACETAMINOPHEN 1000 MG: 500 TABLET ORAL at 06:02

## 2022-06-27 RX ADMIN — KETOROLAC TROMETHAMINE 15 MG: 15 INJECTION, SOLUTION INTRAMUSCULAR; INTRAVENOUS at 06:01

## 2022-06-27 RX ADMIN — HYDROCODONE BITARTRATE AND ACETAMINOPHEN 1 TABLET: 5; 325 TABLET ORAL at 09:42

## 2022-06-27 ASSESSMENT — PAIN DESCRIPTION - ORIENTATION
ORIENTATION: RIGHT
ORIENTATION: RIGHT

## 2022-06-27 ASSESSMENT — ENCOUNTER SYMPTOMS
EYE PAIN: 0
BACK PAIN: 1
VOMITING: 0
DIARRHEA: 0
SORE THROAT: 0
ABDOMINAL PAIN: 0
COUGH: 0
RHINORRHEA: 0
NAUSEA: 0
SHORTNESS OF BREATH: 0

## 2022-06-27 ASSESSMENT — PAIN DESCRIPTION - PAIN TYPE: TYPE: CHRONIC PAIN;ACUTE PAIN

## 2022-06-27 ASSESSMENT — PAIN DESCRIPTION - LOCATION
LOCATION: KNEE
LOCATION: LEG

## 2022-06-27 ASSESSMENT — PAIN DESCRIPTION - DESCRIPTORS
DESCRIPTORS: ACHING
DESCRIPTORS: ACHING

## 2022-06-27 ASSESSMENT — PAIN SCALES - GENERAL
PAINLEVEL_OUTOF10: 10
PAINLEVEL_OUTOF10: 0
PAINLEVEL_OUTOF10: 5

## 2022-06-27 ASSESSMENT — PAIN DESCRIPTION - FREQUENCY: FREQUENCY: CONTINUOUS

## 2022-06-27 ASSESSMENT — PAIN DESCRIPTION - ONSET: ONSET: ON-GOING

## 2022-06-27 ASSESSMENT — PAIN - FUNCTIONAL ASSESSMENT: PAIN_FUNCTIONAL_ASSESSMENT: 0-10

## 2022-06-27 NOTE — ED PROVIDER NOTES
[address] [facility] ED  eMERGENCY dEPARTMENT eNCOUnter      Pt Name: Darío Ross  MRN: [id number]  [place] 1934  Date of evaluation: 6/27/2022  Provider: Princess Singh DO      HISTORY OF PRESENT ILLNESS      Chief Complaint   Patient presents with    Leg Pain     right       The history is provided by the Patient. Darío Ross is a 80 y.o. female with a PMH clinically significant for HTN, HLD, Hypothyroidism, DMII, CKD, CHF, Afib on coumadin presenting to the ED via EMS c/o Right hip and knee pain that has been intermittent and worsening over the past several months s/p fall from standing. Patient states she was evaluated at the time of the fall and told she had a rib fracture. States they did not look at her leg. States she is still able to walk, but with significant pain. Sometimes uses a walker at home that was her late 's. Characterizes pain as aching, constant and mild to moderate in severity. No more recent falls since that time. No associated numbness or weakness. States she was just unable to sleep due to the pain, so came in this morning. Has not been able to sleep well over the past 2 days due to this. Also complaining of green rash over the right upper leg. Unsure how long this has been there. Denies any recent fevers, changes in p.o. intake, chest pain, shortness of breath, cough, abdominal pain, N/V/D/C, changes in urination or bowel movements. Thinks she is taking all meds as indicated except insulin because she does not know how to take it. Thinks she is taking coumadin regularly. Per Chart Review: Evaluation in March 2022 for fall with right-sided chest pain appreciated. Noted rib fracture and stable T8 compression wedge fracture and was treated with tramadol going home. No other acute findings on CT of the chest or thoracic spine. REVIEW OF SYSTEMS       Review of Systems   Constitutional: Negative for chills and fever.    HENT: Negative for rhinorrhea and sore throat. Eyes: Negative for pain and visual disturbance. Respiratory: Negative for cough and shortness of breath. Cardiovascular: Negative for chest pain and palpitations. Gastrointestinal: Negative for abdominal pain, diarrhea, nausea and vomiting. Genitourinary: Negative for difficulty urinating and dysuria. Musculoskeletal: Positive for arthralgias, back pain (chronic, unchanged), gait problem, joint swelling and myalgias. Negative for neck pain. Skin: Positive for rash. Neurological: Negative for weakness, numbness and headaches. PAST MEDICAL HISTORY     Past Medical History:   Diagnosis Date    Abnormal liver ultrasound 5/15/2019    Allergic contact dermatitis due to plants, except food 8/19/2019    Arthritis     Atrial fibrillation (HCC)     CHF (congestive heart failure) (HCC)     Chronic kidney disease     Chronic kidney disease (CKD), stage II (mild)     Depression     Hyperlipidemia     Hypertension     Hypothyroidism     Interstitial cystitis     Dr. Irene Nicole diagnosed this for michele.  Type II or unspecified type diabetes mellitus without mention of complication, not stated as uncontrolled        SURGICAL HISTORY       Past Surgical History:   Procedure Laterality Date    ABDOMEN SURGERY      CATARACT REMOVAL WITH IMPLANT      both eyes    CORONARY ANGIOPLASTY WITH STENT PLACEMENT      HYSTERECTOMY (CERVIX STATUS UNKNOWN)      OTHER SURGICAL HISTORY  09/07/2016    GENERATOR CHANGE     PACEMAKER PLACEMENT         FAMILY HISTORY       Family History   Problem Relation Age of Onset    Arthritis Other     Diabetes Other     Heart Disease Other        SOCIAL HISTORY       Social History     Socioeconomic History    Marital status:       Spouse name: None    Number of children: None    Years of education: None    Highest education level: None   Occupational History    None   Tobacco Use    Smoking status: Never Smoker    Smokeless tobacco: Never Used   Vaping Use    Vaping Use: Never used   Substance and Sexual Activity    Alcohol use: No     Comment: denies    Drug use: No    Sexual activity: Not Currently   Other Topics Concern    None   Social History Narrative    None     Social Determinants of Health     Financial Resource Strain: Low Risk     Difficulty of Paying Living Expenses: Not hard at all   Food Insecurity: No Food Insecurity    Worried About Running Out of Food in the Last Year: Never true    [place] of Food in the Last Year: Never true   Transportation Needs:     Lack of Transportation (Medical): Not on file    Lack of Transportation (Non-Medical): Not on file   Physical Activity:     Days of Exercise per Week: Not on file    Minutes of Exercise per Session: Not on file   Stress:     Feeling of Stress : Not on file   Social Connections:     Frequency of Communication with Friends and Family: Not on file    Frequency of Social Gatherings with Friends and Family: Not on file    Attends [employer] Services: Not on file    Active Member of [address] [place] [address] or Organizations: Not on file    Attends Club or Organization Meetings: Not on file    Marital Status: Not on file   Intimate Partner Violence:     Fear of Current or Ex-Partner: Not on file    Emotionally Abused: Not on file    Physically Abused: Not on file    Sexually Abused: Not on file   Housing Stability:     Unable to Pay for Housing in the Last Year: Not on file    Number of [place] in the Last Year: Not on file    Unstable Housing in the Last Year: Not on file       CURRENT MEDICATIONS       Previous Medications    ALBUTEROL SULFATE  (90 BASE) MCG/ACT INHALER    Inhale 2 puffs into the lungs 4 times daily as needed for Wheezing or Shortness of Breath (cough)    ALCOHOL SWABS PADS    1 each by Does not apply route     ALENDRONATE (FOSAMAX) 70 MG TABLET    take 1 tablet by mouth every week on an empty stomach with 6 OUNCES of water.  No food OR meds for 1 HOUR. Remain Upright    ASPIRIN 81 MG EC TABLET    Take 81 mg by mouth daily. BASAGLAR KWIKPEN 100 UNIT/ML INJECTION PEN    2 samples given 1-Lot [id number] Exp 6/23 1-Lot [id number] Exp 6/23    BLOOD GLUCOSE MONITORING SUPPL (ONETOUCH VERIO) W/DEVICE KIT    As directed    BLOOD GLUCOSE TEST STRIPS (FREESTYLE INSULINX TEST) STRIP    TEST five times a day    BLOOD GLUCOSE TEST STRIPS (ONETOUCH VERIO) STRIP    1 each by In Vitro route 3 times daily As needed. CHOLECALCIFEROL (VITAMIN D3) 1000 UNITS TABS    Take 1,000 Units by mouth daily. FAMOTIDINE (PEPCID) 20 MG TABLET    Take 1 tablet by mouth 2 times daily    FREESTYLE LANCETS MISC    TEST three times a day    FUROSEMIDE (LASIX) 40 MG TABLET    Take 40 mg by mouth daily Except take 1 tablet by mouth twice daily on Monday, Wednesday, and Friday. Rest of days takes daily.     IBUPROFEN (ADVIL;MOTRIN) 200 MG TABLET    Take 200 mg by mouth every 6 hours as needed for Pain    INSULIN GLARGINE (BASAGLAR KWIKPEN) 100 UNIT/ML INJECTION PEN    Lot [id number] Exp 6/23    INSULIN GLARGINE (LANTUS SOLOSTAR) 100 UNIT/ML INJECTION PEN    inject 30 unit subcutaneously every morning    INSULIN LISPRO, 1 UNIT DIAL, (HUMALOG KWIKPEN) 100 UNIT/ML SOPN    inject 4 unit subcutaneously IF GLUCOSE  6 UNITS 151-200 8 UNITS 201-250 10 UNITS 251-300 12 UNITS 301-360 14 UNITS 361-400 MAX 42    INSULIN PEN NEEDLE (NOVOFINE) 32G X 6 MM MISC    use four times a day    INSULIN PEN NEEDLE 32G X 4 MM MISC    1 each by Does not apply route 4 times daily    ISOSORBIDE MONONITRATE (IMDUR) 60 MG EXTENDED RELEASE TABLET    take 1 tablet by mouth once daily    LEVOTHYROXINE (SYNTHROID) 75 MCG TABLET    take 1 tablet by mouth once daily    MAGNESIUM OXIDE (MAG-OX) 400 MG TABLET    Take 400 mg by mouth daily    MECLIZINE (ANTIVERT) 12.5 MG TABLET    Take 1 tablet by mouth 3 times daily as needed for Dizziness    METOPROLOL SUCCINATE (TOPROL XL) 50 MG EXTENDED RELEASE TABLET Take 100 mg by mouth 2 times daily     MULTIPLE VITAMINS-MINERALS (CENTRUM SILVER PO)    Take 1 tablet by mouth daily     NITROGLYCERIN (NITROSTAT) 0.4 MG SL TABLET    Place 1 tablet under the tongue every 5 minutes as needed. ONETOUCH DELICA LANCETS 05X MISC    tid    PANTOPRAZOLE (PROTONIX) 40 MG TABLET    take 1 tablet by mouth once daily    PRAVASTATIN (PRAVACHOL) 40 MG TABLET    Take 1 tablet by mouth daily    RA PEN NEEDLES 31G X 5 MM MISC    USE FOUR TIMES A DAY    SODIUM CHLORIDE (OCEAN, BABY AYR) 0.65 % NASAL SPRAY    1 spray by Nasal route as needed for Congestion    SOTALOL (BETAPACE) 120 MG TABLET    Take 1 tablet by mouth 2 times daily    TELMISARTAN (MICARDIS) 40 MG TABLET    Take 1 tablet by mouth daily    WARFARIN (COUMADIN) 5 MG TABLET    Take as directed by [facility] [place] [facility] [place] [facility]. Quantity equals 90 day supply. ALLERGIES     Latex, Avelox [moxifloxacin hcl in nacl], Penicillins, and Adhesive tape      PHYSICAL EXAM       ED Triage Vitals [06/27/22 0519]   BP Temp Temp Source Heart Rate Resp SpO2 Height Weight   (!) 153/92 98.6 °F (37 °C) Oral 81 18 100 % 5' 2\" (1.575 m) 179 lb (81.2 kg)       Physical Exam  Vitals and nursing note reviewed. Constitutional:       General: She is not in acute distress. Appearance: She is not ill-appearing, toxic-appearing or diaphoretic. HENT:      Head: Normocephalic and atraumatic. Mouth/Throat:      Mouth: Mucous membranes are moist.      Pharynx: Oropharynx is clear. Eyes:      Extraocular Movements: Extraocular movements intact. Pupils: Pupils are equal, round, and reactive to light. Cardiovascular:      Rate and Rhythm: Normal rate. Pulses: Normal pulses. Pulmonary:      Effort: Pulmonary effort is normal. No respiratory distress. Breath sounds: Normal breath sounds. Abdominal:      General: There is no distension. Palpations: Abdomen is soft. Tenderness:  There is no abdominal tenderness. There is no guarding or rebound. Musculoskeletal:      Cervical back: Normal range of motion and neck supple. No tenderness. Right knee: No swelling, deformity or bony tenderness. Normal range of motion. Tenderness present. Right lower leg: No edema. Left lower leg: No edema. Legs:    Skin:     General: Skin is warm and dry. Capillary Refill: Capillary refill takes less than 2 seconds. Neurological:      General: No focal deficit present. Mental Status: She is alert. Sensory: No sensory deficit. Motor: No weakness.    Psychiatric:         Mood and Affect: Mood normal.         MDM:   Chart Reviewed: PMH and additional information as noted in HPI obtained from chart review    Vitals:    Vitals:    06/27/22 0748 06/27/22 0845 06/27/22 0942 06/27/22 0945   BP: 133/73 (!) 143/68  128/80   Pulse: 72 70  68   Resp: 18 18 17 19   Temp:       TempSrc:       SpO2: 97% 99%  100%   Weight:       Height:           PROCEDURES:  Unless otherwise noted below, none  Procedures    LABS:  Labs Reviewed   COMPREHENSIVE METABOLIC PANEL - Abnormal; Notable for the following components:       Result Value    CO2 32 (*)     Glucose 123 (*)     BUN 24 (*)     CREATININE 1.17 (*)     GFR Non- 43.6 (*)     GFR  52.8 (*)     All other components within normal limits   CBC WITH AUTO DIFFERENTIAL - Abnormal; Notable for the following components:    RBC 4.18 (*)     Hematocrit 36.3 (*)     All other components within normal limits   URINALYSIS WITH REFLEX TO CULTURE - Abnormal; Notable for the following components:    Ketones, Urine TRACE (*)     Leukocyte Esterase, Urine SMALL (*)     All other components within normal limits   PROTIME-INR - Abnormal; Notable for the following components:    Protime 24.6 (*)     All other components within normal limits   MICROSCOPIC URINALYSIS - Abnormal; Notable for the following components:    Bacteria, UA MANY (*) WBC, UA 20-50 (*)     All other components within normal limits   CULTURE, URINE   MAGNESIUM   CK   TSH WITH REFLEX       CT HIP RIGHT WO CONTRAST   Final Result   MILD TO SLIGHTLY MORE PRONOUNCED DEGENERATIVE ARTHRITIS. US DUP LOWER EXTREMITY RIGHT LENORE   Final Result   NO SONOGRAPHIC EVIDENCE OF DEEP VENOUS THROMBOSIS WITHIN THE RIGHT LOWER EXTREMITY. XR KNEE RIGHT (3 VIEWS)   Final Result   There are no acute changes. XR HIP 2-3 VW W PELVIS RIGHT   Final Result   NO ACUTE CHANGE. Right hip x-ray no fracture  Right knee no fracture  Duplex right leg no DVT  CT hip no fracture    ED Course as of 06/27/22 1045   Mon Jun 27, 2022   0649 Creatinine(!): 1.17  Improved from most recent baseline CKD. [NA]   0650 CO2(!): 32  Minimally elevated. [NA]   0650 TSH: 1.890 [NA]   0650 Magnesium: 2.3 [NA]   0650 Total CK: 32 [NA]      ED Course User Index  [NA] Gwen Mcneil MD       80 y.o. female with a PMH clinically significant for HTN, HLD, Hypothyroidism, DMII, CKD, CHF, Afib on coumadin presenting to the ED via EMS c/o Right hip and knee pain that has been intermittent and worsening over the past several months s/p fall from standing. Upon initial evaluation, Pt Afebrile, HDS and in NAD. PE as noted above. Labs,  and Imaging as noted above. Given findings, clinical presentation most likely consistent w/ acute on chronic right lower extremity pain has been present for the past several months of yet unclear etiology. Will obtain x-ray imaging and DVT ultrasound of the right lower extremity to further evaluate. No gross acute deformities appreciated on exam.  Patient ambulated for the nurse without difficulty. Green coloration over the right thigh able to be cleaned off with soap and water.    Pt was administered   Medications   ketorolac (TORADOL) injection 15 mg (15 mg IntraVENous Given 6/27/22 0601)   acetaminophen (TYLENOL) tablet 1,000 mg (1,000 mg Oral Given 6/27/22 0602)   HYDROcodone-acetaminophen (NORCO) 5-325 MG per tablet 1 tablet (1 tablet Oral Given 6/27/22 0942)       Plan: Signed out to Dr. aCrlos Combs with plan to follow-up x-ray and ultrasound imaging and reassess. If all negative and patient able to ambulate without difficulty, likely stable for further evaluation management as an outpatient. All imaging negative for fracture. Physical therapy and Occupational Therapy came to see the patient and they felt that she was safe to go home she has a walker at home and uses a cane when she goes out. I will write for some [place] for the pain. CRITICAL CARE TIME   Total CriticalCare time was 0 minutes, excluding separately reportable procedures. There was a high probability of clinically significant/life threatening deterioration in the patient's condition which required my urgent intervention. FINAL IMPRESSION      1. Right leg pain          DISPOSITION/PLAN   DISPOSITION        Current Discharge Medication List      START taking these medications    Details   HYDROcodone-acetaminophen (NORCO) 5-325 MG per tablet Take 1 tablet by mouth every 6 hours as needed for Pain for up to 3 days. Intended supply: 3 days.  Take lowest dose possible to manage pain  Qty: 12 tablet, Refills: 0    Associated Diagnoses: Right leg pain              DO Nelsy Delvalle DO  06/27/22 0638

## 2022-06-27 NOTE — ED TRIAGE NOTES
Pt presents to ED for c/o RLE pain. States she fell on it \"when we had the last snow\" and was seen in ED. States she usually takes a ibuprofen for the pain but was not home yesterday to take it when she usually does so the pain worsened and she was unable to sleep. Pt lives alone and uses walker at home.

## 2022-06-27 NOTE — PROGRESS NOTES
[facility]CC[facility]Y EVALUATION - ACUTE     NAME: Jaspreet Donald  : 1934 (80 y.o.)  MRN: [id number]  CODE STATUS: Prior  Room: 15/15    Date of Service: 2022    Patient Diagnosis(es): No admission diagnoses are documented for this encounter.    Patient Active Problem List    Diagnosis Date Noted    Sciatica of right side 2022    Chest pain 10/29/2020    Depression with anxiety 2020    Psychophysiological insomnia 2020    Pressure injury of buttock, stage 2 ([facility] [address] 2019    Gastroesophageal reflux disease without esophagitis 2019    Vertigo 2019    Age-related osteoporosis without current pathological fracture 2019    Chronic right shoulder pain 2019    Pacemaker 2019    Intertrigo 2019    RUQ pain 2019    Class 2 severe obesity due to excess calories with serious comorbidity and body mass index (BMI) of 38.0 to 38.9 in adult ([address] 2019    Anticoagulated on Coumadin 2016    At high risk for falls 2016    Atrial fibrillation ([address] 2016    Uncontrolled type 2 diabetes mellitus without complication, with long-term current use of insulin 2016    CHF (congestive heart failure) ([address] 2014    Degenerative arthritis of lumbar spine 10/02/2013    Senile cataract 08/15/2013    Incontinence of urine 2012    CKD (chronic kidney disease) stage 3, GFR 30-59 ml/min ([address] 2011    Essential hypertension 2011    Mixed hyperlipidemia 2011    Hypothyroidism 2011        Past Medical History:   Diagnosis Date    Abnormal liver ultrasound 5/15/2019    Allergic contact dermatitis due to plants, except food 2019    Arthritis     Atrial fibrillation (HCC)     CHF (congestive heart failure) (HCC)     Chronic kidney disease     Chronic kidney disease (CKD), stage II (mild)     Depression     Hyperlipidemia     Hypertension     Hypothyroidism     Interstitial cystitis     Dr. Mustapha Hartley diagnosed this for michele.  Type II or unspecified type diabetes mellitus without mention of complication, not stated as uncontrolled      Past Surgical History:   Procedure Laterality Date    ABDOMEN SURGERY      CATARACT REMOVAL WITH IMPLANT      both eyes    CORONARY ANGIOPLASTY WITH STENT PLACEMENT      HYSTERECTOMY (CERVIX STATUS UNKNOWN)      OTHER SURGICAL HISTORY  09/07/2016    GENERATOR CHANGE     PACEMAKER PLACEMENT          Restrictions  Restrictions/Precautions: Fall Risk     Safety Devices: Safety Devices  Type of Devices: All fall risk precautions in place;Call light within reach; Left in bed     Patient's date of birth confirmed: Yes    General:  Chart Reviewed: Yes  Patient assessed for rehabilitation services?: Yes    Subjective  Subjective: \"I just want to get home I'll be fine'       Pain at start of treatment: No    Pain at end of treatment: No    Location: Pt denies pain but states R hip/knee hurt at times if she bends too far.  Not able to describe or elicit  Nursing notified: Declined  RN:   Intervention: None    Prior Level of Function:  Social/Functional History  Lives With: Alone  Type of Home: House  Home Layout: Two level,Able to Live on Main level with bedroom/bathroom (goes to [address] [place] [address] 1 x/wk)  Home Access: Stairs to enter with rails,Ramped entrance (typically use back steps)  Entrance Stairs - Number of Steps: 2  Entrance Stairs - Rails: Left  Bathroom Shower/Tub: Walk-in shower  Bathroom Toilet: Handicap height  Bathroom Equipment: Shower chair,Grab bars in shower,Hand-held shower  Home Equipment: [employer], rolling (transport chair)  Has the patient had two or more falls in the past year or any fall with injury in the past year?: Yes (fall in march)  ADL Assistance: [facility]: Independent  Homemaking Responsibilities: Yes  Ambulation Assistance: Independent (uses [employer] outside the home; uses cane in community)  Transfer Assistance: Independent  Active : Yes  Additional Comments: neighbor brings pt food and has meals on wheels 2 days/wk; goes grocery shopping for cat food    OBJECTIVE:     Orientation Status:  Orientation  Overall Orientation Status: Within Functional Limits    Observation:  Observation/Palpation  Posture: Fair  Observation: no c/o pain    Cognition Status:  Cognition  Overall Cognitive Status: WFL    Perception Status:  Perception  Overall Perceptual Status: WFL    Vision and Hearing Status:  Vision  Vision Exceptions: Wears glasses at all times  Hearing  Hearing: Exceptions to [facility] [place]  Hearing Exceptions: Hard of hearing/hearing concerns   Vision - Basic Assessment  Prior Vision: Wears glasses all the time  Visual History: Glaucoma  Patient Visual Report: No visual complaint reported. Visual Field Cut: No  Oculo Motor Control: WNL    GROSS ASSESSMENT AROM/PROM:  AROM: Within functional limits  PROM: Within functional limits    ROM:   LUE AROM (degrees)  LUE AROM : WFL  Left Hand AROM (degrees)  Left Hand AROM: WFL  RUE AROM (degrees)  RUE AROM : WFL  Right Hand AROM (degrees)  Right Hand AROM: WFL    UE STRENGTH:  Strength: Within functional limits    UE COORDINATION:  Coordination: Within functional limits    UE TONE:  Tone: Normal    UE SENSATION:  Sensation: Intact    Hand Dominance:  Hand Dominance  Hand Dominance: Right    ADL Status:  ADL  Feeding: Independent  Grooming: Modified independent   UE Bathing: Modified independent   LE Bathing: Modified independent   UE Dressing: Modified independent   LE Dressing: Modified independent   Toileting: Modified independent   Additional Comments: Simulated ADLs as above. Limited d/t pt reported intermittent weakness but able to reach feet. Limited d/t environment (high cot), typically uses lift chair  Toilet Transfers  Toilet Transfer: Unable to assess  Toilet Transfers Comments: Anticipate mod I     Educated pt.  on importance of bringing [employer] with her at all times and not leaving it in the bathroom or kitchen. Educated pt. on importance of keeping method of contact available and letting friend know if she is doing a more dangerous task at home, such as showering. Pt. amenable to this education. Functional Mobility:  Patient ambulated to bedside chair with Handheld assist at [address] [place] [address] level. Min A only d/t [employer] not available to test however pt. utilized therapist at [employer] level of support and demonstrated no significant LOB or difficulty ambulating. Bed Mobility  Bed mobility  Rolling to Right: Modified independent  Supine to Sit: Modified independent  Bed Mobility Comments: HOB elevated; increased time and effort; pt states she sleeps in lift chair recliner    Seated and Standing Balance:  Balance  Sitting: Intact  Standing: Intact    Functional Endurance:  Activity Tolerance  Activity Tolerance: Patient Tolerated treatment well    D/C Recommendations:  OT D/C RECOMMENDATIONS  REQUIRES OT FOLLOW-UP: No    Equipment Recommendations:  OT Equipment Recommendations  Equipment Needed: No  Other: PT has [employer] and reacher at home    OT Education:   Patient Education  Education Given To: Patient  Education Provided: Role of Therapy;Plan of Care;ADL Adaptive Strategies;IADL Safety  Education Provided Comments: Educated on importance of bringing [employer] with her at all times, using reacher to [employer] vs. bending from standing, and having daily checks from friends for safety. Pt. agreeable to all education  Education Method: Verbal  Barriers to Learning: None  Education Outcome: Verbalized understanding    OT Follow Up:   OT D/C RECOMMENDATIONS  REQUIRES OT FOLLOW-UP: No     Assessment/Discharge Disposition:  Assessment: Pt is an 80year old woman from home alone who [facility] to [facility] with hip and knee pain. Pt. demonstrates mild functional deficits without WW but has all necessary equipment at home. No acute OT needs identified and pt. denies concerns. No acute OT indicated. Performance deficits / Impairments: Decreased functional mobility ,Decreased endurance,Decreased balance,Decreased high-level IADLs,Decreased strength  Prognosis: Good  No Skilled OT: Independent with ADL's,Safe to return home  Decision Making: Low Complexity  History: Pt's medical history is moderately complex  Exam: Pt. has 5 performance deficits  Assistance / Modification: Pt is independent with ADL tasks    AMPAC (Six Click) Self care Score   How much help for putting on and taking off regular lower body clothing?: None  How much help for Bathing?: None  How much help for Toileting?: None  How much help for putting on and taking off regular upper body clothing?: None  How much help for taking care of personal grooming?: None  How much help for eating meals?: None  AM-[facility] Inpatient Daily Activity Raw Score: 24  AM-PAC Inpatient ADL T-Scale Score : 57.54  ADL Inpatient CMS 0-100% Score: 0    Therapy key for assistance levels -   Independent/Mod I = Pt. is able to perform task with no assistance but may require a device   Stand by assistance = Pt. does not perform task at an independent level but does not need physical assistance, requires verbal cues  Minimal, Moderate, Maximal Assistance = Pt. requires physical assistance (25%, 50%, 75% assist from helper) for task but is able to actively participate in task   Dependent = Pt. requires total assistance with task and is not able to actively participate with task completion     Plan:  Plan  Times per Week: No acute OT    Goals:   Patient will:    Patient Goal: Patient goals : \"I just want to go home\"     Discussed and agreed upon: Yes Comments:       Therapy Time:   Individual   Time In 1016   Time Out 1040   Minutes 24     ADL/IADL trainin minutes  Eval: 16 minutes     Electronically signed by:     LILLIAM Arteaga,   2022, 10:55 AM

## 2022-06-27 NOTE — CARE COORDINATION
Met with pt at bedside to discuss discharge planning. Pt plans to return home. PT/OT evaluated pt and recommendation No PT or OT indicated for discharge. Updated pt of recommendation. Pt called [employer] for ride for discharge and unable to reach. This CM called [employer] at [phone] and no answer and mailbox full. Pt requested writer to call granddaughter Ray Garcia who is emergency contact. Call to Ray Garcia who is currently at work in [employer] but will leave and come  pt shortly. Updated pt and [address] RN. Granddaughter Ray Garcia here to get pt.

## 2022-06-27 NOTE — ED NOTES
Taking over the care of pt. Pt stable, resting in bed, 0 distress, 0 co. A&ox4.      Linwood Chung RN  06/27/22 [id number]

## 2022-06-27 NOTE — ED NOTES
Pt resting in bed, a&ox3, skin w/d/pink, 0 distress, Dr. Mal Sosa at bedside to update pt.      Yessi Magallon RN  06/27/22 2449

## 2022-06-27 NOTE — PROGRESS NOTES
Physical Therapy Med Surg Initial Assessment  Facility/Department: [address] [place] [address] ED  Room: 15/15       NAME: Kaylah Nye  : 1934 (80 y.o.)  MRN: [id number]  CODE STATUS: Prior    Date of Service: 2022    Patient Diagnosis(es): No admission diagnoses are documented for this encounter.    Chief Complaint   Patient presents with    Leg Pain     right     Patient Active Problem List    Diagnosis Date Noted    Sciatica of right side 2022    Chest pain 10/29/2020    Depression with anxiety 2020    Psychophysiological insomnia 2020    Pressure injury of buttock, stage 2 ([facility] [address] 2019    Gastroesophageal reflux disease without esophagitis 2019    Vertigo 2019    Age-related osteoporosis without current pathological fracture 2019    Chronic right shoulder pain 2019    Pacemaker 2019    Intertrigo 2019    RUQ pain 2019    Class 2 severe obesity due to excess calories with serious comorbidity and body mass index (BMI) of 38.0 to 38.9 in adult ([address] 2019    Anticoagulated on Coumadin 2016    At high risk for falls 2016    Atrial fibrillation ([address] 2016    Uncontrolled type 2 diabetes mellitus without complication, with long-term current use of insulin 2016    CHF (congestive heart failure) ([address] 2014    Degenerative arthritis of lumbar spine 10/02/2013    Senile cataract 08/15/2013    Incontinence of urine 2012    CKD (chronic kidney disease) stage 3, GFR 30-59 ml/min ([address] 2011    Essential hypertension 2011    Mixed hyperlipidemia 2011    Hypothyroidism 2011        Past Medical History:   Diagnosis Date    Abnormal liver ultrasound 5/15/2019    Allergic contact dermatitis due to plants, except food 2019    Arthritis     Atrial fibrillation (HCC)     CHF (congestive heart failure) (HCC)     Chronic kidney disease     Chronic kidney disease Impaired  Vision Exceptions: Wears glasses at all times  Hearing: Exceptions to [facility]  Hearing Exceptions: Hard of hearing/hearing concerns    Cognition:  Overall Orientation Status: Within Functional Limits  Follows Commands: Within Functional Limits  Overall Cognitive Status: WFL    Observation/Palpation  Posture: Fair  Observation: no c/o pain    ROM:  RLE AROM: WFL  LLE AROM : WFL    Strength:  Strength RLE  Strength RLE: WFL  Strength LLE  Strength LLE: WFL    Neuro:  Balance  Sitting - Static: Good  Sitting - Dynamic: Good  Standing - Static: Fair;- (requires B UE support)  Standing - Dynamic: Fair;- (requires B UE support)    Sensation: Intact    Bed mobility  Rolling to Right: Modified independent  Supine to Sit: Modified independent  Bed Mobility Comments: HOB elevated; increased time and effort; pt states she sleeps in lift chair recliner    Transfers  Sit to Stand: Stand by assistance  Stand to sit: Stand by assistance  Comment: no walker available; anticipate mod indep with [facility]    Ambulation  Surface: level tile  Device: Hand-Held Assist  Assistance: Minimal assistance  Quality of Gait: min antalgic pattern R initially with gait due to R LE stiffness  Comments: no walker available- anticipate mod indep with [facility]    Stairs/Curb  Stairs?: No    Activity Tolerance  Activity Tolerance: Patient tolerated evaluation without incident    Patient Education  Education Given To: Patient  Education Provided: Role of Therapy;Plan of Care;Transfer Training;Equipment (general safety)  Education Method: Verbal  Education Outcome: Verbalized understanding       ASSESSMENT:   Decision Making: Low Complexity  History: high  Exam: low  Clinical Presentation: low    Therapy Prognosis: Good         DISCHARGE RECOMMENDATIONS:  No Skilled PT: At baseline function    Assessment: Pt exhibits functional mobility at baseline status of requiring WW AD with B UE support at [facility].   PT educated pt at length in home safety including use of [facility] AD at all times, use of reacher for picking up items off floor, have neighbors or family check in on pt in AM/PM daily, possible need for use of w/c inside home on bad days, and participation with [place] PT- pt stated understanding of education. No continued PT indicated at this time. Requires PT Follow-Up: No      PLAN OF CARE:  Plan  Plan Comment: pt at baseline status, no continued PT indicated    Safety Devices  Type of Devices: All fall risk precautions in place,Call light within reach,Left in bed    [facility] (6 CLICK) BASIC MOBILITY  AM-PAC Inpatient Mobility Raw Score : 18    Therapy Time:   Individual   Time In 1016   Time Out 1040   Minutes 24       eval X 15 min  Transfers/bed mob X 9 min    Anais Hoff PT, 06/27/22 at 11:01 AM         Definitions for assistance levels  Independent = pt does not require any physical supervision or assistance from another person for activity completion. Device may be needed.   Stand by assistance = pt requires verbal cues or instructions from another person, close to but not touching, to perform the activity  Minimal assistance= pt performs 75% or more of the activity; assistance is required to complete the activity  Moderate assistance= pt performs 50% of the activity; assistance is required to complete the activity  Maximal assistance = pt performs 25% of the activity; assistance is required to complete the activity  Dependent = pt requires total physical assistance to accomplish the task

## 2022-06-28 LAB
ORGANISM: ABNORMAL
URINE CULTURE, ROUTINE: ABNORMAL
URINE CULTURE, ROUTINE: ABNORMAL

## 2022-07-08 RX ORDER — BLOOD SUGAR DIAGNOSTIC
STRIP MISCELLANEOUS
Qty: 200 STRIP | Refills: 3 | Status: SHIPPED | OUTPATIENT
Start: 2022-07-08

## 2022-07-11 ENCOUNTER — HOSPITAL ENCOUNTER (OUTPATIENT)
Dept: PHARMACY | Age: 87
Setting detail: THERAPIES SERIES
Discharge: HOME | End: 2022-07-11
Payer: MEDICARE

## 2022-07-11 DIAGNOSIS — I48.91 ATRIAL FIBRILLATION, UNSPECIFIED TYPE (HCC): Primary | ICD-10-CM

## 2022-07-11 LAB — INTERNATIONAL NORMALIZATION RATIO, POC: 1.7

## 2022-07-11 PROCEDURE — 85610 PROTHROMBIN TIME: CPT | Performed by: PHARMACIST

## 2022-07-11 PROCEDURE — 99211 OFF/OP EST MAY X REQ PHY/QHP: CPT | Performed by: PHARMACIST

## 2022-07-11 NOTE — PROGRESS NOTES
Ms. Ayan Clement is a 80 y.o. female with history of Afib who presents today for anticoagulation monitoring and adjustment. Face to face visit completed, procedural mask worn by myself as instructed: Mask worn by patient, room cleaned pre and post visit with Virex Plus spray    INR 1.7 is subtherapeutic for this patient (goal range 2-3) and is reflective of 20 mg TWD  Patient's current dosing schedule is 20 mg TWD  Patient verifies current dosing regimen, patient able to verbally recall dose    Patient reports no missed doses in the last seven days     Patient denies s/sx clotting and/or stroke  Patient denies hematuria, epistaxis, rectal bleeding  Patient denies changes in diet  Patient denies changes in alcohol use  Patient denies changes in tobacco use    Discussed current medications and drug allergies with patient, updated any medication additions or modifications accordingly    Patient denies any pending medical procedures scheduled at this time  Patient denies any pending dental procedures scheduled at this time    Patient was instructed to take an extra 1/2 tab (2.5 mg) today then continue 20mg TWD and RTC in 3 weeks    For Pharmacy Admin Tracking Only     Intervention Detail: Adherence Monitorin   Total # of Interventions Recommended: 1   Total # of Interventions Accepted: 1   Time Spent (min): 8956 Kana Edmondson PharmD Student  Bryant Tamayo, R. Ph. 2022 11:15 AM

## 2022-08-01 ENCOUNTER — HOSPITAL ENCOUNTER (OUTPATIENT)
Dept: PHARMACY | Age: 87
Setting detail: THERAPIES SERIES
Discharge: HOME | End: 2022-08-01
Payer: MEDICARE

## 2022-08-01 DIAGNOSIS — I48.91 ATRIAL FIBRILLATION, UNSPECIFIED TYPE (HCC): Primary | ICD-10-CM

## 2022-08-01 LAB — INTERNATIONAL NORMALIZATION RATIO, POC: 2.1

## 2022-08-01 PROCEDURE — 99211 OFF/OP EST MAY X REQ PHY/QHP: CPT | Performed by: PHARMACIST

## 2022-08-01 PROCEDURE — 85610 PROTHROMBIN TIME: CPT | Performed by: PHARMACIST

## 2022-08-01 RX ORDER — WARFARIN SODIUM 5 MG/1
TABLET ORAL
Qty: 70 TABLET | Refills: 1 | Status: SHIPPED | OUTPATIENT
Start: 2022-08-01

## 2022-08-01 NOTE — PROGRESS NOTES
Ms. Lucie Carr is a 80 y.o. female with history of Afib who presents today for anticoagulation monitoring and adjustment. Face to face visit completed, procedural mask worn by myself as instructed: Mask worn by patient, room cleaned pre and post visit with Virex Plus spray    INR 2.1 is therapeutic for this patient (goal range 2-3) and is reflective of 20 mg TWD  Patient's current dosing schedule is 20 TWD  Patient verifies current dosing regimen, patient able to verbally recall dose    Patient reports no missed doses in the last seven days     Patient denies s/sx clotting and/or stroke  Patient denies hematuria, epistaxis, rectal bleeding  Patient denies changes in diet  Patient denies changes in alcohol use  Patient denies changes in tobacco use    Discussed current medications and drug allergies with patient, updated any medication additions or modifications accordingly    Patient denies any pending medical procedures scheduled at this time  Patient denies any pending dental procedures scheduled at this time    Patient was instructed to continue 20mg TWD and RTC in 4 weeks    For Pharmacy Admin Tracking Only    Intervention Detail: Adherence Monitorin  Total # of Interventions Recommended: 1  Total # of Interventions Accepted: 1  Time Spent (min): 30    MICAH Tolentino. Ph. 2022 2:03 PM

## 2022-08-24 ENCOUNTER — HOSPITAL ENCOUNTER (OUTPATIENT)
Dept: CARDIOLOGY | Age: 87
Discharge: HOME | End: 2022-08-24
Payer: MEDICARE

## 2022-08-24 PROCEDURE — 93296 REM INTERROG EVL PM/IDS: CPT

## 2022-08-29 ENCOUNTER — HOSPITAL ENCOUNTER (OUTPATIENT)
Dept: PHARMACY | Age: 87
Setting detail: THERAPIES SERIES
Discharge: HOME | End: 2022-08-29
Payer: MEDICARE

## 2022-08-29 DIAGNOSIS — I48.91 ATRIAL FIBRILLATION, UNSPECIFIED TYPE (HCC): Primary | ICD-10-CM

## 2022-08-29 LAB — INTERNATIONAL NORMALIZATION RATIO, POC: 2.5

## 2022-08-29 PROCEDURE — 99211 OFF/OP EST MAY X REQ PHY/QHP: CPT

## 2022-08-29 PROCEDURE — 85610 PROTHROMBIN TIME: CPT

## 2022-08-29 NOTE — PROGRESS NOTES
Ms. Kelsey Vazquez is a 80 y.o. y/o female with history of Afib who presents today for anticoagulation monitoring and adjustment.   INR 2.5 is therapeutic for this patient (goal range 2-3) and is reflective of 20 mg TWD  Patient verifies current dosing regimen, patient able to verbally recall dose  Patient reports no  missed doses since last INR   Patient denies s/sx clotting and/or stroke  Patient denies hematuria, epistaxis, rectal bleeding  Patient denies changes in diet, alcohol, or tobacco use  Reviewed medication list and drug allergies with patient, updated any medication additions or modifications accordingly  Patient also denies any pending medical or dental procedures scheduled at this time  Patient was instructed to continue current regimen of 20 mg TWD and RTC 4 weeks    For Pharmacy Admin Tracking Only    Intervention Detail: Lab(s) Ordered  Total # of Interventions Recommended: 1  Total # of Interventions Accepted: 1  Time Spent (min): 30    Michelle Ann PharmD, [facility], [id number] [facility]  8/29/2022 11:30 AM

## 2022-09-26 ENCOUNTER — HOSPITAL ENCOUNTER (OUTPATIENT)
Dept: PHARMACY | Age: 87
Setting detail: THERAPIES SERIES
Discharge: HOME | End: 2022-09-26
Payer: MEDICARE

## 2022-09-26 DIAGNOSIS — I48.91 ATRIAL FIBRILLATION, UNSPECIFIED TYPE (HCC): Primary | ICD-10-CM

## 2022-09-26 LAB — INTERNATIONAL NORMALIZATION RATIO, POC: 2.5

## 2022-09-26 PROCEDURE — 85610 PROTHROMBIN TIME: CPT

## 2022-09-26 PROCEDURE — 99211 OFF/OP EST MAY X REQ PHY/QHP: CPT

## 2022-09-26 NOTE — PROGRESS NOTES
Ms. Kyler Gonzales is a 80 y.o. y/o female with history of Afib who presents today for anticoagulation monitoring and adjustment. INR 2.5 is therapeutic for this patient (goal range 2-3) and is reflective of 20 mg TWD  Patient verifies current dosing regimen, patient able to verbally recall dose  Patient reports 0 missed doses since last INR   Patient denies s/sx clotting and/or stroke  Patient denies hematuria, epistaxis, rectal bleeding  Patient denies changes in diet, alcohol, or tobacco use  Reviewed medication list and drug allergies with patient, updated any medication additions or modifications accordingly  Patient also denies any pending medical or dental procedures scheduled at this time    Patient reports increased joint and generalized body pains.  Has been taking Tylenol 2-3x daily for the past week or so     Patient was instructed to continue 20 mg TWD and RTC 4 weeks    Karena Quigley PharmD  2022 10:50 AM      For Pharmacy Admin Tracking Only    Intervention Detail: Adherence Monitorin  Total # of Interventions Recommended: 1  Total # of Interventions Accepted: 1  Time Spent (min): 30

## 2022-10-25 ENCOUNTER — TELEPHONE (OUTPATIENT)
Dept: PHARMACY | Age: 87
End: 2022-10-25

## 2022-10-25 DIAGNOSIS — I48.91 ATRIAL FIBRILLATION, UNSPECIFIED TYPE (HCC): Primary | ICD-10-CM

## 2022-10-31 ENCOUNTER — HOSPITAL ENCOUNTER (OUTPATIENT)
Dept: PHARMACY | Age: 87
Setting detail: THERAPIES SERIES
Discharge: HOME | End: 2022-10-31
Payer: MEDICARE

## 2022-10-31 DIAGNOSIS — I48.91 ATRIAL FIBRILLATION, UNSPECIFIED TYPE (HCC): Primary | ICD-10-CM

## 2022-10-31 LAB — INTERNATIONAL NORMALIZATION RATIO, POC: 1.9

## 2022-10-31 PROCEDURE — 99211 OFF/OP EST MAY X REQ PHY/QHP: CPT

## 2022-10-31 PROCEDURE — 85610 PROTHROMBIN TIME: CPT

## 2022-10-31 NOTE — PROGRESS NOTES
Ms. Violeta Ellis is a 80 y.o. y/o female with history of Afib who presents today for anticoagulation monitoring and adjustment. INR 1.9 is slightly sub-therapeutic for this patient (goal range 2-3) and is reflective of 17.5 mg TWD. Patient verifies current dosing regimen, patient able to verbally recall dose  Patient's currently scheduled TWD is 20 mg. Patient reports 1 missed doses a couple days ago, was a 2.5 mg dose. Patient denies s/sx clotting and/or stroke  Patient denies hematuria, epistaxis, rectal bleeding  Patient denies changes in diet, alcohol, or tobacco use  Reviewed medication list and drug allergies with patient, updated any medication additions or modifications accordingly  Patient also denies any pending medical or dental procedures scheduled at this time    Patient reports no medication changes. She states her diet is poor but this is consistent. Patient complains of arthritic pain and states she takes tylenol every day. Decreased INR likely due to missed dose some time last week. Usual dose tonight is 5 mg, will not increase further. Patient denies need for refill at this visit.      Patient was instructed to continue with 2.5 mg all days except 5 mg on M for 20 mg TWD and RTC 4 weeks      Jonel Monson, PharmD  PGY1 Pharmacy Resident  10/31/2022 12:40 PM      For Pharmacy Admin Tracking Only    Intervention Detail: Adherence Monitorin  Total # of Interventions Recommended: 1  Total # of Interventions Accepted: 1  Time Spent (min): 30

## 2022-11-28 ENCOUNTER — HOSPITAL ENCOUNTER (OUTPATIENT)
Dept: PHARMACY | Age: 87
Setting detail: THERAPIES SERIES
Discharge: HOME | End: 2022-11-28
Payer: MEDICARE

## 2022-11-28 DIAGNOSIS — I48.91 ATRIAL FIBRILLATION, UNSPECIFIED TYPE (HCC): Primary | ICD-10-CM

## 2022-11-28 LAB — INTERNATIONAL NORMALIZATION RATIO, POC: 1.9

## 2022-11-28 PROCEDURE — 99211 OFF/OP EST MAY X REQ PHY/QHP: CPT

## 2022-11-28 PROCEDURE — 85610 PROTHROMBIN TIME: CPT

## 2022-11-28 NOTE — PROGRESS NOTES
Ms. Anton Pepper is a 80 y.o. y/o female with history of Afib who presents today for anticoagulation monitoring and adjustment.     INR 1.9 is sl sub-therapeutic for this patient (goal range 2-3) and is reflective of 20 mg TWD  Patient verifies current dosing regimen, patient able to verbally recall dose  Patient reports 0  missed doses since last INR   Patient denies s/sx clotting and/or stroke  Patient denies hematuria, epistaxis, rectal bleeding  Patient denies changes in diet, alcohol, or tobacco use  Reviewed medication list and drug allergies with patient, updated any medication additions or modifications accordingly  Patient also denies any pending medical or dental procedures scheduled at this time  Patient was instructed to continue 20 mg TWD and RTC 4 weeks    For Pharmacy Admin Tracking Only    Intervention Detail: Adherence Monitorin  Total # of Interventions Recommended: 0  Total # of Interventions Accepted: 0  Time Spent (min): 20

## 2022-12-27 ENCOUNTER — HOSPITAL ENCOUNTER (OUTPATIENT)
Dept: PHARMACY | Age: 87
Setting detail: THERAPIES SERIES
Discharge: HOME | End: 2022-12-27
Payer: MEDICARE

## 2022-12-27 DIAGNOSIS — I48.91 ATRIAL FIBRILLATION, UNSPECIFIED TYPE (HCC): Primary | ICD-10-CM

## 2022-12-27 LAB — INTERNATIONAL NORMALIZATION RATIO, POC: 2.1

## 2022-12-27 PROCEDURE — 85610 PROTHROMBIN TIME: CPT | Performed by: PHARMACIST

## 2022-12-27 PROCEDURE — 99211 OFF/OP EST MAY X REQ PHY/QHP: CPT | Performed by: PHARMACIST

## 2022-12-27 NOTE — PROGRESS NOTES
Ms. Meaghan Dao is a 80 y.o. female with history of Afib who presents today for anticoagulation monitoring and adjustment. Face to face visit completed, procedural mask worn by myself as instructed: Room cleaned pre and post visit with Virex Plus spray    INR 2.1 is therapeutic for this patient (goal range 2-3) and is reflective of 20 mg TWD  Patient's current dosing schedule is 20 TWD  Patient verifies current dosing regimen, patient able to verbally recall dose    Patient reports no missed doses in the last seven days     Patient denies s/sx clotting and/or stroke  Patient denies hematuria, epistaxis, rectal bleeding  Patient denies changes in diet  Patient denies changes in alcohol use  Patient denies changes in tobacco use    Discussed current medications and drug allergies with patient, updated any medication additions or modifications accordingly    Patient denies any pending medical procedures scheduled at this time  Patient denies any pending dental procedures scheduled at this time    Patient was instructed to continue 20mg TWD and RTC in 4 weeks    For Pharmacy Admin Tracking Only    Intervention Detail: Adherence Monitorin  Total # of Interventions Recommended: 1  Total # of Interventions Accepted: 1  Time Spent (min): 30    MICAH Tolentino. Ph. 2022 2:20 PM

## 2022-12-29 ENCOUNTER — TELEPHONE (OUTPATIENT)
Dept: CARDIOLOGY CLINIC | Age: 87
End: 2022-12-29

## 2022-12-29 NOTE — TELEPHONE ENCOUNTER
Called granddaughter to make her aware of pt's confusion earlier today. Pt came into the office looking for [facility] (recently moved to [facility]). Gave pt directions and a printed out map; pt was still confused so I personally had her follow me in her car to [facility]. Pt did not have an appt for today and was very confused (still in her nightgown from the night before) and didn't know her address/how to go home. I told her I would drive with her to [facility] so she could  her prescription and she instead went to [facility]. She seemed very confused which isn't uncommon for her age but it was a bit concerning and just wanted to make everyone aware of the situation.

## 2023-01-17 ENCOUNTER — HOSPITAL ENCOUNTER (OUTPATIENT)
Dept: CARDIOLOGY | Age: 88
Discharge: HOME | End: 2023-01-17
Payer: MEDICARE

## 2023-01-17 PROCEDURE — 93280 PM DEVICE PROGR EVAL DUAL: CPT

## 2023-01-25 ENCOUNTER — TELEPHONE (OUTPATIENT)
Dept: PHARMACY | Age: 88
End: 2023-01-25

## 2023-01-25 DIAGNOSIS — I48.91 ATRIAL FIBRILLATION, UNSPECIFIED TYPE (HCC): Primary | ICD-10-CM

## 2023-01-30 ENCOUNTER — HOSPITAL ENCOUNTER (OUTPATIENT)
Dept: PHARMACY | Age: 88
Setting detail: THERAPIES SERIES
Discharge: HOME | End: 2023-01-30
Payer: MEDICARE

## 2023-01-30 DIAGNOSIS — I48.91 ATRIAL FIBRILLATION, UNSPECIFIED TYPE (HCC): Primary | ICD-10-CM

## 2023-01-30 LAB — INTERNATIONAL NORMALIZATION RATIO, POC: 1.6

## 2023-01-30 PROCEDURE — 99211 OFF/OP EST MAY X REQ PHY/QHP: CPT | Performed by: PHARMACIST

## 2023-01-30 PROCEDURE — 85610 PROTHROMBIN TIME: CPT | Performed by: PHARMACIST

## 2023-01-30 NOTE — PROGRESS NOTES
Ms. Tico Amaya is a 80 y.o. y/o female with history of Afib who presents today for anticoagulation monitoring and adjustment. INR 1.6 is SUB-therapeutic for this patient (goal range 2-3) and is reflective of 20 mg TWD  Patient verifies current dosing regimen, patient able to verbally recall dose  Patient reports NO  missed doses since last INR   Patient denies s/sx clotting and/or stroke  Patient denies hematuria, epistaxis, rectal bleeding  Patient denies changes in diet, alcohol, or tobacco use  Reviewed medication list and drug allergies with patient, updated any medication additions or modifications accordingly  Patient also denies any pending medical or dental procedures scheduled at this time  Patient was instructed to take extra half tablet tomorrow then resume 20 mg TWD and RTC 2 weeks  No findings to explain low INR    LEAH Renee Ph.  2023  12:59 PM    For Pharmacy Admin Tracking Only    Intervention Detail: Adherence Monitorin and Dose Adjustment: 1, reason: Improve Adherence, Therapy Optimization  Total # of Interventions Recommended: 1  Total # of Interventions Accepted: 1  Time Spent (min): 15

## 2023-02-14 ENCOUNTER — TELEPHONE (OUTPATIENT)
Dept: PHARMACY | Age: 88
End: 2023-02-14

## 2023-02-14 DIAGNOSIS — I48.91 ATRIAL FIBRILLATION, UNSPECIFIED TYPE (HCC): Primary | ICD-10-CM

## 2023-02-20 ENCOUNTER — HOSPITAL ENCOUNTER (OUTPATIENT)
Dept: PHARMACY | Age: 88
Setting detail: THERAPIES SERIES
Discharge: HOME | End: 2023-02-20
Payer: MEDICARE

## 2023-02-20 DIAGNOSIS — I48.91 ATRIAL FIBRILLATION, UNSPECIFIED TYPE (HCC): Primary | ICD-10-CM

## 2023-02-20 LAB — INTERNATIONAL NORMALIZATION RATIO, POC: 1.7

## 2023-02-20 PROCEDURE — 99211 OFF/OP EST MAY X REQ PHY/QHP: CPT

## 2023-02-20 PROCEDURE — 85610 PROTHROMBIN TIME: CPT

## 2023-02-20 NOTE — PROGRESS NOTES
Ms. Romana Osier is a 80 y.o. y/o female with history of Afib who presents today for anticoagulation monitoring and adjustment. INR 1.7 is subtherapeutic for this patient (goal range 2.0-3.0) and is reflective of 20 mg TWD  Patient verifies current dosing regimen, patient able to verbally recall dose  Patient reports 0  missed doses since last INR   Patient denies s/sx clotting and/or stroke  Patient denies hematuria, epistaxis, rectal bleeding  Patient denies changes in diet, alcohol, or tobacco use  Reviewed medication list and drug allergies with patient, updated any medication additions or modifications accordingly  Patient also denies any pending medical or dental procedures scheduled at this time  INR subtherapeutic this visit and last. Patient states she has been missing some doses. Patient was instructed to boost today by taking 7.5mg and then resume normal home regimen of 5mg Mon, 2.5mg all other days.  RTC 2 weeks    For Pharmacy Admin Tracking Only    Intervention Detail: Adherence Monitorin, Dose Adjustment: 1, reason: Therapy Optimization, and Lab(s) Ordered  Total # of Interventions Recommended: 2  Total # of Interventions Accepted: 2  Time Spent (min): 15    Caroline Herron PharmD   2023 9:01 AM

## 2023-03-07 ENCOUNTER — TELEPHONE (OUTPATIENT)
Dept: PHARMACY | Age: 88
End: 2023-03-07

## 2023-03-07 DIAGNOSIS — I48.91 ATRIAL FIBRILLATION, UNSPECIFIED TYPE (HCC): Primary | ICD-10-CM

## 2023-03-07 NOTE — TELEPHONE ENCOUNTER
First phone call to schedule patient, no answer, left VM. Second phone call in 7 days if no response. Updated tracker

## 2023-03-14 ENCOUNTER — TELEPHONE (OUTPATIENT)
Dept: PHARMACY | Age: 88
End: 2023-03-14

## 2023-03-14 DIAGNOSIS — I48.91 ATRIAL FIBRILLATION, UNSPECIFIED TYPE (HCC): Primary | ICD-10-CM

## 2023-03-15 ENCOUNTER — HOSPITAL ENCOUNTER (OUTPATIENT)
Dept: PHARMACY | Age: 88
Setting detail: THERAPIES SERIES
Discharge: HOME | End: 2023-03-15
Payer: MEDICARE

## 2023-03-15 DIAGNOSIS — I48.91 ATRIAL FIBRILLATION, UNSPECIFIED TYPE (HCC): Primary | ICD-10-CM

## 2023-03-15 LAB — INTERNATIONAL NORMALIZATION RATIO, POC: 1.8

## 2023-03-15 PROCEDURE — 99211 OFF/OP EST MAY X REQ PHY/QHP: CPT

## 2023-03-15 PROCEDURE — 85610 PROTHROMBIN TIME: CPT

## 2023-03-15 NOTE — PROGRESS NOTES
Ms. Meaghan Dao is a 80 y.o. y/o female with history of Afib who presents today for anticoagulation monitoring and adjustment. INR 1.8 is subtherapeutic for this patient (goal range 2.0-3.0) and is reflective of 20 mg TWD  Patient verifies current dosing regimen, patient able to verbally recall dose  Patient reports 0  missed doses since last INR   Patient denies s/sx clotting and/or stroke  Patient denies hematuria, epistaxis, rectal bleeding  Patient denies changes in diet, alcohol, or tobacco use  Reviewed medication list and drug allergies with patient, updated any medication additions or modifications accordingly  Patient also denies any pending medical or dental procedures scheduled at this time  INR subtherapeutic this visit and the previous two while on the same regimen. Will increase TWD to 5mg MoWe, 2.5mg all other days.  RTC 2 weeks    For Pharmacy Admin Tracking Only    Intervention Detail: Adherence Monitorin, Dose Adjustment: 1, reason: Therapy Optimization, and Lab(s) Ordered  Total # of Interventions Recommended: 2  Total # of Interventions Accepted: 2  Time Spent (min): Rgio Jorge PharmD   3/15/2023 1:51 PM

## 2023-03-30 ENCOUNTER — HOSPITAL ENCOUNTER (OUTPATIENT)
Dept: PHARMACY | Age: 88
Setting detail: THERAPIES SERIES
Discharge: HOME | End: 2023-03-30
Payer: MEDICARE

## 2023-03-30 DIAGNOSIS — I48.91 ATRIAL FIBRILLATION, UNSPECIFIED TYPE (HCC): Primary | ICD-10-CM

## 2023-03-30 LAB — INTERNATIONAL NORMALIZATION RATIO, POC: 2.6

## 2023-03-30 RX ORDER — WARFARIN SODIUM 5 MG/1
TABLET ORAL
Qty: 70 TABLET | Refills: 1 | Status: SHIPPED | OUTPATIENT
Start: 2023-03-30

## 2023-03-30 NOTE — PROGRESS NOTES
Ms. Masha Bhardwaj is a 80 y.o. y/o female with history of Afib who presents today for anticoagulation monitoring and adjustment. INR 2.6 is therapeutic for this patient (goal range 2.0-3.0) and is reflective of 22.5 mg TWD  Patient verifies current dosing regimen, patient able to verbally recall dose  Patient reports 0  missed doses since last INR   Patient denies s/sx clotting and/or stroke  Patient denies hematuria, epistaxis, rectal bleeding  Patient denies changes in diet, alcohol, or tobacco use  Reviewed medication list and drug allergies with patient, updated any medication additions or modifications accordingly  Patient also denies any pending medical or dental procedures scheduled at this time  Regimen was adjusted last visit due to subtherapeutic INR. INR now therapeutic today at 2.6. Patient was instructed to continue current regimen of 5mg MoWe, 2.5mg all other days. RTC 3 weeks. Patient requested warfarin refill at [facility] [address] [place] [address] over prescription.     For Pharmacy Admin Tracking Only    Intervention Detail: Adherence Monitorin, Lab(s) Ordered, and New Rx: 1, reason: Needs Additional Therapy  Total # of Interventions Recommended: 2  Total # of Interventions Accepted: 2  Time Spent (min): 20    Irina GarcíaD   3/30/2023 3:33 PM

## 2023-04-20 ENCOUNTER — HOSPITAL ENCOUNTER (OUTPATIENT)
Dept: CARDIOLOGY | Age: 88
Discharge: HOME | End: 2023-04-20
Payer: MEDICARE

## 2023-04-20 ENCOUNTER — HOSPITAL ENCOUNTER (OUTPATIENT)
Dept: PHARMACY | Age: 88
Setting detail: THERAPIES SERIES
Discharge: HOME | End: 2023-04-20
Payer: MEDICARE

## 2023-04-20 DIAGNOSIS — I48.91 ATRIAL FIBRILLATION, UNSPECIFIED TYPE (HCC): Primary | ICD-10-CM

## 2023-04-20 LAB — INTERNATIONAL NORMALIZATION RATIO, POC: 3

## 2023-04-20 PROCEDURE — 85610 PROTHROMBIN TIME: CPT

## 2023-04-20 PROCEDURE — 99211 OFF/OP EST MAY X REQ PHY/QHP: CPT

## 2023-04-20 PROCEDURE — 93280 PM DEVICE PROGR EVAL DUAL: CPT

## 2023-04-20 NOTE — PROGRESS NOTES
Ms. Wisam Phan is a 80 y.o. y/o female with history of Afib who presents today for anticoagulation monitoring and adjustment.   INR 3.0 is therapeutic for this patient (goal range 2-3) and is reflective of 22.5 mg TWD  Patient verifies current dosing regimen, patient able to verbally recall dose  Patient reports 0  missed doses since last INR   Patient denies s/sx clotting and/or stroke  Patient denies hematuria, epistaxis, rectal bleeding  Patient denies changes in diet, alcohol, or tobacco use  Reviewed medication list and drug allergies with patient, updated any medication additions or modifications accordingly  Patient also denies any pending medical or dental procedures scheduled at this time  Patient was instructed to continue 22.5 mg TWD and RTC 4 weeks          For Pharmacy Admin Tracking Only    Intervention Detail: Adherence Monitorin  Total # of Interventions Recommended: 1  Total # of Interventions Accepted: 1  Time Spent (min): 15

## 2023-04-24 ENCOUNTER — OFFICE VISIT (OUTPATIENT)
Dept: FAMILY MEDICINE CLINIC | Age: 88
End: 2023-04-24
Payer: MEDICARE

## 2023-04-24 VITALS
TEMPERATURE: 96.9 F | HEART RATE: 75 BPM | DIASTOLIC BLOOD PRESSURE: 84 MMHG | HEIGHT: 62 IN | WEIGHT: 167 LBS | SYSTOLIC BLOOD PRESSURE: 120 MMHG | OXYGEN SATURATION: 99 % | BODY MASS INDEX: 30.73 KG/M2

## 2023-04-24 DIAGNOSIS — E08.22 DIABETES MELLITUS DUE TO UNDERLYING CONDITION WITH STAGE 3 CHRONIC KIDNEY DISEASE, WITH LONG-TERM CURRENT USE OF INSULIN, UNSPECIFIED WHETHER STAGE 3A OR 3B CKD (HCC): ICD-10-CM

## 2023-04-24 DIAGNOSIS — I50.9 CHRONIC CONGESTIVE HEART FAILURE, UNSPECIFIED HEART FAILURE TYPE (HCC): Primary | ICD-10-CM

## 2023-04-24 DIAGNOSIS — I48.11 LONGSTANDING PERSISTENT ATRIAL FIBRILLATION (HCC): ICD-10-CM

## 2023-04-24 DIAGNOSIS — Z79.4 DIABETES MELLITUS DUE TO UNDERLYING CONDITION WITH STAGE 3 CHRONIC KIDNEY DISEASE, WITH LONG-TERM CURRENT USE OF INSULIN, UNSPECIFIED WHETHER STAGE 3A OR 3B CKD (HCC): ICD-10-CM

## 2023-04-24 DIAGNOSIS — I10 PRIMARY HYPERTENSION: ICD-10-CM

## 2023-04-24 DIAGNOSIS — N18.30 DIABETES MELLITUS DUE TO UNDERLYING CONDITION WITH STAGE 3 CHRONIC KIDNEY DISEASE, WITH LONG-TERM CURRENT USE OF INSULIN, UNSPECIFIED WHETHER STAGE 3A OR 3B CKD (HCC): ICD-10-CM

## 2023-04-24 DIAGNOSIS — E03.9 ACQUIRED HYPOTHYROIDISM: ICD-10-CM

## 2023-04-24 PROCEDURE — 1036F TOBACCO NON-USER: CPT | Performed by: PHYSICIAN ASSISTANT

## 2023-04-24 PROCEDURE — G8427 DOCREV CUR MEDS BY ELIG CLIN: HCPCS | Performed by: PHYSICIAN ASSISTANT

## 2023-04-24 PROCEDURE — 99203 OFFICE O/P NEW LOW 30 MIN: CPT | Performed by: PHYSICIAN ASSISTANT

## 2023-04-24 PROCEDURE — 1090F PRES/ABSN URINE INCON ASSESS: CPT | Performed by: PHYSICIAN ASSISTANT

## 2023-04-24 PROCEDURE — G8417 CALC BMI ABV UP PARAM F/U: HCPCS | Performed by: PHYSICIAN ASSISTANT

## 2023-04-24 PROCEDURE — 1123F ACP DISCUSS/DSCN MKR DOCD: CPT | Performed by: PHYSICIAN ASSISTANT

## 2023-04-24 SDOH — ECONOMIC STABILITY: HOUSING INSECURITY
IN THE LAST 12 MONTHS, WAS THERE A TIME WHEN YOU DID NOT HAVE A STEADY PLACE TO SLEEP OR SLEPT IN A SHELTER (INCLUDING NOW)?: NO

## 2023-04-24 SDOH — ECONOMIC STABILITY: INCOME INSECURITY: HOW HARD IS IT FOR YOU TO PAY FOR THE VERY BASICS LIKE FOOD, HOUSING, MEDICAL CARE, AND HEATING?: NOT VERY HARD

## 2023-04-24 SDOH — ECONOMIC STABILITY: FOOD INSECURITY: WITHIN THE PAST 12 MONTHS, THE FOOD YOU BOUGHT JUST DIDN'T LAST AND YOU DIDN'T HAVE MONEY TO GET MORE.: NEVER TRUE

## 2023-04-24 SDOH — ECONOMIC STABILITY: FOOD INSECURITY: WITHIN THE PAST 12 MONTHS, YOU WORRIED THAT YOUR FOOD WOULD RUN OUT BEFORE YOU GOT MONEY TO BUY MORE.: NEVER TRUE

## 2023-04-24 ASSESSMENT — PATIENT HEALTH QUESTIONNAIRE - PHQ9
7. TROUBLE CONCENTRATING ON THINGS, SUCH AS READING THE NEWSPAPER OR WATCHING TELEVISION: 0
5. POOR APPETITE OR OVEREATING: 0
SUM OF ALL RESPONSES TO PHQ QUESTIONS 1-9: 0
6. FEELING BAD ABOUT YOURSELF - OR THAT YOU ARE A FAILURE OR HAVE LET YOURSELF OR YOUR FAMILY DOWN: 0
8. MOVING OR SPEAKING SO SLOWLY THAT OTHER PEOPLE COULD HAVE NOTICED. OR THE OPPOSITE, BEING SO FIGETY OR RESTLESS THAT YOU HAVE BEEN MOVING AROUND A LOT MORE THAN USUAL: 0
SUM OF ALL RESPONSES TO PHQ QUESTIONS 1-9: 0
9. THOUGHTS THAT YOU WOULD BE BETTER OFF DEAD, OR OF HURTING YOURSELF: 0
10. IF YOU CHECKED OFF ANY PROBLEMS, HOW DIFFICULT HAVE THESE PROBLEMS MADE IT FOR YOU TO DO YOUR WORK, TAKE CARE OF THINGS AT HOME, OR GET ALONG WITH OTHER PEOPLE: 0
3. TROUBLE FALLING OR STAYING ASLEEP: 0
SUM OF ALL RESPONSES TO PHQ QUESTIONS 1-9: 0
2. FEELING DOWN, DEPRESSED OR HOPELESS: 0
SUM OF ALL RESPONSES TO PHQ9 QUESTIONS 1 & 2: 0
1. LITTLE INTEREST OR PLEASURE IN DOING THINGS: 0
SUM OF ALL RESPONSES TO PHQ QUESTIONS 1-9: 0
4. FEELING TIRED OR HAVING LITTLE ENERGY: 0

## 2023-04-24 NOTE — PROGRESS NOTES
Subjective  Chen Allen Anger, 80 y.o. female presents today with:  Chief Complaint   Patient presents with    Establish Care     Patient presents today to establishing care. HPI   Patient is here to establish care past medical history is significant for CHF hypertension atrial fibrillation followed by Sherryle Dynes  Also significant for hypothyroidism type 2 diabetes and stage III chronic kidney disease  Patient is on Coumadin follow-up on Coumadin clinic  States she has not taking any of her medicines with exception of Coumadin isosorbide and Pravachol states she feels well denies chest pain pressure shortness of breath palpitation and does not feel the need to take additional medication  She does not check her blood glucose. Review of Systems   All other systems reviewed and are negative. Past Medical History:   Diagnosis Date    Abnormal liver ultrasound 5/15/2019    Allergic contact dermatitis due to plants, except food 8/19/2019    Arthritis     Atrial fibrillation (HCC)     CHF (congestive heart failure) (HCC)     Chronic kidney disease     Chronic kidney disease (CKD), stage II (mild)     Depression     Hyperlipidemia     Hypertension     Hypothyroidism     Interstitial cystitis     Dr. Anabel Ken diagnosed this for patinet. Type II or unspecified type diabetes mellitus without mention of complication, not stated as uncontrolled      Past Surgical History:   Procedure Laterality Date    ABDOMEN SURGERY      CATARACT REMOVAL WITH IMPLANT      both eyes    CORONARY ANGIOPLASTY WITH STENT PLACEMENT      HYSTERECTOMY (CERVIX STATUS UNKNOWN)      OTHER SURGICAL HISTORY  09/07/2016    GENERATOR CHANGE     PACEMAKER PLACEMENT       Social History     Socioeconomic History    Marital status:       Spouse name: Not on file    Number of children: Not on file    Years of education: Not on file    Highest education level: Not on file   Occupational History    Not on file   Tobacco Use

## 2023-05-03 ENCOUNTER — OFFICE VISIT (OUTPATIENT)
Dept: FAMILY MEDICINE CLINIC | Age: 88
End: 2023-05-03
Payer: MEDICARE

## 2023-05-03 VITALS
SYSTOLIC BLOOD PRESSURE: 118 MMHG | WEIGHT: 164 LBS | BODY MASS INDEX: 30.18 KG/M2 | DIASTOLIC BLOOD PRESSURE: 78 MMHG | HEART RATE: 80 BPM | HEIGHT: 62 IN | TEMPERATURE: 97.8 F | OXYGEN SATURATION: 96 %

## 2023-05-03 DIAGNOSIS — Z79.4 TYPE 2 DIABETES MELLITUS WITH OTHER SPECIFIED COMPLICATION, WITH LONG-TERM CURRENT USE OF INSULIN (HCC): Primary | ICD-10-CM

## 2023-05-03 DIAGNOSIS — H91.93 DECREASED HEARING OF BOTH EARS: ICD-10-CM

## 2023-05-03 DIAGNOSIS — E03.9 ACQUIRED HYPOTHYROIDISM: ICD-10-CM

## 2023-05-03 DIAGNOSIS — R22.31 MASS OF SKIN OF RIGHT THUMB: ICD-10-CM

## 2023-05-03 DIAGNOSIS — E11.69 TYPE 2 DIABETES MELLITUS WITH OTHER SPECIFIED COMPLICATION, WITH LONG-TERM CURRENT USE OF INSULIN (HCC): Primary | ICD-10-CM

## 2023-05-03 PROCEDURE — 99214 OFFICE O/P EST MOD 30 MIN: CPT | Performed by: PHYSICIAN ASSISTANT

## 2023-05-03 PROCEDURE — G8427 DOCREV CUR MEDS BY ELIG CLIN: HCPCS | Performed by: PHYSICIAN ASSISTANT

## 2023-05-03 PROCEDURE — 1123F ACP DISCUSS/DSCN MKR DOCD: CPT | Performed by: PHYSICIAN ASSISTANT

## 2023-05-03 PROCEDURE — 1036F TOBACCO NON-USER: CPT | Performed by: PHYSICIAN ASSISTANT

## 2023-05-03 PROCEDURE — 1090F PRES/ABSN URINE INCON ASSESS: CPT | Performed by: PHYSICIAN ASSISTANT

## 2023-05-03 PROCEDURE — G8417 CALC BMI ABV UP PARAM F/U: HCPCS | Performed by: PHYSICIAN ASSISTANT

## 2023-05-03 RX ORDER — LEVOTHYROXINE SODIUM 0.07 MG/1
TABLET ORAL
Qty: 30 TABLET | Refills: 5 | Status: SHIPPED | OUTPATIENT
Start: 2023-05-03

## 2023-05-03 NOTE — PROGRESS NOTES
tablet 3    ibuprofen (ADVIL;MOTRIN) 200 MG tablet Take 1 tablet by mouth every 6 hours as needed for Pain      isosorbide mononitrate (IMDUR) 60 MG extended release tablet take 1 tablet by mouth once daily 90 tablet 0    RA PEN NEEDLES 31G X 5 MM MISC USE FOUR TIMES A  each 1    sodium chloride (OCEAN, BABY AYR) 0.65 % nasal spray 1 spray by Nasal route as needed for Congestion 1 Bottle 0    meclizine (ANTIVERT) 12.5 MG tablet Take 1 tablet by mouth 3 times daily as needed for Dizziness 60 tablet 3    FREESTYLE LANCETS MISC TEST three times a day 100 each 11    metoprolol succinate (TOPROL XL) 50 MG extended release tablet Take 2 tablets by mouth 2 times daily      magnesium oxide (MAG-OX) 400 MG tablet Take 1 tablet by mouth daily      sotalol (BETAPACE) 120 MG tablet Take 1 tablet by mouth 2 times daily 60 tablet 3    Alcohol Swabs PADS 1 each by Does not apply route       pravastatin (PRAVACHOL) 40 MG tablet Take 1 tablet by mouth daily 90 tablet 3    telmisartan (MICARDIS) 40 MG tablet Take 1 tablet by mouth daily 30 tablet 11    furosemide (LASIX) 40 MG tablet Take 1 tablet by mouth daily Except take 1 tablet by mouth twice daily on Monday, Wednesday, and Friday. Rest of days takes daily. nitroGLYCERIN (NITROSTAT) 0.4 MG SL tablet Place 1 tablet under the tongue every 5 minutes as needed. 25 tablet 1    Cholecalciferol (VITAMIN D3) 1000 UNITS TABS Take 1 tablet by mouth daily      Multiple Vitamins-Minerals (CENTRUM SILVER PO) Take 1 tablet by mouth daily       aspirin 81 MG EC tablet Take 1 tablet by mouth daily      albuterol sulfate  (90 Base) MCG/ACT inhaler Inhale 2 puffs into the lungs 4 times daily as needed for Wheezing or Shortness of Breath (cough) 1 Inhaler 0     No current facility-administered medications for this visit.        Objective    Vitals:    05/03/23 1333   BP: 118/78   Site: Right Upper Arm   Position: Sitting   Cuff Size: Medium Adult   Pulse: 80   Temp: 97.8 °F (36.6

## 2023-05-16 ENCOUNTER — TELEMEDICINE (OUTPATIENT)
Dept: FAMILY MEDICINE CLINIC | Age: 88
End: 2023-05-16
Payer: MEDICARE

## 2023-05-16 DIAGNOSIS — Z00.00 MEDICARE ANNUAL WELLNESS VISIT, SUBSEQUENT: Primary | ICD-10-CM

## 2023-05-16 PROCEDURE — 1123F ACP DISCUSS/DSCN MKR DOCD: CPT | Performed by: PHYSICIAN ASSISTANT

## 2023-05-16 PROCEDURE — G0439 PPPS, SUBSEQ VISIT: HCPCS | Performed by: PHYSICIAN ASSISTANT

## 2023-05-16 ASSESSMENT — PATIENT HEALTH QUESTIONNAIRE - PHQ9
6. FEELING BAD ABOUT YOURSELF - OR THAT YOU ARE A FAILURE OR HAVE LET YOURSELF OR YOUR FAMILY DOWN: 0
3. TROUBLE FALLING OR STAYING ASLEEP: 0
8. MOVING OR SPEAKING SO SLOWLY THAT OTHER PEOPLE COULD HAVE NOTICED. OR THE OPPOSITE, BEING SO FIGETY OR RESTLESS THAT YOU HAVE BEEN MOVING AROUND A LOT MORE THAN USUAL: 0
2. FEELING DOWN, DEPRESSED OR HOPELESS: 0
SUM OF ALL RESPONSES TO PHQ QUESTIONS 1-9: 0
SUM OF ALL RESPONSES TO PHQ QUESTIONS 1-9: 0
1. LITTLE INTEREST OR PLEASURE IN DOING THINGS: 0
5. POOR APPETITE OR OVEREATING: 0
SUM OF ALL RESPONSES TO PHQ QUESTIONS 1-9: 0
SUM OF ALL RESPONSES TO PHQ9 QUESTIONS 1 & 2: 0
10. IF YOU CHECKED OFF ANY PROBLEMS, HOW DIFFICULT HAVE THESE PROBLEMS MADE IT FOR YOU TO DO YOUR WORK, TAKE CARE OF THINGS AT HOME, OR GET ALONG WITH OTHER PEOPLE: 0
SUM OF ALL RESPONSES TO PHQ QUESTIONS 1-9: 0
9. THOUGHTS THAT YOU WOULD BE BETTER OFF DEAD, OR OF HURTING YOURSELF: 0
7. TROUBLE CONCENTRATING ON THINGS, SUCH AS READING THE NEWSPAPER OR WATCHING TELEVISION: 0
4. FEELING TIRED OR HAVING LITTLE ENERGY: 0

## 2023-05-16 ASSESSMENT — LIFESTYLE VARIABLES: HOW OFTEN DO YOU HAVE A DRINK CONTAINING ALCOHOL: NEVER

## 2023-05-16 NOTE — PROGRESS NOTES
Medicare Annual Wellness Visit    Yaakov Miramontes is here for Medicare AWV (Patient presents today for medicare annual wellness.)    Assessment & Plan   Medicare annual wellness visit, subsequent      Recommendations for Preventive Services Due: see orders and patient instructions/AVS.  Recommended screening schedule for the next 5-10 years is provided to the patient in written form: see Patient Instructions/AVS.     Return in 6 months (on 11/16/2023). Subjective       Patient's complete Health Risk Assessment and screening values have been reviewed and are found in Flowsheets. The following problems were reviewed today and where indicated follow up appointments were made and/or referrals ordered. Positive Risk Factor Screenings with Interventions:    Fall Risk:  Do you feel unsteady or are you worried about falling? : no  2 or more falls in past year?: (!) yes  Fall with injury in past year?: no     Interventions:    Patient declines any further evaluation or treatment    Cognitive: Words recalled: 2 Words Recalled           Total Score Interpretation: Abnormal Mini-Cog      Interventions:  Patient declines any further evaluation or treatment           General HRA Questions:  Select all that apply: (!) New or Increased Pain    Pain Interventions:  Patient declined any further interventions or treatment       Weight and Activity:  Physical Activity: Insufficiently Active    Days of Exercise per Week: 3 days    Minutes of Exercise per Session: 20 min     On average, how many days per week do you engage in moderate to strenuous exercise (like a brisk walk)?: 3 days  Have you lost any weight without trying in the past 3 months?: (!) Yes  There is no height or weight on file to calculate BMI.  (!) Abnormal    Unintentional Weight Loss Interventions:  Patient declined any further interventions or treatment  Obesity Interventions:  Patient declines any further evaluation or treatment           Hearing

## 2023-05-18 ENCOUNTER — HOSPITAL ENCOUNTER (OUTPATIENT)
Dept: PHARMACY | Age: 88
Setting detail: THERAPIES SERIES
Discharge: HOME | End: 2023-05-18
Payer: MEDICARE

## 2023-05-18 DIAGNOSIS — I48.11 LONGSTANDING PERSISTENT ATRIAL FIBRILLATION (HCC): Primary | ICD-10-CM

## 2023-05-18 LAB — INTERNATIONAL NORMALIZATION RATIO, POC: 1.7

## 2023-05-18 PROCEDURE — 99211 OFF/OP EST MAY X REQ PHY/QHP: CPT | Performed by: PHARMACIST

## 2023-05-18 PROCEDURE — 85610 PROTHROMBIN TIME: CPT | Performed by: PHARMACIST

## 2023-05-18 NOTE — PROGRESS NOTES
Ms. Silver Padilla is a 80 y.o. y/o female with history of Afib who presents today for anticoagulation monitoring and adjustment.   INR 1.7 is subtherapeutic for this patient (goal range 2-3) and is reflective of 22.5 mg TWD  Patient verifies current dosing regimen, patient able to verbally recall dose  Patient reports no missed doses since last INR   Patient denies s/sx clotting and/or stroke  Patient denies hematuria, epistaxis, rectal bleeding  Patient denies changes in diet, alcohol, or tobacco use  Reviewed medication list and drug allergies with patient, updated any medication additions or modifications accordingly  Patient also denies any pending medical or dental procedures scheduled at this time  Patient was instructed to take a booster dose of 5 mg 5/18 and then continue 22.5 mg TWD and RTC 4 weeks          For Pharmacy Admin Tracking Only    Intervention Detail: Dose Adjustment: 1, reason: Therapy Optimization  Total # of Interventions Recommended: 1  Total # of Interventions Accepted: 1  Time Spent (min): 15

## 2023-05-19 ENCOUNTER — OFFICE VISIT (OUTPATIENT)
Dept: ORTHOPEDIC SURGERY | Age: 88
End: 2023-05-19

## 2023-05-19 VITALS
HEIGHT: 62 IN | WEIGHT: 164 LBS | OXYGEN SATURATION: 96 % | HEART RATE: 78 BPM | BODY MASS INDEX: 30.18 KG/M2 | TEMPERATURE: 97.6 F

## 2023-05-19 DIAGNOSIS — T14.8XXA BRUISE: Primary | ICD-10-CM

## 2023-05-19 NOTE — PROGRESS NOTES
Diagnosis Orders   1. Julio Monk is a very pleasant elderly lady here today for concerns of bruising on the inside portion of her thigh/knee. She has no pain or any history of trauma. She is on Coumadin and likely had a small microtrauma to the area which caused her area of concern. This is self-limiting and should dissipate course the next 4 to 6 weeks. If she has any issues in regards to pain in that knee she was instructed to call our office I will be happy to see her back. No orders of the defined types were placed in this encounter. No orders of the defined types were placed in this encounter. No follow-ups on file.     Damon Gonzalez PA-C  [facility]  [phone]

## 2023-06-02 ENCOUNTER — TELEPHONE (OUTPATIENT)
Dept: PHARMACY | Age: 88
End: 2023-06-02

## 2023-06-02 DIAGNOSIS — I48.20 CHRONIC ATRIAL FIBRILLATION (HCC): Primary | ICD-10-CM

## 2023-06-07 ENCOUNTER — HOSPITAL ENCOUNTER (OUTPATIENT)
Dept: PHARMACY | Age: 88
Setting detail: THERAPIES SERIES
Discharge: HOME | End: 2023-06-07
Payer: MEDICARE

## 2023-06-07 DIAGNOSIS — I48.91 ATRIAL FIBRILLATION, UNSPECIFIED TYPE (HCC): Primary | ICD-10-CM

## 2023-06-07 LAB — INTERNATIONAL NORMALIZATION RATIO, POC: 2.3

## 2023-06-07 PROCEDURE — 85610 PROTHROMBIN TIME: CPT

## 2023-06-07 PROCEDURE — 99211 OFF/OP EST MAY X REQ PHY/QHP: CPT

## 2023-06-07 NOTE — PROGRESS NOTES
Ms. Dorita Rios is a 80 y.o. y/o female with history of Afib who presents today for anticoagulation monitoring and adjustment.   INR 2.3 is therapeutic for this patient (goal range 2-3) and is reflective of 22.5 mg TWD  Patient verifies current dosing regimen, patient able to verbally recall dose  Patient reports 0  missed doses since last INR   Patient denies s/sx clotting and/or stroke  Patient denies hematuria, epistaxis, rectal bleeding  Patient denies changes in diet, alcohol, or tobacco use  Reviewed medication list and drug allergies with patient, updated any medication additions or modifications accordingly  Patient also denies any pending medical or dental procedures scheduled at this time  Patient was instructed to continue 22.5 mg TWD and RTC 4 weeks    For Pharmacy Admin Tracking Only    Intervention Detail: Adherence Monitorin  Total # of Interventions Recommended: 1  Total # of Interventions Accepted: 1  Time Spent (min): 15

## 2023-07-13 ENCOUNTER — HOSPITAL ENCOUNTER (OUTPATIENT)
Dept: PHARMACY | Age: 88
Setting detail: THERAPIES SERIES
Discharge: HOME | End: 2023-07-13
Payer: MEDICARE

## 2023-07-13 DIAGNOSIS — I48.11 LONGSTANDING PERSISTENT ATRIAL FIBRILLATION (HCC): Primary | ICD-10-CM

## 2023-07-13 LAB — INTERNATIONAL NORMALIZATION RATIO, POC: 2.8

## 2023-07-13 PROCEDURE — 85610 PROTHROMBIN TIME: CPT

## 2023-07-13 PROCEDURE — 99211 OFF/OP EST MAY X REQ PHY/QHP: CPT

## 2023-07-13 NOTE — PROGRESS NOTES
Ms. Roger Garrett is a 80 y.o. y/o female with history of Afib who presents today for anticoagulation monitoring and adjustment.   INR 2.8 is therapeutic for this patient (goal range 2-3) and is reflective of 22.5 mg TWD  Patient verifies current dosing regimen, patient able to verbally recall dose  Patient reports 0  missed doses since last INR   Patient denies s/sx clotting and/or stroke  Patient denies hematuria, epistaxis, rectal bleeding  Patient denies changes in diet, alcohol, or tobacco use  Reviewed medication list and drug allergies with patient, updated any medication additions or modifications accordingly  Patient also denies any pending medical or dental procedures scheduled at this time  Patient was instructed to continue 22.5 mg TWD and RTC 4 weeks    For Pharmacy Admin Tracking Only    Intervention Detail: Adherence Monitorin  Total # of Interventions Recommended: 1  Total # of Interventions Accepted: 1  Time Spent (min): 15

## 2023-07-21 ENCOUNTER — APPOINTMENT (OUTPATIENT)
Dept: GENERAL RADIOLOGY | Age: 88
End: 2023-07-21
Payer: MEDICARE

## 2023-07-21 ENCOUNTER — HOSPITAL ENCOUNTER (EMERGENCY)
Age: 88
Discharge: HOME | End: 2023-07-21
Attending: EMERGENCY MEDICINE
Payer: MEDICARE

## 2023-07-21 ENCOUNTER — APPOINTMENT (OUTPATIENT)
Dept: CT IMAGING | Age: 88
End: 2023-07-21
Payer: MEDICARE

## 2023-07-21 VITALS
HEART RATE: 72 BPM | OXYGEN SATURATION: 100 % | SYSTOLIC BLOOD PRESSURE: 143 MMHG | RESPIRATION RATE: 20 BRPM | DIASTOLIC BLOOD PRESSURE: 90 MMHG

## 2023-07-21 DIAGNOSIS — S80.02XA CONTUSION OF LEFT KNEE, INITIAL ENCOUNTER: Primary | ICD-10-CM

## 2023-07-21 DIAGNOSIS — S06.9X1A HEAD INJURY, CLOSED, WITH BRIEF LOC (HCC): ICD-10-CM

## 2023-07-21 LAB
BASOPHILS # BLD: 0.1 K/UL (ref 0–0.2)
BASOPHILS NFR BLD: 0.5 %
EOSINOPHIL # BLD: 0.1 K/UL (ref 0–0.7)
EOSINOPHIL NFR BLD: 1 %
ERYTHROCYTE [DISTWIDTH] IN BLOOD BY AUTOMATED COUNT: 13.7 % (ref 11.5–14.5)
HCT VFR BLD AUTO: 43.6 % (ref 37–47)
HGB BLD-MCNC: 14.7 G/DL (ref 12–16)
INR PPP: 1.9
LYMPHOCYTES # BLD: 1.7 K/UL (ref 1–4.8)
LYMPHOCYTES NFR BLD: 15.9 %
MCH RBC QN AUTO: 29.3 PG (ref 27–31.3)
MCHC RBC AUTO-ENTMCNC: 33.8 % (ref 33–37)
MCV RBC AUTO: 86.9 FL (ref 79.4–94.8)
MONOCYTES # BLD: 0.8 K/UL (ref 0.2–0.8)
MONOCYTES NFR BLD: 7.3 %
NEUTROPHILS # BLD: 8.2 K/UL (ref 1.4–6.5)
NEUTS SEG NFR BLD: 75.3 %
PLATELET # BLD AUTO: 184 K/UL (ref 130–400)
PROTHROMBIN TIME: 21.9 SEC (ref 12.3–14.9)
RBC # BLD AUTO: 5.01 M/UL (ref 4.2–5.4)
WBC # BLD AUTO: 10.9 K/UL (ref 4.8–10.8)

## 2023-07-21 PROCEDURE — 36415 COLL VENOUS BLD VENIPUNCTURE: CPT

## 2023-07-21 PROCEDURE — 85025 COMPLETE CBC W/AUTO DIFF WBC: CPT

## 2023-07-21 PROCEDURE — 73560 X-RAY EXAM OF KNEE 1 OR 2: CPT

## 2023-07-21 PROCEDURE — 99284 EMERGENCY DEPT VISIT MOD MDM: CPT

## 2023-07-21 PROCEDURE — 85610 PROTHROMBIN TIME: CPT

## 2023-07-21 PROCEDURE — 70450 CT HEAD/BRAIN W/O DYE: CPT

## 2023-07-21 NOTE — ED TRIAGE NOTES
PT ARRIVED VIA EMS FROM HOME AFTER TRIP FALL. PT A/O X 3 SKIN PINK W/D RESP NON LABORED. PT O/E REPORTS SHE HIT HER HEAD AND O/E HAS SMALL HEMATOMA TO LT FOREHEAD. AND SWELLING TO LT KNEE AREA W/O DEFORMITY. PT DENIES LOC.

## 2023-08-02 DIAGNOSIS — I50.9 CHRONIC CONGESTIVE HEART FAILURE, UNSPECIFIED HEART FAILURE TYPE (HCC): ICD-10-CM

## 2023-08-02 DIAGNOSIS — I48.11 LONGSTANDING PERSISTENT ATRIAL FIBRILLATION (HCC): ICD-10-CM

## 2023-08-02 DIAGNOSIS — I10 ESSENTIAL HYPERTENSION: ICD-10-CM

## 2023-08-02 DIAGNOSIS — Z95.0 PACEMAKER: Primary | Chronic | ICD-10-CM

## 2023-08-10 ENCOUNTER — HOSPITAL ENCOUNTER (OUTPATIENT)
Dept: PHARMACY | Age: 88
Setting detail: THERAPIES SERIES
Discharge: HOME | End: 2023-08-10
Payer: MEDICARE

## 2023-08-10 DIAGNOSIS — I48.11 LONGSTANDING PERSISTENT ATRIAL FIBRILLATION (HCC): Primary | ICD-10-CM

## 2023-08-10 LAB — INTERNATIONAL NORMALIZATION RATIO, POC: 1.5

## 2023-08-10 PROCEDURE — 85610 PROTHROMBIN TIME: CPT

## 2023-08-10 PROCEDURE — 99211 OFF/OP EST MAY X REQ PHY/QHP: CPT

## 2023-08-10 NOTE — PROGRESS NOTES
Ms. Isaura Mcgregor is a 80 y.o. y/o female with history of Afib who presents today for anticoagulation monitoring and adjustment. INR 1.5 is subtherapeutic for this patient (goal range 2-3) and is reflective of 22.5 mg TWD    Patient verifies current dosing regimen, patient able to verbally recall dose  Patient reports 0 missed doses since last INR   Patient denies s/sx clotting and/or stroke  Patient denies hematuria, epistaxis, rectal bleeding  Patient denies changes in diet, alcohol, or tobacco use  Reviewed medication list and drug allergies with patient, updated any medication additions or modifications accordingly  Patient also denies any pending medical or dental procedures scheduled at this time    No discernible reason for low INR. Patient has been relatively consistent on current dose.  Patient denies missing doses, change in diet/medications, etc    Patient was instructed to boost with 5 mg today (usually 2.5 mg) and then resume 22.5 mg TWD and RTC 3 weeks    Radha Vera PharmD  8/10/2023 9:54 AM      For Pharmacy Admin Tracking Only    Intervention Detail: Adherence Monitorin  Total # of Interventions Recommended: 1  Total # of Interventions Accepted: 1  Time Spent (min): 15

## 2023-08-31 ENCOUNTER — HOSPITAL ENCOUNTER (OUTPATIENT)
Dept: PHARMACY | Age: 88
Setting detail: THERAPIES SERIES
Discharge: HOME | End: 2023-08-31
Payer: MEDICARE

## 2023-08-31 DIAGNOSIS — I48.11 LONGSTANDING PERSISTENT ATRIAL FIBRILLATION (HCC): Primary | ICD-10-CM

## 2023-08-31 LAB — INTERNATIONAL NORMALIZATION RATIO, POC: 1.4

## 2023-08-31 PROCEDURE — 99211 OFF/OP EST MAY X REQ PHY/QHP: CPT

## 2023-08-31 PROCEDURE — 85610 PROTHROMBIN TIME: CPT

## 2023-08-31 NOTE — PROGRESS NOTES
Ms. Radha Kuo is a 80 y.o. y/o female with history of Afib who presents today for anticoagulation monitoring and adjustment. INR 1.4 is for this patient (goal range 2-3) and is subtherapeutic reflective of 22.5 mg TWD. Patient verifies current dosing regimen, patient able to verbally recall dose. Patient reports 0 missed doses since last INR. Patient denies s/sx clotting and/or stroke. Patient denies hematuria, epistaxis, rectal bleeding. Patient denies changes in diet, alcohol, or tobacco use. Patient reports eating less. Reviewed medication list and drug allergies with patient, updated any medication additions or modifications accordingly. Patient also denies any pending medical or dental procedures scheduled at this time. No discernable reason for low INR today. Patient was instructed to boost with 5 mg today (usual 2.5 mg), then increase to 25 mg TWD (11.1% increase) and RTC 2 weeks.     Irlanda Drew, PharmD  PGY1 Pharmacy Resident  2023 10:24 AM      For Pharmacy Admin Tracking Only    Intervention Detail: Adherence Monitorin and Dose Adjustment: 1, reason: Therapy Optimization  Total # of Interventions Recommended: 2  Total # of Interventions Accepted: 2  Time Spent (min): 30

## 2023-09-13 ENCOUNTER — TELEPHONE (OUTPATIENT)
Dept: FAMILY MEDICINE CLINIC | Age: 88
End: 2023-09-13

## 2023-09-13 ENCOUNTER — OFFICE VISIT (OUTPATIENT)
Dept: CARDIOLOGY CLINIC | Age: 88
End: 2023-09-13
Payer: MEDICARE

## 2023-09-13 VITALS
OXYGEN SATURATION: 97 % | DIASTOLIC BLOOD PRESSURE: 62 MMHG | SYSTOLIC BLOOD PRESSURE: 100 MMHG | HEART RATE: 73 BPM | BODY MASS INDEX: 29.33 KG/M2 | HEIGHT: 62 IN | WEIGHT: 159.4 LBS

## 2023-09-13 DIAGNOSIS — I10 ESSENTIAL HYPERTENSION: ICD-10-CM

## 2023-09-13 DIAGNOSIS — I48.0 PAROXYSMAL ATRIAL FIBRILLATION (HCC): Primary | ICD-10-CM

## 2023-09-13 DIAGNOSIS — R06.09 DOE (DYSPNEA ON EXERTION): ICD-10-CM

## 2023-09-13 PROCEDURE — 1036F TOBACCO NON-USER: CPT | Performed by: INTERNAL MEDICINE

## 2023-09-13 PROCEDURE — 93000 ELECTROCARDIOGRAM COMPLETE: CPT | Performed by: INTERNAL MEDICINE

## 2023-09-13 PROCEDURE — 1090F PRES/ABSN URINE INCON ASSESS: CPT | Performed by: INTERNAL MEDICINE

## 2023-09-13 PROCEDURE — 1123F ACP DISCUSS/DSCN MKR DOCD: CPT | Performed by: INTERNAL MEDICINE

## 2023-09-13 PROCEDURE — 99204 OFFICE O/P NEW MOD 45 MIN: CPT | Performed by: INTERNAL MEDICINE

## 2023-09-13 PROCEDURE — G8427 DOCREV CUR MEDS BY ELIG CLIN: HCPCS | Performed by: INTERNAL MEDICINE

## 2023-09-13 PROCEDURE — G8417 CALC BMI ABV UP PARAM F/U: HCPCS | Performed by: INTERNAL MEDICINE

## 2023-09-13 NOTE — PATIENT INSTRUCTIONS
Stress test  Echocardiogram  Pacemaker check today  Continue current medications  Follow up in 4 weeks.

## 2023-09-13 NOTE — PROGRESS NOTES
that includes Coronary angioplasty with stent; pacemaker placement; Cataract removal with implant; other surgical history (09/07/2016); Hysterectomy; and Abdomen surgery. Social History: She has never smoked. She has never used smokeless tobacco. She does not drink alcohol and does not use drugs. Family History: Noncontributory for premature CAD. Current medications:   Current Outpatient Medications   Medication Sig Dispense Refill    blood glucose test strips (FREESTYLE INSULINX TEST) strip TEST five times a day 200 strip 3    Blood Glucose Monitoring Suppl (ONETOUCH VERIO) w/Device KIT As directed 1 kit 0    BASAGLAR KWIKPEN 100 UNIT/ML injection pen 2 samples given 1-Lot [id number] Exp 6/23 1-Lot [id number] Exp 6/23 2 pen 0    blood glucose test strips (ONETOUCH VERIO) strip 1 each by In Vitro route 3 times daily As needed. 100 each 3    FREESTYLE LANCETS MISC TEST three times a day 100 each 11    Alcohol Swabs PADS 1 each by Does not apply route       Cholecalciferol (VITAMIN D3) 1000 UNITS TABS Take 1 tablet by mouth daily      aspirin 81 MG EC tablet Take 1 tablet by mouth daily      levothyroxine (SYNTHROID) 75 MCG tablet take 1 tablet by mouth once daily 30 tablet 5    warfarin (COUMADIN) 5 MG tablet Take as directed by [facility]. Quantity equals 90 day supply.  70 tablet 1    insulin glargine (BASAGLAR KWIKPEN) 100 UNIT/ML injection pen Lot [id number] Exp 6/23 1 pen 0    insulin lispro, 1 Unit Dial, (HUMALOG KWIKPEN) 100 UNIT/ML SOPN inject 4 unit subcutaneously IF GLUCOSE  6 UNITS 151-200 8 UNITS 201-250 10 UNITS 251-300 12 UNITS 301-360 14 UNITS 361-400 MAX 42 30 mL 3    insulin glargine (LANTUS SOLOSTAR) 100 UNIT/ML injection pen inject 30 unit subcutaneously every morning 45 mL 3    Insulin Pen Needle 32G X 4 MM MISC 1 each by Does not apply route 4 times daily 200 each 3    OneTouch Delica Lancets 76M MISC tid 100 each 3    Insulin Pen Needle

## 2023-09-13 NOTE — TELEPHONE ENCOUNTER
She has refused help in the past. A grand daughter came with her to the office at her last visit so she does have help if she accepts it. I will address it with her at her appt.  Thank you 18-Mar-2022 20:54

## 2023-09-13 NOTE — TELEPHONE ENCOUNTER
----- Message from Javi Patel DO sent at 9/13/2023  5:08 PM EDT -----  Regarding: [place] patient needs home health RN or other assistance  Good afternoon,     We have this mutual patient in common who came in today to establish cardiac care. She seemed to be very very confused and likely has advanced dementia. She lives alone but I suspect she has been having lots of trouble taking her medications as well as with basic day to day living like personal hygiene. She needed lots of help today finding our office as well as getting back to her car. Sounds like she doesn't have any family support. Is there a way for home health care or social work to get involved? Our office took the liberty of calling to make an appointment with you to further address. Please let me know if I can be of any further assistance.      Thanks  Sawyer Gilbert DO

## 2023-09-14 ENCOUNTER — HOSPITAL ENCOUNTER (OUTPATIENT)
Dept: PHARMACY | Age: 88
Setting detail: THERAPIES SERIES
Discharge: HOME | End: 2023-09-14
Payer: MEDICARE

## 2023-09-14 ENCOUNTER — TELEPHONE (OUTPATIENT)
Dept: PHARMACY | Age: 88
End: 2023-09-14

## 2023-09-14 DIAGNOSIS — I48.11 LONGSTANDING PERSISTENT ATRIAL FIBRILLATION (HCC): Primary | ICD-10-CM

## 2023-09-14 LAB — INTERNATIONAL NORMALIZATION RATIO, POC: 2.2

## 2023-09-14 PROCEDURE — 85610 PROTHROMBIN TIME: CPT

## 2023-09-14 PROCEDURE — 99211 OFF/OP EST MAY X REQ PHY/QHP: CPT

## 2023-09-14 NOTE — PROGRESS NOTES
Ms. Radha Kuo is a 80 y.o. y/o female with history of Afib who presents today for anticoagulation monitoring and adjustment. INR 2.2 is therapeutic for this patient (goal range 2-3) and is reflective of 25 mg TWD  Patient verifies current dosing regimen, patient able to verbally recall dose  Patient reports 0  missed doses since last INR   Patient denies s/sx clotting and/or stroke  Patient denies hematuria, epistaxis, rectal bleeding  Patient denies changes in diet, alcohol, or tobacco use  Reviewed medication list and drug allergies with patient, updated any medication additions or modifications accordingly  Patient also denies any pending medical or dental procedures scheduled at this time  Patient was instructed to continue 25 mg TWD and RTC 4 weeks  Pt is very confused and does not know where she is or why she is here. Pt does not know day or month or how to take medications. Concerned pt does not have any support at home to help with medications, lives alone and drives. Will reach out to family physician.     For Pharmacy Admin Tracking Only    Intervention Detail: Adherence Monitorin  Total # of Interventions Recommended: 1  Total # of Interventions Accepted: 1  Time Spent (min): 15

## 2023-09-14 NOTE — TELEPHONE ENCOUNTER
Reaching out regarding mutual patient Jesus Brown. Patient presented to coumadin clinic today and is extremely confused. Pt does not know where she is, why she is here, day or month. Patient does not know how to take her medications and appears to not be able to manage hygeine. Pt states she does not have any support at home. Given the potential danger with warfarin incorrect dosing we are reaching out to communicate our concerns for patient safety.  Julieta Cheadle [facility]

## 2023-10-05 DIAGNOSIS — E08.22 DIABETES MELLITUS DUE TO UNDERLYING CONDITION WITH STAGE 3 CHRONIC KIDNEY DISEASE, WITH LONG-TERM CURRENT USE OF INSULIN, UNSPECIFIED WHETHER STAGE 3A OR 3B CKD (HCC): ICD-10-CM

## 2023-10-05 DIAGNOSIS — N18.30 DIABETES MELLITUS DUE TO UNDERLYING CONDITION WITH STAGE 3 CHRONIC KIDNEY DISEASE, WITH LONG-TERM CURRENT USE OF INSULIN, UNSPECIFIED WHETHER STAGE 3A OR 3B CKD (HCC): ICD-10-CM

## 2023-10-05 DIAGNOSIS — I50.9 CHRONIC CONGESTIVE HEART FAILURE, UNSPECIFIED HEART FAILURE TYPE (HCC): ICD-10-CM

## 2023-10-05 DIAGNOSIS — E03.9 ACQUIRED HYPOTHYROIDISM: ICD-10-CM

## 2023-10-05 DIAGNOSIS — Z79.4 DIABETES MELLITUS DUE TO UNDERLYING CONDITION WITH STAGE 3 CHRONIC KIDNEY DISEASE, WITH LONG-TERM CURRENT USE OF INSULIN, UNSPECIFIED WHETHER STAGE 3A OR 3B CKD (HCC): ICD-10-CM

## 2023-10-05 LAB
ALBUMIN SERPL-MCNC: 3.9 G/DL (ref 3.5–4.6)
ALP SERPL-CCNC: 80 U/L (ref 40–130)
ALT SERPL-CCNC: 12 U/L (ref 0–33)
ANION GAP SERPL CALCULATED.3IONS-SCNC: 13 MEQ/L (ref 9–15)
AST SERPL-CCNC: 16 U/L (ref 0–35)
BASOPHILS # BLD: 0 K/UL (ref 0–0.2)
BASOPHILS NFR BLD: 0.8 %
BILIRUB SERPL-MCNC: 0.6 MG/DL (ref 0.2–0.7)
BUN SERPL-MCNC: 18 MG/DL (ref 8–23)
CALCIUM SERPL-MCNC: 9.2 MG/DL (ref 8.5–9.9)
CHLORIDE SERPL-SCNC: 98 MEQ/L (ref 95–107)
CHOLEST SERPL-MCNC: 189 MG/DL (ref 0–199)
CO2 SERPL-SCNC: 26 MEQ/L (ref 20–31)
CREAT SERPL-MCNC: 1.11 MG/DL (ref 0.5–0.9)
EOSINOPHIL # BLD: 0.1 K/UL (ref 0–0.7)
EOSINOPHIL NFR BLD: 2.5 %
ERYTHROCYTE [DISTWIDTH] IN BLOOD BY AUTOMATED COUNT: 13.1 % (ref 11.5–14.5)
GLOBULIN SER CALC-MCNC: 3 G/DL (ref 2.3–3.5)
GLUCOSE SERPL-MCNC: 299 MG/DL (ref 70–99)
HBA1C MFR BLD: 12.1 % (ref 4.8–5.9)
HCT VFR BLD AUTO: 39.8 % (ref 37–47)
HDLC SERPL-MCNC: 45 MG/DL (ref 40–59)
HGB BLD-MCNC: 12.9 G/DL (ref 12–16)
LDL CHOLESTEROL CALCULATED: 119 MG/DL (ref 0–129)
LYMPHOCYTES # BLD: 1.3 K/UL (ref 1–4.8)
LYMPHOCYTES NFR BLD: 23.8 %
MCH RBC QN AUTO: 28.3 PG (ref 27–31.3)
MCHC RBC AUTO-ENTMCNC: 32.4 % (ref 33–37)
MCV RBC AUTO: 87.3 FL (ref 79.4–94.8)
MONOCYTES # BLD: 0.5 K/UL (ref 0.2–0.8)
MONOCYTES NFR BLD: 9.8 %
NEUTROPHILS # BLD: 3.3 K/UL (ref 1.4–6.5)
NEUTS SEG NFR BLD: 62.5 %
PLATELET # BLD AUTO: 223 K/UL (ref 130–400)
POTASSIUM SERPL-SCNC: 3.8 MEQ/L (ref 3.4–4.9)
PROT SERPL-MCNC: 6.9 G/DL (ref 6.3–8)
RBC # BLD AUTO: 4.56 M/UL (ref 4.2–5.4)
SODIUM SERPL-SCNC: 137 MEQ/L (ref 135–144)
TRIGLYCERIDE, FASTING: 126 MG/DL (ref 0–150)
TSH REFLEX: 1.7 UIU/ML (ref 0.44–3.86)
WBC # BLD AUTO: 5.3 K/UL (ref 4.8–10.8)

## 2023-10-16 ENCOUNTER — APPOINTMENT (OUTPATIENT)
Dept: CT IMAGING | Age: 88
End: 2023-10-16
Payer: MEDICARE

## 2023-10-16 ENCOUNTER — HOSPITAL ENCOUNTER (EMERGENCY)
Age: 88
Discharge: HOME | End: 2023-10-16
Attending: EMERGENCY MEDICINE
Payer: MEDICARE

## 2023-10-16 ENCOUNTER — TELEPHONE (OUTPATIENT)
Dept: PHARMACY | Age: 88
End: 2023-10-16

## 2023-10-16 VITALS
WEIGHT: 159 LBS | BODY MASS INDEX: 29.26 KG/M2 | OXYGEN SATURATION: 95 % | HEIGHT: 62 IN | RESPIRATION RATE: 19 BRPM | DIASTOLIC BLOOD PRESSURE: 58 MMHG | TEMPERATURE: 98.7 F | HEART RATE: 73 BPM | SYSTOLIC BLOOD PRESSURE: 128 MMHG

## 2023-10-16 DIAGNOSIS — I48.91 ATRIAL FIBRILLATION, UNSPECIFIED TYPE (HCC): Primary | ICD-10-CM

## 2023-10-16 DIAGNOSIS — R41.82 ALTERED MENTAL STATUS, UNSPECIFIED ALTERED MENTAL STATUS TYPE: Primary | ICD-10-CM

## 2023-10-16 LAB
ALBUMIN SERPL-MCNC: 3.9 G/DL (ref 3.5–4.6)
ALP SERPL-CCNC: 74 U/L (ref 40–130)
ALT SERPL-CCNC: 13 U/L (ref 0–33)
ANION GAP SERPL CALCULATED.3IONS-SCNC: 8 MEQ/L (ref 9–15)
AST SERPL-CCNC: 20 U/L (ref 0–35)
BACTERIA URNS QL MICRO: ABNORMAL /HPF
BASOPHILS # BLD: 0 K/UL (ref 0–0.2)
BASOPHILS NFR BLD: 0.6 %
BILIRUB SERPL-MCNC: 0.9 MG/DL (ref 0.2–0.7)
BILIRUB UR QL STRIP: NEGATIVE
BUN SERPL-MCNC: 26 MG/DL (ref 8–23)
CALCIUM SERPL-MCNC: 9.7 MG/DL (ref 8.5–9.9)
CHLORIDE SERPL-SCNC: 97 MEQ/L (ref 95–107)
CHP ED QC CHECK: YES
CLARITY UR: CLEAR
CO2 SERPL-SCNC: 29 MEQ/L (ref 20–31)
COLOR UR: YELLOW
CREAT SERPL-MCNC: 1.02 MG/DL (ref 0.5–0.9)
EKG ATRIAL RATE: 258 BPM
EKG Q-T INTERVAL: 472 MS
EKG QRS DURATION: 148 MS
EKG QTC CALCULATION (BAZETT): 544 MS
EKG R AXIS: 75 DEGREES
EKG T AXIS: 71 DEGREES
EKG VENTRICULAR RATE: 80 BPM
EOSINOPHIL # BLD: 0.1 K/UL (ref 0–0.7)
EOSINOPHIL NFR BLD: 1.4 %
EPI CELLS #/AREA URNS AUTO: ABNORMAL /HPF (ref 0–5)
ERYTHROCYTE [DISTWIDTH] IN BLOOD BY AUTOMATED COUNT: 13 % (ref 11.5–14.5)
ETHANOL PERCENT: NORMAL G/DL
ETHANOLAMINE SERPL-MCNC: <10 MG/DL (ref 0–0.08)
GLOBULIN SER CALC-MCNC: 3 G/DL (ref 2.3–3.5)
GLUCOSE BLD-MCNC: 165 MG/DL
GLUCOSE BLD-MCNC: 265 MG/DL (ref 70–99)
GLUCOSE SERPL-MCNC: 275 MG/DL (ref 70–99)
GLUCOSE UR STRIP-MCNC: 250 MG/DL
HCT VFR BLD AUTO: 42.6 % (ref 37–47)
HGB BLD-MCNC: 13.9 G/DL (ref 12–16)
HGB UR QL STRIP: NEGATIVE
HYALINE CASTS #/AREA URNS AUTO: ABNORMAL /HPF (ref 0–5)
INR PPP: 1.5
KETONES UR STRIP-MCNC: 15 MG/DL
LEUKOCYTE ESTERASE UR QL STRIP: ABNORMAL
LYMPHOCYTES # BLD: 1.3 K/UL (ref 1–4.8)
LYMPHOCYTES NFR BLD: 20.3 %
MCH RBC QN AUTO: 29 PG (ref 27–31.3)
MCHC RBC AUTO-ENTMCNC: 32.6 % (ref 33–37)
MCV RBC AUTO: 88.9 FL (ref 79.4–94.8)
MONOCYTES # BLD: 0.5 K/UL (ref 0.2–0.8)
MONOCYTES NFR BLD: 7.9 %
NEUTROPHILS # BLD: 4.4 K/UL (ref 1.4–6.5)
NEUTS SEG NFR BLD: 69.5 %
NITRITE UR QL STRIP: POSITIVE
PERFORMED ON: ABNORMAL
PH UR STRIP: 8.5 [PH] (ref 5–9)
PLATELET # BLD AUTO: 200 K/UL (ref 130–400)
POTASSIUM SERPL-SCNC: 4.2 MEQ/L (ref 3.4–4.9)
PROT SERPL-MCNC: 6.9 G/DL (ref 6.3–8)
PROT UR STRIP-MCNC: ABNORMAL MG/DL
PROTHROMBIN TIME: 18.4 SEC (ref 12.3–14.9)
RBC # BLD AUTO: 4.79 M/UL (ref 4.2–5.4)
RBC #/AREA URNS AUTO: ABNORMAL /HPF (ref 0–5)
SODIUM SERPL-SCNC: 134 MEQ/L (ref 135–144)
SP GR UR STRIP: 1.02 (ref 1–1.03)
TSH REFLEX: 1.97 UIU/ML (ref 0.44–3.86)
URINE REFLEX TO CULTURE: ABNORMAL
UROBILINOGEN UR STRIP-ACNC: 0.2 E.U./DL
WBC # BLD AUTO: 6.4 K/UL (ref 4.8–10.8)
WBC #/AREA URNS AUTO: ABNORMAL /HPF (ref 0–5)

## 2023-10-16 PROCEDURE — 99284 EMERGENCY DEPT VISIT MOD MDM: CPT

## 2023-10-16 PROCEDURE — 93010 ELECTROCARDIOGRAM REPORT: CPT | Performed by: INTERNAL MEDICINE

## 2023-10-16 PROCEDURE — 80053 COMPREHEN METABOLIC PANEL: CPT

## 2023-10-16 PROCEDURE — 84443 ASSAY THYROID STIM HORMONE: CPT

## 2023-10-16 PROCEDURE — 81001 URINALYSIS AUTO W/SCOPE: CPT

## 2023-10-16 PROCEDURE — 85610 PROTHROMBIN TIME: CPT

## 2023-10-16 PROCEDURE — 93005 ELECTROCARDIOGRAM TRACING: CPT | Performed by: EMERGENCY MEDICINE

## 2023-10-16 PROCEDURE — 36415 COLL VENOUS BLD VENIPUNCTURE: CPT

## 2023-10-16 PROCEDURE — 85025 COMPLETE CBC W/AUTO DIFF WBC: CPT

## 2023-10-16 PROCEDURE — 82077 ASSAY SPEC XCP UR&BREATH IA: CPT

## 2023-10-16 PROCEDURE — 70450 CT HEAD/BRAIN W/O DYE: CPT

## 2023-10-16 ASSESSMENT — ENCOUNTER SYMPTOMS
SHORTNESS OF BREATH: 0
EYE PAIN: 0
ABDOMINAL PAIN: 0
CHEST TIGHTNESS: 0
SORE THROAT: 0
NAUSEA: 0
VOMITING: 0

## 2023-10-16 ASSESSMENT — PAIN - FUNCTIONAL ASSESSMENT: PAIN_FUNCTIONAL_ASSESSMENT: NONE - DENIES PAIN

## 2023-10-16 ASSESSMENT — LIFESTYLE VARIABLES
HOW OFTEN DO YOU HAVE A DRINK CONTAINING ALCOHOL: NEVER
HOW MANY STANDARD DRINKS CONTAINING ALCOHOL DO YOU HAVE ON A TYPICAL DAY: PATIENT DOES NOT DRINK

## 2023-10-16 NOTE — ED TRIAGE NOTES
Pt arrived by ems with report of being found at a gas station confused and wandering    Per report by ems pt recently left her car there and was there wandering around the gas station confused     Pt states she lives alone     Pt alert and oriented x1-2

## 2023-10-16 NOTE — ED PROVIDER NOTES
(PEPCID) 20 MG TABLET    Take 1 tablet by mouth 2 times daily    FREESTYLE LANCETS MISC    TEST three times a day    FUROSEMIDE (LASIX) 40 MG TABLET    Take 1 tablet by mouth daily Except take 1 tablet by mouth twice daily on Monday, Wednesday, and Friday. Rest of days takes daily. IBUPROFEN (ADVIL;MOTRIN) 200 MG TABLET    Take 1 tablet by mouth every 6 hours as needed for Pain    INSULIN GLARGINE (BASAGLAR KWIKPEN) 100 UNIT/ML INJECTION PEN    Lot [id number] Exp 6/23    INSULIN GLARGINE (LANTUS SOLOSTAR) 100 UNIT/ML INJECTION PEN    inject 30 unit subcutaneously every morning    INSULIN LISPRO, 1 UNIT DIAL, (HUMALOG KWIKPEN) 100 UNIT/ML SOPN    inject 4 unit subcutaneously IF GLUCOSE  6 UNITS 151-200 8 UNITS 201-250 10 UNITS 251-300 12 UNITS 301-360 14 UNITS 361-400 MAX 42    INSULIN PEN NEEDLE (NOVOFINE) 32G X 6 MM MISC    use four times a day    INSULIN PEN NEEDLE 32G X 4 MM MISC    1 each by Does not apply route 4 times daily    ISOSORBIDE MONONITRATE (IMDUR) 60 MG EXTENDED RELEASE TABLET    take 1 tablet by mouth once daily    LEVOTHYROXINE (SYNTHROID) 75 MCG TABLET    take 1 tablet by mouth once daily    MAGNESIUM OXIDE (MAG-OX) 400 MG TABLET    Take 1 tablet by mouth daily    MECLIZINE (ANTIVERT) 12.5 MG TABLET    Take 1 tablet by mouth 3 times daily as needed for Dizziness    METOPROLOL SUCCINATE (TOPROL XL) 50 MG EXTENDED RELEASE TABLET    Take 2 tablets by mouth 2 times daily    MULTIPLE VITAMINS-MINERALS (CENTRUM SILVER PO)    Take 1 tablet by mouth daily     NITROGLYCERIN (NITROSTAT) 0.4 MG SL TABLET    Place 1 tablet under the tongue every 5 minutes as needed.     ONETOUCH DELICA LANCETS 02X MISC    tid    PANTOPRAZOLE (PROTONIX) 40 MG TABLET    take 1 tablet by mouth once daily    PRAVASTATIN (PRAVACHOL) 40 MG TABLET    Take 1 tablet by mouth daily    RA PEN NEEDLES 31G X 5 MM MISC    USE FOUR TIMES A DAY    SODIUM CHLORIDE (OCEAN, BABY AYR) 0.65 % NASAL SPRAY    1 spray by Nasal route as needed

## 2023-10-16 NOTE — ED NOTES
Patient arrives in [facility] bed 6 . She is A/O X1. She is pleasant but definitely confused. Urine results reviewed with Dr. Chava Blake, no new orders at this time. Patient is placed in psych safe gown and tucked in bed. Will review chart and reach out to .       Deyanira Isaacs RN  10/16/23 1834

## 2023-10-16 NOTE — CARE COORDINATION
This nurse spoke with the patient who states \"I live alone with my cats\" stating that there are around 25 of them. She has difficulty telling me how she gets food and cares for herself and the cats. She was able to tell me that today is Monday, not month or year. She was able to tell me that she is at a hospital but not able to state which one. She states that she does have a grand daughter that visits her. This nurse called the number listed for her grand daughter Candie Gonzales but received an unidentified voicemail and no information was left.

## 2023-10-16 NOTE — ED NOTES
Granddaughter has arrived and is speaking with patient to determine if she is at her baseline.      Rosenda Lyle RN  10/16/23 6608

## 2023-10-16 NOTE — CARE COORDINATION
This nurse spoke with Silvia Coker from [address] [place] [address]. She states that they do know of the patient and the patient has been followed by the [facility] office on aging, worker is Renée. She states that she will contact her and have her call me. I called Rozanna Sicard venable's office but they hadn't seen the patient since May, Dr Petty Moncada office hadn't seen her recently either. This nurse spoke with [address] [place] [address] regarding the patient's needs. I did call [facility] to find out whether someone could go and care for the patient's animals but they are closed today. I informed Tara Hunt of that. I spoke with Dr Paulino Patel regarding getting a psych evaluation to check the patient's mental status to see what level of care may be appropriate.

## 2023-10-16 NOTE — ED NOTES
Pt to [address] via Wheelchair   Belongings in belongings bags      Virginie Andrews, REILLY  10/16/23 1734

## 2023-10-16 NOTE — ED NOTES
Pt to [facility] bed 6. Pt assisted to bed. Changed into psych safe clothing and belongings secured. No distress noted and pt cooperative.      Melanie Cruz RN  10/16/23 6050

## 2023-10-16 NOTE — ED NOTES
Spoke with Heather Wiggins, [address] [place] [address] Coordination, who advised patient's [facility] will be coming in to see if patient is at her baseline or if there has been an acute change.       Deyanira Isaacs RN  10/16/23 1314

## 2023-10-16 NOTE — ED NOTES
Dr. Sean [place] at bedside. Patient is at her baseline according to granddaughter. Patient is answering questions appropriately when asked loudly, apparently, her hearing is poor. Granddaughter states she is confused at times. She went to the gas station last night, it was raining and dark so she did not think she should drive home so the police took the patient home and she walked back to the gas station today to get her car. She lives right around the corner from the gas station and she thought she could walk it without her walker. When arriving at the gas station, she was unsteady on her feet and bystanders became concerned. Patient was brought to the ED for evaluation. Granddaughter advises patient is at her baseline and she feels comfortable taking her home.       Rosenda Lyle RN  10/16/23 0986

## 2023-10-16 NOTE — TELEPHONE ENCOUNTER
Pt currently in [address] [address]  Missed appt on Friday with [facility]    Follow up in 1 week.  Updated tracker

## 2023-10-16 NOTE — CARE COORDINATION
This nurse spoke to Jolene Geronimo who states she is the patient's grand daughter who states that she sees the patient every other day and calls her daily. She states \"she's a little confused here and there\" but that \"she's very independent. \" When this nurse informed her of patient's current orientation she states \"well that's new\" she states that she will come to the hospital to check on the patient.

## 2023-10-16 NOTE — ED NOTES
Patient discharged home with granddaughter. Granddaughter verbalized an understanding for discharge and follow up instructions. Patient assisted to car with w/c by Rosalba Jay.       Jolie Hamman, RN  10/16/23 1331

## 2023-10-23 ENCOUNTER — TELEPHONE (OUTPATIENT)
Dept: PHARMACY | Age: 88
End: 2023-10-23

## 2023-10-23 DIAGNOSIS — I48.11 LONGSTANDING PERSISTENT ATRIAL FIBRILLATION (HCC): Primary | ICD-10-CM

## 2023-10-23 NOTE — TELEPHONE ENCOUNTER
Called for patient update - was at [address] last week  Spoke with granddaughter Cruzito Mendez who will now be managing medications, appts and any communications to patient as patient has become increasingly confused  Scheduled patient for tomorrow afternoon MD Mirella

## 2023-10-24 ENCOUNTER — TELEPHONE (OUTPATIENT)
Dept: CARDIOLOGY CLINIC | Age: 88
End: 2023-10-24

## 2023-10-24 ENCOUNTER — HOSPITAL ENCOUNTER (OUTPATIENT)
Dept: PHARMACY | Age: 88
Setting detail: THERAPIES SERIES
Discharge: HOME | End: 2023-10-24
Payer: MEDICARE

## 2023-10-24 DIAGNOSIS — I48.11 LONGSTANDING PERSISTENT ATRIAL FIBRILLATION (HCC): Primary | ICD-10-CM

## 2023-10-24 LAB — INTERNATIONAL NORMALIZATION RATIO, POC: 1.2

## 2023-10-24 PROCEDURE — 85610 PROTHROMBIN TIME: CPT

## 2023-10-24 PROCEDURE — 99211 OFF/OP EST MAY X REQ PHY/QHP: CPT

## 2023-10-24 NOTE — TELEPHONE ENCOUNTER
Ladonna calling back. States that pt goes to Dr Mic Chapa for cardiology. She wanted to cancel tomorrow's appt and future testing.    States that this appt was made by accident since pt goes to another cardiologist.

## 2023-10-24 NOTE — PROGRESS NOTES
Ms. Stone Pike is a 80 y.o. y/o female with history of Afib who presents today for anticoagulation monitoring and adjustment. INR 1.2 is subtherapeutic for this patient (goal range 2-3) and is reflective of ?? mg TWD    Patient verifies current dosing regimen, patient able to verbally recall dose  Patient reports undetermined missed doses since last INR - patient states that she may have missed one or many doses, and isn't sure she knows where the bottle of medication is  Patient denies s/sx clotting and/or stroke  Patient denies hematuria, epistaxis, rectal bleeding  Patient denies changes in diet, alcohol, or tobacco use  Reviewed medication list and drug allergies with patient, updated any medication additions or modifications accordingly  Patient also denies any pending medical or dental procedures scheduled at this time    Patient recently brought to ED after being found wandering confused at a gas station. Granddaughter Yary Boykin has recently started helping with transportation and medications due to above issues. Patient is extremely unhappy with current situation    Patient was instructed to boost with 5 mg the next three days and then resume 25 mg TWD and RTC 1 week.  Information relayed to granddaughter who was in waiting room    Vale Barakat PharmD  10/24/2023 1:02 PM      For Pharmacy Admin Tracking Only    Intervention Detail: Adherence Monitorin and Dose Adjustment: 1, reason: Therapy Optimization  Total # of Interventions Recommended: 2  Total # of Interventions Accepted: 2  Time Spent (min): 30

## 2023-10-31 ENCOUNTER — HOSPITAL ENCOUNTER (OUTPATIENT)
Dept: PHARMACY | Age: 88
Setting detail: THERAPIES SERIES
Discharge: HOME | End: 2023-10-31
Payer: MEDICARE

## 2023-10-31 DIAGNOSIS — I48.20 CHRONIC ATRIAL FIBRILLATION (HCC): Primary | ICD-10-CM

## 2023-10-31 LAB — INTERNATIONAL NORMALIZATION RATIO, POC: 1.3

## 2023-10-31 PROCEDURE — 85610 PROTHROMBIN TIME: CPT | Performed by: PHARMACIST

## 2023-10-31 PROCEDURE — 99211 OFF/OP EST MAY X REQ PHY/QHP: CPT | Performed by: PHARMACIST

## 2023-10-31 NOTE — PROGRESS NOTES
Ms. Alvaro Huang is a 80 y.o. y/o female with history of Afib who presents today for anticoagulation monitoring and adjustment. INR 1.3 is subtherapeutic for this patient (goal range 2-3) and is reflective of unknown TWD the past week d/t noncompliance. Patient cannot verify current dose but granddaughter Pablo Laguerre (here w/ patient) verifies current dosing regimen. Patient reports unknown  missed doses since last INR   Patient denies s/sx clotting and/or stroke  Patient denies hematuria, epistaxis, rectal bleeding  Patient denies changes in diet, alcohol, or tobacco use  Reviewed medication list and drug allergies with patient, updated any medication additions or modifications accordingly  Patient also denies any pending medical or dental procedures scheduled at this time  Patient (via granddaughter Pablo Laguerre ) instructed to take 5mg daily rest of the week 10/31 thru 11/4/23 (extra 30% this week only) . Then continue usual 25mg TWD starting 11-5-23  and RTC 1 week from today. Pablo Laguerre will fill warfarin in pill box for MORNING administration since the patient is more compliant with AM meds. Advised to have patient go to ER asap for s/s clot or stroke.         For Pharmacy Admin Tracking Only    Intervention Detail: Dose Adjustment: 1, reason: Therapy Optimization  Total # of Interventions Recommended: 1  Total # of Interventions Accepted: 1  Time Spent (min): 30

## 2023-11-01 ENCOUNTER — APPOINTMENT (OUTPATIENT)
Dept: CT IMAGING | Age: 88
DRG: 689 | End: 2023-11-01
Payer: MEDICARE

## 2023-11-01 ENCOUNTER — HOSPITAL ENCOUNTER (INPATIENT)
Age: 88
LOS: 6 days | Discharge: NURSING, REHAB OR LONG-TERM CARE | DRG: 689 | End: 2023-11-07
Attending: STUDENT IN AN ORGANIZED HEALTH CARE EDUCATION/TRAINING PROGRAM | Admitting: INTERNAL MEDICINE
Payer: MEDICARE

## 2023-11-01 ENCOUNTER — APPOINTMENT (OUTPATIENT)
Dept: GENERAL RADIOLOGY | Age: 88
DRG: 689 | End: 2023-11-01
Payer: MEDICARE

## 2023-11-01 DIAGNOSIS — R31.9 URINARY TRACT INFECTION WITH HEMATURIA, SITE UNSPECIFIED: ICD-10-CM

## 2023-11-01 DIAGNOSIS — N39.0 URINARY TRACT INFECTION WITH HEMATURIA, SITE UNSPECIFIED: ICD-10-CM

## 2023-11-01 DIAGNOSIS — R41.82 ALTERED MENTAL STATUS, UNSPECIFIED ALTERED MENTAL STATUS TYPE: Primary | ICD-10-CM

## 2023-11-01 LAB
ALBUMIN SERPL-MCNC: 3.8 G/DL (ref 3.5–4.6)
ALP SERPL-CCNC: 59 U/L (ref 40–130)
ALT SERPL-CCNC: 13 U/L (ref 0–33)
ANION GAP SERPL CALCULATED.3IONS-SCNC: 13 MEQ/L (ref 9–15)
AST SERPL-CCNC: 24 U/L (ref 0–35)
BACTERIA URNS QL MICRO: ABNORMAL /HPF
BASOPHILS # BLD: 0 K/UL (ref 0–0.2)
BASOPHILS NFR BLD: 0.3 %
BILIRUB SERPL-MCNC: 0.6 MG/DL (ref 0.2–0.7)
BILIRUB UR QL STRIP: NEGATIVE
BUN SERPL-MCNC: 13 MG/DL (ref 8–23)
CALCIUM SERPL-MCNC: 9.4 MG/DL (ref 8.5–9.9)
CHLORIDE SERPL-SCNC: 104 MEQ/L (ref 95–107)
CK SERPL-CCNC: 41 U/L (ref 0–170)
CLARITY UR: ABNORMAL
CO2 SERPL-SCNC: 23 MEQ/L (ref 20–31)
COLOR UR: YELLOW
CREAT SERPL-MCNC: 1.01 MG/DL (ref 0.5–0.9)
EOSINOPHIL # BLD: 0.1 K/UL (ref 0–0.7)
EOSINOPHIL NFR BLD: 1 %
EPI CELLS #/AREA URNS AUTO: ABNORMAL /HPF (ref 0–5)
ERYTHROCYTE [DISTWIDTH] IN BLOOD BY AUTOMATED COUNT: 13 % (ref 11.5–14.5)
GLOBULIN SER CALC-MCNC: 3.2 G/DL (ref 2.3–3.5)
GLUCOSE SERPL-MCNC: 154 MG/DL (ref 70–99)
GLUCOSE UR STRIP-MCNC: NEGATIVE MG/DL
HCT VFR BLD AUTO: 43.3 % (ref 37–47)
HGB BLD-MCNC: 13.9 G/DL (ref 12–16)
HGB UR QL STRIP: ABNORMAL
HYALINE CASTS #/AREA URNS AUTO: ABNORMAL /HPF (ref 0–5)
HYALINE CASTS #/AREA URNS LPF: ABNORMAL /LPF (ref 0–5)
KETONES UR STRIP-MCNC: ABNORMAL MG/DL
LEUKOCYTE ESTERASE UR QL STRIP: ABNORMAL
LYMPHOCYTES # BLD: 1.5 K/UL (ref 1–4.8)
LYMPHOCYTES NFR BLD: 22.4 %
MAGNESIUM SERPL-MCNC: 2.2 MG/DL (ref 1.7–2.4)
MCH RBC QN AUTO: 28.8 PG (ref 27–31.3)
MCHC RBC AUTO-ENTMCNC: 32.1 % (ref 33–37)
MCV RBC AUTO: 89.8 FL (ref 79.4–94.8)
MONOCYTES # BLD: 0.6 K/UL (ref 0.2–0.8)
MONOCYTES NFR BLD: 8.3 %
NEUTROPHILS # BLD: 4.6 K/UL (ref 1.4–6.5)
NEUTS SEG NFR BLD: 67.9 %
NITRITE UR QL STRIP: POSITIVE
PH UR STRIP: 5 [PH] (ref 5–9)
PLATELET # BLD AUTO: 233 K/UL (ref 130–400)
POTASSIUM SERPL-SCNC: 4.4 MEQ/L (ref 3.4–4.9)
PROT SERPL-MCNC: 7 G/DL (ref 6.3–8)
PROT UR STRIP-MCNC: ABNORMAL MG/DL
RBC # BLD AUTO: 4.82 M/UL (ref 4.2–5.4)
RBC #/AREA URNS AUTO: ABNORMAL /HPF (ref 0–5)
SODIUM SERPL-SCNC: 140 MEQ/L (ref 135–144)
SP GR UR STRIP: 1.02 (ref 1–1.03)
TSH REFLEX: 1.75 UIU/ML (ref 0.44–3.86)
URINE REFLEX TO CULTURE: YES
UROBILINOGEN UR STRIP-ACNC: 0.2 E.U./DL
WBC # BLD AUTO: 6.7 K/UL (ref 4.8–10.8)
WBC #/AREA URNS AUTO: ABNORMAL /HPF (ref 0–5)

## 2023-11-01 PROCEDURE — 93005 ELECTROCARDIOGRAM TRACING: CPT | Performed by: STUDENT IN AN ORGANIZED HEALTH CARE EDUCATION/TRAINING PROGRAM

## 2023-11-01 PROCEDURE — 82550 ASSAY OF CK (CPK): CPT

## 2023-11-01 PROCEDURE — 83735 ASSAY OF MAGNESIUM: CPT

## 2023-11-01 PROCEDURE — 80053 COMPREHEN METABOLIC PANEL: CPT

## 2023-11-01 PROCEDURE — 36415 COLL VENOUS BLD VENIPUNCTURE: CPT

## 2023-11-01 PROCEDURE — 81001 URINALYSIS AUTO W/SCOPE: CPT

## 2023-11-01 PROCEDURE — 71045 X-RAY EXAM CHEST 1 VIEW: CPT

## 2023-11-01 PROCEDURE — 6370000000 HC RX 637 (ALT 250 FOR IP): Performed by: STUDENT IN AN ORGANIZED HEALTH CARE EDUCATION/TRAINING PROGRAM

## 2023-11-01 PROCEDURE — 96365 THER/PROPH/DIAG IV INF INIT: CPT

## 2023-11-01 PROCEDURE — 2580000003 HC RX 258: Performed by: STUDENT IN AN ORGANIZED HEALTH CARE EDUCATION/TRAINING PROGRAM

## 2023-11-01 PROCEDURE — 70450 CT HEAD/BRAIN W/O DYE: CPT

## 2023-11-01 PROCEDURE — 87186 SC STD MICRODIL/AGAR DIL: CPT

## 2023-11-01 PROCEDURE — 84443 ASSAY THYROID STIM HORMONE: CPT

## 2023-11-01 PROCEDURE — 85025 COMPLETE CBC W/AUTO DIFF WBC: CPT

## 2023-11-01 PROCEDURE — 1210000000 HC MED SURG R&B

## 2023-11-01 PROCEDURE — 87077 CULTURE AEROBIC IDENTIFY: CPT

## 2023-11-01 PROCEDURE — 99285 EMERGENCY DEPT VISIT HI MDM: CPT

## 2023-11-01 PROCEDURE — 87086 URINE CULTURE/COLONY COUNT: CPT

## 2023-11-01 PROCEDURE — 6360000002 HC RX W HCPCS: Performed by: STUDENT IN AN ORGANIZED HEALTH CARE EDUCATION/TRAINING PROGRAM

## 2023-11-01 RX ORDER — ONDANSETRON 2 MG/ML
4 INJECTION INTRAMUSCULAR; INTRAVENOUS EVERY 6 HOURS PRN
Status: DISCONTINUED | OUTPATIENT
Start: 2023-11-01 | End: 2023-11-08 | Stop reason: HOSPADM

## 2023-11-01 RX ORDER — MAGNESIUM SULFATE IN WATER 40 MG/ML
2000 INJECTION, SOLUTION INTRAVENOUS PRN
Status: DISCONTINUED | OUTPATIENT
Start: 2023-11-01 | End: 2023-11-08 | Stop reason: HOSPADM

## 2023-11-01 RX ORDER — POTASSIUM CHLORIDE 20 MEQ/1
40 TABLET, EXTENDED RELEASE ORAL PRN
Status: DISCONTINUED | OUTPATIENT
Start: 2023-11-01 | End: 2023-11-08 | Stop reason: HOSPADM

## 2023-11-01 RX ORDER — POTASSIUM CHLORIDE 7.45 MG/ML
10 INJECTION INTRAVENOUS PRN
Status: DISCONTINUED | OUTPATIENT
Start: 2023-11-01 | End: 2023-11-08 | Stop reason: HOSPADM

## 2023-11-01 RX ORDER — WARFARIN SODIUM 5 MG/1
5 TABLET ORAL ONCE
Status: COMPLETED | OUTPATIENT
Start: 2023-11-01 | End: 2023-11-01

## 2023-11-01 RX ORDER — SODIUM CHLORIDE 9 MG/ML
INJECTION, SOLUTION INTRAVENOUS PRN
Status: DISCONTINUED | OUTPATIENT
Start: 2023-11-01 | End: 2023-11-08 | Stop reason: HOSPADM

## 2023-11-01 RX ORDER — ACETAMINOPHEN 650 MG/1
650 SUPPOSITORY RECTAL EVERY 6 HOURS PRN
Status: DISCONTINUED | OUTPATIENT
Start: 2023-11-01 | End: 2023-11-08 | Stop reason: HOSPADM

## 2023-11-01 RX ORDER — ACETAMINOPHEN 325 MG/1
650 TABLET ORAL EVERY 6 HOURS PRN
Status: DISCONTINUED | OUTPATIENT
Start: 2023-11-01 | End: 2023-11-08 | Stop reason: HOSPADM

## 2023-11-01 RX ORDER — SODIUM CHLORIDE 0.9 % (FLUSH) 0.9 %
5-40 SYRINGE (ML) INJECTION PRN
Status: DISCONTINUED | OUTPATIENT
Start: 2023-11-01 | End: 2023-11-05

## 2023-11-01 RX ORDER — SODIUM CHLORIDE 0.9 % (FLUSH) 0.9 %
5-40 SYRINGE (ML) INJECTION EVERY 12 HOURS SCHEDULED
Status: DISCONTINUED | OUTPATIENT
Start: 2023-11-01 | End: 2023-11-08 | Stop reason: HOSPADM

## 2023-11-01 RX ORDER — ONDANSETRON 4 MG/1
4 TABLET, ORALLY DISINTEGRATING ORAL EVERY 8 HOURS PRN
Status: DISCONTINUED | OUTPATIENT
Start: 2023-11-01 | End: 2023-11-08 | Stop reason: HOSPADM

## 2023-11-01 RX ORDER — POLYETHYLENE GLYCOL 3350 17 G/17G
17 POWDER, FOR SOLUTION ORAL DAILY PRN
Status: DISCONTINUED | OUTPATIENT
Start: 2023-11-01 | End: 2023-11-08 | Stop reason: HOSPADM

## 2023-11-01 RX ADMIN — WARFARIN SODIUM 5 MG: 5 TABLET ORAL at 21:58

## 2023-11-01 RX ADMIN — CEFTRIAXONE SODIUM 1000 MG: 1 INJECTION, POWDER, FOR SOLUTION INTRAMUSCULAR; INTRAVENOUS at 16:38

## 2023-11-01 RX ADMIN — SODIUM CHLORIDE, PRESERVATIVE FREE 10 ML: 5 INJECTION INTRAVENOUS at 21:58

## 2023-11-01 ASSESSMENT — PAIN - FUNCTIONAL ASSESSMENT: PAIN_FUNCTIONAL_ASSESSMENT: NONE - DENIES PAIN

## 2023-11-01 ASSESSMENT — LIFESTYLE VARIABLES
HOW MANY STANDARD DRINKS CONTAINING ALCOHOL DO YOU HAVE ON A TYPICAL DAY: PATIENT DOES NOT DRINK
HOW OFTEN DO YOU HAVE A DRINK CONTAINING ALCOHOL: NEVER

## 2023-11-01 NOTE — ED TRIAGE NOTES
Pt was brought in by squad with c/o poor living conditions, not taking her medication ,and worsening dementia  Pt is crying about her Alisha Age (states they took him from her)  Pt states she isn't going to live long (her family doesn't give a damna about her)  Pt states only person she wants to see is her neighbor (the )  Pt denies pain   Pt smells of urine (pt was changed into hospital gown, given warm blankets, and brief was changed (soaked PTA).    Pt smells of urine (UTI)  Pt has dementia   Pt states she doesn't know who she is or where she is   Pt is afebrile

## 2023-11-01 NOTE — H&P
collection. The gray-white differentiation is maintained without evidence of an acute infarct. There is no evidence of hydrocephalus. Periventricular white matter changes consistent chronic microvascular disease. ORBITS: The visualized portion of the orbits demonstrate no acute abnormality. SINUSES: The visualized paranasal sinuses and mastoid air cells demonstrate no acute abnormality. SOFT TISSUES/SKULL:  No acute abnormality of the visualized skull or soft tissues. No acute intracranial abnormality. Periventricular white matter changes consistent chronic microvascular disease. XR CHEST PORTABLE    Result Date: 11/1/2023  EXAMINATION: ONE XRAY VIEW OF THE CHEST 11/1/2023 2:17 pm COMPARISON: None. HISTORY: ORDERING SYSTEM PROVIDED HISTORY: AMS TECHNOLOGIST PROVIDED HISTORY: Reason for exam:->AMS What reading provider will be dictating this exam?->CRC FINDINGS: The lungs are without acute focal process. There is no effusion or pneumothorax. The cardiomediastinal silhouette is without acute process. The osseous structures are without acute process. Old right rib fracture. No acute process. Pacemaker in place.        Assessment      Hospital Problems             Last Modified POA    * (Principal) UTI (urinary tract infection) 11/1/2023 Yes       Plan     UTI  Urinalysis showed WBC , bacteria many, small amount of leukocyte esterase, nitrite positive  WBC normal at 6.7  Afebrile at 97.9, with stable vital signs  AO x 1, very confused, agitated - Baseline AO x 1-2 intermittent confusion  Admit to MedSurg  Continue IV antibiotics: Rocephin    AMS, agitation, anxiety, confusion  Significant history of dementia  Likely exacerbated by UTI  AO x 1  Reorient frequently  Virtual   Fall precautions    Essential hypertension, hyperlipidemia, CHF, PAF, CAD s/p stenting  BP within acceptable range, latest /74  Last known LVEF 55 to 60%  Euvolemic on assessment  Denies chest pain, dizziness, headache  Resume warfarin, with consult to pharmacy for dosing  Will resume home meds, as tolerated, when home med list is completed by nursing    DM type II  Accu-Cheks ACHS   Latest random blood glucose, 154  HbA1c 12.1 (10/5/2023)  On insulin sliding scale coverage  With hypoglycemia treatment protocol    CKD 3A  Kidney function: BUN 13, creatinine 1.01, EGFR 53.1 - at baseline  Avoid nephrotoxic agents  BMP in AM    Hypothyroidism  TSH at 1.750  Will resume home meds, as tolerated, when home med list is completed by [address] [place] [address] needs. Patient will need to follow up with PCP for chronic disease management. Time spent evaluating and intervening patient, 60 minutes. Greater than 70% of time spent focused exclusively on this patient, reviewing chart, reconciling medications, and answering questions and discussing treatment plan.     Consultations Ordered:  PHARMACY TO DOSE WARFARIN    Electronically signed by Mariella Duane, APRN - CNP on 11/1/23 at 5:39 PM EDT

## 2023-11-01 NOTE — ED PROVIDER NOTES
intracranial abnormalities on CT head. Abx initiated in the ED. Not meeting criteria for severe sepsis or septic shock. Pt was administered   Medications   cefTRIAXone (ROCEPHIN) 1,000 mg in sodium chloride 0.9 % 50 mL IVPB (mini-bag) (1,000 mg IntraVENous New Bag 11/1/23 2790)       Plan: Admit to IM under Dr. Dylon Diaz, who was understanding of the patient's condition in the ED and amenable to accepting care. CRITICAL CARE TIME   Total CriticalCare time was 0 minutes, excluding separately reportable procedures. There was a high probability of clinically significant/life threatening deterioration in the patient's condition which required my urgent intervention. FINAL IMPRESSION      1. Altered mental status, unspecified altered mental status type    2.  Urinary tract infection with hematuria, site unspecified          DISPOSITION/PLAN   DISPOSITION Decision To Admit 11/01/2023 04:35:15 PM      Current Discharge Medication List           MD Supa Sams MD  11/01/23 3032

## 2023-11-01 NOTE — ED NOTES
Page to [facility] hosp @ 0184  Dr joseph unable to return call  [facility] [place] @ [phone]  Dr Verito Zaldivar in dept @ [address] [place], [place]  11/01/23 [address] [facility] [address]

## 2023-11-01 NOTE — CARE COORDINATION
This nurse received a call from Peter Angulo who states that she is the patient's grand daughter. She states that she and her mother Kirit Mars are the patient's POA's. Ms Jose King states that she was at the patient's home yesterday and states that the patient was \"confused\" which she states is not new but has been increasing over the past couple of months. She checks on her frequently and states that her house was not in disarray yesterday but that the patient has several cats indoors and outdoors who \"could have knocked things over\". She states that the patient Gia Other been losing things \" and may have been \"rummaging through things, she states that Gerhardt Slimmer will look for her \" who passed away years ago. She states that she sets up the patient's pill box weekly but noted that there were \"pills laying on the table\" and is not sure if the patient is taking them correctly. I did discuss with her that the Dr is doing tests on the patient and we don't have a disposition yet but that if the patient is not admitted that she should not be alone. Ms Jose King states understanding. She is requesting and I will provide her with information about SNF placement procedures and lists. This nurse reported the above to the patient's nurse and Dr Jaqui Garcia.

## 2023-11-01 NOTE — ED NOTES
Report given to [facility] the RN on [address], [place]  11/01/23 7209

## 2023-11-02 LAB
INR PPP: 1.8
PROTHROMBIN TIME: 20.9 SEC (ref 12.3–14.9)

## 2023-11-02 PROCEDURE — 2580000003 HC RX 258: Performed by: STUDENT IN AN ORGANIZED HEALTH CARE EDUCATION/TRAINING PROGRAM

## 2023-11-02 PROCEDURE — 6360000002 HC RX W HCPCS: Performed by: STUDENT IN AN ORGANIZED HEALTH CARE EDUCATION/TRAINING PROGRAM

## 2023-11-02 PROCEDURE — 6370000000 HC RX 637 (ALT 250 FOR IP): Performed by: INTERNAL MEDICINE

## 2023-11-02 PROCEDURE — 85610 PROTHROMBIN TIME: CPT

## 2023-11-02 PROCEDURE — 6370000000 HC RX 637 (ALT 250 FOR IP): Performed by: STUDENT IN AN ORGANIZED HEALTH CARE EDUCATION/TRAINING PROGRAM

## 2023-11-02 PROCEDURE — 1210000000 HC MED SURG R&B

## 2023-11-02 PROCEDURE — 36415 COLL VENOUS BLD VENIPUNCTURE: CPT

## 2023-11-02 RX ORDER — WARFARIN SODIUM 5 MG/1
5 TABLET ORAL
Status: COMPLETED | OUTPATIENT
Start: 2023-11-02 | End: 2023-11-02

## 2023-11-02 RX ORDER — FUROSEMIDE 40 MG/1
40 TABLET ORAL DAILY
Status: DISCONTINUED | OUTPATIENT
Start: 2023-11-02 | End: 2023-11-08 | Stop reason: HOSPADM

## 2023-11-02 RX ORDER — METOPROLOL SUCCINATE 100 MG/1
100 TABLET, EXTENDED RELEASE ORAL 2 TIMES DAILY
Status: DISCONTINUED | OUTPATIENT
Start: 2023-11-02 | End: 2023-11-08 | Stop reason: HOSPADM

## 2023-11-02 RX ORDER — LEVOTHYROXINE SODIUM 0.07 MG/1
75 TABLET ORAL DAILY
Status: DISCONTINUED | OUTPATIENT
Start: 2023-11-03 | End: 2023-11-08 | Stop reason: HOSPADM

## 2023-11-02 RX ORDER — ISOSORBIDE MONONITRATE 60 MG/1
60 TABLET, EXTENDED RELEASE ORAL DAILY
Status: DISCONTINUED | OUTPATIENT
Start: 2023-11-02 | End: 2023-11-08 | Stop reason: HOSPADM

## 2023-11-02 RX ADMIN — SODIUM CHLORIDE, PRESERVATIVE FREE 5 ML: 5 INJECTION INTRAVENOUS at 07:37

## 2023-11-02 RX ADMIN — SODIUM CHLORIDE, PRESERVATIVE FREE 10 ML: 5 INJECTION INTRAVENOUS at 20:03

## 2023-11-02 RX ADMIN — ISOSORBIDE MONONITRATE 60 MG: 60 TABLET, EXTENDED RELEASE ORAL at 20:03

## 2023-11-02 RX ADMIN — METOPROLOL SUCCINATE 100 MG: 100 TABLET, EXTENDED RELEASE ORAL at 20:03

## 2023-11-02 RX ADMIN — CEFTRIAXONE SODIUM 1000 MG: 1 INJECTION, POWDER, FOR SOLUTION INTRAMUSCULAR; INTRAVENOUS at 15:42

## 2023-11-02 RX ADMIN — ACETAMINOPHEN 650 MG: 325 TABLET ORAL at 20:03

## 2023-11-02 RX ADMIN — FUROSEMIDE 40 MG: 40 TABLET ORAL at 20:03

## 2023-11-02 RX ADMIN — WARFARIN SODIUM 5 MG: 5 TABLET ORAL at 18:06

## 2023-11-02 ASSESSMENT — ENCOUNTER SYMPTOMS
VOMITING: 0
NAUSEA: 0
DIARRHEA: 0
SHORTNESS OF BREATH: 0
COUGH: 0

## 2023-11-02 NOTE — CARE COORDINATION
LSW SPOKE WITH THE PT'S GRANDDAUGHTER REGARDING DC PLANS. SHE SAID THAT THE PT HAS BEEN GETTING MORE FORGETFUL RECENTLY BUT THEY ARE HOPEFUL THAT THIS IS ALL RELATED TO THE UTI. FAMILY WANTS TO PLACE PT FOR REHAB AND SEE IF SHE CAN GET BETTER. SNF LIST DISCUSSED WITH GRANDDAUGHTER AND LEFT AT BEDSIDE. GRANDDAUGHTER TO GIVE THIS LSW TOP CHOICES.

## 2023-11-02 NOTE — ACP (ADVANCE CARE PLANNING)
Advance Care Planning     Advance Care Planning Activator (Inpatient)  Conversation Note      Date of ACP Conversation: 11/1/2023     [employer] with: pt and grand daughter Karl Stonekeeper    Pt states that she does have a living will and that it is current with her wishes. ACP Activator: Nixon Barlow RN    Health Care Decision Maker:     Current Designated Health Care Decision Maker:     Primary Decision Maker: [facility] - [phone]    Secondary Decision Maker: Lennox Hunter [facility] - [phone]    Care Preferences    Ventilation: \"If you were in your present state of health and suddenly became very ill and were unable to breathe on your own, what would your preference be about the use of a ventilator (breathing machine) if it were available to you? \"      Would the patient desire the use of ventilator (breathing machine)?: yes    \"If your health worsens and it becomes clear that your chance of recovery is unlikely, what would your preference be about the use of a ventilator (breathing machine) if it were available to you? \"     Would the patient desire the use of ventilator (breathing machine)?: No      Resuscitation  \"CPR works best to restart the heart when there is a sudden event, like a heart attack, in someone who is otherwise healthy. Unfortunately, CPR does not typically restart the heart for people who have serious health conditions or who are very sick. \"    \"In the event your heart stopped as a result of an underlying serious health condition, would you want attempts to be made to restart your heart (answer \"yes\" for attempt to resuscitate) or would you prefer a natural death (answer \"no\" for do not attempt to resuscitate)? \" yes       [x] Yes   [] No   Educated Patient / [facility] regarding differences between Advance Directives and portable DNR orders.     Length of ACP Conversation in minutes:  10    Conversation Outcomes:  ACP discussion completed    Follow-up

## 2023-11-02 NOTE — DISCHARGE INSTR - COC
Continuity of Care Form    Patient Name: Emma Pump   :  1934  MRN:  [id number]    Admit date:  2023  Discharge date:  23    Code Status Order: Full Code   Advance Directives:     Admitting Physician:  Kay Chew MD  PCP: Enrique Hester PA-C    Discharging Nurse Dustin Camargo RN  [facility] Unit/Room#: W875/C871-99  Discharging Unit Phone Number: [phone]    Emergency Contact:   Extended Emergency Contact Information  Primary Emergency Contact: Marija Pizarro [facility] [address] Phone: [phone]  Work Phone: [phone]  Mobile Phone: [phone]  Relation: [facility] [place] [address]  Secondary Emergency Contact: [address] [employer] [facility] Phone: [phone]  Relation: Child    Past Surgical History:  Past Surgical History:   Procedure Laterality Date    ABDOMEN SURGERY      CATARACT REMOVAL WITH IMPLANT      both eyes    CORONARY ANGIOPLASTY WITH STENT PLACEMENT      HYSTERECTOMY (CERVIX STATUS UNKNOWN)      OTHER SURGICAL HISTORY  2016    GENERATOR CHANGE [address] [place] [address]    PACEMAKER PLACEMENT         Immunization History:   Immunization History   Administered Date(s) Administered    COVID-19, MODERNA BLUE border, Primary or Immunocompromised, (age 12y+), IM, 100 mcg/0.5mL 2021, 2021, 2021, 2021    Influenza Nasal 2011, 2012    Influenza Vaccine, unspecified formulation 10/01/2013    Influenza Virus Vaccine 10/01/2010, 2011, 2012, 2012, 10/01/2013, 10/01/2014, 2015, 2016, 2017, 10/01/2018, 10/01/2019, 2020, 2020    Influenza, AFLURIA, FLUZONE (age 10-34 mo), MDV, 0.25mL 2018, 2019    Influenza, FLUAD, (age 72 y+), Adjuvanted, 0.5mL 2020    Influenza, FLUARIX, FLULAVAL, FLUZONE (age 10 mo+) AND AFLURIA, (age 1 y+), PF, 0.5mL 2020    Influenza, FLUZONE (age 72 y+), High Dose, 0.7mL 2021, 2022    Influenza, High Dose (Fluzone 65 yrs and older) 2015,

## 2023-11-02 NOTE — CARE COORDINATION
Case Management Assessment  Initial Evaluation    Date/Time of Evaluation: 11/1/2023 8:30 PM  Assessment Completed by: Zully Salas RN    If patient is discharged prior to next notation, then this note serves as note for discharge by case management. Patient Name: Alvaro Huang                   YOB: 1934  Diagnosis: UTI (urinary tract infection) [N39.0]  Urinary tract infection with hematuria, site unspecified [N39.0, R31.9]  Altered mental status, unspecified altered mental status type [R41.82]                   Date / Time: 11/1/2023  1:36 PM    Patient Admission Status: Inpatient   Readmission Risk (Low < 19, Mod (19-27), High > 27): Readmission Risk Score: 12.1    Current PCP: Ivana Albert PA-C  PCP verified by CM? Yes    Chart Reviewed: Yes      History Provided by: Patient, Child/Family  Patient Orientation: Alert and Oriented, Person, Place, Situation, Self    Patient Cognition: Other (see comment) (possible dementia vs \"confusion from UTI\" per her grand daughter.)    Hospitalization in the last 30 days (Readmission):  No    If yes, Readmission Assessment in CM Navigator will be completed. Advance Directives:      Code Status: Full Code   Patient's Primary Decision Maker is: Legal Next of Kin (daughter Colt, grand daughter Pablo Laguerre)    Primary Decision Maker: Kathie Will - [phone]    Secondary Decision Maker: Jaspal Jeffries - Child - [phone]    Discharge Planning:    Patient lives with: (P) Alone Type of Home: (P) House  Primary Care Giver:  Other (Comment) (self and grand daughter helps.)  Patient Support Systems include: Children, Family Members   Current Financial resources: (P) Medicare  Current community resources: (P) None  Current services prior to admission: (P) None            Current DME:              Type of Home Care services:  (P) None    ADLS  Prior functional level: (P) Independent in ADLs/IADLs  Current functional level: (P)

## 2023-11-02 NOTE — PROGRESS NOTES
Warfarin Dosing - Pharmacy Consult Note  Consulting Provider: Felix Gonzales MD    Indication:  Atrial Fibrillation  Warfarin Dose prior to admission: 5 mg every MWF; 2.5 mg all other days   Concurrent anticoagulants/antiplatelets: NA  Significant Drug Interactions: No obvious interactions  Recent Labs     10/31/23  0949 11/01/23  1415 11/02/23  0409   INR 1.3  --  1.8   HGB  --  13.9  --    PLT  --  233  --    LABALBU  --  3.8  --      Recent warfarin administrations                     warfarin (COUMADIN) tablet 5 mg (mg) 5 mg Given 11/01/23 2158                   Date   INR    Dose  11/1        ---        5 mg   11/2       1.8        5 mg    Assessment/Plan  (Goal INR: 2 - 3)  INR is subtherapeutic at 1.8  Unclear what warfarin doses patient has been taking prior to being admitted to the hospital  Will order a boosted dose of warfarin 5 mg for today (usually 2.5 mg)    Active problem list reviewed. INR orders are placed. Chart reviewed for pertinent labs, drug/diet interactions, and past doses. Documentation of patient's clinical condition was reviewed. Pharmacy Dosing:  Pharmacy will continue to follow.      Victoria Roy, PharmD  11/2/2023 8:29 AM

## 2023-11-03 LAB
EKG ATRIAL RATE: 70 BPM
EKG Q-T INTERVAL: 466 MS
EKG QRS DURATION: 154 MS
EKG QTC CALCULATION (BAZETT): 503 MS
EKG R AXIS: 52 DEGREES
EKG T AXIS: 92 DEGREES
EKG VENTRICULAR RATE: 70 BPM
INR PPP: 2.3
PROTHROMBIN TIME: 25 SEC (ref 12.3–14.9)

## 2023-11-03 PROCEDURE — 2580000003 HC RX 258: Performed by: STUDENT IN AN ORGANIZED HEALTH CARE EDUCATION/TRAINING PROGRAM

## 2023-11-03 PROCEDURE — 36415 COLL VENOUS BLD VENIPUNCTURE: CPT

## 2023-11-03 PROCEDURE — 97166 OT EVAL MOD COMPLEX 45 MIN: CPT

## 2023-11-03 PROCEDURE — 6370000000 HC RX 637 (ALT 250 FOR IP): Performed by: STUDENT IN AN ORGANIZED HEALTH CARE EDUCATION/TRAINING PROGRAM

## 2023-11-03 PROCEDURE — 6360000002 HC RX W HCPCS

## 2023-11-03 PROCEDURE — 6370000000 HC RX 637 (ALT 250 FOR IP): Performed by: INTERNAL MEDICINE

## 2023-11-03 PROCEDURE — 1210000000 HC MED SURG R&B

## 2023-11-03 PROCEDURE — 97162 PT EVAL MOD COMPLEX 30 MIN: CPT

## 2023-11-03 PROCEDURE — 6360000002 HC RX W HCPCS: Performed by: STUDENT IN AN ORGANIZED HEALTH CARE EDUCATION/TRAINING PROGRAM

## 2023-11-03 PROCEDURE — 85610 PROTHROMBIN TIME: CPT

## 2023-11-03 RX ORDER — HALOPERIDOL 5 MG/ML
INJECTION INTRAMUSCULAR
Status: COMPLETED
Start: 2023-11-03 | End: 2023-11-03

## 2023-11-03 RX ORDER — HALOPERIDOL 5 MG/ML
2 INJECTION INTRAMUSCULAR ONCE
Status: COMPLETED | OUTPATIENT
Start: 2023-11-03 | End: 2023-11-03

## 2023-11-03 RX ORDER — WARFARIN SODIUM 5 MG/1
5 TABLET ORAL
Status: COMPLETED | OUTPATIENT
Start: 2023-11-03 | End: 2023-11-03

## 2023-11-03 RX ADMIN — SODIUM CHLORIDE, PRESERVATIVE FREE 10 ML: 5 INJECTION INTRAVENOUS at 20:41

## 2023-11-03 RX ADMIN — METOPROLOL SUCCINATE 100 MG: 100 TABLET, EXTENDED RELEASE ORAL at 20:38

## 2023-11-03 RX ADMIN — ISOSORBIDE MONONITRATE 60 MG: 60 TABLET, EXTENDED RELEASE ORAL at 08:42

## 2023-11-03 RX ADMIN — CEFTRIAXONE SODIUM 1000 MG: 1 INJECTION, POWDER, FOR SOLUTION INTRAMUSCULAR; INTRAVENOUS at 16:10

## 2023-11-03 RX ADMIN — METOPROLOL SUCCINATE 100 MG: 100 TABLET, EXTENDED RELEASE ORAL at 08:43

## 2023-11-03 RX ADMIN — WARFARIN SODIUM 5 MG: 5 TABLET ORAL at 18:06

## 2023-11-03 RX ADMIN — HALOPERIDOL 2 MG: 5 INJECTION INTRAMUSCULAR at 03:31

## 2023-11-03 RX ADMIN — SODIUM CHLORIDE, PRESERVATIVE FREE 10 ML: 5 INJECTION INTRAVENOUS at 08:45

## 2023-11-03 RX ADMIN — ACETAMINOPHEN 650 MG: 325 TABLET ORAL at 16:10

## 2023-11-03 RX ADMIN — ACETAMINOPHEN 650 MG: 325 TABLET ORAL at 10:17

## 2023-11-03 RX ADMIN — HALOPERIDOL LACTATE 2 MG: 5 INJECTION, SOLUTION INTRAMUSCULAR at 03:31

## 2023-11-03 RX ADMIN — LEVOTHYROXINE SODIUM 75 MCG: 0.07 TABLET ORAL at 06:03

## 2023-11-03 RX ADMIN — FUROSEMIDE 40 MG: 40 TABLET ORAL at 08:42

## 2023-11-03 ASSESSMENT — ENCOUNTER SYMPTOMS
DIARRHEA: 0
VOMITING: 0
SHORTNESS OF BREATH: 0
COUGH: 0
NAUSEA: 0

## 2023-11-03 ASSESSMENT — PAIN SCALES - GENERAL
PAINLEVEL_OUTOF10: 0
PAINLEVEL_OUTOF10: 0

## 2023-11-03 NOTE — PLAN OF CARE
Problem: Safety - Adult  Goal: Free from fall injury  Outcome: Progressing     Problem: ABCDS Injury Assessment  Goal: Absence of physical injury  Outcome: Progressing  Flowsheets (Taken 11/3/2023 1110)  Absence of Physical Injury: Implement safety measures based on patient assessment     Problem: Chronic Conditions and Co-morbidities  Goal: Patient's chronic conditions and co-morbidity symptoms are monitored and maintained or improved  Outcome: Progressing  Flowsheets (Taken 11/3/2023 0800)  Care Plan - Patient's Chronic Conditions and Co-Morbidity Symptoms are Monitored and Maintained or Improved:   Monitor and assess patient's chronic conditions and comorbid symptoms for stability, deterioration, or improvement   Collaborate with multidisciplinary team to address chronic and comorbid conditions and prevent exacerbation or deterioration   Update acute care plan with appropriate goals if chronic or comorbid symptoms are exacerbated and prevent overall improvement and discharge     Problem: Safety - Medical Restraint  Goal: Remains free of injury from restraints (Restraint for Interference with Medical Device)  Description: INTERVENTIONS:  1. Determine that other, less restrictive measures have been tried or would not be effective before applying the restraint  2. Evaluate the patient's condition at the time of restraint application  3. Inform patient/family regarding the reason for restraint  4. Q2H: Monitor safety, psychosocial status, comfort, nutrition and hydration  Outcome: Progressing     Problem: Physical Therapy - Adult  Goal: By Discharge: Performs mobility at highest level of function for planned discharge setting. See evaluation for individualized goals. 11/3/2023 0954 by Bre Wyatt PT  Outcome: Progressing     Problem: Occupational Therapy - Adult  Goal: By Discharge: Performs self-care activities at highest level of function for planned discharge setting.   See evaluation for individualized

## 2023-11-03 NOTE — PROGRESS NOTES
Warfarin Dosing - Pharmacy Consult Note  Consulting Provider: Ganesh Montoya MD    Indication:  Atrial Fibrillation  Warfarin Dose prior to admission: 5 mg every MWF; 2.5 mg all other days   Concurrent anticoagulants/antiplatelets: NA  Significant Drug Interactions: No obvious interactions  Recent Labs     11/01/23  1415 11/02/23  0409 11/03/23  0453   INR  --  1.8 2.3   HGB 13.9  --   --      --   --    LABALBU 3.8  --   --      Recent warfarin administrations                     warfarin (COUMADIN) tablet 5 mg (mg) 5 mg Given 11/02/23 1806    warfarin (COUMADIN) tablet 5 mg (mg) 5 mg Given 11/01/23 2158                   Date   INR    Dose  11/1        ---        5 mg   11/2       1.8        5 mg  11/3       2.3        5 mg    Assessment/Plan  (Goal INR: 2 - 3)  INR is therapeutic at 2.3  Unclear what warfarin doses patient has been taking prior to being admitted to the hospital  Will order dose of warfarin 5 mg for today (home dose)    Active problem list reviewed. INR orders are placed. Chart reviewed for pertinent labs, drug/diet interactions, and past doses. Documentation of patient's clinical condition was reviewed. Pharmacy Dosing:  Pharmacy will continue to follow.      Ronna Sosa [facility]  Staff Pharmacist, Ranjith Del Toro  11/3/2023  10:43 AM

## 2023-11-03 NOTE — PROGRESS NOTES
Physical Therapy Med Surg Initial Assessment  Facility/Department: [facility]  Room: [place] [id number]       NAME: Jaziel Tamez  : 1934 (80 y.o.)  MRN: [id number]  CODE STATUS: Full Code    Date of Service: 11/3/2023    Patient Diagnosis(es): UTI (urinary tract infection) [N39.0]  Urinary tract infection with hematuria, site unspecified [N39.0, R31.9]  Altered mental status, unspecified altered mental status type [R41.82]   Chief Complaint   Patient presents with    Dementia     Worsening dementia, unable to care for self, family lives out of state, [facility] [employer] stating that her family was missing, hasnt been taking meds     Patient Active Problem List    Diagnosis Date Noted    UTI (urinary tract infection) 2023    Sciatica of right side 2022    Chest pain 10/29/2020    Depression with anxiety 2020    Psychophysiological insomnia 2020    Pressure injury of buttock, stage 2 ([address]) 2019    Gastroesophageal reflux disease without esophagitis 2019    Vertigo 2019    Age-related osteoporosis without current pathological fracture 2019    Chronic right shoulder pain 2019    Pacemaker 2019    Intertrigo 2019    RUQ pain 2019    Class 2 severe obesity due to excess calories with serious comorbidity and body mass index (BMI) of 38.0 to 38.9 in adult ([address]) 2019    Anticoagulated on Coumadin 2016    At high risk for falls 2016    Atrial fibrillation ([address]) 2016    Uncontrolled type 2 diabetes mellitus without complication, with long-term current use of insulin 2016    CHF (congestive heart failure) ([address]) 2014    Degenerative arthritis of lumbar spine 10/02/2013    Senile cataract 08/15/2013    Incontinence of urine 2012    CKD (chronic kidney disease) stage 3, GFR 30-59 ml/min ([address]) 2011    Essential hypertension 2011    Mixed hyperlipidemia 2011    Hypothyroidism 2011 PLAN OF CARE:  Physcial Therapy Plan  General Plan: 1 time a day 3-6 times a week  Current Treatment Recommendations: Strengthening, Balance training, Functional mobility training, Transfer training, ADL/Self-care training, Gait training, Stair training, Neuromuscular re-education, Home exercise program, Safety education & training, Patient/Caregiver education & training, Equipment evaluation, education, & procurement, Modalities, Endurance training, Cognitive/Perceptual training    Safety Devices  Type of Devices: All fall risk precautions in place    Goals:  Long Term Goals  Long Term Goal 1: Pt to complete bed mobility with indep  Long Term Goal 2: Pt to complete transfers with supervision  Long Term Goal 3: Pt to ambulate 50-150ft with LRD and supervision    [facility] (6 CLICK) BASIC MOBILITY  AM-PAC Inpatient Mobility Raw Score : 18     Therapy Time:   Individual   Time In 0908   Time Out 0916   Minutes 8           [place], [place], 11/03/23 at 9:54 AM         Definitions for assistance levels  Independent = pt does not require any physical supervision or assistance from another person for activity completion. Device may be needed.   Stand by assistance = pt requires verbal cues or instructions from another person, close to but not touching, to perform the activity  Minimal assistance= pt performs 75% or more of the activity; assistance is required to complete the activity  Moderate assistance= pt performs 50% of the activity; assistance is required to complete the activity  Maximal assistance = pt performs 25% of the activity; assistance is required to complete the activity  Dependent = pt requires total physical assistance to accomplish the task

## 2023-11-03 NOTE — CARE COORDINATION
ALLEYW RECEIVED A CALL FROM THE PT'S GRANDDAUGHTER. SHE REVIEWED THE SNF LIST AND CHOSE [address] [place] [address] FIRST CHOICE AND [facility] AS SECOND CHOICE. REFERRALS SENT.

## 2023-11-03 NOTE — PROGRESS NOTES
Patient had been very sweet all shift although confused and now hitting, kicking and screaming at the top of her lungs \"Help\", patient states we are holding her hostage and want the police called. Trying to leave her bed and wanting to leave this \"house\" in her words. Unable to keep her in bed with 4 staff. Physician perfect served at this time and he arrived to room as patient was screaming and hitting. Unable to reorient patient at all or calm her down. 2mg Haldol IM given at this time. Awaiting her to calm down at this time.     0430  patient is relaxed, lying in bed, talking calmly to staff.     0630 patient has not slept all shift, continues to try to get out of bed at least every half hour, pleasant and cooperative at this time

## 2023-11-03 NOTE — PROGRESS NOTES
[facility] OCCUPATIONAL THERAPY EVALUATION - ACUTE     NAME: Isaura Mcgregor  : 1934 (80 y.o.)  MRN: [id number]  CODE STATUS: Full Code  [facility]    Date of Service: 11/3/2023    Patient Diagnosis(es): UTI (urinary tract infection) [N39.0]  Urinary tract infection with hematuria, site unspecified [N39.0, R31.9]  Altered mental status, unspecified altered mental status type [R41.82]   Patient Active Problem List    Diagnosis Date Noted    UTI (urinary tract infection) 2023    Sciatica of right side 2022    Chest pain 10/29/2020    Depression with anxiety 2020    Psychophysiological insomnia 2020    Pressure injury of buttock, stage 2 ([address]) 2019    Gastroesophageal reflux disease without esophagitis 2019    Vertigo 2019    Age-related osteoporosis without current pathological fracture 2019    Chronic right shoulder pain 2019    Pacemaker 2019    Intertrigo 2019    RUQ pain 2019    Class 2 severe obesity due to excess calories with serious comorbidity and body mass index (BMI) of 38.0 to 38.9 in adult ([address]) 2019    Anticoagulated on Coumadin 2016    At high risk for falls 2016    Atrial fibrillation ([address]) 2016    Uncontrolled type 2 diabetes mellitus without complication, with long-term current use of insulin 2016    CHF (congestive heart failure) ([address]) 2014    Degenerative arthritis of lumbar spine 10/02/2013    Senile cataract 08/15/2013    Incontinence of urine 2012    CKD (chronic kidney disease) stage 3, GFR 30-59 ml/min ([address]) 2011    Essential hypertension 2011    Mixed hyperlipidemia 2011    Hypothyroidism 2011        Past Medical History:   Diagnosis Date    Abnormal liver ultrasound 5/15/2019    Allergic contact dermatitis due to plants, except food 2019    Arthritis     Atrial fibrillation (HCC)     CHF (congestive heart failure) (HCC)     Chronic training, Safety education & training, Pain management, Cognitive reorientation, Patient/Caregiver education & training, Home management training, Equipment evaluation, education, & procurement, Self-Care / ADL, Cognitive/Perceptual training    Goals:   Patient will:    - Improve functional endurance to tolerate/complete 30 mins of ADL's  - Be Min A in UB ADLs   - Be Min A in LB ADLs  - Be SBA in ADL transfers without LOB  - Be SBA in toileting tasks  - Improve B UE strength and endurance to 4-/5 in order to participate in self-care activities as projected. - Access appropriate D/C site with as few architectural barriers as possible. - Sequence self-care tasks with 25% verbal cues for sequencing    Patient Goal: Patient goals : \"I want to go home\"     Discussed and agreed upon: Yes Comments:       Therapy Time:   Individual   Time In 0908   Time Out 0916   Minutes 8     Eval: 8 minutes     Electronically signed by:     LILLIAM Manzo,   11/3/2023, 9:53 AM

## 2023-11-03 NOTE — PROGRESS NOTES
Report called to [facility] on 4W. Patient to transport to [address] [place] [address], 1:1 to go with patient.

## 2023-11-03 NOTE — PLAN OF CARE
See OT evaluation for all goals and OT POC.  Electronically signed by CE Painting/L on 11/3/2023 at 9:55 AM

## 2023-11-04 LAB
ALBUMIN SERPL-MCNC: 3.8 G/DL (ref 3.5–4.6)
ALP SERPL-CCNC: 61 U/L (ref 40–130)
ALT SERPL-CCNC: 11 U/L (ref 0–33)
ANION GAP SERPL CALCULATED.3IONS-SCNC: 14 MEQ/L (ref 9–15)
AST SERPL-CCNC: 18 U/L (ref 0–35)
BACTERIA UR CULT: ABNORMAL
BASOPHILS # BLD: 0 K/UL (ref 0–0.2)
BASOPHILS NFR BLD: 0.3 %
BILIRUB SERPL-MCNC: 0.5 MG/DL (ref 0.2–0.7)
BUN SERPL-MCNC: 14 MG/DL (ref 8–23)
CALCIUM SERPL-MCNC: 9.1 MG/DL (ref 8.5–9.9)
CHLORIDE SERPL-SCNC: 102 MEQ/L (ref 95–107)
CO2 SERPL-SCNC: 21 MEQ/L (ref 20–31)
CREAT SERPL-MCNC: 1.05 MG/DL (ref 0.5–0.9)
EOSINOPHIL # BLD: 0.1 K/UL (ref 0–0.7)
EOSINOPHIL NFR BLD: 1.1 %
ERYTHROCYTE [DISTWIDTH] IN BLOOD BY AUTOMATED COUNT: 13.1 % (ref 11.5–14.5)
GLOBULIN SER CALC-MCNC: 3.2 G/DL (ref 2.3–3.5)
GLUCOSE BLD-MCNC: 212 MG/DL (ref 70–99)
GLUCOSE SERPL-MCNC: 218 MG/DL (ref 70–99)
HCT VFR BLD AUTO: 43.3 % (ref 37–47)
HGB BLD-MCNC: 14.5 G/DL (ref 12–16)
INR PPP: 2.6
LACTATE BLDV-SCNC: 1.4 MMOL/L (ref 0.5–2.2)
LYMPHOCYTES # BLD: 1.2 K/UL (ref 1–4.8)
LYMPHOCYTES NFR BLD: 12.9 %
MCH RBC QN AUTO: 29.1 PG (ref 27–31.3)
MCHC RBC AUTO-ENTMCNC: 33.5 % (ref 33–37)
MCV RBC AUTO: 86.8 FL (ref 79.4–94.8)
MONOCYTES # BLD: 0.6 K/UL (ref 0.2–0.8)
MONOCYTES NFR BLD: 6.1 %
NEUTROPHILS # BLD: 7.5 K/UL (ref 1.4–6.5)
NEUTS SEG NFR BLD: 79.3 %
ORGANISM: ABNORMAL
ORGANISM: ABNORMAL
PERFORMED ON: ABNORMAL
PLATELET # BLD AUTO: 225 K/UL (ref 130–400)
POTASSIUM SERPL-SCNC: 3.8 MEQ/L (ref 3.4–4.9)
PROT SERPL-MCNC: 7 G/DL (ref 6.3–8)
PROTHROMBIN TIME: 28.2 SEC (ref 12.3–14.9)
RBC # BLD AUTO: 4.99 M/UL (ref 4.2–5.4)
SODIUM SERPL-SCNC: 137 MEQ/L (ref 135–144)
TROPONIN, HIGH SENSITIVITY: 17 NG/L (ref 0–19)
WBC # BLD AUTO: 9.5 K/UL (ref 4.8–10.8)

## 2023-11-04 PROCEDURE — 1210000000 HC MED SURG R&B

## 2023-11-04 PROCEDURE — 85025 COMPLETE CBC W/AUTO DIFF WBC: CPT

## 2023-11-04 PROCEDURE — 93005 ELECTROCARDIOGRAM TRACING: CPT | Performed by: INTERNAL MEDICINE

## 2023-11-04 PROCEDURE — 85610 PROTHROMBIN TIME: CPT

## 2023-11-04 PROCEDURE — 80053 COMPREHEN METABOLIC PANEL: CPT

## 2023-11-04 PROCEDURE — 6370000000 HC RX 637 (ALT 250 FOR IP): Performed by: STUDENT IN AN ORGANIZED HEALTH CARE EDUCATION/TRAINING PROGRAM

## 2023-11-04 PROCEDURE — 6360000002 HC RX W HCPCS: Performed by: INTERNAL MEDICINE

## 2023-11-04 PROCEDURE — 84484 ASSAY OF TROPONIN QUANT: CPT

## 2023-11-04 PROCEDURE — 6370000000 HC RX 637 (ALT 250 FOR IP): Performed by: INTERNAL MEDICINE

## 2023-11-04 PROCEDURE — 83605 ASSAY OF LACTIC ACID: CPT

## 2023-11-04 PROCEDURE — 36415 COLL VENOUS BLD VENIPUNCTURE: CPT

## 2023-11-04 RX ORDER — HYDRALAZINE HYDROCHLORIDE 20 MG/ML
5 INJECTION INTRAMUSCULAR; INTRAVENOUS ONCE
Status: COMPLETED | OUTPATIENT
Start: 2023-11-04 | End: 2023-11-04

## 2023-11-04 RX ORDER — METOPROLOL TARTRATE 5 MG/5ML
5 INJECTION INTRAVENOUS ONCE
Status: DISCONTINUED | OUTPATIENT
Start: 2023-11-04 | End: 2023-11-08 | Stop reason: HOSPADM

## 2023-11-04 RX ORDER — CEPHALEXIN 500 MG/1
500 CAPSULE ORAL EVERY 12 HOURS SCHEDULED
Status: COMPLETED | OUTPATIENT
Start: 2023-11-04 | End: 2023-11-06

## 2023-11-04 RX ORDER — METOPROLOL TARTRATE 5 MG/5ML
5 INJECTION INTRAVENOUS EVERY 6 HOURS
Status: DISCONTINUED | OUTPATIENT
Start: 2023-11-04 | End: 2023-11-04

## 2023-11-04 RX ORDER — METOPROLOL TARTRATE 5 MG/5ML
5 INJECTION INTRAVENOUS ONCE
Status: DISCONTINUED | OUTPATIENT
Start: 2023-11-04 | End: 2023-11-04

## 2023-11-04 RX ORDER — WARFARIN SODIUM 2.5 MG/1
2.5 TABLET ORAL
Status: COMPLETED | OUTPATIENT
Start: 2023-11-04 | End: 2023-11-04

## 2023-11-04 RX ORDER — HYDRALAZINE HYDROCHLORIDE 25 MG/1
25 TABLET, FILM COATED ORAL EVERY 8 HOURS SCHEDULED
Status: DISCONTINUED | OUTPATIENT
Start: 2023-11-04 | End: 2023-11-08 | Stop reason: HOSPADM

## 2023-11-04 RX ADMIN — LEVOTHYROXINE SODIUM 75 MCG: 0.07 TABLET ORAL at 09:15

## 2023-11-04 RX ADMIN — ISOSORBIDE MONONITRATE 60 MG: 60 TABLET, EXTENDED RELEASE ORAL at 09:15

## 2023-11-04 RX ADMIN — METOPROLOL SUCCINATE 100 MG: 100 TABLET, EXTENDED RELEASE ORAL at 20:55

## 2023-11-04 RX ADMIN — CEPHALEXIN 500 MG: 500 CAPSULE ORAL at 20:55

## 2023-11-04 RX ADMIN — METOPROLOL SUCCINATE 100 MG: 100 TABLET, EXTENDED RELEASE ORAL at 09:15

## 2023-11-04 RX ADMIN — CEPHALEXIN 500 MG: 500 CAPSULE ORAL at 12:48

## 2023-11-04 RX ADMIN — WARFARIN SODIUM 2.5 MG: 2.5 TABLET ORAL at 18:35

## 2023-11-04 RX ADMIN — HYDRALAZINE HYDROCHLORIDE 5 MG: 20 INJECTION, SOLUTION INTRAMUSCULAR; INTRAVENOUS at 09:13

## 2023-11-04 RX ADMIN — HYDRALAZINE HYDROCHLORIDE 25 MG: 25 TABLET, FILM COATED ORAL at 12:48

## 2023-11-04 RX ADMIN — FUROSEMIDE 40 MG: 40 TABLET ORAL at 09:15

## 2023-11-04 ASSESSMENT — ENCOUNTER SYMPTOMS
NAUSEA: 0
COUGH: 0
VOMITING: 0
SHORTNESS OF BREATH: 0
DIARRHEA: 0

## 2023-11-04 NOTE — CODE DOCUMENTATION
3937 pt had change in condition she became restless trying to reach for items on the floor trying to sit up and began to have difficulty breathing o2 at [place]  applied, vss obtained bp elevated at 200/96 rapid called  0622 rapid called for change in condition pt having acute respiratory distress by evidence of SOB, difficulty breathing and audible wheezing, Vss obtained bp 200/96, spo2 89on RA,  o2 down changed to 3 liters of o2 applied via nasal canula pt sats 98%, Dr. Arnaldo Mari , Lab, and respiratory responded to rapid call, pt fc, admitted for UTI, orders for   0625 bp 206/107, hr72,99% 3 LNC  0629:blood sugar 212  0632:EKG obtained pt v-pacing  0637:metoprolol 5mg IVP  0640:Hydralazine 5mg IVP   Patient responding well to interventions, bp trending down 161/67 at 0658, spo2  99% on 3lnc, pt no longer having SOB, talking with nurse at this time about past events, vss to monitored.       5831 rapid response ended    Pt in bed alert to self, greatly improved report given to oncoming nurse and 1 on 1 caregiver      Staff in on rapid Spring Popeye RN, Tala Dawn, RN, Heidi Osuna, RN, Dr. Dahiana Matta, pharmacy

## 2023-11-04 NOTE — PROGRESS NOTES
Warfarin Dosing - Pharmacy Consult Note    Consulting Provider: Dr. Verito Zaldivar    Indication:  Atrial Fibrillation  Warfarin Dose prior to admission: 5 mg every MWF; 2.5 mg all other days    Concurrent anticoagulants/antiplatelets: None  Significant Drug Interactions:  rocephin    Recent Labs     11/01/23  1415 11/02/23  0409 11/03/23  0453 11/04/23  0422 11/04/23  0638 11/04/23  0639   INR  --  1.8 2.3 2.6  --   --    HGB 13.9  --   --   --  14.5  --      --   --   --  225  --    LABALBU 3.8  --   --   --   --  3.8     Recent warfarin administrations                     warfarin (COUMADIN) tablet 5 mg (mg) 5 mg Given 11/03/23 1806    warfarin (COUMADIN) tablet 5 mg (mg) 5 mg Given 11/02/23 1806    warfarin (COUMADIN) tablet 5 mg (mg) 5 mg Given 11/01/23 2158                     Date   INR    Dose  11/1        ---        5 mg   11/2       1.8        5 mg  11/3       2.3        5 mg  11/4       2.6        2.5 mg    Assessment/Plan  (Goal INR: 2 - 3)  INR of 2.6 is therapeutic. Will give home dose of warfarin 2.5 mg today. Active problem list reviewed. INR orders are placed. Chart reviewed for pertinent labs, drug/diet interactions, and past doses. Documentation of patient's clinical condition was reviewed. Pharmacy Dosing:  Pharmacy will continue to follow. Thank you for consulting pharmacy,    Hugo So, IrinaD.   PGY1 Pharmacy Resident  11/4/2023  10:07 AM

## 2023-11-04 NOTE — PROGRESS NOTES
Pt lost IV access due to infiltration. Attempted new IV by 2 nurses, unsuccessful. Dr. Nadine Dobson at bedside, states ok for no IV access.  IV antibiotics changed to oral.

## 2023-11-04 NOTE — SIGNIFICANT EVENT
Rapid response called to room 475 at 0622 hrs. Patient is an 66-year-old female with advanced dementia, A&O x1 at baseline per EMR, admitted with UTI. She was found to be severely hypertensive with SBP just over 200 mmHg. Most recent preceding blood pressure reading ~1900 hrs. on 11/3/2023 significantly more normal with SBP ~140 mmHg. No obvious missed or held medications, per quick review of EMR. Patient complaining of some dyspnea along with hypertension. Twelve-lead EKG obtained stat, demonstrated ventricularly paced rhythm at 70 bpm with no significant ST changes concerning for acute ischemia. POCT blood glucose ~200 at time of rapid response. Lab present, and did have them collect morning labs at time of rapid to include CBC with differential, CMP with magnesium, high-sensitivity troponin, and lactate. Initial pharmacologic treatment with 5 mg Lopressor IV, with modest improvement and subsequent SBP to ~170 mmHg, and improvement in dyspnea on unchanged 3 L O2 by NC. Gave additional 5 mg of hydralazine IV once. Patient significantly more comfortable. Rapid response ended. Discussed with room nurse to perform frequent serial vital signs checks over the next 30 to 60 minutes, and notify for any significant adverse values.     Electronically signed by Kristel Martinez DO on 11/4/2023 at 6:53 AM

## 2023-11-05 LAB
INR PPP: 3.3
PROTHROMBIN TIME: 33.7 SEC (ref 12.3–14.9)

## 2023-11-05 PROCEDURE — 1210000000 HC MED SURG R&B

## 2023-11-05 PROCEDURE — 85610 PROTHROMBIN TIME: CPT

## 2023-11-05 PROCEDURE — 6370000000 HC RX 637 (ALT 250 FOR IP): Performed by: INTERNAL MEDICINE

## 2023-11-05 PROCEDURE — 36415 COLL VENOUS BLD VENIPUNCTURE: CPT

## 2023-11-05 PROCEDURE — 2700000000 HC OXYGEN THERAPY PER DAY

## 2023-11-05 RX ADMIN — METOPROLOL SUCCINATE 100 MG: 100 TABLET, EXTENDED RELEASE ORAL at 09:05

## 2023-11-05 RX ADMIN — LEVOTHYROXINE SODIUM 75 MCG: 0.07 TABLET ORAL at 07:34

## 2023-11-05 RX ADMIN — HYDRALAZINE HYDROCHLORIDE 25 MG: 25 TABLET, FILM COATED ORAL at 01:07

## 2023-11-05 RX ADMIN — HYDRALAZINE HYDROCHLORIDE 25 MG: 25 TABLET, FILM COATED ORAL at 17:37

## 2023-11-05 RX ADMIN — FUROSEMIDE 40 MG: 40 TABLET ORAL at 09:05

## 2023-11-05 RX ADMIN — HYDRALAZINE HYDROCHLORIDE 25 MG: 25 TABLET, FILM COATED ORAL at 21:28

## 2023-11-05 RX ADMIN — CEPHALEXIN 500 MG: 500 CAPSULE ORAL at 09:05

## 2023-11-05 RX ADMIN — ISOSORBIDE MONONITRATE 60 MG: 60 TABLET, EXTENDED RELEASE ORAL at 09:05

## 2023-11-05 RX ADMIN — CEPHALEXIN 500 MG: 500 CAPSULE ORAL at 21:27

## 2023-11-05 RX ADMIN — HYDRALAZINE HYDROCHLORIDE 25 MG: 25 TABLET, FILM COATED ORAL at 06:15

## 2023-11-05 RX ADMIN — METOPROLOL SUCCINATE 100 MG: 100 TABLET, EXTENDED RELEASE ORAL at 21:28

## 2023-11-05 ASSESSMENT — ENCOUNTER SYMPTOMS
VOMITING: 0
NAUSEA: 0
SHORTNESS OF BREATH: 0
DIARRHEA: 0
COUGH: 0

## 2023-11-05 ASSESSMENT — PAIN SCALES - GENERAL: PAINLEVEL_OUTOF10: 0

## 2023-11-05 NOTE — PROGRESS NOTES
Warfarin Dosing - Pharmacy Consult Note    Consulting Provider: Dr. Moody Cockayne    Indication:  Atrial Fibrillation  Warfarin Dose prior to admission: 5 mg every MWF; 2.5 mg all other days    Concurrent anticoagulants/antiplatelets: None  Significant Drug Interactions: cephalexin    Recent Labs     11/03/23  0453 11/04/23  0422 11/04/23  0638 11/04/23  0639 11/05/23  0422   INR 2.3 2.6  --   --  3.3   HGB  --   --  14.5  --   --    PLT  --   --  225  --   --    LABALBU  --   --   --  3.8  --      Recent warfarin administrations                     warfarin (COUMADIN) tablet 2.5 mg (mg) 2.5 mg Given 11/04/23 1835    warfarin (COUMADIN) tablet 5 mg (mg) 5 mg Given 11/03/23 1806    warfarin (COUMADIN) tablet 5 mg (mg) 5 mg Given 11/02/23 1806                     Date   INR    Dose  11/1        ---        5 mg   11/2       1.8        5 mg  11/3       2.3        5 mg  11/4       2.6        2.5 mg  11/5       3.3        HOLD    Assessment/Plan  (Goal INR: 2 - 3)  INR of 3.3 is slightly supra-therapeutic. Given INR increase of 0.9 in 1 day will hold x today. Of note started on keflex 11/4    Active problem list reviewed. INR orders are placed. Chart reviewed for pertinent labs, drug/diet interactions, and past doses. Documentation of patient's clinical condition was reviewed. Pharmacy Dosing:  Pharmacy will continue to follow.         Thank you for consulting pharmacy,    Nydia Higgins, [facility] PharmD

## 2023-11-06 LAB
EKG ATRIAL RATE: 73 BPM
EKG Q-T INTERVAL: 480 MS
EKG QRS DURATION: 172 MS
EKG QTC CALCULATION (BAZETT): 518 MS
EKG R AXIS: 55 DEGREES
EKG T AXIS: 120 DEGREES
EKG VENTRICULAR RATE: 70 BPM
GLUCOSE BLD-MCNC: 140 MG/DL (ref 70–99)
GLUCOSE BLD-MCNC: 243 MG/DL (ref 70–99)
GLUCOSE BLD-MCNC: 272 MG/DL (ref 70–99)
INR PPP: 3.5
PERFORMED ON: ABNORMAL
PROTHROMBIN TIME: 35.2 SEC (ref 12.3–14.9)

## 2023-11-06 PROCEDURE — 2700000000 HC OXYGEN THERAPY PER DAY

## 2023-11-06 PROCEDURE — 97116 GAIT TRAINING THERAPY: CPT

## 2023-11-06 PROCEDURE — 36415 COLL VENOUS BLD VENIPUNCTURE: CPT

## 2023-11-06 PROCEDURE — 97535 SELF CARE MNGMENT TRAINING: CPT

## 2023-11-06 PROCEDURE — 93010 ELECTROCARDIOGRAM REPORT: CPT | Performed by: INTERNAL MEDICINE

## 2023-11-06 PROCEDURE — 6370000000 HC RX 637 (ALT 250 FOR IP): Performed by: STUDENT IN AN ORGANIZED HEALTH CARE EDUCATION/TRAINING PROGRAM

## 2023-11-06 PROCEDURE — 6370000000 HC RX 637 (ALT 250 FOR IP): Performed by: INTERNAL MEDICINE

## 2023-11-06 PROCEDURE — 1210000000 HC MED SURG R&B

## 2023-11-06 PROCEDURE — 85610 PROTHROMBIN TIME: CPT

## 2023-11-06 PROCEDURE — 97110 THERAPEUTIC EXERCISES: CPT

## 2023-11-06 RX ORDER — CEPHALEXIN 500 MG/1
500 CAPSULE ORAL EVERY 12 HOURS SCHEDULED
Qty: 1 CAPSULE | Refills: 0 | Status: SHIPPED | OUTPATIENT
Start: 2023-11-06 | End: 2023-11-07

## 2023-11-06 RX ORDER — INSULIN LISPRO 100 [IU]/ML
0-4 INJECTION, SOLUTION INTRAVENOUS; SUBCUTANEOUS
Qty: 90 EACH | Refills: 0 | Status: SHIPPED | OUTPATIENT
Start: 2023-11-06 | End: 2023-12-06

## 2023-11-06 RX ORDER — INSULIN LISPRO 100 [IU]/ML
0-4 INJECTION, SOLUTION INTRAVENOUS; SUBCUTANEOUS NIGHTLY
Qty: 1 EACH | Refills: 0 | Status: SHIPPED | OUTPATIENT
Start: 2023-11-06 | End: 2023-12-06

## 2023-11-06 RX ORDER — GLUCAGON 1 MG/ML
1 KIT INJECTION PRN
Status: DISCONTINUED | OUTPATIENT
Start: 2023-11-06 | End: 2023-11-08 | Stop reason: HOSPADM

## 2023-11-06 RX ORDER — DEXTROSE MONOHYDRATE 100 MG/ML
INJECTION, SOLUTION INTRAVENOUS CONTINUOUS PRN
Status: DISCONTINUED | OUTPATIENT
Start: 2023-11-06 | End: 2023-11-08 | Stop reason: HOSPADM

## 2023-11-06 RX ORDER — INSULIN LISPRO 100 [IU]/ML
0-4 INJECTION, SOLUTION INTRAVENOUS; SUBCUTANEOUS NIGHTLY
Status: DISCONTINUED | OUTPATIENT
Start: 2023-11-06 | End: 2023-11-08 | Stop reason: HOSPADM

## 2023-11-06 RX ORDER — INSULIN LISPRO 100 [IU]/ML
0-4 INJECTION, SOLUTION INTRAVENOUS; SUBCUTANEOUS
Status: DISCONTINUED | OUTPATIENT
Start: 2023-11-06 | End: 2023-11-08 | Stop reason: HOSPADM

## 2023-11-06 RX ORDER — HYDRALAZINE HYDROCHLORIDE 25 MG/1
25 TABLET, FILM COATED ORAL EVERY 8 HOURS SCHEDULED
Qty: 90 TABLET | Refills: 3 | Status: SHIPPED | OUTPATIENT
Start: 2023-11-06

## 2023-11-06 RX ADMIN — LEVOTHYROXINE SODIUM 75 MCG: 0.07 TABLET ORAL at 06:37

## 2023-11-06 RX ADMIN — CEPHALEXIN 500 MG: 500 CAPSULE ORAL at 08:06

## 2023-11-06 RX ADMIN — ACETAMINOPHEN 650 MG: 325 TABLET ORAL at 15:31

## 2023-11-06 RX ADMIN — ISOSORBIDE MONONITRATE 60 MG: 60 TABLET, EXTENDED RELEASE ORAL at 08:06

## 2023-11-06 RX ADMIN — FUROSEMIDE 40 MG: 40 TABLET ORAL at 08:06

## 2023-11-06 RX ADMIN — HYDRALAZINE HYDROCHLORIDE 25 MG: 25 TABLET, FILM COATED ORAL at 13:47

## 2023-11-06 RX ADMIN — METOPROLOL SUCCINATE 100 MG: 100 TABLET, EXTENDED RELEASE ORAL at 21:32

## 2023-11-06 RX ADMIN — CEPHALEXIN 500 MG: 500 CAPSULE ORAL at 21:32

## 2023-11-06 RX ADMIN — METOPROLOL SUCCINATE 100 MG: 100 TABLET, EXTENDED RELEASE ORAL at 08:06

## 2023-11-06 RX ADMIN — HYDRALAZINE HYDROCHLORIDE 25 MG: 25 TABLET, FILM COATED ORAL at 06:37

## 2023-11-06 RX ADMIN — INSULIN LISPRO 2 UNITS: 100 INJECTION, SOLUTION INTRAVENOUS; SUBCUTANEOUS at 12:26

## 2023-11-06 ASSESSMENT — PAIN DESCRIPTION - LOCATION
LOCATION: SHOULDER;NECK
LOCATION: SHOULDER

## 2023-11-06 ASSESSMENT — PAIN SCALES - GENERAL
PAINLEVEL_OUTOF10: 7
PAINLEVEL_OUTOF10: 0

## 2023-11-06 NOTE — CARE COORDINATION
ALLEYW SPOKE WITH MANAS NAVARRO WITH [facility] TODAY. THEY HAVE ACCEPTED THE PT AND STARTED THE PRECERT THIS MORNING.

## 2023-11-06 NOTE — PROGRESS NOTES
Physical Therapy Med Surg Daily Treatment Note  [facility]Department[facility]  [facility]       NAME: Kevin Stiles  : 1934 (80 y.o.)  MRN: [id number]  CODE STATUS: Full Code    Date of Service: 2023    Patient Diagnosis(es): UTI (urinary tract infection) [N39.0]  Urinary tract infection with hematuria, site unspecified [N39.0, R31.9]  Altered mental status, unspecified altered mental status type [R41.82]   Chief Complaint   Patient presents with    Dementia     Worsening dementia, unable to care for self, family lives out of state, [employer] police stating that her family was missing, hasnt been taking meds     Patient Active Problem List    Diagnosis Date Noted    UTI (urinary tract infection) 2023    Sciatica of right side 2022    Chest pain 10/29/2020    Depression with anxiety 2020    Psychophysiological insomnia 2020    Pressure injury of buttock, stage 2 ([address]) 2019    Gastroesophageal reflux disease without esophagitis 2019    Vertigo 2019    Age-related osteoporosis without current pathological fracture 2019    Chronic right shoulder pain 2019    Pacemaker 2019    Intertrigo 2019    RUQ pain 2019    Class 2 severe obesity due to excess calories with serious comorbidity and body mass index (BMI) of 38.0 to 38.9 in adult ([address]) 2019    Anticoagulated on Coumadin 2016    At high risk for falls 2016    Atrial fibrillation ([address]) 2016    Uncontrolled type 2 diabetes mellitus without complication, with long-term current use of insulin 2016    CHF (congestive heart failure) ([address]) 2014    Degenerative arthritis of lumbar spine 10/02/2013    Senile cataract 08/15/2013    Incontinence of urine 2012    CKD (chronic kidney disease) stage 3, GFR 30-59 ml/min ([address]) 2011    Essential hypertension 2011    Mixed hyperlipidemia 2011    Hypothyroidism

## 2023-11-06 NOTE — PROGRESS NOTES
Warfarin Dosing - Pharmacy Consult Note    Consulting Provider: Dr. Verenice Garcia    Indication:  Atrial Fibrillation  Warfarin Dose prior to admission: 5 mg every MWF; 2.5 mg all other days    Concurrent anticoagulants/antiplatelets: None  Significant Drug Interactions: cephalexin    Recent Labs     11/04/23  0422 11/04/23  0638 11/04/23  0639 11/05/23  0422 11/06/23  0442   INR 2.6  --   --  3.3 3.5   HGB  --  14.5  --   --   --    PLT  --  225  --   --   --    LABALBU  --   --  3.8  --   --      Recent warfarin administrations                     warfarin (COUMADIN) tablet 2.5 mg (mg) 2.5 mg Given 11/04/23 1835    warfarin (COUMADIN) tablet 5 mg (mg) 5 mg Given 11/03/23 1806                     Date   INR    Dose  11/1        ---        5 mg   11/2       1.8        5 mg  11/3       2.3        5 mg  11/4       2.6        2.5 mg  11/5       3.3        HOLD  11/6       3.5        HOLD    Assessment/Plan  (Goal INR: 2 - 3)  INR of 3.5 is supra-therapeutic. Given further  INR increase- will hold warfarin today. Of note started on keflex 11/4    Active problem list reviewed. INR orders are placed. Chart reviewed for pertinent labs, drug/diet interactions, and past doses. Documentation of patient's clinical condition was reviewed. Pharmacy Dosing:  Pharmacy will continue to follow.         Thank you for consulting pharmacyRenuka  Staff Pharmacist, Guzman Mahendra  11/6/2023  7:35 AM

## 2023-11-06 NOTE — DISCHARGE SUMMARY
Discharge Summary    Date: 11/6/2023  Patient Name: Jones Gongora    YOB: 1934     Age: 80 y.o. Admit Date: 11/1/2023  Discharge Date: 11/6/2023  Discharge Condition: [place]    Admission Diagnosis  UTI (urinary tract infection) [N39.0]; Urinary tract infection with hematuria, site unspecified [N39.0, R31.9]; Altered mental status, unspecified altered mental status type [R41.82]      Discharge Diagnosis  Principal Problem:    UTI (urinary tract infection)  Resolved Problems:    * No resolved hospital problems. [facility] Stay  Narrative of Hospital Course:  Patient comes with weakness found to have UTI. Receive IV antibiotics. Patient will be discharged to SNF with p.o. antibiotics. Hospital course was complicated by elevated blood pressure started on hydralazine. Consultants:  PHARMACY TO DOSE WARFARIN    Surgeries/procedures Performed:      Treatments:            Discharge Plan/Disposition:  Home    Hospital/Incidental Findings Requiring Follow Up:    Patient Instructions:    Diet:    Activity:  For number of days (if applicable): Other Instructions:    Provider Follow-Up:   No follow-ups on file. Significant Diagnostic Studies:    Recent Labs:  Admission on 11/01/2023  Sodium                                        Date: 11/01/2023  Value: 140         Ref range: 135 - 144 mEq/L    Status: Final  Potassium                                     Date: 11/01/2023  Value: 4.4         Ref range: 3.4 - 4.9 mEq/L    Status: Final                Comment: Specimen hemolysis has exceeded the interference as defined  by Roche. Value may be falsely increased. Suggest  recollection if clinically indicated.     Chloride                                      Date: 11/01/2023  Value: 104         Ref range: 95 - 107 mEq/L     Status: Final  CO2                                           Date: 11/01/2023  Value: 23          Ref range: 20 - 31 mEq/L      Status: Final  Anion Gap (ADVIL;MOTRIN) 200 MG tablet  Comments:  Reason for Stopping:    meclizine (ANTIVERT) 12.5 MG tablet  Comments:  Reason for Stopping:    sotalol (BETAPACE) 120 MG tablet  Comments:  Reason for Stopping:    pravastatin (PRAVACHOL) 40 MG tablet  Comments:  Reason for Stopping:    telmisartan (MICARDIS) 40 MG tablet  Comments:  Reason for Stopping:    nitroGLYCERIN (NITROSTAT) 0.4 MG SL tablet  Comments:  Reason for Stopping:    aspirin 81 MG EC tablet  Comments:  Reason for Stopping:          Time Spent on Discharge:  minutes were spent in patient examination, evaluation, counseling as well as medication reconciliation, prescriptions for required medications, discharge plan, and follow up.     Electronically signed by Claudene Bogus, MD on 11/6/23 at 11:20 AM EST   Overtime on dc summary was 45 min

## 2023-11-07 ENCOUNTER — TELEPHONE (OUTPATIENT)
Dept: PHARMACY | Age: 88
End: 2023-11-07

## 2023-11-07 VITALS
TEMPERATURE: 98.4 F | BODY MASS INDEX: 27.6 KG/M2 | SYSTOLIC BLOOD PRESSURE: 125 MMHG | HEART RATE: 72 BPM | WEIGHT: 150 LBS | HEIGHT: 62 IN | OXYGEN SATURATION: 100 % | DIASTOLIC BLOOD PRESSURE: 58 MMHG | RESPIRATION RATE: 18 BRPM

## 2023-11-07 LAB
GLUCOSE BLD-MCNC: 175 MG/DL (ref 70–99)
GLUCOSE BLD-MCNC: 199 MG/DL (ref 70–99)
GLUCOSE BLD-MCNC: 222 MG/DL (ref 70–99)
GLUCOSE BLD-MCNC: 325 MG/DL (ref 70–99)
INR PPP: 3.2
PERFORMED ON: ABNORMAL
PROTHROMBIN TIME: 33.2 SEC (ref 12.3–14.9)

## 2023-11-07 PROCEDURE — 36415 COLL VENOUS BLD VENIPUNCTURE: CPT

## 2023-11-07 PROCEDURE — 97116 GAIT TRAINING THERAPY: CPT

## 2023-11-07 PROCEDURE — 97535 SELF CARE MNGMENT TRAINING: CPT

## 2023-11-07 PROCEDURE — 97530 THERAPEUTIC ACTIVITIES: CPT

## 2023-11-07 PROCEDURE — 6370000000 HC RX 637 (ALT 250 FOR IP): Performed by: STUDENT IN AN ORGANIZED HEALTH CARE EDUCATION/TRAINING PROGRAM

## 2023-11-07 PROCEDURE — 6370000000 HC RX 637 (ALT 250 FOR IP): Performed by: INTERNAL MEDICINE

## 2023-11-07 PROCEDURE — 85610 PROTHROMBIN TIME: CPT

## 2023-11-07 RX ORDER — WARFARIN SODIUM 2.5 MG/1
2.5 TABLET ORAL
Status: COMPLETED | OUTPATIENT
Start: 2023-11-07 | End: 2023-11-07

## 2023-11-07 RX ADMIN — FUROSEMIDE 40 MG: 40 TABLET ORAL at 07:51

## 2023-11-07 RX ADMIN — ACETAMINOPHEN 650 MG: 325 TABLET ORAL at 07:51

## 2023-11-07 RX ADMIN — ISOSORBIDE MONONITRATE 60 MG: 60 TABLET, EXTENDED RELEASE ORAL at 07:51

## 2023-11-07 RX ADMIN — WARFARIN SODIUM 2.5 MG: 2.5 TABLET ORAL at 16:39

## 2023-11-07 RX ADMIN — LEVOTHYROXINE SODIUM 75 MCG: 0.07 TABLET ORAL at 06:20

## 2023-11-07 RX ADMIN — HYDRALAZINE HYDROCHLORIDE 25 MG: 25 TABLET, FILM COATED ORAL at 15:05

## 2023-11-07 RX ADMIN — METOPROLOL SUCCINATE 100 MG: 100 TABLET, EXTENDED RELEASE ORAL at 07:51

## 2023-11-07 RX ADMIN — INSULIN LISPRO 3 UNITS: 100 INJECTION, SOLUTION INTRAVENOUS; SUBCUTANEOUS at 11:40

## 2023-11-07 RX ADMIN — HYDRALAZINE HYDROCHLORIDE 25 MG: 25 TABLET, FILM COATED ORAL at 06:20

## 2023-11-07 ASSESSMENT — ENCOUNTER SYMPTOMS
VOMITING: 0
COUGH: 0
NAUSEA: 0
DIARRHEA: 0
SHORTNESS OF BREATH: 0

## 2023-11-07 ASSESSMENT — PAIN SCALES - GENERAL: PAINLEVEL_OUTOF10: 4

## 2023-11-07 ASSESSMENT — PAIN DESCRIPTION - LOCATION: LOCATION: BACK

## 2023-11-07 ASSESSMENT — PAIN SCALES - WONG BAKER: WONGBAKER_NUMERICALRESPONSE: 0

## 2023-11-07 NOTE — PROGRESS NOTES
Pt assessment complete, see flowsheets. Vital signs stable, medication administered per MAR. Pt's b/p 118/58 Pulse of 70. Metoprolol and hydralazine was schedule, metoprolol was given, but held hydralazine. Pt is alert and oriented to self, disoriented to place and situation. Pt has increased confusion and requires AVASYS monitoring. Pt left resting with call light within reach.   Electronically signed by Pretty Zamarripa RN on 11/7/2023 at 1:32 AM

## 2023-11-07 NOTE — CARE COORDINATION
This LSW visited patient at bedside this am. I called and left detailed voicemail message for [facility], admissions coordinator at [facility] at 10:50am today. Awaiting return call at this time, I will inquire about patients insurance authorization once when call is returned. ALLEYW/CM to follow. Electronically signed by JYOTI Yip LSW on 11/7/23 at 11:00 AM EST     This LSW received return call from [email or web address] at [address]  notified me that insurance authorization has been obtained for patient to be transferred to SNF. I arranged transportation via [employer]- transport is scheduled for 7:00PM. Patient, Pop [facility] and [employer] are all aware of transport time. [id number] completed.   Electronically signed by JYOTI Yip, AJAY on 11/7/23 at 12:06 PM EST

## 2023-11-07 NOTE — PROGRESS NOTES
increased effort to maintain full extension at elbow to complete activity as instructed. [employer]  How much help is needed for putting on and taking off regular lower body clothing?: A Lot  How much help is needed for bathing (which includes washing, rinsing, drying)?: A Lot  How much help is needed for toileting (which includes using toilet, bedpan, or urinal)?: A Lot  How much help is needed for putting on and taking off regular upper body clothing?: A Little  How much help is needed for taking care of personal grooming?: A Little  How much help for eating meals?: A Little  AM-[facility] Inpatient Daily Activity Raw Score: 15  AM-PAC Inpatient ADL T-Scale Score : 34.69  ADL Inpatient CMS 0-100% Score: 56.46  ADL Inpatient CMS G-Code Modifier : CK    Plan:    Continue OT per POC    Patient Education:  Patient Education  Education Given To: Patient  Education Provided Comments: UE strengthening activity  Education Method: Demonstration;Verbal  Barriers to Learning: Cognition  Education Outcome: Verbalized understanding;Continued education needed    Equipment recommendations:   Continue to assess     Goals/Plan of care addressed during this session:        Patient Goal: Patient goals : \"I want to go home\"    Improve Balance, Improve Strength, Improve [place] with ADLs, and Improve Endurance    Therapy Time:   Individual Group Co-Treat   Time In 1653       Time Out 1710         Minutes 17            Therapeutic activities: 17 minutes    Electronically signed by:     LILLIAM Perez    11/7/2023, 5:20 PM

## 2023-11-07 NOTE — PROGRESS NOTES
Physical Therapy Med Surg Daily Treatment Note  Facility/Department: [facility]  [facility]       NAME: Lacie Fraser  : 1934 (80 y.o.)  MRN: [id number]  CODE STATUS: Full Code    Date of Service: 2023    Patient Diagnosis(es): UTI (urinary tract infection) [N39.0]  Urinary tract infection with hematuria, site unspecified [N39.0, R31.9]  Altered mental status, unspecified altered mental status type [R41.82]   Chief Complaint   Patient presents with    Dementia     Worsening dementia, unable to care for self, [place], [facility] that her family was missing, hasnt been taking meds     Patient Active Problem List    Diagnosis Date Noted    UTI (urinary tract infection) 2023    Sciatica of right side 2022    Chest pain 10/29/2020    Depression with anxiety 2020    Psychophysiological insomnia 2020    Pressure injury of buttock, stage 2 ([address]) 2019    Gastroesophageal reflux disease without esophagitis 2019    Vertigo 2019    Age-related osteoporosis without current pathological fracture 2019    Chronic right shoulder pain 2019    Pacemaker 2019    Intertrigo 2019    RUQ pain 2019    Class 2 severe obesity due to excess calories with serious comorbidity and body mass index (BMI) of 38.0 to 38.9 in adult ([address]) 2019    Anticoagulated on Coumadin 2016    At high risk for falls 2016    Atrial fibrillation ([address]) 2016    Uncontrolled type 2 diabetes mellitus without complication, with long-term current use of insulin 2016    CHF (congestive heart failure) ([address]) 2014    Degenerative arthritis of lumbar spine 10/02/2013    Senile cataract 08/15/2013    Incontinence of urine 2012    CKD (chronic kidney disease) stage 3, GFR 30-59 ml/min ([address]) 2011    Essential hypertension 2011    Mixed hyperlipidemia 2011    Hypothyroidism 09/07/2011        Past Medical History:   Diagnosis Date    Abnormal liver ultrasound 5/15/2019    Allergic contact dermatitis due to plants, except food 8/19/2019    Arthritis     Atrial fibrillation (HCC)     CHF (congestive heart failure) (HCC)     Chronic kidney disease     Chronic kidney disease (CKD), stage II (mild)     Depression     Hyperlipidemia     Hypertension     Hypothyroidism     Interstitial cystitis     Dr. Lauren Hook diagnosed this for michele. Type II or unspecified type diabetes mellitus without mention of complication, not stated as uncontrolled      Past Surgical History:   Procedure Laterality Date    ABDOMEN SURGERY      CATARACT REMOVAL WITH IMPLANT      both eyes    CORONARY ANGIOPLASTY WITH STENT PLACEMENT      HYSTERECTOMY (CERVIX STATUS UNKNOWN)      OTHER SURGICAL HISTORY  09/07/2016    GENERATOR CHANGE     PACEMAKER PLACEMENT         Chart Reviewed: Yes  Family / Caregiver Present: Yes    Restrictions:  Restrictions/Precautions: Fall Risk;Up as Tolerated    SUBJECTIVE:   Subjective: Pt agreeable to tx    Pain  Pain: pt denies pain pre tx. c/o abdominal pain post tx, did not rate with a number     OBJECTIVE:        Bed mobility  Rolling to Left: Minimal assistance  Supine to Sit: Minimal assistance  Sit to Supine: Minimal assistance  Scooting: Minimal assistance  Bed Mobility Comments: vc's on proper sequencing and hand placement.  Encouraged pt to complete at higher level of independence    Transfers  Sit to Stand: Contact guard assistance  Stand to Sit: Contact guard assistance  Comment: vc's on proper hand placement and sequencing    Ambulation  Surface: Level tile  Device: Rolling Walker  Other Apparatus: O2  Assistance: Minimal assistance  Quality of Gait: Pt needed frequent vc's on progressing walker safely   Distance: 40ft with multiple directional turns                              Activity Tolerance  Activity Tolerance: Patient tolerated treatment well

## 2023-11-07 NOTE — PROGRESS NOTES
Warfarin Dosing - Pharmacy Consult Note    Consulting Provider: Dr. Silver Giron    Indication:  Atrial Fibrillation  Warfarin Dose prior to admission: 5 mg every MWF; 2.5 mg all other days    Concurrent anticoagulants/antiplatelets: None  Significant Drug Interactions: none    Recent Labs     11/05/23  0422 11/06/23  0442 11/07/23  0350   INR 3.3 3.5 3.2     Recent warfarin administrations                     warfarin (COUMADIN) tablet 2.5 mg (mg) 2.5 mg Given 11/04/23 1835                     Date   INR    Dose  11/1        ---        5 mg   11/2       1.8        5 mg  11/3       2.3        5 mg  11/4       2.6        2.5 mg  11/5       3.3        HOLD  11/6       3.5        HOLD  11/7       3.2        2.5 mg    Assessment/Plan  (Goal INR: 2 - 3)  INR of 3.2 is slight supra-therapeutic. Will give warfarin  2.5 mg today to avoid further decline     Active problem list reviewed. INR orders are placed. Chart reviewed for pertinent labs, drug/diet interactions, and past doses. Documentation of patient's clinical condition was reviewed. Pharmacy Dosing:  Pharmacy will continue to follow.         Thank you for consulting pharmacyRenuka  Staff Pharmacist, [facility]  11/7/2023  8:54 AM

## 2023-11-07 NOTE — PROGRESS NOTES
Report called to Allyssa Turcios at [facility], [facility],  all questions answered, number left if further information is needed

## 2023-11-08 ENCOUNTER — OFFICE VISIT (OUTPATIENT)
Dept: GERIATRIC MEDICINE | Age: 88
End: 2023-11-08
Payer: MEDICARE

## 2023-11-08 DIAGNOSIS — I10 HYPERTENSION, UNSPECIFIED TYPE: ICD-10-CM

## 2023-11-08 DIAGNOSIS — F03.90 DEMENTIA WITHOUT BEHAVIORAL DISTURBANCE (HCC): Primary | ICD-10-CM

## 2023-11-08 DIAGNOSIS — E03.9 HYPOTHYROIDISM, UNSPECIFIED TYPE: ICD-10-CM

## 2023-11-08 DIAGNOSIS — I50.32 CHRONIC DIASTOLIC CHF (CONGESTIVE HEART FAILURE) (HCC): ICD-10-CM

## 2023-11-08 PROCEDURE — 99306 1ST NF CARE HIGH MDM 50: CPT | Performed by: INTERNAL MEDICINE

## 2023-11-08 PROCEDURE — G8484 FLU IMMUNIZE NO ADMIN: HCPCS | Performed by: INTERNAL MEDICINE

## 2023-11-08 PROCEDURE — 1123F ACP DISCUSS/DSCN MKR DOCD: CPT | Performed by: INTERNAL MEDICINE

## 2023-11-08 NOTE — PROGRESS NOTES
Physical Therapy  Facility/Department: [facility] MED SURG [id number]  Physical Therapy Discharge      NAME: Jp Pike    : 1934 (80 y.o.)  MRN: [id number]    Account: [de-identified]  Gender: female      Patient has been discharged from acute care hospital. DC patient from current PT program.      Electronically signed by Gray Diaz PT on 23 at 9:07 AM EST

## 2023-11-09 ENCOUNTER — OFFICE VISIT (OUTPATIENT)
Dept: GERIATRIC MEDICINE | Age: 88
End: 2023-11-09
Payer: MEDICARE

## 2023-11-09 DIAGNOSIS — I10 HYPERTENSION, UNSPECIFIED TYPE: ICD-10-CM

## 2023-11-09 DIAGNOSIS — E03.9 HYPOTHYROIDISM, UNSPECIFIED TYPE: ICD-10-CM

## 2023-11-09 DIAGNOSIS — F03.90 DEMENTIA WITHOUT BEHAVIORAL DISTURBANCE (HCC): Primary | ICD-10-CM

## 2023-11-09 DIAGNOSIS — I50.32 CHRONIC DIASTOLIC CHF (CONGESTIVE HEART FAILURE) (HCC): ICD-10-CM

## 2023-11-09 PROCEDURE — 99306 1ST NF CARE HIGH MDM 50: CPT | Performed by: INTERNAL MEDICINE

## 2023-11-09 PROCEDURE — 1123F ACP DISCUSS/DSCN MKR DOCD: CPT | Performed by: INTERNAL MEDICINE

## 2023-11-09 PROCEDURE — G8484 FLU IMMUNIZE NO ADMIN: HCPCS | Performed by: INTERNAL MEDICINE

## 2023-11-10 ENCOUNTER — OFFICE VISIT (OUTPATIENT)
Dept: GERIATRIC MEDICINE | Age: 88
End: 2023-11-10

## 2023-11-10 DIAGNOSIS — E03.9 HYPOTHYROIDISM, UNSPECIFIED TYPE: ICD-10-CM

## 2023-11-10 DIAGNOSIS — I50.32 CHRONIC DIASTOLIC CHF (CONGESTIVE HEART FAILURE) (HCC): ICD-10-CM

## 2023-11-10 DIAGNOSIS — F03.90 DEMENTIA WITHOUT BEHAVIORAL DISTURBANCE (HCC): Primary | ICD-10-CM

## 2023-11-10 DIAGNOSIS — I10 HYPERTENSION, UNSPECIFIED TYPE: ICD-10-CM

## 2023-11-13 ENCOUNTER — OFFICE VISIT (OUTPATIENT)
Dept: GERIATRIC MEDICINE | Age: 88
End: 2023-11-13

## 2023-11-13 DIAGNOSIS — I50.32 CHRONIC DIASTOLIC CHF (CONGESTIVE HEART FAILURE) (HCC): ICD-10-CM

## 2023-11-13 DIAGNOSIS — F03.90 DEMENTIA WITHOUT BEHAVIORAL DISTURBANCE (HCC): Primary | ICD-10-CM

## 2023-11-13 DIAGNOSIS — E03.9 HYPOTHYROIDISM, UNSPECIFIED TYPE: ICD-10-CM

## 2023-11-13 DIAGNOSIS — I10 HYPERTENSION, UNSPECIFIED TYPE: ICD-10-CM

## 2023-11-13 NOTE — PROGRESS NOTES
History and Physical      CHIEF COMPLAINT:  UTI/ dementia     History of Present Illness:      A 80 y.o. female who is being seen at [facility] s/p hospital stay for UTI. She has baseline dementia and came from AL. She is sitting in the hallway in wheelchair sleeping. Nursing states that she did not sleep the night before at all. REVIEW OF SYSTEMS:  A complete 10 Point review of systems was preformed and negative unless previously stated      PMH:  Past Medical History:   Diagnosis Date    Abnormal liver ultrasound 5/15/2019    Allergic contact dermatitis due to plants, except food 8/19/2019    Arthritis     Atrial fibrillation (HCC)     CHF (congestive heart failure) (HCC)     Chronic kidney disease     Chronic kidney disease (CKD), stage II (mild)     Depression     Hyperlipidemia     Hypertension     Hypothyroidism     Interstitial cystitis     Dr. Karlie Gregory diagnosed this for patinet. Type II or unspecified type diabetes mellitus without mention of complication, not stated as uncontrolled        Surgical History:  Past Surgical History:   Procedure Laterality Date    ABDOMEN SURGERY      CATARACT REMOVAL WITH IMPLANT      both eyes    CORONARY ANGIOPLASTY WITH STENT PLACEMENT      HYSTERECTOMY (CERVIX STATUS UNKNOWN)      OTHER SURGICAL HISTORY  09/07/2016    GENERATOR CHANGE     PACEMAKER PLACEMENT         Medications Prior to Admission:    Prior to Admission medications    Medication Sig Start Date End Date Taking?  Authorizing Provider   hydrALAZINE (APRESOLINE) 25 MG tablet Take 1 tablet by mouth every 8 hours 11/6/23   Laci Montes De Oca MD   insulin lispro (HUMALOG) 100 UNIT/ML SOLN injection vial Inject 0-4 Units into the skin 3 times daily (with meals) 11/6/23 12/6/23  Laci Montes De Oca MD   insulin lispro (HUMALOG) 100 UNIT/ML SOLN injection vial Inject 0-4 Units into the skin nightly 11/6/23 12/6/23  Laci Montes De Oca MD   levothyroxine (SYNTHROID) 75 MCG tablet take 1 tablet by mouth once

## 2023-11-14 ENCOUNTER — TELEPHONE (OUTPATIENT)
Dept: PHARMACY | Age: 88
End: 2023-11-14

## 2023-11-14 ENCOUNTER — OFFICE VISIT (OUTPATIENT)
Dept: GERIATRIC MEDICINE | Age: 88
End: 2023-11-14

## 2023-11-14 DIAGNOSIS — E03.9 HYPOTHYROIDISM, UNSPECIFIED TYPE: ICD-10-CM

## 2023-11-14 DIAGNOSIS — F03.90 DEMENTIA WITHOUT BEHAVIORAL DISTURBANCE (HCC): Primary | ICD-10-CM

## 2023-11-14 DIAGNOSIS — I50.32 CHRONIC DIASTOLIC CHF (CONGESTIVE HEART FAILURE) (HCC): ICD-10-CM

## 2023-11-14 DIAGNOSIS — I10 HYPERTENSION, UNSPECIFIED TYPE: ICD-10-CM

## 2023-11-14 NOTE — TELEPHONE ENCOUNTER
Pt currently admitted to [facility] SNF per Epic.  Left VM with grand daughter to see if this is temporary or permanent
denies pain/discomfort

## 2023-11-14 NOTE — PROGRESS NOTES
SNF PROGRESS NOTE      Cc- UTI/ dementia      Patient is a Chen Linsey Ill 80 y.o. female s/p hospital stay for UTI. She has baseline dementia and came from AL. She is sitting in the hallway at the nursing station. She is sitting with fabricio wrapped inb a towel. Past Medical History:   Diagnosis Date    Abnormal liver ultrasound 5/15/2019    Allergic contact dermatitis due to plants, except food 8/19/2019    Arthritis     Atrial fibrillation (HCC)     CHF (congestive heart failure) (HCC)     Chronic kidney disease     Chronic kidney disease (CKD), stage II (mild)     Depression     Hyperlipidemia     Hypertension     Hypothyroidism     Interstitial cystitis     Dr. Shilpa Paz diagnosed this for michele.     Type II or unspecified type diabetes mellitus without mention of complication, not stated as uncontrolled      Latex, Avelox [moxifloxacin hcl in nacl], Penicillins, and Adhesive tape    VS reviewed    Gen- Alert and oriented x 1   Heart- RRR no murmur no LE edema   Lungs- CTA b/l no resp distress RA  oxygen   Abd- bs x 4           Assessment and Plan    Dementia   Insomnia  Melatonin added on,   UTI   Treated in hospital.   HTN  Isosorbide, metoprolol, hydralazine    HLD   A fib   Warfarin, metoprolol   Chronic Diastolic CHF  Lasix   Metoprolol    DM   Hypothyroid  Synthroid        Will Butler DO [facility]     Electronically signed by: Matthew Ambriz DO on 11/9/2023

## 2023-11-15 NOTE — PROGRESS NOTES
SNF PROGRESS NOTE      Cc- UTI/ dementia      Patient is a Chen Pierce Doug 80 y.o. female s/p hospital stay for UTI. She has baseline dementia and came from AL. Patient is sitting in the hallway at the nursing station. She remains confused. Past Medical History:   Diagnosis Date    Abnormal liver ultrasound 5/15/2019    Allergic contact dermatitis due to plants, except food 8/19/2019    Arthritis     Atrial fibrillation (HCC)     CHF (congestive heart failure) (HCC)     Chronic kidney disease     Chronic kidney disease (CKD), stage II (mild)     Depression     Hyperlipidemia     Hypertension     Hypothyroidism     Interstitial cystitis     Dr. Noe Santos diagnosed this for patinet.     Type II or unspecified type diabetes mellitus without mention of complication, not stated as uncontrolled      Latex, Avelox [moxifloxacin hcl in nacl], Penicillins, and Adhesive tape    VS reviewed    Gen- Alert and oriented x 1   Heart- RRR no murmur no LE edema   Lungs- CTA b/l no resp distress RA  oxygen   Abd- bs x 4         Assessment and Plan    Dementia   Insomnia  Melatonin   UTI   Treated in hospital.   HTN  Isosorbide, metoprolol, hydralazine    HLD   A fib   Warfarin, metoprolol   Chronic Diastolic CHF  Lasix   Metoprolol    DM   Hypothyroid  Synthroid       [place] Tova GOMEZ [facility]     Electronically signed by: Carmelita Walker DO on 11/10/2023

## 2023-11-16 ENCOUNTER — OFFICE VISIT (OUTPATIENT)
Dept: GERIATRIC MEDICINE | Age: 88
End: 2023-11-16

## 2023-11-16 DIAGNOSIS — I50.32 CHRONIC DIASTOLIC CHF (CONGESTIVE HEART FAILURE) (HCC): ICD-10-CM

## 2023-11-16 DIAGNOSIS — E03.9 HYPOTHYROIDISM, UNSPECIFIED TYPE: ICD-10-CM

## 2023-11-16 DIAGNOSIS — F03.90 DEMENTIA WITHOUT BEHAVIORAL DISTURBANCE (HCC): Primary | ICD-10-CM

## 2023-11-16 DIAGNOSIS — I10 HYPERTENSION, UNSPECIFIED TYPE: ICD-10-CM

## 2023-11-16 NOTE — PROGRESS NOTES
SNF PROGRESS NOTE      Cc- UTI/ dementia      Patient is a Chen Jaeger Mews 80 y.o. female s/p hospital stay for UTI. She has baseline dementia and came from AL. She remains confused at the front nurses desk. She is sitting with her fabricio cat, and she is upset because someone took his pendant charm. Past Medical History:   Diagnosis Date    Abnormal liver ultrasound 5/15/2019    Allergic contact dermatitis due to plants, except food 8/19/2019    Arthritis     Atrial fibrillation (HCC)     CHF (congestive heart failure) (HCC)     Chronic kidney disease     Chronic kidney disease (CKD), stage II (mild)     Depression     Hyperlipidemia     Hypertension     Hypothyroidism     Interstitial cystitis     Dr. Carley Peabody diagnosed this for michele.     Type II or unspecified type diabetes mellitus without mention of complication, not stated as uncontrolled      Latex, Avelox [moxifloxacin hcl in nacl], Penicillins, and Adhesive tape    VS reviewed      Gen- Alert and oriented x 1   Heart- RRR no murmur no LE edema   Lungs- CTA b/l no resp distress RA  oxygen   Abd- bs x 4         Assessment and Plan    Dementia   Insomnia  Melatonin   UTI   Treated in hospital.   HTN  Isosorbide, metoprolol, hydralazine    HLD   A fib   Warfarin, metoprolol   Chronic Diastolic CHF  Lasix   Metoprolol    DM   Hypothyroid  Synthroid       Hieu Diaz DO [facility]     Electronically signed by: Mehdi Sprague DO on 11/13/2023

## 2023-11-17 ENCOUNTER — OFFICE VISIT (OUTPATIENT)
Dept: GERIATRIC MEDICINE | Age: 88
End: 2023-11-17

## 2023-11-17 DIAGNOSIS — F03.90 DEMENTIA WITHOUT BEHAVIORAL DISTURBANCE (HCC): Primary | ICD-10-CM

## 2023-11-17 DIAGNOSIS — I10 HYPERTENSION, UNSPECIFIED TYPE: ICD-10-CM

## 2023-11-17 DIAGNOSIS — E03.9 HYPOTHYROIDISM, UNSPECIFIED TYPE: ICD-10-CM

## 2023-11-17 DIAGNOSIS — I50.32 CHRONIC DIASTOLIC CHF (CONGESTIVE HEART FAILURE) (HCC): ICD-10-CM

## 2023-11-17 NOTE — PROGRESS NOTES
SNF PROGRESS NOTE      Cc-  UTI/ dementia      Patient is a Chen Polo Records 80 y.o. female s/p hospital stay for UTI. She has baseline dementia and came from AL. Patient is sitting in the hallway in her wheelchair. She remains confused. She is sitting with a fabricio doll. Past Medical History:   Diagnosis Date    Abnormal liver ultrasound 5/15/2019    Allergic contact dermatitis due to plants, except food 8/19/2019    Arthritis     Atrial fibrillation (HCC)     CHF (congestive heart failure) (HCC)     Chronic kidney disease     Chronic kidney disease (CKD), stage II (mild)     Depression     Hyperlipidemia     Hypertension     Hypothyroidism     Interstitial cystitis     Dr. Wendy Wang diagnosed this for michele.     Type II or unspecified type diabetes mellitus without mention of complication, not stated as uncontrolled      Latex, Avelox [moxifloxacin hcl in nacl], Penicillins, and Adhesive tape    VS reviewed    Gen- Alert and oriented x 1   Heart- RRR no murmur no LE edema   Lungs- CTA b/l no resp distress RA  oxygen   Abd- bs x 4           Assessment and Plan    Dementia   Insomnia  Melatonin   UTI   Treated in hospital.   HTN  Isosorbide, metoprolol, hydralazine    HLD   A fib   Warfarin, metoprolol   Chronic Diastolic CHF  Lasix   Metoprolol    DM   Hypothyroid  Synthroid       Christine Hernandez DO [facility]     Electronically signed by: Vickey Turner DO on 11/14/2023

## 2023-11-20 ENCOUNTER — OFFICE VISIT (OUTPATIENT)
Dept: GERIATRIC MEDICINE | Age: 88
End: 2023-11-20

## 2023-11-20 DIAGNOSIS — L89.152 DECUBITUS ULCER OF SACRAL REGION, STAGE 2 (HCC): ICD-10-CM

## 2023-11-20 DIAGNOSIS — E03.9 HYPOTHYROIDISM, UNSPECIFIED TYPE: ICD-10-CM

## 2023-11-20 DIAGNOSIS — I50.32 CHRONIC DIASTOLIC CHF (CONGESTIVE HEART FAILURE) (HCC): ICD-10-CM

## 2023-11-20 DIAGNOSIS — F03.90 DEMENTIA WITHOUT BEHAVIORAL DISTURBANCE (HCC): Primary | ICD-10-CM

## 2023-11-20 DIAGNOSIS — I10 HYPERTENSION, UNSPECIFIED TYPE: ICD-10-CM

## 2023-11-21 ENCOUNTER — OFFICE VISIT (OUTPATIENT)
Dept: GERIATRIC MEDICINE | Age: 88
End: 2023-11-21

## 2023-11-21 DIAGNOSIS — F03.90 DEMENTIA WITHOUT BEHAVIORAL DISTURBANCE (HCC): Primary | ICD-10-CM

## 2023-11-21 DIAGNOSIS — I10 HYPERTENSION, UNSPECIFIED TYPE: ICD-10-CM

## 2023-11-21 DIAGNOSIS — L89.152 DECUBITUS ULCER OF SACRAL REGION, STAGE 2 (HCC): ICD-10-CM

## 2023-11-21 DIAGNOSIS — I50.32 CHRONIC DIASTOLIC CHF (CONGESTIVE HEART FAILURE) (HCC): ICD-10-CM

## 2023-11-21 DIAGNOSIS — E03.9 HYPOTHYROIDISM, UNSPECIFIED TYPE: ICD-10-CM

## 2023-11-22 ENCOUNTER — OFFICE VISIT (OUTPATIENT)
Dept: GERIATRIC MEDICINE | Age: 88
End: 2023-11-22
Payer: MEDICARE

## 2023-11-22 DIAGNOSIS — L89.152 DECUBITUS ULCER OF SACRAL REGION, STAGE 2 (HCC): ICD-10-CM

## 2023-11-22 DIAGNOSIS — I50.32 CHRONIC DIASTOLIC CHF (CONGESTIVE HEART FAILURE) (HCC): ICD-10-CM

## 2023-11-22 DIAGNOSIS — I10 HYPERTENSION, UNSPECIFIED TYPE: ICD-10-CM

## 2023-11-22 DIAGNOSIS — E03.9 HYPOTHYROIDISM, UNSPECIFIED TYPE: ICD-10-CM

## 2023-11-22 DIAGNOSIS — F03.90 DEMENTIA WITHOUT BEHAVIORAL DISTURBANCE (HCC): Primary | ICD-10-CM

## 2023-11-22 PROCEDURE — 99308 SBSQ NF CARE LOW MDM 20: CPT | Performed by: INTERNAL MEDICINE

## 2023-11-22 PROCEDURE — 1123F ACP DISCUSS/DSCN MKR DOCD: CPT | Performed by: INTERNAL MEDICINE

## 2023-11-22 PROCEDURE — G8484 FLU IMMUNIZE NO ADMIN: HCPCS | Performed by: INTERNAL MEDICINE

## 2023-11-24 ENCOUNTER — OFFICE VISIT (OUTPATIENT)
Dept: GERIATRIC MEDICINE | Age: 88
End: 2023-11-24
Payer: MEDICARE

## 2023-11-24 DIAGNOSIS — L89.152 DECUBITUS ULCER OF SACRAL REGION, STAGE 2 (HCC): ICD-10-CM

## 2023-11-24 DIAGNOSIS — I50.32 CHRONIC DIASTOLIC CHF (CONGESTIVE HEART FAILURE) (HCC): ICD-10-CM

## 2023-11-24 DIAGNOSIS — I10 HYPERTENSION, UNSPECIFIED TYPE: ICD-10-CM

## 2023-11-24 DIAGNOSIS — E03.9 HYPOTHYROIDISM, UNSPECIFIED TYPE: ICD-10-CM

## 2023-11-24 DIAGNOSIS — F03.90 DEMENTIA WITHOUT BEHAVIORAL DISTURBANCE (HCC): Primary | ICD-10-CM

## 2023-11-24 PROCEDURE — G8484 FLU IMMUNIZE NO ADMIN: HCPCS | Performed by: INTERNAL MEDICINE

## 2023-11-24 PROCEDURE — 99308 SBSQ NF CARE LOW MDM 20: CPT | Performed by: INTERNAL MEDICINE

## 2023-11-24 PROCEDURE — 1123F ACP DISCUSS/DSCN MKR DOCD: CPT | Performed by: INTERNAL MEDICINE

## 2023-11-24 NOTE — PROGRESS NOTES
SNF PROGRESS NOTE      Cc- UTI/ dementia      Patient is a Chen Tran 80 y.o. female s/p hospital stay for UTI. She has baseline dementia and came from AL. Patient didn't sleep well throughout the night. Past Medical History:   Diagnosis Date    Abnormal liver ultrasound 5/15/2019    Allergic contact dermatitis due to plants, except food 8/19/2019    Arthritis     Atrial fibrillation (HCC)     CHF (congestive heart failure) (HCC)     Chronic kidney disease     Chronic kidney disease (CKD), stage II (mild)     Depression     Hyperlipidemia     Hypertension     Hypothyroidism     Interstitial cystitis     Dr. Chuckie Price diagnosed this for michele.     Type II or unspecified type diabetes mellitus without mention of complication, not stated as uncontrolled      Latex, Avelox [moxifloxacin hcl in nacl], Penicillins, and Adhesive tape    VS reviewed    Gen- Alert and oriented x 1   Heart- RRR no murmur no LE edema   Lungs- CTA b/l no resp distress RA  oxygen   Abd- bs x 4           Assessment and Plan    Dementia   Stage 2 sacral Decub  Wound care   Insomnia  Melatonin   Trazodone added on   UTI   Treated in hospital.   HTN  Isosorbide, metoprolol, hydralazine    HLD   A fib   Warfarin, metoprolol   Chronic Diastolic CHF  Lasix   Metoprolol    DM   Hypothyroid  Synthroid       Calista Chan DO [facility]     Electronically signed by: Mic Crespo DO on 11/20/2023

## 2023-11-25 ENCOUNTER — OFFICE VISIT (OUTPATIENT)
Dept: GERIATRIC MEDICINE | Age: 88
End: 2023-11-25

## 2023-11-25 DIAGNOSIS — I50.32 CHRONIC DIASTOLIC CHF (CONGESTIVE HEART FAILURE) (HCC): ICD-10-CM

## 2023-11-25 DIAGNOSIS — I10 HYPERTENSION, UNSPECIFIED TYPE: ICD-10-CM

## 2023-11-25 DIAGNOSIS — F03.90 DEMENTIA WITHOUT BEHAVIORAL DISTURBANCE (HCC): Primary | ICD-10-CM

## 2023-11-25 DIAGNOSIS — L89.152 DECUBITUS ULCER OF SACRAL REGION, STAGE 2 (HCC): ICD-10-CM

## 2023-11-25 DIAGNOSIS — E03.9 HYPOTHYROIDISM, UNSPECIFIED TYPE: ICD-10-CM

## 2023-11-25 NOTE — PROGRESS NOTES
SNF PROGRESS NOTE      Cc- UTI/ dementia      Patient is a Chen Stiles 80 y.o. female s/p hospital stay for UTI. She has baseline dementia and came from AL. Patient was agitated last night. Patient is sitting in the chair at her bedside. Past Medical History:   Diagnosis Date    Abnormal liver ultrasound 5/15/2019    Allergic contact dermatitis due to plants, except food 8/19/2019    Arthritis     Atrial fibrillation (HCC)     CHF (congestive heart failure) (HCC)     Chronic kidney disease     Chronic kidney disease (CKD), stage II (mild)     Depression     Hyperlipidemia     Hypertension     Hypothyroidism     Interstitial cystitis     Dr. Barrow Prudent diagnosed this for patinet.     Type II or unspecified type diabetes mellitus without mention of complication, not stated as uncontrolled      Latex, Avelox [moxifloxacin hcl in nacl], Penicillins, and Adhesive tape    VS reviewed    Gen- Alert and oriented x 1   Heart- RRR no murmur no LE edema   Lungs- CTA b/l no resp distress RA  oxygen   Abd- bs x 4           Assessment and Plan    Dementia   Stage 2 sacral Decub  Wound care   Insomnia  Melatonin   Trazodone added on   UTI   Treated in hospital.   HTN  Isosorbide, metoprolol, hydralazine    HLD   A fib   Warfarin, metoprolol   Chronic Diastolic CHF  Lasix   Metoprolol    DM   Hypothyroid  Synthroid         Georgette Urias DO [facility]     Electronically signed by: Yanni Castillo DO on 11/25/2023

## 2023-11-25 NOTE — PROGRESS NOTES
SNF PROGRESS NOTE      Cc- UTI/ dementia      Patient is a Chen Nunez 80 y.o. female s/p hospital stay for UTI. She has baseline dementia and came from AL. Patient had nausea this morning. She remains agitated at night. Past Medical History:   Diagnosis Date    Abnormal liver ultrasound 5/15/2019    Allergic contact dermatitis due to plants, except food 8/19/2019    Arthritis     Atrial fibrillation (HCC)     CHF (congestive heart failure) (HCC)     Chronic kidney disease     Chronic kidney disease (CKD), stage II (mild)     Depression     Hyperlipidemia     Hypertension     Hypothyroidism     Interstitial cystitis     Dr. Honey Guerin diagnosed this for patinet.     Type II or unspecified type diabetes mellitus without mention of complication, not stated as uncontrolled      Latex, Avelox [moxifloxacin hcl in nacl], Penicillins, and Adhesive tape    VS reviewed    Gen- Alert and oriented x 1   Heart- RRR no murmur no LE edema   Lungs- CTA b/l no resp distress RA  oxygen   Abd- bs x 4         Assessment and Plan    Dementia   Stage 2 sacral Decub  Wound care   Insomnia  Melatonin   Trazodone added on   UTI   Treated in hospital.   HTN  Isosorbide, metoprolol, hydralazine    HLD   A fib   Warfarin, metoprolol   Chronic Diastolic CHF  Lasix   Metoprolol    DM   Hypothyroid  Synthroid      [facility] Monteiro [facility]     Electronically signed by: Sheyla Freire DO on 11/21/2023

## 2023-11-27 ENCOUNTER — TELEPHONE (OUTPATIENT)
Dept: CARDIOLOGY | Facility: CLINIC | Age: 88
End: 2023-11-27
Payer: COMMERCIAL

## 2023-11-28 NOTE — PROGRESS NOTES
SNF PROGRESS NOTE      Cc- UTI/ dementia      Patient is a Chen Sebastian [place] 80 y.o. female s/p hospital stay for UTI. She has baseline dementia and came from [place]    Patient was a little agitated overnight. No issues at this time. She is followed by psych. Past Medical History:   Diagnosis Date    Abnormal liver ultrasound 5/15/2019    Allergic contact dermatitis due to plants, except food 8/19/2019    Arthritis     Atrial fibrillation (HCC)     CHF (congestive heart failure) (HCC)     Chronic kidney disease     Chronic kidney disease (CKD), stage II (mild)     Depression     Hyperlipidemia     Hypertension     Hypothyroidism     Interstitial cystitis     Dr. Marc Villavicencio diagnosed this for michele.     Type II or unspecified type diabetes mellitus without mention of complication, not stated as uncontrolled      Latex, Avelox [moxifloxacin hcl in nacl], Penicillins, and Adhesive tape    VS reviewed    Gen- Alert and oriented x 1   Heart- RRR no murmur no LE edema   Lungs- CTA b/l no resp distress RA  oxygen   Abd- bs x 4           Assessment and Plan    Dementia   Stage 2 sacral Decub  Wound care   Insomnia  Melatonin   Trazodone added on   UTI   Treated in hospital.   HTN  Isosorbide, metoprolol, hydralazine    HLD   A fib   Warfarin, metoprolol   Chronic Diastolic CHF  Lasix   Metoprolol    DM   Hypothyroid  Synthroid      Melina Pascual DO [facility]     Electronically signed by: Omi Martinez DO on 11/22/2023

## 2023-11-29 NOTE — PROGRESS NOTES
SNF PROGRESS NOTE      Cc- UTI/ dementia      Patient is a Chen Srinivasan 80 y.o. female s/p hospital stay for UTI. She has baseline dementia and came from AL. Patient remains confused. She is sitting in her chair but remains confused. At night her symptoms appear worse, but psych is following. Past Medical History:   Diagnosis Date    Abnormal liver ultrasound 5/15/2019    Allergic contact dermatitis due to plants, except food 2019    Arthritis     Atrial fibrillation (HCC)     CHF (congestive heart failure) (HCC)     Chronic kidney disease     Chronic kidney disease (CKD), stage II (mild)     Depression     Hyperlipidemia     Hypertension     Hypothyroidism     Interstitial cystitis     Dr. Shahbaz Cid diagnosed this for michele.     Type II or unspecified type diabetes mellitus without mention of complication, not stated as uncontrolled      Latex, Avelox [moxifloxacin hcl in nacl], Penicillins, and Adhesive tape    VS reviewed      Gen- Alert and oriented x 1   Heart- RRR no murmur no LE edema   Lungs- CTA b/l no resp distress RA  oxygen   Abd- bs x 4         Assessment and Plan    Dementia   Stage 2 sacral Decub  Wound care   Insomnia  Melatonin   Trazodone added on   UTI   Treated in hospital.   HTN  Isosorbide, metoprolol, hydralazine    HLD   A fib   Warfarin, metoprolol   Chronic Diastolic CHF  Lasix   Metoprolol    DM   Hypothyroid  Synthroid      Arthurine Gaston GOMEZ [facility]     Electronically signed by: Elly Apgar, DO on 2023

## 2023-12-01 PROBLEM — N39.0 UTI (URINARY TRACT INFECTION): Status: RESOLVED | Noted: 2023-11-01 | Resolved: 2023-12-01

## 2023-12-06 ENCOUNTER — TELEPHONE (OUTPATIENT)
Dept: PHARMACY | Age: 88
End: 2023-12-06

## 2023-12-06 DIAGNOSIS — I48.11 LONGSTANDING PERSISTENT ATRIAL FIBRILLATION (HCC): Primary | ICD-10-CM

## 2023-12-06 NOTE — TELEPHONE ENCOUNTER
Patient is now a long term resident of [address] [place] [address] in [facility]. Called facility ([phone]) for patient status update. No plans to change long term status at this time, family is unable to provide full time care for patient  Will discharge patient from clinic at this time. If services are required in the future, we would be happy to enroll patient in our clinic again.  Discharge letter sent, episode resolved

## 2023-12-22 PROBLEM — E78.5 HYPERLIPIDEMIA: Status: ACTIVE | Noted: 2023-12-22

## 2023-12-22 PROBLEM — F03.90 DEMENTIA (MULTI): Status: ACTIVE | Noted: 2023-12-22

## 2023-12-22 PROBLEM — I10 HYPERTENSION: Status: ACTIVE | Noted: 2023-12-22

## 2023-12-22 PROBLEM — Z95.0 CARDIAC PACEMAKER: Status: ACTIVE | Noted: 2023-12-22

## 2023-12-22 PROBLEM — G89.29 CHRONIC PAIN OF BOTH KNEES: Status: ACTIVE | Noted: 2023-12-22

## 2023-12-22 PROBLEM — N18.31 STAGE 3A CHRONIC KIDNEY DISEASE (MULTI): Status: ACTIVE | Noted: 2023-12-22

## 2023-12-22 PROBLEM — M25.562 CHRONIC PAIN OF BOTH KNEES: Status: ACTIVE | Noted: 2023-12-22

## 2023-12-22 PROBLEM — E03.9 HYPOTHYROIDISM: Status: ACTIVE | Noted: 2023-12-22

## 2023-12-22 PROBLEM — M54.50 CHRONIC MIDLINE LOW BACK PAIN WITHOUT SCIATICA: Status: ACTIVE | Noted: 2023-12-22

## 2023-12-22 PROBLEM — G31.84 MCI (MILD COGNITIVE IMPAIRMENT) WITH MEMORY LOSS: Status: ACTIVE | Noted: 2023-12-22

## 2023-12-22 PROBLEM — M15.9 DJD (DEGENERATIVE JOINT DISEASE), MULTIPLE SITES: Status: ACTIVE | Noted: 2023-12-22

## 2023-12-22 PROBLEM — K21.9 GERD (GASTROESOPHAGEAL REFLUX DISEASE): Status: ACTIVE | Noted: 2023-12-22

## 2023-12-22 PROBLEM — I25.10 CAD S/P PERCUTANEOUS CORONARY ANGIOPLASTY: Status: ACTIVE | Noted: 2023-12-22

## 2023-12-22 PROBLEM — Z98.61 CAD S/P PERCUTANEOUS CORONARY ANGIOPLASTY: Status: ACTIVE | Noted: 2023-12-22

## 2023-12-22 PROBLEM — I48.91 ATRIAL FIBRILLATION (MULTI): Status: ACTIVE | Noted: 2023-12-22

## 2023-12-22 PROBLEM — L29.9 ITCHING: Status: ACTIVE | Noted: 2023-12-22

## 2023-12-22 PROBLEM — G47.30 SLEEP APNEA: Status: ACTIVE | Noted: 2023-12-22

## 2023-12-22 PROBLEM — M54.6 BACK PAIN, THORACIC: Status: ACTIVE | Noted: 2023-12-22

## 2023-12-22 PROBLEM — M79.673 FOOT PAIN: Status: ACTIVE | Noted: 2023-12-22

## 2023-12-22 PROBLEM — G89.29 CHRONIC MIDLINE LOW BACK PAIN WITHOUT SCIATICA: Status: ACTIVE | Noted: 2023-12-22

## 2023-12-22 PROBLEM — M25.561 CHRONIC PAIN OF BOTH KNEES: Status: ACTIVE | Noted: 2023-12-22

## 2023-12-22 RX ORDER — SELENIUM 50 MCG
1 TABLET ORAL DAILY
COMMUNITY

## 2023-12-22 RX ORDER — HYDRALAZINE HYDROCHLORIDE 25 MG/1
1 TABLET, FILM COATED ORAL EVERY 8 HOURS
COMMUNITY
Start: 2023-11-06 | End: 2024-03-01 | Stop reason: HOSPADM

## 2023-12-22 RX ORDER — WARFARIN SODIUM 5 MG/1
TABLET ORAL
Status: ON HOLD | COMMUNITY
End: 2024-02-28 | Stop reason: WASHOUT

## 2023-12-22 RX ORDER — ASPIRIN 81 MG/1
1 TABLET ORAL DAILY
Status: ON HOLD | COMMUNITY
End: 2024-02-25

## 2023-12-22 RX ORDER — METOPROLOL SUCCINATE 100 MG/1
100 TABLET, EXTENDED RELEASE ORAL 2 TIMES DAILY
COMMUNITY

## 2023-12-22 RX ORDER — CHOLECALCIFEROL (VITAMIN D3) 25 MCG
1 TABLET ORAL DAILY
COMMUNITY

## 2023-12-22 RX ORDER — TELMISARTAN 40 MG/1
40 TABLET ORAL DAILY
COMMUNITY
End: 2024-03-01 | Stop reason: HOSPADM

## 2023-12-22 RX ORDER — LEVOTHYROXINE SODIUM 75 UG/1
75 TABLET ORAL DAILY
COMMUNITY

## 2023-12-22 RX ORDER — PANTOPRAZOLE SODIUM 20 MG/1
20 TABLET, DELAYED RELEASE ORAL DAILY
COMMUNITY

## 2023-12-22 RX ORDER — CALCIUM CARBONATE/VITAMIN D3 500-10/5ML
1 LIQUID (ML) ORAL DAILY
COMMUNITY

## 2023-12-22 RX ORDER — ALBUTEROL SULFATE 90 UG/1
2 AEROSOL, METERED RESPIRATORY (INHALATION) EVERY 6 HOURS PRN
COMMUNITY

## 2023-12-22 RX ORDER — FUROSEMIDE 40 MG/1
TABLET ORAL
Status: ON HOLD | COMMUNITY
End: 2024-03-01 | Stop reason: SDUPTHER

## 2023-12-22 RX ORDER — INSULIN GLARGINE 100 [IU]/ML
INJECTION, SOLUTION SUBCUTANEOUS
COMMUNITY
End: 2024-03-01 | Stop reason: HOSPADM

## 2023-12-22 RX ORDER — MECLIZINE HCL 12.5 MG 12.5 MG/1
1 TABLET ORAL EVERY 8 HOURS PRN
COMMUNITY

## 2023-12-22 RX ORDER — NITROGLYCERIN 0.4 MG/1
TABLET SUBLINGUAL
COMMUNITY

## 2023-12-22 RX ORDER — ISOSORBIDE MONONITRATE 30 MG/1
60 TABLET, EXTENDED RELEASE ORAL DAILY
COMMUNITY

## 2024-01-13 ENCOUNTER — APPOINTMENT (OUTPATIENT)
Dept: CARDIOLOGY | Facility: HOSPITAL | Age: 89
End: 2024-01-13
Payer: COMMERCIAL

## 2024-01-13 ENCOUNTER — HOSPITAL ENCOUNTER (EMERGENCY)
Facility: HOSPITAL | Age: 89
Discharge: HOME | End: 2024-01-14
Attending: EMERGENCY MEDICINE
Payer: COMMERCIAL

## 2024-01-13 ENCOUNTER — APPOINTMENT (OUTPATIENT)
Dept: RADIOLOGY | Facility: HOSPITAL | Age: 89
End: 2024-01-13
Payer: COMMERCIAL

## 2024-01-13 DIAGNOSIS — I50.9: Primary | ICD-10-CM

## 2024-01-13 LAB
ALBUMIN SERPL BCP-MCNC: 3.6 G/DL (ref 3.4–5)
ALP SERPL-CCNC: 74 U/L (ref 33–136)
ALT SERPL W P-5'-P-CCNC: 13 U/L (ref 7–45)
ANION GAP SERPL CALC-SCNC: 12 MMOL/L (ref 10–20)
AST SERPL W P-5'-P-CCNC: 15 U/L (ref 9–39)
BASOPHILS # BLD AUTO: 0.04 X10*3/UL (ref 0–0.1)
BASOPHILS NFR BLD AUTO: 0.4 %
BILIRUB SERPL-MCNC: 0.9 MG/DL (ref 0–1.2)
BUN SERPL-MCNC: 40 MG/DL (ref 6–23)
CALCIUM SERPL-MCNC: 9 MG/DL (ref 8.6–10.3)
CARDIAC TROPONIN I PNL SERPL HS: 15 NG/L (ref 0–13)
CARDIAC TROPONIN I PNL SERPL HS: 16 NG/L (ref 0–13)
CHLORIDE SERPL-SCNC: 93 MMOL/L (ref 98–107)
CO2 SERPL-SCNC: 33 MMOL/L (ref 21–32)
CREAT SERPL-MCNC: 1.34 MG/DL (ref 0.5–1.05)
EGFRCR SERPLBLD CKD-EPI 2021: 38 ML/MIN/1.73M*2
EOSINOPHIL # BLD AUTO: 0.16 X10*3/UL (ref 0–0.4)
EOSINOPHIL NFR BLD AUTO: 1.7 %
ERYTHROCYTE [DISTWIDTH] IN BLOOD BY AUTOMATED COUNT: 14.8 % (ref 11.5–14.5)
GLUCOSE SERPL-MCNC: 201 MG/DL (ref 74–99)
HCT VFR BLD AUTO: 32.9 % (ref 36–46)
HGB BLD-MCNC: 10.7 G/DL (ref 12–16)
IMM GRANULOCYTES # BLD AUTO: 0.04 X10*3/UL (ref 0–0.5)
IMM GRANULOCYTES NFR BLD AUTO: 0.4 % (ref 0–0.9)
INR PPP: 2.9 (ref 0.9–1.1)
LYMPHOCYTES # BLD AUTO: 1.47 X10*3/UL (ref 0.8–3)
LYMPHOCYTES NFR BLD AUTO: 15.7 %
MAGNESIUM SERPL-MCNC: 2.07 MG/DL (ref 1.6–2.4)
MCH RBC QN AUTO: 30.1 PG (ref 26–34)
MCHC RBC AUTO-ENTMCNC: 32.5 G/DL (ref 32–36)
MCV RBC AUTO: 92 FL (ref 80–100)
MONOCYTES # BLD AUTO: 0.94 X10*3/UL (ref 0.05–0.8)
MONOCYTES NFR BLD AUTO: 10.1 %
NEUTROPHILS # BLD AUTO: 6.7 X10*3/UL (ref 1.6–5.5)
NEUTROPHILS NFR BLD AUTO: 71.7 %
NRBC BLD-RTO: 0 /100 WBCS (ref 0–0)
PLATELET # BLD AUTO: 231 X10*3/UL (ref 150–450)
POTASSIUM SERPL-SCNC: 4 MMOL/L (ref 3.5–5.3)
PROT SERPL-MCNC: 6.9 G/DL (ref 6.4–8.2)
PROTHROMBIN TIME: 33.5 SECONDS (ref 9.8–12.8)
RBC # BLD AUTO: 3.56 X10*6/UL (ref 4–5.2)
SODIUM SERPL-SCNC: 134 MMOL/L (ref 136–145)
WBC # BLD AUTO: 9.4 X10*3/UL (ref 4.4–11.3)

## 2024-01-13 PROCEDURE — 96374 THER/PROPH/DIAG INJ IV PUSH: CPT

## 2024-01-13 PROCEDURE — 80053 COMPREHEN METABOLIC PANEL: CPT | Performed by: EMERGENCY MEDICINE

## 2024-01-13 PROCEDURE — 71045 X-RAY EXAM CHEST 1 VIEW: CPT | Performed by: RADIOLOGY

## 2024-01-13 PROCEDURE — 83735 ASSAY OF MAGNESIUM: CPT | Performed by: EMERGENCY MEDICINE

## 2024-01-13 PROCEDURE — 84484 ASSAY OF TROPONIN QUANT: CPT | Performed by: EMERGENCY MEDICINE

## 2024-01-13 PROCEDURE — 93005 ELECTROCARDIOGRAM TRACING: CPT

## 2024-01-13 PROCEDURE — 36415 COLL VENOUS BLD VENIPUNCTURE: CPT | Performed by: EMERGENCY MEDICINE

## 2024-01-13 PROCEDURE — 85025 COMPLETE CBC W/AUTO DIFF WBC: CPT | Performed by: EMERGENCY MEDICINE

## 2024-01-13 PROCEDURE — 85610 PROTHROMBIN TIME: CPT | Performed by: EMERGENCY MEDICINE

## 2024-01-13 PROCEDURE — 99284 EMERGENCY DEPT VISIT MOD MDM: CPT | Performed by: EMERGENCY MEDICINE

## 2024-01-13 PROCEDURE — 71045 X-RAY EXAM CHEST 1 VIEW: CPT

## 2024-01-13 PROCEDURE — 2500000004 HC RX 250 GENERAL PHARMACY W/ HCPCS (ALT 636 FOR OP/ED): Performed by: EMERGENCY MEDICINE

## 2024-01-13 RX ORDER — FUROSEMIDE 10 MG/ML
20 INJECTION INTRAMUSCULAR; INTRAVENOUS ONCE
Status: COMPLETED | OUTPATIENT
Start: 2024-01-13 | End: 2024-01-13

## 2024-01-13 RX ADMIN — FUROSEMIDE 20 MG: 10 INJECTION, SOLUTION INTRAMUSCULAR; INTRAVENOUS at 22:26

## 2024-01-13 ASSESSMENT — COLUMBIA-SUICIDE SEVERITY RATING SCALE - C-SSRS
1. IN THE PAST MONTH, HAVE YOU WISHED YOU WERE DEAD OR WISHED YOU COULD GO TO SLEEP AND NOT WAKE UP?: NO
2. HAVE YOU ACTUALLY HAD ANY THOUGHTS OF KILLING YOURSELF?: NO
6. HAVE YOU EVER DONE ANYTHING, STARTED TO DO ANYTHING, OR PREPARED TO DO ANYTHING TO END YOUR LIFE?: NO

## 2024-01-13 ASSESSMENT — PAIN DESCRIPTION - LOCATION: LOCATION: CHEST

## 2024-01-13 ASSESSMENT — PAIN - FUNCTIONAL ASSESSMENT: PAIN_FUNCTIONAL_ASSESSMENT: 0-10

## 2024-01-13 ASSESSMENT — PAIN DESCRIPTION - DESCRIPTORS
DESCRIPTORS: ACHING
DESCRIPTORS: ACHING

## 2024-01-13 ASSESSMENT — PAIN SCALES - GENERAL
PAINLEVEL_OUTOF10: 8
PAINLEVEL_OUTOF10: 8

## 2024-01-13 ASSESSMENT — LIFESTYLE VARIABLES: REASON UNABLE TO ASSESS: YES

## 2024-01-13 ASSESSMENT — PAIN DESCRIPTION - ORIENTATION: ORIENTATION: LEFT

## 2024-01-13 ASSESSMENT — PAIN DESCRIPTION - PAIN TYPE: TYPE: ACUTE PAIN

## 2024-01-14 ENCOUNTER — HOSPITAL ENCOUNTER (OUTPATIENT)
Dept: CARDIOLOGY | Facility: HOSPITAL | Age: 89
Discharge: HOME | End: 2024-01-14
Payer: COMMERCIAL

## 2024-01-14 VITALS
DIASTOLIC BLOOD PRESSURE: 65 MMHG | HEART RATE: 76 BPM | TEMPERATURE: 96.8 F | SYSTOLIC BLOOD PRESSURE: 105 MMHG | RESPIRATION RATE: 16 BRPM | OXYGEN SATURATION: 96 %

## 2024-01-14 LAB
ATRIAL RATE: 65 BPM
ATRIAL RATE: 74 BPM
P AXIS: -4 DEGREES
P OFFSET: 168 MS
P ONSET: 148 MS
PR INTERVAL: 244 MS
PR INTERVAL: 246 MS
Q ONSET: 196 MS
Q ONSET: 198 MS
QRS COUNT: 12 BEATS
QRS COUNT: 14 BEATS
QRS DURATION: 148 MS
QRS DURATION: 156 MS
QT INTERVAL: 444 MS
QT INTERVAL: 484 MS
QTC CALCULATION(BAZETT): 512 MS
QTC CALCULATION(BAZETT): 537 MS
QTC FREDERICIA: 489 MS
QTC FREDERICIA: 519 MS
R AXIS: 27 DEGREES
R AXIS: 48 DEGREES
T AXIS: 58 DEGREES
T AXIS: 63 DEGREES
T OFFSET: 418 MS
T OFFSET: 440 MS
VENTRICULAR RATE: 74 BPM
VENTRICULAR RATE: 80 BPM

## 2024-01-14 PROCEDURE — 93005 ELECTROCARDIOGRAM TRACING: CPT

## 2024-01-14 NOTE — ED PROVIDER NOTES
"HPI   Chief Complaint   Patient presents with    Chest Pain     Per EMS, CP \"for a long time\".       Patient is a 89-year-old female with history of dementia atrial fibrillation on Coumadin coronary disease comes from a nursing home complaining of chest pain which she states started yesterday but also at states been going for several years patient continues to had that pain started after her  left another woman and just not able to get much of a history                          Nisreen Coma Scale Score: 14                  Patient History   Past Medical History:   Diagnosis Date    Muscle spasm of back     Back spasm    Pain in thoracic spine 08/15/2022    Back pain, thoracic    Personal history of other specified conditions     History of vertigo    Personal history of other specified conditions     History of itching     Past Surgical History:   Procedure Laterality Date    OTHER SURGICAL HISTORY  01/03/2022    Cardioversion    OTHER SURGICAL HISTORY  01/03/2022    Total hysterectomy abdominal    OTHER SURGICAL HISTORY  01/03/2022    Tonsillectomy    OTHER SURGICAL HISTORY  01/03/2022    Pacemaker insertion    OTHER SURGICAL HISTORY  01/03/2022    Cataract surgery    OTHER SURGICAL HISTORY  01/03/2022    Arm surgery    OTHER SURGICAL HISTORY  01/03/2022    Atrial cardioversion    OTHER SURGICAL HISTORY  01/12/2022    Complete colonoscopy     No family history on file.  Social History     Tobacco Use    Smoking status: Not on file    Smokeless tobacco: Not on file   Substance Use Topics    Alcohol use: Not on file    Drug use: Not on file       Physical Exam   ED Triage Vitals [01/13/24 2047]   Temp Heart Rate Resp BP   36 °C (96.8 °F) 75 19 122/71      SpO2 Temp Source Heart Rate Source Patient Position   93 % Temporal Monitor Lying      BP Location FiO2 (%)     Left arm --       Physical Exam  Vitals and nursing note reviewed.   Constitutional:       Appearance: She is ill-appearing.   HENT:      Head: " Normocephalic.   Cardiovascular:      Rate and Rhythm: Normal rate and regular rhythm.   Pulmonary:      Effort: Pulmonary effort is normal.   Skin:     General: Skin is warm.   Neurological:      General: No focal deficit present.      Mental Status: She is alert.         ED Course & MDM   Diagnoses as of 01/13/24 2257   Mild congestive heart failure (CMS/HCC)       Medical Decision Making  Differential diagnosis  Noncardiac chest pain acute coronary syndrome acute STEMI  I ordered a CBC chemistries EKG is cardiac labs further workup and management return based upon the results of the test ordered  EKG interpreted by me shows atrial ventricular paced rhythm with rate of 80, widened QRS and nonspecific ST changes    Further workup and management will be done based upon the results of the test ordered    Labs Reviewed  CBC WITH AUTO DIFFERENTIAL - Abnormal     WBC                           9.4                    nRBC                          0.0                    RBC                           3.56 (*)               Hemoglobin                    10.7 (*)               Hematocrit                    32.9 (*)               MCV                           92                     MCH                           30.1                   MCHC                          32.5                   RDW                           14.8 (*)               Platelets                     231                    Neutrophils %                 71.7                   Immature Granulocytes %, Automated   0.4                    Lymphocytes %                 15.7                   Monocytes %                   10.1                   Eosinophils %                 1.7                    Basophils %                   0.4                    Neutrophils Absolute          6.70 (*)               Immature Granulocytes Absolute, Au*   0.04                   Lymphocytes Absolute          1.47                   Monocytes Absolute            0.94 (*)                Eosinophils Absolute          0.16                   Basophils Absolute            0.04                COMPREHENSIVE METABOLIC PANEL - Abnormal     Glucose                       201 (*)                Sodium                        134 (*)                Potassium                     4.0                    Chloride                      93 (*)                 Bicarbonate                   33 (*)                 Anion Gap                     12                     Urea Nitrogen                 40 (*)                 Creatinine                    1.34 (*)               eGFR                          38 (*)                 Calcium                       9.0                    Albumin                       3.6                    Alkaline Phosphatase          74                     Total Protein                 6.9                    AST                           15                     Bilirubin, Total              0.9                    ALT                           13                  SERIAL TROPONIN-INITIAL - Abnormal     Troponin I, High Sensitivity   16 (*)                   Narrative: Less than 99th percentile of normal range cutoff-                Female and children under 18 years old <14 ng/L; Male <21 ng/L: Negative                Repeat testing should be performed if clinically indicated.                                 Female and children under 18 years old 14-50 ng/L; Male 21-50 ng/L:                Consistent with possible cardiac damage and possible increased clinical                 risk. Serial measurements may help to assess extent of myocardial damage.                                 >50 ng/L: Consistent with cardiac damage, increased clinical risk and                myocardial infarction. Serial measurements may help assess extent of                 myocardial damage.                                  NOTE: Children less than 1 year old may have higher baseline troponin                 levels and results  should be interpreted in conjunction with the overall                 clinical context.                                 NOTE: Troponin I testing is performed using a different                 testing methodology at [facility] than at other                 [facility]. Direct result comparisons should only                 be made within the same method.  SERIAL TROPONIN, 1 HOUR - Abnormal     Troponin I, High Sensitivity   15 (*)                   Narrative: Less than 99th percentile of normal range cutoff-                Female and children under 18 years old <14 ng/L; Male <21 ng/L: Negative                Repeat testing should be performed if clinically indicated.                                 Female and children under 18 years old 14-50 ng/L; Male 21-50 ng/L:                Consistent with possible cardiac damage and possible increased clinical                 risk. Serial measurements may help to assess extent of myocardial damage.                                 >50 ng/L: Consistent with cardiac damage, increased clinical risk and                myocardial infarction. Serial measurements may help assess extent of                 myocardial damage.                                  NOTE: Children less than 1 year old may have higher baseline troponin                 levels and results should be interpreted in conjunction with the overall                 clinical context.                                 NOTE: Troponin I testing is performed using a different                 testing methodology at [facility] than at other                 [facility]. Direct result comparisons should only                 be made within the same method.  PROTIME-INR - Abnormal     Protime                       33.5 (*)               INR                           2.9 (*)             MAGNESIUM - Normal     Magnesium                     2.07                TROPONIN SERIES- (INITIAL, 1 HR)     XR  chest 1 view   Final Result    Findings suggestive of CHF with interstitial edema. Small pleural    effusions.          MACRO:    None          Signed by: Shwetha Rivera 1/13/2024 9:57 PM    Dictation workstation:   [id number]     2 sets of cardiac enzymes were unremarkable also INR was therapeutic.  Patient chest x-ray showed mild.  Trace distal edema, patient has a history of CHF is not requiring any oxygenation so at this time patient was given extra dose of IV Lasix and then will be discharged back to the nursing home and was advised to continue taking all her medications as prescribed including the Lasix and warfarin.        Procedure  Procedures     Ailyn Diamond MD  01/13/24 0895

## 2024-01-15 ENCOUNTER — OFFICE VISIT (OUTPATIENT)
Dept: GERIATRIC MEDICINE | Age: 89
End: 2024-01-15

## 2024-01-15 DIAGNOSIS — I10 HYPERTENSION, UNSPECIFIED TYPE: ICD-10-CM

## 2024-01-15 DIAGNOSIS — L89.152 DECUBITUS ULCER OF SACRAL REGION, STAGE 2 (HCC): ICD-10-CM

## 2024-01-15 DIAGNOSIS — I50.32 CHRONIC DIASTOLIC CHF (CONGESTIVE HEART FAILURE) (HCC): ICD-10-CM

## 2024-01-15 DIAGNOSIS — E03.9 HYPOTHYROIDISM, UNSPECIFIED TYPE: ICD-10-CM

## 2024-01-15 DIAGNOSIS — F03.90 DEMENTIA WITHOUT BEHAVIORAL DISTURBANCE (HCC): Primary | ICD-10-CM

## 2024-01-15 NOTE — PROGRESS NOTES
SNF PROGRESS NOTE      Cc- dementia      Patient is a Chen Med[place]in 89 y.o. female s/p hospital stay for UTI. She has baseline dementia for her 30 day exam.     Over the last 30 days, patient did have a fall. Upcoming vascular surgery appt.   She went to the ED for chest pain.     Past Medical History:   Diagnosis Date    Abnormal liver ultrasound 5/15/2019    Allergic contact dermatitis due to plants, except food 8/19/2019    Arthritis     Atrial fibrillation (HCC)     CHF (congestive heart failure) (HCC)     Chronic kidney disease     Chronic kidney disease (CKD), stage II (mild)     Depression     Hyperlipidemia     Hypertension     Hypothyroidism     Interstitial cystitis     Dr. Chappell diagnosed this for michele.    Type II or unspecified type diabetes mellitus without mention of complication, not stated as uncontrolled      Latex, Avelox [moxifloxacin hcl in nacl], Penicillins, and Adhesive tape    VS reviewed    Gen- Alert and oriented x 1   Heart- RRR no murmur no LE edema   Lungs- CTA b/l no resp distress RA  oxygen   Abd- bs x 4         Assessment and Plan    Dementia  with behavioral disturbances  Psych .   Stage 2 sacral Decub  Wound care   Insomnia  Melatonin   Trazodone added on   HTN  Isosorbide, metoprolol, hydralazine    HLD   A fib   Warfarin, metoprolol   Chronic Diastolic CHF  Lasix   Metoprolol    DM   Hypothyroid  Synthroid      Darline Knapp DO, FACOI     Electronically signed by: Darline Knapp DO on 1/15/2024

## 2024-02-01 ENCOUNTER — APPOINTMENT (OUTPATIENT)
Dept: CARDIOLOGY | Facility: CLINIC | Age: 89
End: 2024-02-01
Payer: COMMERCIAL

## 2024-02-14 ENCOUNTER — HOSPITAL ENCOUNTER (OUTPATIENT)
Dept: CARDIOLOGY | Age: 89
Discharge: HOME | End: 2024-02-14
Payer: MEDICARE

## 2024-02-14 PROCEDURE — 93294 REM INTERROG EVL PM/LDLS PM: CPT | Performed by: INTERNAL MEDICINE

## 2024-02-14 PROCEDURE — 93296 REM INTERROG EVL PM/IDS: CPT

## 2024-02-15 ENCOUNTER — OFFICE VISIT (OUTPATIENT)
Dept: GERIATRIC MEDICINE | Age: 89
End: 2024-02-15

## 2024-02-15 DIAGNOSIS — I10 HYPERTENSION, UNSPECIFIED TYPE: ICD-10-CM

## 2024-02-15 DIAGNOSIS — F03.90 DEMENTIA WITHOUT BEHAVIORAL DISTURBANCE (HCC): Primary | ICD-10-CM

## 2024-02-15 DIAGNOSIS — I50.32 CHRONIC DIASTOLIC CHF (CONGESTIVE HEART FAILURE) (HCC): ICD-10-CM

## 2024-02-15 DIAGNOSIS — L89.152 DECUBITUS ULCER OF SACRAL REGION, STAGE 2 (HCC): ICD-10-CM

## 2024-02-15 DIAGNOSIS — E03.9 HYPOTHYROIDISM, UNSPECIFIED TYPE: ICD-10-CM

## 2024-02-23 ENCOUNTER — OFFICE VISIT (OUTPATIENT)
Dept: CARDIOLOGY | Facility: CLINIC | Age: 89
End: 2024-02-23
Payer: COMMERCIAL

## 2024-02-23 ENCOUNTER — HOSPITAL ENCOUNTER (OUTPATIENT)
Facility: HOSPITAL | Age: 89
Setting detail: OUTPATIENT SURGERY
End: 2024-02-23
Attending: INTERNAL MEDICINE | Admitting: INTERNAL MEDICINE
Payer: COMMERCIAL

## 2024-02-23 VITALS
HEIGHT: 60 IN | SYSTOLIC BLOOD PRESSURE: 136 MMHG | DIASTOLIC BLOOD PRESSURE: 52 MMHG | WEIGHT: 147 LBS | BODY MASS INDEX: 28.86 KG/M2 | HEART RATE: 76 BPM

## 2024-02-23 DIAGNOSIS — I49.5 SINUS NODE DYSFUNCTION (MULTI): ICD-10-CM

## 2024-02-23 DIAGNOSIS — R94.31 ABNORMAL EKG: ICD-10-CM

## 2024-02-23 DIAGNOSIS — I48.11 LONGSTANDING PERSISTENT ATRIAL FIBRILLATION (MULTI): ICD-10-CM

## 2024-02-23 DIAGNOSIS — Z79.01 LONG TERM CURRENT USE OF ANTICOAGULANT THERAPY: ICD-10-CM

## 2024-02-23 DIAGNOSIS — I49.5 SINUS NODE DYSFUNCTION (MULTI): Primary | ICD-10-CM

## 2024-02-23 DIAGNOSIS — Z45.010 PACEMAKER AT END OF BATTERY LIFE: ICD-10-CM

## 2024-02-23 DIAGNOSIS — Z78.9 NEVER SMOKED TOBACCO: ICD-10-CM

## 2024-02-23 DIAGNOSIS — Z51.81 ANTICOAGULATION MANAGEMENT ENCOUNTER: ICD-10-CM

## 2024-02-23 DIAGNOSIS — Z79.01 ANTICOAGULATION MANAGEMENT ENCOUNTER: ICD-10-CM

## 2024-02-23 PROCEDURE — 99215 OFFICE O/P EST HI 40 MIN: CPT | Performed by: INTERNAL MEDICINE

## 2024-02-23 PROCEDURE — 1125F AMNT PAIN NOTED PAIN PRSNT: CPT | Performed by: INTERNAL MEDICINE

## 2024-02-23 PROCEDURE — 3078F DIAST BP <80 MM HG: CPT | Performed by: INTERNAL MEDICINE

## 2024-02-23 PROCEDURE — 1159F MED LIST DOCD IN RCRD: CPT | Performed by: INTERNAL MEDICINE

## 2024-02-23 PROCEDURE — 1036F TOBACCO NON-USER: CPT | Performed by: INTERNAL MEDICINE

## 2024-02-23 PROCEDURE — 1123F ACP DISCUSS/DSCN MKR DOCD: CPT | Performed by: INTERNAL MEDICINE

## 2024-02-23 PROCEDURE — 3075F SYST BP GE 130 - 139MM HG: CPT | Performed by: INTERNAL MEDICINE

## 2024-02-23 RX ORDER — ALUMINUM HYDROXIDE, MAGNESIUM HYDROXIDE, AND SIMETHICONE 1200; 120; 1200 MG/30ML; MG/30ML; MG/30ML
30 SUSPENSION ORAL EVERY 6 HOURS PRN
COMMUNITY

## 2024-02-23 RX ORDER — METOLAZONE 2.5 MG/1
2.5 TABLET ORAL EVERY OTHER DAY
COMMUNITY
End: 2024-03-01 | Stop reason: HOSPADM

## 2024-02-23 RX ORDER — CHLORHEXIDINE GLUCONATE 40 MG/ML
SOLUTION TOPICAL ONCE
Status: CANCELLED | OUTPATIENT
Start: 2024-02-23 | End: 2024-02-23

## 2024-02-23 RX ORDER — TRAZODONE HYDROCHLORIDE 50 MG/1
50 TABLET ORAL NIGHTLY
Status: ON HOLD | COMMUNITY
End: 2024-03-01 | Stop reason: SDUPTHER

## 2024-02-23 RX ORDER — SODIUM PHOSPHATE,MONO-DIBASIC 19G-7G/197
1 ENEMA (ML) RECTAL DAILY
COMMUNITY

## 2024-02-23 RX ORDER — BISACODYL 10 MG/1
10 SUPPOSITORY RECTAL ONCE
COMMUNITY

## 2024-02-23 RX ORDER — ADHESIVE BANDAGE
30 BANDAGE TOPICAL DAILY PRN
COMMUNITY

## 2024-02-23 RX ORDER — VANCOMYCIN HYDROCHLORIDE 1 G/200ML
1000 INJECTION, SOLUTION INTRAVENOUS ONCE
Status: CANCELLED | OUTPATIENT
Start: 2024-02-23 | End: 2024-02-23

## 2024-02-23 RX ORDER — ESCITALOPRAM OXALATE 10 MG/1
10 TABLET ORAL DAILY
Status: ON HOLD | COMMUNITY
End: 2024-03-01 | Stop reason: SDUPTHER

## 2024-02-23 RX ORDER — INSULIN ASPART 100 [IU]/ML
INJECTION, SOLUTION INTRAVENOUS; SUBCUTANEOUS
COMMUNITY

## 2024-02-23 RX ORDER — ACETAMINOPHEN 325 MG/1
2 TABLET ORAL EVERY 4 HOURS PRN
COMMUNITY

## 2024-02-23 RX ORDER — AMMONIUM LACTATE 12 G/100G
CREAM TOPICAL AS NEEDED
COMMUNITY

## 2024-02-23 RX ORDER — ACETAMINOPHEN 650 MG/1
650 SUPPOSITORY RECTAL EVERY 4 HOURS PRN
COMMUNITY

## 2024-02-23 RX ORDER — SODIUM CHLORIDE 9 MG/ML
20 INJECTION, SOLUTION INTRAVENOUS CONTINUOUS
Status: CANCELLED | OUTPATIENT
Start: 2024-02-23

## 2024-02-23 RX ORDER — MUPIROCIN 20 MG/G
1 OINTMENT TOPICAL ONCE
Status: CANCELLED | OUTPATIENT
Start: 2024-02-23 | End: 2024-02-23

## 2024-02-23 RX ORDER — POTASSIUM CHLORIDE 3 G/15ML
15 SOLUTION ORAL ONCE
COMMUNITY

## 2024-02-23 NOTE — PROGRESS NOTES
CARDIOLOGY OFFICE VISIT      CHIEF COMPLAINT  Chief Complaint   Patient presents with    Follow-up    Device Check     abn       HISTORY OF PRESENT ILLNESS  HPI    89-year-old female with a past medical history of coronary artery disease with percutaneous coronary interventions in the past, persistent atrial fibrillation on rate control strategy status post pacemaker for sinus node dysfunction in September 2016, hypertension, hyperlipidemia, diabetes mellitus, struct of sleep apnea.    Family members state that she had an admission at [facility] in November 2023 for UTI.  Since then her dementia got worse.  She can recognize family members but she is very disoriented.    She still on warfarin therapy and recent INR on February 21, 2024 was 7.8.  She was placed on hold Coumadin for 2 days and she is supposed to have her recheck INR today.    No clear symptoms from family description.    Patient had a device interrogation performed in February 14, 2024 that shows battery longevity battery status is at [facility].  Persistent atrial fibrillation.    A stress test in 2020 was negative for ischemia with a left intervention fraction 73%.        Past Medical History  Past Medical History:   Diagnosis Date    Muscle spasm of back     Back spasm    Pain in thoracic spine 08/15/2022    Back pain, thoracic    Personal history of other specified conditions     History of vertigo    Personal history of other specified conditions     History of itching       Social History  Social History     Tobacco Use    Smoking status: Never    Smokeless tobacco: Never   Substance Use Topics    Alcohol use: Not on file    Drug use: Not on file       Family History   No family history on file.     Allergies:  Allergies   Allergen Reactions    Moxifloxacin Other    Penicillins Other        Outpatient Medications:  Current Outpatient Medications   Medication Instructions    acetaminophen (TYLENOL) 650 mg, rectal, Every 4 hours PRN     acetaminophen (TYLENOL) 325 mg, oral, Every 4 hours PRN    albuterol (ProAir HFA) 90 mcg/actuation inhaler 2 puffs, inhalation, Every 6 hours PRN    alum-mag hydroxide-simeth (Mylanta) 200-200-20 mg/5 mL oral suspension 30 mL, oral, Every 6 hours PRN    ammonium lactate (Amlactin) 12 % cream Topical, As needed    aspirin 81 mg EC tablet 1 tablet, oral, Daily    bisacodyl (DULCOLAX) 10 mg, rectal, Once    cholecalciferol (Vitamin D-3) 25 MCG (1000 UT) tablet 1 tablet, oral, Daily    escitalopram (LEXAPRO) 10 mg, oral, Daily    furosemide (Lasix) 40 mg tablet TAKE 1 TABLET TWICE DAILY on MONDAY, WEDNESDAY, and FRIDAY and take 1 tablet all other days.    hydrALAZINE (Apresoline) 25 mg tablet 1 tablet, oral, Every 8 hours    insulin aspart (NovoLOG Flexpen U-100 Insulin) 100 unit/mL (3 mL) pen subcutaneous, 3 times daily before meals, Take as directed per insulin instructions.    insulin glargine (Basaglar KwikPen U-100 Insulin) 100 unit/mL (3 mL) pen subcutaneous    isosorbide mononitrate ER (IMDUR) 60 mg, oral, Daily    lactobacillus acidophilus capsule 1 capsule, oral, Daily    levothyroxine (SYNTHROID, LEVOXYL) 75 mcg, oral, Daily    magnesium hydroxide (Milk of Magnesia) 400 mg/5 mL suspension 30 mL, oral, Daily PRN    magnesium oxide 400 mg magnesium capsule 1 capsule, oral, Daily    meclizine (Antivert) 12.5 mg tablet 1 tablet, oral, 3 times daily PRN    metOLazone (ZAROXOLYN) 2.5 mg, oral, Every other day    metoprolol succinate XL (TOPROL-XL) 100 mg, oral, 2 times daily    multivitamin with minerals iron-free (Centrum Silver) 1 tablet, oral, Every morning    nitroglycerin (Nitrostat) 0.4 mg SL tablet sublingual    Nystatin/Doxycycline/Hydrocortisone/Diphenhydramine Oral Susp 5 mL, Swish & Spit, 3 times daily, Shake well. Swish, gargle and spit. For 5 days    pantoprazole (PROTONIX) 20 mg, oral, Daily    potassium chloride 40 mEq/15 mL liquid 15 mL, oral, Once    sodium chloride (Ocean) 0.65 % nasal spray 1  spray, Each Nostril, As needed    sodium phosphates (Fleet Enema Extra) 19-7 gram/197 mL enema 1 suppository, rectal, Daily    telmisartan (MICARDIS) 40 mg, oral, Daily    traZODone (DESYREL) 50 mg, oral, Nightly    warfarin (Coumadin) 5 mg tablet Take as directed by [facility]. Quantity equals 90 day supply.      Stress test 2020    IMPRESSION:  Normal Lexiscan Myoview cardiac perfusion stress test.     No evidence of ischemia or myocardial infarction by perfusion imaging.     Normal left ventricular systolic function, ejection fraction 73%.    REVIEW OF SYSTEMS  Review of Systems   All other systems reviewed and are negative.        VITALS  Vitals:    02/23/24 1213   BP: 136/52   Pulse: 76       PHYSICAL EXAM  Constitutional:       Appearance: Not in distress. Cachectic.   Neck:      Vascular: No JVR. JVD normal.   Pulmonary:      Effort: Pulmonary effort is normal.      Breath sounds: Normal breath sounds. No wheezing. No rhonchi. No rales.   Chest:      Chest wall: Not tender to palpatation.   Cardiovascular:      PMI at left midclavicular line. Normal rate. Irregularly irregular rhythm. Normal S1. Normal S2.       Murmurs: There is no murmur.      No gallop.  No click. No rub.   Pulses:     Intact distal pulses.   Edema:     Peripheral edema absent.   Abdominal:      General: Bowel sounds are normal.      Palpations: Abdomen is soft.      Tenderness: There is no abdominal tenderness.   Musculoskeletal: Normal range of motion.         General: No tenderness. Skin:     General: Skin is warm and dry.   Neurological:      General: No focal deficit present.      Mental Status: Exhibits altered mental status.           ASSESSMENT AND PLAN  Clinical impression    1.  Sinus node dysfunction  2.  Status post dual-chamber pacemaker implant in 2016 with battery at [facility]  3.  Persistent atrial fibrillation  4.  Long-term anticoagulant therapy with warfarin  5.  Hypertension  6.   Hyperlipidemia  7.  Diabetes mellitus  8.  Obstructive sleep apnea  9.  Coronary disease with history of percutaneous coronary interventions in the past    Plan recommendations    I had a lengthy discussion with patient's family member about plan to follow for management of pacemaker with battery at [facility].  Patient is to have a dual-chamber pacemaker by exchange.  Procedure, risk, benefits and possible complications were explained to patient.  All questions were answered.  Patient agrees with plan.  Informed consent was signed.    Hold warfarin for now.  Will try to do this procedure early next week.  Last INR a couple of days ago 7.1.    At some point we can transition also warfarin to Eliquis therapy.    Follow device clinic as scheduled    Follow my office 7 days for procedure for wound assessment.    Risk factor modification and lifestyle modification discussed with patient. Diet , exercise and hydration discussed with patient.    I have personally review with patient during this office visit, laboratory data, echocardiogram results, stress test results, Holter-event monitor results prior and after the last electrophysiology visit. All questions has been answered.    Please excuse any errors in grammar or translation related to this dictation.  Voice recognition software was utilized to prepare this document.  slim

## 2024-02-23 NOTE — PATIENT INSTRUCTIONS
Patient to have a pacemaker generator change on Tuesday 2/27/24 with Dr. Finley  Hold Warfarin starting tonight  Labs to be done prior procedure      Please bring all medicines, vitamins, and herbal supplements with you in original bottles to every appointment!!!!    -Prescriptions will not be filled unless you are compliant with your follow up appointments or have a follow up appointment scheduled as per instruction of your physician. Refills should be requested at the time of your visit.

## 2024-02-25 ENCOUNTER — APPOINTMENT (OUTPATIENT)
Dept: RADIOLOGY | Facility: HOSPITAL | Age: 89
DRG: 871 | End: 2024-02-25
Payer: COMMERCIAL

## 2024-02-25 ENCOUNTER — HOSPITAL ENCOUNTER (INPATIENT)
Facility: HOSPITAL | Age: 89
LOS: 5 days | Discharge: NURSING, REHAB OR LONG-TERM CARE | DRG: 871 | End: 2024-03-01
Attending: STUDENT IN AN ORGANIZED HEALTH CARE EDUCATION/TRAINING PROGRAM | Admitting: HOSPITALIST
Payer: COMMERCIAL

## 2024-02-25 ENCOUNTER — APPOINTMENT (OUTPATIENT)
Dept: CARDIOLOGY | Facility: HOSPITAL | Age: 89
DRG: 871 | End: 2024-02-25
Payer: COMMERCIAL

## 2024-02-25 DIAGNOSIS — I48.91 ATRIAL FIBRILLATION AND FLUTTER (MULTI): ICD-10-CM

## 2024-02-25 DIAGNOSIS — E86.0 DEHYDRATION: ICD-10-CM

## 2024-02-25 DIAGNOSIS — F03.94 DEMENTIA WITH ANXIETY, UNSPECIFIED DEMENTIA SEVERITY, UNSPECIFIED DEMENTIA TYPE (MULTI): Primary | ICD-10-CM

## 2024-02-25 DIAGNOSIS — N18.9 ACUTE RENAL FAILURE SUPERIMPOSED ON CHRONIC KIDNEY DISEASE, UNSPECIFIED ACUTE RENAL FAILURE TYPE, UNSPECIFIED CKD STAGE (CMS-HCC): ICD-10-CM

## 2024-02-25 DIAGNOSIS — E83.41 HYPERMAGNESEMIA: ICD-10-CM

## 2024-02-25 DIAGNOSIS — Z95.0 PACEMAKER: ICD-10-CM

## 2024-02-25 DIAGNOSIS — I48.92 ATRIAL FIBRILLATION AND FLUTTER (MULTI): ICD-10-CM

## 2024-02-25 DIAGNOSIS — R79.1 ELEVATED INR: ICD-10-CM

## 2024-02-25 DIAGNOSIS — N17.9 ACUTE RENAL FAILURE SUPERIMPOSED ON CHRONIC KIDNEY DISEASE, UNSPECIFIED ACUTE RENAL FAILURE TYPE, UNSPECIFIED CKD STAGE (CMS-HCC): ICD-10-CM

## 2024-02-25 LAB
ALBUMIN SERPL BCP-MCNC: 4.2 G/DL (ref 3.4–5)
ALP SERPL-CCNC: 61 U/L (ref 33–136)
ALT SERPL W P-5'-P-CCNC: 15 U/L (ref 7–45)
ANION GAP SERPL CALC-SCNC: 23 MMOL/L (ref 10–20)
APPEARANCE UR: ABNORMAL
AST SERPL W P-5'-P-CCNC: 24 U/L (ref 9–39)
ATRIAL RATE: 79 BPM
BACTERIA #/AREA URNS AUTO: ABNORMAL /HPF
BASOPHILS # BLD AUTO: 0.04 X10*3/UL (ref 0–0.1)
BASOPHILS NFR BLD AUTO: 0.3 %
BILIRUB DIRECT SERPL-MCNC: 0.4 MG/DL (ref 0–0.3)
BILIRUB SERPL-MCNC: 1.6 MG/DL (ref 0–1.2)
BILIRUB UR STRIP.AUTO-MCNC: NEGATIVE MG/DL
BNP SERPL-MCNC: 297 PG/ML (ref 0–99)
BUN SERPL-MCNC: 129 MG/DL (ref 6–23)
CALCIUM SERPL-MCNC: 10.5 MG/DL (ref 8.6–10.3)
CARDIAC TROPONIN I PNL SERPL HS: 42 NG/L (ref 0–13)
CARDIAC TROPONIN I PNL SERPL HS: 45 NG/L (ref 0–13)
CHLORIDE SERPL-SCNC: 83 MMOL/L (ref 98–107)
CK SERPL-CCNC: 40 U/L (ref 0–215)
CO2 SERPL-SCNC: 38 MMOL/L (ref 21–32)
COLOR UR: YELLOW
CREAT SERPL-MCNC: 3.56 MG/DL (ref 0.5–1.05)
EGFRCR SERPLBLD CKD-EPI 2021: 12 ML/MIN/1.73M*2
EOSINOPHIL # BLD AUTO: 0.06 X10*3/UL (ref 0–0.4)
EOSINOPHIL NFR BLD AUTO: 0.4 %
ERYTHROCYTE [DISTWIDTH] IN BLOOD BY AUTOMATED COUNT: 13.3 % (ref 11.5–14.5)
FLUAV RNA RESP QL NAA+PROBE: NOT DETECTED
FLUBV RNA RESP QL NAA+PROBE: NOT DETECTED
GLUCOSE BLD MANUAL STRIP-MCNC: 192 MG/DL (ref 74–99)
GLUCOSE BLD MANUAL STRIP-MCNC: 203 MG/DL (ref 74–99)
GLUCOSE SERPL-MCNC: 210 MG/DL (ref 74–99)
GLUCOSE UR STRIP.AUTO-MCNC: NEGATIVE MG/DL
HCT VFR BLD AUTO: 40.4 % (ref 36–46)
HGB BLD-MCNC: 13.4 G/DL (ref 12–16)
HOLD SPECIMEN: NORMAL
HYALINE CASTS #/AREA URNS AUTO: ABNORMAL /LPF
IMM GRANULOCYTES # BLD AUTO: 0.07 X10*3/UL (ref 0–0.5)
IMM GRANULOCYTES NFR BLD AUTO: 0.5 % (ref 0–0.9)
INR PPP: 6 (ref 0.9–1.1)
KETONES UR STRIP.AUTO-MCNC: ABNORMAL MG/DL
LACTATE SERPL-SCNC: 2.4 MMOL/L (ref 0.4–2)
LACTATE SERPL-SCNC: 2.5 MMOL/L (ref 0.4–2)
LEUKOCYTE ESTERASE UR QL STRIP.AUTO: ABNORMAL
LIPASE SERPL-CCNC: 51 U/L (ref 9–82)
LYMPHOCYTES # BLD AUTO: 1.72 X10*3/UL (ref 0.8–3)
LYMPHOCYTES NFR BLD AUTO: 12.5 %
MAGNESIUM SERPL-MCNC: 3.06 MG/DL (ref 1.6–2.4)
MCH RBC QN AUTO: 29.1 PG (ref 26–34)
MCHC RBC AUTO-ENTMCNC: 33.2 G/DL (ref 32–36)
MCV RBC AUTO: 88 FL (ref 80–100)
MONOCYTES # BLD AUTO: 1.37 X10*3/UL (ref 0.05–0.8)
MONOCYTES NFR BLD AUTO: 10 %
NEUTROPHILS # BLD AUTO: 10.49 X10*3/UL (ref 1.6–5.5)
NEUTROPHILS NFR BLD AUTO: 76.3 %
NITRITE UR QL STRIP.AUTO: NEGATIVE
NRBC BLD-RTO: 0 /100 WBCS (ref 0–0)
PH UR STRIP.AUTO: 5 [PH]
PLATELET # BLD AUTO: 306 X10*3/UL (ref 150–450)
POTASSIUM SERPL-SCNC: 2.8 MMOL/L (ref 3.5–5.3)
PROT SERPL-MCNC: 8.4 G/DL (ref 6.4–8.2)
PROT UR STRIP.AUTO-MCNC: NEGATIVE MG/DL
PROTHROMBIN TIME: 69.4 SECONDS (ref 9.8–12.8)
Q ONSET: 210 MS
QRS COUNT: 14 BEATS
QRS DURATION: 138 MS
QT INTERVAL: 482 MS
QTC CALCULATION(BAZETT): 586 MS
QTC FREDERICIA: 549 MS
R AXIS: 82 DEGREES
RBC # BLD AUTO: 4.6 X10*6/UL (ref 4–5.2)
RBC # UR STRIP.AUTO: NEGATIVE /UL
RBC #/AREA URNS AUTO: ABNORMAL /HPF
SARS-COV-2 RNA RESP QL NAA+PROBE: NOT DETECTED
SODIUM SERPL-SCNC: 141 MMOL/L (ref 136–145)
SP GR UR STRIP.AUTO: 1.01
SQUAMOUS #/AREA URNS AUTO: ABNORMAL /HPF
T AXIS: -81 DEGREES
T OFFSET: 451 MS
TSH SERPL-ACNC: 2.21 MIU/L (ref 0.44–3.98)
UROBILINOGEN UR STRIP.AUTO-MCNC: 2 MG/DL
VENTRICULAR RATE: 89 BPM
WBC # BLD AUTO: 13.8 X10*3/UL (ref 4.4–11.3)
WBC #/AREA URNS AUTO: >50 /HPF

## 2024-02-25 PROCEDURE — 96365 THER/PROPH/DIAG IV INF INIT: CPT

## 2024-02-25 PROCEDURE — 70450 CT HEAD/BRAIN W/O DYE: CPT | Performed by: STUDENT IN AN ORGANIZED HEALTH CARE EDUCATION/TRAINING PROGRAM

## 2024-02-25 PROCEDURE — 81001 URINALYSIS AUTO W/SCOPE: CPT | Performed by: STUDENT IN AN ORGANIZED HEALTH CARE EDUCATION/TRAINING PROGRAM

## 2024-02-25 PROCEDURE — 76770 US EXAM ABDO BACK WALL COMP: CPT | Performed by: RADIOLOGY

## 2024-02-25 PROCEDURE — 2500000001 HC RX 250 WO HCPCS SELF ADMINISTERED DRUGS (ALT 637 FOR MEDICARE OP): Performed by: HOSPITALIST

## 2024-02-25 PROCEDURE — 2500000004 HC RX 250 GENERAL PHARMACY W/ HCPCS (ALT 636 FOR OP/ED): Performed by: STUDENT IN AN ORGANIZED HEALTH CARE EDUCATION/TRAINING PROGRAM

## 2024-02-25 PROCEDURE — 82248 BILIRUBIN DIRECT: CPT | Performed by: PHYSICIAN ASSISTANT

## 2024-02-25 PROCEDURE — 71045 X-RAY EXAM CHEST 1 VIEW: CPT

## 2024-02-25 PROCEDURE — 2500000001 HC RX 250 WO HCPCS SELF ADMINISTERED DRUGS (ALT 637 FOR MEDICARE OP): Performed by: STUDENT IN AN ORGANIZED HEALTH CARE EDUCATION/TRAINING PROGRAM

## 2024-02-25 PROCEDURE — 36415 COLL VENOUS BLD VENIPUNCTURE: CPT | Performed by: PHYSICIAN ASSISTANT

## 2024-02-25 PROCEDURE — 1210000001 HC SEMI-PRIVATE ROOM DAILY

## 2024-02-25 PROCEDURE — 87086 URINE CULTURE/COLONY COUNT: CPT | Mod: ELYLAB | Performed by: STUDENT IN AN ORGANIZED HEALTH CARE EDUCATION/TRAINING PROGRAM

## 2024-02-25 PROCEDURE — 83690 ASSAY OF LIPASE: CPT | Performed by: PHYSICIAN ASSISTANT

## 2024-02-25 PROCEDURE — 2500000004 HC RX 250 GENERAL PHARMACY W/ HCPCS (ALT 636 FOR OP/ED): Performed by: HOSPITALIST

## 2024-02-25 PROCEDURE — 83735 ASSAY OF MAGNESIUM: CPT | Performed by: PHYSICIAN ASSISTANT

## 2024-02-25 PROCEDURE — 84443 ASSAY THYROID STIM HORMONE: CPT | Performed by: PHYSICIAN ASSISTANT

## 2024-02-25 PROCEDURE — 93005 ELECTROCARDIOGRAM TRACING: CPT

## 2024-02-25 PROCEDURE — 2500000004 HC RX 250 GENERAL PHARMACY W/ HCPCS (ALT 636 FOR OP/ED): Performed by: NURSE PRACTITIONER

## 2024-02-25 PROCEDURE — 82550 ASSAY OF CK (CPK): CPT | Performed by: PHYSICIAN ASSISTANT

## 2024-02-25 PROCEDURE — 71045 X-RAY EXAM CHEST 1 VIEW: CPT | Performed by: STUDENT IN AN ORGANIZED HEALTH CARE EDUCATION/TRAINING PROGRAM

## 2024-02-25 PROCEDURE — 87040 BLOOD CULTURE FOR BACTERIA: CPT | Mod: ELYLAB | Performed by: PHYSICIAN ASSISTANT

## 2024-02-25 PROCEDURE — 84484 ASSAY OF TROPONIN QUANT: CPT | Performed by: PHYSICIAN ASSISTANT

## 2024-02-25 PROCEDURE — 74176 CT ABD & PELVIS W/O CONTRAST: CPT | Performed by: STUDENT IN AN ORGANIZED HEALTH CARE EDUCATION/TRAINING PROGRAM

## 2024-02-25 PROCEDURE — 82947 ASSAY GLUCOSE BLOOD QUANT: CPT

## 2024-02-25 PROCEDURE — 85025 COMPLETE CBC W/AUTO DIFF WBC: CPT | Performed by: PHYSICIAN ASSISTANT

## 2024-02-25 PROCEDURE — 74176 CT ABD & PELVIS W/O CONTRAST: CPT

## 2024-02-25 PROCEDURE — 85610 PROTHROMBIN TIME: CPT | Performed by: PHYSICIAN ASSISTANT

## 2024-02-25 PROCEDURE — 96361 HYDRATE IV INFUSION ADD-ON: CPT

## 2024-02-25 PROCEDURE — 76770 US EXAM ABDO BACK WALL COMP: CPT

## 2024-02-25 PROCEDURE — 83605 ASSAY OF LACTIC ACID: CPT | Performed by: PHYSICIAN ASSISTANT

## 2024-02-25 PROCEDURE — 87636 SARSCOV2 & INF A&B AMP PRB: CPT | Performed by: PHYSICIAN ASSISTANT

## 2024-02-25 PROCEDURE — 99223 1ST HOSP IP/OBS HIGH 75: CPT | Performed by: HOSPITALIST

## 2024-02-25 PROCEDURE — 2500000001 HC RX 250 WO HCPCS SELF ADMINISTERED DRUGS (ALT 637 FOR MEDICARE OP): Performed by: PHYSICIAN ASSISTANT

## 2024-02-25 PROCEDURE — 83036 HEMOGLOBIN GLYCOSYLATED A1C: CPT | Mod: ELYLAB | Performed by: HOSPITALIST

## 2024-02-25 PROCEDURE — 83880 ASSAY OF NATRIURETIC PEPTIDE: CPT | Performed by: PHYSICIAN ASSISTANT

## 2024-02-25 PROCEDURE — 70450 CT HEAD/BRAIN W/O DYE: CPT

## 2024-02-25 RX ORDER — ONDANSETRON 4 MG/1
4 TABLET, FILM COATED ORAL EVERY 8 HOURS PRN
Status: DISCONTINUED | OUTPATIENT
Start: 2024-02-25 | End: 2024-02-26

## 2024-02-25 RX ORDER — FLUCONAZOLE 2 MG/ML
100 INJECTION, SOLUTION INTRAVENOUS DAILY
Status: DISCONTINUED | OUTPATIENT
Start: 2024-02-25 | End: 2024-02-26

## 2024-02-25 RX ORDER — L. ACIDOPHILUS/L.BULGARICUS 1MM CELL
1 TABLET ORAL DAILY
Status: DISCONTINUED | OUTPATIENT
Start: 2024-02-25 | End: 2024-03-01 | Stop reason: HOSPADM

## 2024-02-25 RX ORDER — MECLIZINE HCL 12.5 MG 12.5 MG/1
12.5 TABLET ORAL 3 TIMES DAILY PRN
Status: DISCONTINUED | OUTPATIENT
Start: 2024-02-25 | End: 2024-03-01 | Stop reason: HOSPADM

## 2024-02-25 RX ORDER — SODIUM CHLORIDE 9 MG/ML
125 INJECTION, SOLUTION INTRAVENOUS CONTINUOUS
Status: DISCONTINUED | OUTPATIENT
Start: 2024-02-25 | End: 2024-02-28

## 2024-02-25 RX ORDER — ONDANSETRON HYDROCHLORIDE 2 MG/ML
4 INJECTION, SOLUTION INTRAVENOUS EVERY 8 HOURS PRN
Status: DISCONTINUED | OUTPATIENT
Start: 2024-02-25 | End: 2024-02-26

## 2024-02-25 RX ORDER — ACETAMINOPHEN 160 MG/5ML
650 SOLUTION ORAL EVERY 4 HOURS PRN
Status: DISCONTINUED | OUTPATIENT
Start: 2024-02-25 | End: 2024-03-01 | Stop reason: HOSPADM

## 2024-02-25 RX ORDER — FLUCONAZOLE 2 MG/ML
100 INJECTION, SOLUTION INTRAVENOUS DAILY
Status: DISCONTINUED | OUTPATIENT
Start: 2024-02-25 | End: 2024-02-25

## 2024-02-25 RX ORDER — INSULIN LISPRO 100 [IU]/ML
0-5 INJECTION, SOLUTION INTRAVENOUS; SUBCUTANEOUS
Status: DISCONTINUED | OUTPATIENT
Start: 2024-02-25 | End: 2024-03-01 | Stop reason: HOSPADM

## 2024-02-25 RX ORDER — CHLORHEXIDINE GLUCONATE ORAL RINSE 1.2 MG/ML
15 SOLUTION DENTAL ONCE
Status: COMPLETED | OUTPATIENT
Start: 2024-02-25 | End: 2024-02-25

## 2024-02-25 RX ORDER — DEXTROSE MONOHYDRATE 100 MG/ML
0.3 INJECTION, SOLUTION INTRAVENOUS ONCE AS NEEDED
Status: DISCONTINUED | OUTPATIENT
Start: 2024-02-25 | End: 2024-03-01 | Stop reason: HOSPADM

## 2024-02-25 RX ORDER — POTASSIUM CHLORIDE 14.9 MG/ML
20 INJECTION INTRAVENOUS
Status: COMPLETED | OUTPATIENT
Start: 2024-02-25 | End: 2024-02-25

## 2024-02-25 RX ORDER — POLYETHYLENE GLYCOL 3350 17 G/17G
17 POWDER, FOR SOLUTION ORAL DAILY PRN
Status: DISCONTINUED | OUTPATIENT
Start: 2024-02-25 | End: 2024-03-01 | Stop reason: HOSPADM

## 2024-02-25 RX ORDER — CEFTRIAXONE 1 G/50ML
1 INJECTION, SOLUTION INTRAVENOUS EVERY 24 HOURS
Status: DISCONTINUED | OUTPATIENT
Start: 2024-02-26 | End: 2024-02-27

## 2024-02-25 RX ORDER — ACETAMINOPHEN 650 MG/1
650 SUPPOSITORY RECTAL EVERY 4 HOURS PRN
Status: DISCONTINUED | OUTPATIENT
Start: 2024-02-25 | End: 2024-03-01 | Stop reason: HOSPADM

## 2024-02-25 RX ORDER — SODIUM CHLORIDE, SODIUM LACTATE, POTASSIUM CHLORIDE, CALCIUM CHLORIDE 600; 310; 30; 20 MG/100ML; MG/100ML; MG/100ML; MG/100ML
125 INJECTION, SOLUTION INTRAVENOUS CONTINUOUS
Status: DISCONTINUED | OUTPATIENT
Start: 2024-02-25 | End: 2024-02-25

## 2024-02-25 RX ORDER — DEXTROSE 50 % IN WATER (D50W) INTRAVENOUS SYRINGE
25
Status: DISCONTINUED | OUTPATIENT
Start: 2024-02-25 | End: 2024-03-01 | Stop reason: HOSPADM

## 2024-02-25 RX ORDER — CEFTRIAXONE 1 G/50ML
1 INJECTION, SOLUTION INTRAVENOUS ONCE
Status: COMPLETED | OUTPATIENT
Start: 2024-02-25 | End: 2024-02-25

## 2024-02-25 RX ORDER — AMMONIUM LACTATE 12 G/100G
CREAM TOPICAL 2 TIMES DAILY PRN
Status: DISCONTINUED | OUTPATIENT
Start: 2024-02-25 | End: 2024-03-01 | Stop reason: HOSPADM

## 2024-02-25 RX ORDER — METOPROLOL SUCCINATE 50 MG/1
100 TABLET, EXTENDED RELEASE ORAL 2 TIMES DAILY
Status: DISCONTINUED | OUTPATIENT
Start: 2024-02-25 | End: 2024-03-01 | Stop reason: HOSPADM

## 2024-02-25 RX ORDER — OLANZAPINE 10 MG/2ML
2.5 INJECTION, POWDER, FOR SOLUTION INTRAMUSCULAR EVERY 6 HOURS PRN
Status: DISCONTINUED | OUTPATIENT
Start: 2024-02-25 | End: 2024-03-01 | Stop reason: HOSPADM

## 2024-02-25 RX ORDER — LEVOTHYROXINE SODIUM 75 UG/1
75 TABLET ORAL DAILY
Status: DISCONTINUED | OUTPATIENT
Start: 2024-02-25 | End: 2024-03-01 | Stop reason: HOSPADM

## 2024-02-25 RX ORDER — ACETAMINOPHEN 325 MG/1
650 TABLET ORAL EVERY 4 HOURS PRN
Status: DISCONTINUED | OUTPATIENT
Start: 2024-02-25 | End: 2024-03-01 | Stop reason: HOSPADM

## 2024-02-25 RX ORDER — MENTHOL AND ZINC OXIDE .44; 20.625 G/100G; G/100G
1 OINTMENT TOPICAL ONCE
Status: COMPLETED | OUTPATIENT
Start: 2024-02-25 | End: 2024-02-25

## 2024-02-25 RX ORDER — BISACODYL 10 MG/1
10 SUPPOSITORY RECTAL ONCE
Status: DISCONTINUED | OUTPATIENT
Start: 2024-02-25 | End: 2024-03-01 | Stop reason: HOSPADM

## 2024-02-25 RX ORDER — HYDROXYZINE HYDROCHLORIDE 25 MG/1
25 TABLET, FILM COATED ORAL 2 TIMES DAILY
Status: DISCONTINUED | OUTPATIENT
Start: 2024-02-25 | End: 2024-03-01 | Stop reason: HOSPADM

## 2024-02-25 RX ORDER — PANTOPRAZOLE SODIUM 20 MG/1
20 TABLET, DELAYED RELEASE ORAL DAILY
Status: DISCONTINUED | OUTPATIENT
Start: 2024-02-25 | End: 2024-03-01 | Stop reason: HOSPADM

## 2024-02-25 RX ORDER — HYDROXYZINE HYDROCHLORIDE 25 MG/1
25 TABLET, FILM COATED ORAL 2 TIMES DAILY
COMMUNITY

## 2024-02-25 RX ADMIN — SODIUM CHLORIDE, POTASSIUM CHLORIDE, SODIUM LACTATE AND CALCIUM CHLORIDE 125 ML/HR: 600; 310; 30; 20 INJECTION, SOLUTION INTRAVENOUS at 11:52

## 2024-02-25 RX ADMIN — POTASSIUM CHLORIDE 20 MEQ: 14.9 INJECTION, SOLUTION INTRAVENOUS at 09:57

## 2024-02-25 RX ADMIN — PHYTONADIONE 2.5 MG: 10 INJECTION, EMULSION INTRAMUSCULAR; INTRAVENOUS; SUBCUTANEOUS at 05:57

## 2024-02-25 RX ADMIN — HYDROXYZINE HYDROCHLORIDE 25 MG: 25 TABLET ORAL at 20:25

## 2024-02-25 RX ADMIN — Medication 1 APPLICATION: at 06:23

## 2024-02-25 RX ADMIN — ACETAMINOPHEN 650 MG: 325 SOLUTION ORAL at 17:51

## 2024-02-25 RX ADMIN — FLUCONAZOLE, SODIUM CHLORIDE 100 MG: 2 INJECTION INTRAVENOUS at 15:57

## 2024-02-25 RX ADMIN — Medication 1 TABLET: at 20:25

## 2024-02-25 RX ADMIN — SODIUM CHLORIDE 100 ML/HR: 9 INJECTION, SOLUTION INTRAVENOUS at 15:27

## 2024-02-25 RX ADMIN — POTASSIUM CHLORIDE 20 MEQ: 14.9 INJECTION, SOLUTION INTRAVENOUS at 07:47

## 2024-02-25 RX ADMIN — CEFTRIAXONE SODIUM 1 G: 1 INJECTION, SOLUTION INTRAVENOUS at 07:42

## 2024-02-25 RX ADMIN — SODIUM CHLORIDE, POTASSIUM CHLORIDE, SODIUM LACTATE AND CALCIUM CHLORIDE 500 ML: 600; 310; 30; 20 INJECTION, SOLUTION INTRAVENOUS at 05:58

## 2024-02-25 RX ADMIN — SODIUM CHLORIDE, POTASSIUM CHLORIDE, SODIUM LACTATE AND CALCIUM CHLORIDE 125 ML/HR: 600; 310; 30; 20 INJECTION, SOLUTION INTRAVENOUS at 06:59

## 2024-02-25 RX ADMIN — CHLORHEXIDINE GLUCONATE 15 ML: 1.2 RINSE ORAL at 06:32

## 2024-02-25 SDOH — SOCIAL STABILITY: SOCIAL INSECURITY: HAS ANYONE EVER THREATENED TO HURT YOUR FAMILY OR YOUR PETS?: UNABLE TO ASSESS

## 2024-02-25 SDOH — SOCIAL STABILITY: SOCIAL INSECURITY: DO YOU FEEL ANYONE HAS EXPLOITED OR TAKEN ADVANTAGE OF YOU FINANCIALLY OR OF YOUR PERSONAL PROPERTY?: UNABLE TO ASSESS

## 2024-02-25 SDOH — ECONOMIC STABILITY: HOUSING INSECURITY
IN THE LAST 12 MONTHS, WAS THERE A TIME WHEN YOU DID NOT HAVE A STEADY PLACE TO SLEEP OR SLEPT IN A SHELTER (INCLUDING NOW)?: PATIENT UNABLE TO ANSWER

## 2024-02-25 SDOH — SOCIAL STABILITY: SOCIAL INSECURITY: ARE YOU OR HAVE YOU BEEN THREATENED OR ABUSED PHYSICALLY, EMOTIONALLY, OR SEXUALLY BY ANYONE?: UNABLE TO ASSESS

## 2024-02-25 SDOH — SOCIAL STABILITY: SOCIAL INSECURITY: DO YOU FEEL UNSAFE GOING BACK TO THE PLACE WHERE YOU ARE LIVING?: UNABLE TO ASSESS

## 2024-02-25 SDOH — SOCIAL STABILITY: SOCIAL INSECURITY: HAVE YOU HAD THOUGHTS OF HARMING ANYONE ELSE?: UNABLE TO ASSESS

## 2024-02-25 SDOH — ECONOMIC STABILITY: INCOME INSECURITY
IN THE LAST 12 MONTHS, WAS THERE A TIME WHEN YOU WERE NOT ABLE TO PAY THE MORTGAGE OR RENT ON TIME?: PATIENT UNABLE TO ANSWER

## 2024-02-25 SDOH — SOCIAL STABILITY: SOCIAL INSECURITY: DOES ANYONE TRY TO KEEP YOU FROM HAVING/CONTACTING OTHER FRIENDS OR DOING THINGS OUTSIDE YOUR HOME?: UNABLE TO ASSESS

## 2024-02-25 SDOH — ECONOMIC STABILITY: TRANSPORTATION INSECURITY
IN THE PAST 12 MONTHS, HAS THE LACK OF TRANSPORTATION KEPT YOU FROM MEDICAL APPOINTMENTS OR FROM GETTING MEDICATIONS?: PATIENT UNABLE TO ANSWER

## 2024-02-25 SDOH — ECONOMIC STABILITY: INCOME INSECURITY
HOW HARD IS IT FOR YOU TO PAY FOR THE VERY BASICS LIKE FOOD, HOUSING, MEDICAL CARE, AND HEATING?: PATIENT UNABLE TO ANSWER

## 2024-02-25 SDOH — SOCIAL STABILITY: SOCIAL INSECURITY: ABUSE: ADULT

## 2024-02-25 SDOH — ECONOMIC STABILITY: TRANSPORTATION INSECURITY
IN THE PAST 12 MONTHS, HAS LACK OF TRANSPORTATION KEPT YOU FROM MEETINGS, WORK, OR FROM GETTING THINGS NEEDED FOR DAILY LIVING?: PATIENT UNABLE TO ANSWER

## 2024-02-25 SDOH — SOCIAL STABILITY: SOCIAL INSECURITY: ARE THERE ANY APPARENT SIGNS OF INJURIES/BEHAVIORS THAT COULD BE RELATED TO ABUSE/NEGLECT?: UNABLE TO ASSESS

## 2024-02-25 ASSESSMENT — ACTIVITIES OF DAILY LIVING (ADL)
LACK_OF_TRANSPORTATION: PATIENT UNABLE TO ANSWER
ASSISTIVE_DEVICE: WHEELCHAIR
DRESSING YOURSELF: DEPENDENT
TOILETING: DEPENDENT
BATHING: DEPENDENT
FEEDING YOURSELF: UNABLE TO ASSESS
WALKS IN HOME: DEPENDENT
ADEQUATE_TO_COMPLETE_ADL: UNABLE TO ASSESS
JUDGMENT_ADEQUATE_SAFELY_COMPLETE_DAILY_ACTIVITIES: NO
PATIENT'S MEMORY ADEQUATE TO SAFELY COMPLETE DAILY ACTIVITIES?: NO
HEARING - RIGHT EAR: UNABLE TO ASSESS
HEARING - LEFT EAR: UNABLE TO ASSESS
GROOMING: DEPENDENT

## 2024-02-25 ASSESSMENT — PAIN SCALES - GENERAL
PAINLEVEL_OUTOF10: 0 - NO PAIN

## 2024-02-25 ASSESSMENT — COGNITIVE AND FUNCTIONAL STATUS - GENERAL
PATIENT BASELINE BEDBOUND: UNABLE TO ASSESS AT THIS TIME
MOVING FROM LYING ON BACK TO SITTING ON SIDE OF FLAT BED WITH BEDRAILS: TOTAL
DRESSING REGULAR UPPER BODY CLOTHING: TOTAL
STANDING UP FROM CHAIR USING ARMS: TOTAL
HELP NEEDED FOR BATHING: TOTAL
TOILETING: TOTAL
DAILY ACTIVITIY SCORE: 6
DRESSING REGULAR LOWER BODY CLOTHING: TOTAL
PERSONAL GROOMING: TOTAL
EATING MEALS: TOTAL
MOVING TO AND FROM BED TO CHAIR: TOTAL
TURNING FROM BACK TO SIDE WHILE IN FLAT BAD: TOTAL
WALKING IN HOSPITAL ROOM: TOTAL
CLIMB 3 TO 5 STEPS WITH RAILING: TOTAL
MOBILITY SCORE: 6

## 2024-02-25 ASSESSMENT — COLUMBIA-SUICIDE SEVERITY RATING SCALE - C-SSRS
1. IN THE PAST MONTH, HAVE YOU WISHED YOU WERE DEAD OR WISHED YOU COULD GO TO SLEEP AND NOT WAKE UP?: NO
6. HAVE YOU EVER DONE ANYTHING, STARTED TO DO ANYTHING, OR PREPARED TO DO ANYTHING TO END YOUR LIFE?: NO
2. HAVE YOU ACTUALLY HAD ANY THOUGHTS OF KILLING YOURSELF?: NO

## 2024-02-25 ASSESSMENT — PAIN - FUNCTIONAL ASSESSMENT
PAIN_FUNCTIONAL_ASSESSMENT: 0-10
PAIN_FUNCTIONAL_ASSESSMENT: 0-10

## 2024-02-25 ASSESSMENT — LIFESTYLE VARIABLES
HOW OFTEN DO YOU HAVE A DRINK CONTAINING ALCOHOL: PATIENT UNABLE TO ANSWER
HOW MANY STANDARD DRINKS CONTAINING ALCOHOL DO YOU HAVE ON A TYPICAL DAY: PATIENT UNABLE TO ANSWER
AUDIT-C TOTAL SCORE: -1
SKIP TO QUESTIONS 9-10: 0
AUDIT-C TOTAL SCORE: -1
HOW OFTEN DO YOU HAVE 6 OR MORE DRINKS ON ONE OCCASION: PATIENT UNABLE TO ANSWER

## 2024-02-25 ASSESSMENT — PATIENT HEALTH QUESTIONNAIRE - PHQ9
1. LITTLE INTEREST OR PLEASURE IN DOING THINGS: NOT AT ALL
2. FEELING DOWN, DEPRESSED OR HOPELESS: NOT AT ALL
SUM OF ALL RESPONSES TO PHQ9 QUESTIONS 1 & 2: 0

## 2024-02-25 NOTE — CARE PLAN
The patient's goals for the shift include      The clinical goals for the shift include safe and effective patient centered care    Over the shift, the patient did not make progress toward the following goals. Barriers to progression include ***. Recommendations to address these barriers include ***.

## 2024-02-25 NOTE — PROGRESS NOTES
Emergency Medicine Transition of Care Note.    I received Ernestina Argueta in signout from DORIS Loya.  Please see the previous ED provider note for all HPI, PE and MDM up to the time of signout at 0 600. This is in addition to the primary record.    In brief Ernestina Argueta is an 89 y.o. female presenting for   Chief Complaint   Patient presents with    Altered Mental Status     At the time of signout we were awaiting: Remainder of the labs and imaging    Diagnoses as of 02/25/24 0723   Dementia with anxiety, unspecified dementia severity, unspecified dementia type (CMS/HCC)   Elevated INR   Dehydration   Acute renal failure superimposed on chronic kidney disease, unspecified acute renal failure type, unspecified CKD stage (CMS/HCC)   Hypermagnesemia       Medical Decision Making      Final diagnoses:   [F03.94] Dementia with anxiety, unspecified dementia severity, unspecified dementia type (CMS/HCC)   [R79.1] Elevated INR   [E86.0] Dehydration   [N17.9, N18.9] Acute renal failure superimposed on chronic kidney disease, unspecified acute renal failure type, unspecified CKD stage (CMS/HCC)   [E83.41] Hypermagnesemia       Patient presented altered for the last several days, although worse today per nursing home reports.  Workup concerning, patient did have evidence of significant VERONICA, baseline renal function with a creatinine around 1.3, today 3.56 with a BUN of 129.  Supratherapeutic INR at 6.0.  With evidence of UTI.  CT imaging of the abdomen/pelvis was overall relatively unremarkable.    Patient was given IV fluids for hydration, small dose of potassium, 20 mill equivalents, started on Rocephin for the UTI.  Ultimately admitted to the hospitalist service for further treatment and monitoring    Procedure  Procedures    Mikayla Lopez, APRN-CNP

## 2024-02-25 NOTE — ED PROVIDER NOTES
HPI   Chief Complaint   Patient presents with    Altered Mental Status       This is an 89-year-old female who was sent in from the [facility] with report of altered mental status since Thursday.  According to the report by EMS the granddaughter noted that she went to see her and took her out for the day and when she brought her back she informed the nursing home staff that the patient seemed more confused than normal.  Nursing reports that this evening the patient seemed a little more confused and agitated and decided to send her in for evaluation.  Patient self is a very poor historian and is unable to provide any history.  The patient's history was obtained from EMS run report as well as the patient's EMR.  Last medical history includes atrial fibrillation on anticoagulation with a history of sinus node dysfunction sees Dr. Finley from cardiology, CHF, CAD, chronic kidney disease stage III, depression, dementia, hyperlipidemia, hypertension, hypothyroidism, interstitial cystitis, type 2 diabetes and obstructive sleep apnea.  Patient is on warfarin therapy.  Patient's last stress test was in 2020 that was negative for ischemia with a left ejection fraction was 73%.      History provided by:  Medical records and EMS personnel  History limited by:  Dementia                      Epworth Coma Scale Score: 11                     Patient History   Past Medical History:   Diagnosis Date    Muscle spasm of back     Back spasm    Pain in thoracic spine 08/15/2022    Back pain, thoracic    Personal history of other specified conditions     History of vertigo    Personal history of other specified conditions     History of itching     Past Surgical History:   Procedure Laterality Date    OTHER SURGICAL HISTORY  01/03/2022    Cardioversion    OTHER SURGICAL HISTORY  01/03/2022    Total hysterectomy abdominal    OTHER SURGICAL HISTORY  01/03/2022    Tonsillectomy    OTHER SURGICAL HISTORY  01/03/2022    Pacemaker  insertion    OTHER SURGICAL HISTORY  01/03/2022    Cataract surgery    OTHER SURGICAL HISTORY  01/03/2022    Arm surgery    OTHER SURGICAL HISTORY  01/03/2022    Atrial cardioversion    OTHER SURGICAL HISTORY  01/12/2022    Complete colonoscopy     No family history on file.  Social History     Tobacco Use    Smoking status: Never    Smokeless tobacco: Never   Substance Use Topics    Alcohol use: Not on file    Drug use: Not on file       Physical Exam   ED Triage Vitals [02/25/24 0410]   Temperature Heart Rate Respirations BP   36.6 °C (97.9 °F) 96 (!) 30 111/59      Pulse Ox Temp Source Heart Rate Source Patient Position   97 % Temporal -- Lying      BP Location FiO2 (%)     Left arm --       Physical Exam  Vitals and nursing note reviewed. Exam conducted with a chaperone present.   Constitutional:       General: She is awake.      Appearance: Normal appearance. She is well-developed and well-groomed. She is not ill-appearing, toxic-appearing or diaphoretic.      Comments: Patient is resting comfortably she is verbalizing incoherent words, she does follow commands but during exam she does get agitated but is consolable.   HENT:      Head: Normocephalic and atraumatic.      Jaw: There is normal jaw occlusion.      Right Ear: Hearing, tympanic membrane, ear canal and external ear normal.      Left Ear: Hearing, tympanic membrane, ear canal and external ear normal.      Nose: Nose normal. No congestion or rhinorrhea.      Mouth/Throat:      Mouth: Mucous membranes are dry.      Pharynx: Oropharynx is clear.   Eyes:      Conjunctiva/sclera: Conjunctivae normal.      Pupils: Pupils are equal, round, and reactive to light.   Cardiovascular:      Rate and Rhythm: Normal rate. Rhythm irregular.      Pulses: Normal pulses.      Heart sounds: Normal heart sounds.   Pulmonary:      Effort: Pulmonary effort is normal.      Breath sounds: Normal breath sounds. No wheezing.   Chest:      Chest wall: No tenderness.   Abdominal:       General: Abdomen is flat. Bowel sounds are normal.      Palpations: Abdomen is soft.      Tenderness: There is no abdominal tenderness.      Hernia: No hernia is present.   Musculoskeletal:         General: No swelling or tenderness.      Cervical back: Normal range of motion and neck supple. No rigidity.      Right lower leg: No edema.      Left lower leg: No edema.   Lymphadenopathy:      Cervical: No cervical adenopathy.   Skin:     General: Skin is warm and dry.      Capillary Refill: Capillary refill takes less than 2 seconds.      Findings: Erythema present.             Comments: There is erythema to the buttocks and pilonidal region, no skin breakdown.  I did order, 17   Neurological:      Mental Status: Mental status is at baseline. She is disoriented.   Psychiatric:         Attention and Perception: She is inattentive.         Mood and Affect: Mood is anxious.         Speech: Speech is rapid and pressured.         Behavior: Behavior is cooperative.         Cognition and Memory: Cognition is impaired. Memory is impaired. She exhibits impaired recent memory and impaired remote memory.         ED Course & MDM   Diagnoses as of 02/25/24 0555   Dementia with anxiety, unspecified dementia severity, unspecified dementia type (CMS/HCC)   Elevated INR   Dehydration   Acute renal failure superimposed on chronic kidney disease, unspecified acute renal failure type, unspecified CKD stage (CMS/HCC)   Hypermagnesemia       Medical Decision Making  The course temperature is 36.6 heart rate 96 respirations 30 /59, pulse ox was 97% on room air  We have ordered lab work, chest x-ray CT scan of the head, straight catheter urine with urine culture blood cultures x 2.  EKG interpreted by me at 0511 hrs. shows a ventricular paced rhythm rate 89 QRS is 138 QT was 42 QTc was 586, when compared to previous EKG performed back on 13 January 2024 there was a new T wave inversions in lead aVL but no ischemic changes  noted.  Patient's lab work was reviewed.  CBC white count 13.8, hemoglobin 13.4, hematocrit 40.4, platelet count was 306, troponin was 45, 1 month ago was 15, patient's TSH is 2.21, lactic was 2.4, , PT 69.4 INR of 6.0, The patient's metabolic panel results reviewed glucose 210 sodium 141 potassium 2.8 chloride 83 bicarb 38 anion gap of 23  creatinine 3.56 GFR 12 calcium 10.5.  When compared to her labs a month ago her sodium was 134 her K was 4, chloride was 93 bicarb 33 BUN was 40 creatinine 1.34 with a GFR of 38 this is acute on chronic renal failure with severe dehydration, hepatic function bilirubin 1.6 bilirubin direct was 0.4 total protein of 8.4.  Magnesium was 3.06.  Lipase 51 CK of 40.  The patient was given a 500 cc bolus of LR, she was given 2.5 mg of vitamin K IV push and we will reevaluate the patient's metabolic panel after the fluid bolus.  Care turned over to vitor Lopez NP        Procedure  Procedures     Cameron Blackwood PA-C  02/25/24 0555

## 2024-02-25 NOTE — PROGRESS NOTES
Social Work Note Per chart review, pt admitted from [facility].  Sent referral information to [facility] in [facility].  Per [facility], pt was admitted from their LTC unit and is a bedhold.  SANDRA Oliva

## 2024-02-25 NOTE — H&P
History Of Present Illness  Ernestina Argueta is a 89 y.o. female presenting with history of advanced dementia, CAD, persistent afib on coumadin, Hx of PM for sinus node dysfunction, HTN, HLD, DMII presented to the ED from [facility] with worsening confusion and agitation. Per daughter she recently started treatment for thrush and has stopped eating or drinking because of the pain in her mouth. The oral therapy is not working. In the ED extensive thrush noted in the mouth. Lab work showed VERONICA and hypokalemia and possible UTI. She was give, fluids, IV abx and admitted for further care.      Past Medical History  She has a past medical history of Muscle spasm of back, Pain in thoracic spine (08/15/2022), Personal history of other specified conditions, and Personal history of other specified conditions.    Surgical History: Hx of PM    Social History: Resides in memory care unit at [facility]    Family History  No family history on file.     10 point review of systems negative except per HPI      Last Recorded Vitals  BP 98/69   Pulse (!) 102   Temp 36.6 °C (97.9 °F) (Temporal)   Resp (!) 28   SpO2 100%     Physical Exam   Gen: AOX1, agitated   HEENT: oral parynyx with extensive white plaques   CV: RRR, no murmurs rubs or gallops  Resp: coarse rhonchi b/l   Abdomen: soft, NT,+BS  LE: No edema        Relevant Results  Labs Reviewed   CBC WITH AUTO DIFFERENTIAL - Abnormal       Result Value    WBC 13.8 (*)     nRBC 0.0      RBC 4.60      Hemoglobin 13.4      Hematocrit 40.4      MCV 88      MCH 29.1      MCHC 33.2      RDW 13.3      Platelets 306      Neutrophils % 76.3      Immature Granulocytes %, Automated 0.5      Lymphocytes % 12.5      Monocytes % 10.0      Eosinophils % 0.4      Basophils % 0.3      Neutrophils Absolute 10.49 (*)     Immature Granulocytes Absolute, Automated 0.07      Lymphocytes Absolute 1.72      Monocytes Absolute 1.37 (*)     Eosinophils Absolute 0.06      Basophils Absolute 0.04     BASIC  METABOLIC PANEL - Abnormal    Glucose 210 (*)     Sodium 141      Potassium 2.8 (*)     Chloride 83 (*)     Bicarbonate 38 (*)     Anion Gap 23 (*)     Urea Nitrogen 129 (*)     Creatinine 3.56 (*)     eGFR 12 (*)     Calcium 10.5 (*)    HEPATIC FUNCTION PANEL - Abnormal    Albumin 4.2      Bilirubin, Total 1.6 (*)     Bilirubin, Direct 0.4 (*)     Alkaline Phosphatase 61      ALT 15      AST 24      Total Protein 8.4 (*)    MAGNESIUM - Abnormal    Magnesium 3.06 (*)    LACTATE - Abnormal    Lactate 2.4 (*)     Narrative:     Venipuncture immediately after or during the administration of Metamizole may lead to falsely low results. Testing should be performed immediately  prior to Metamizole dosing.   PROTIME-INR - Abnormal    Protime 69.4 (*)     INR 6.0 (*)    B-TYPE NATRIURETIC PEPTIDE - Abnormal     (*)     Narrative:        <100 pg/mL - Heart failure unlikely  100-299 pg/mL - Intermediate probability of acute heart                  failure exacerbation. Correlate with clinical                  context and patient history.    >=300 pg/mL - Heart Failure likely. Correlate with clinical                  context and patient history.    BNP testing is performed using different testing methodology at [facility] than at other [facility]. Direct result comparisons should only be made within the same method.      SERIAL TROPONIN-INITIAL - Abnormal    Troponin I, High Sensitivity 45 (*)     Narrative:     Less than 99th percentile of normal range cutoff-  Female and children under 18 years old <14 ng/L; Male <21 ng/L: Negative  Repeat testing should be performed if clinically indicated.     Female and children under 18 years old 14-50 ng/L; Male 21-50 ng/L:  Consistent with possible cardiac damage and possible increased clinical   risk. Serial measurements may help to assess extent of myocardial damage.     >50 ng/L: Consistent with cardiac damage, increased clinical risk and  myocardial  infarction. Serial measurements may help assess extent of   myocardial damage.      NOTE: Children less than 1 year old may have higher baseline troponin   levels and results should be interpreted in conjunction with the overall   clinical context.     NOTE: Troponin I testing is performed using a different   testing methodology at [facility] than at other   [facility]. Direct result comparisons should only   be made within the same method.   SERIAL TROPONIN, 1 HOUR - Abnormal    Troponin I, High Sensitivity 42 (*)     Narrative:     Less than 99th percentile of normal range cutoff-  Female and children under 18 years old <14 ng/L; Male <21 ng/L: Negative  Repeat testing should be performed if clinically indicated.     Female and children under 18 years old 14-50 ng/L; Male 21-50 ng/L:  Consistent with possible cardiac damage and possible increased clinical   risk. Serial measurements may help to assess extent of myocardial damage.     >50 ng/L: Consistent with cardiac damage, increased clinical risk and  myocardial infarction. Serial measurements may help assess extent of   myocardial damage.      NOTE: Children less than 1 year old may have higher baseline troponin   levels and results should be interpreted in conjunction with the overall   clinical context.     NOTE: Troponin I testing is performed using a different   testing methodology at [facility] than at other   [facility]. Direct result comparisons should only   be made within the same method.   LACTATE - Abnormal    Lactate 2.5 (*)     Narrative:     Venipuncture immediately after or during the administration of Metamizole may lead to falsely low results. Testing should be performed immediately  prior to Metamizole dosing.   URINALYSIS WITH REFLEX MICROSCOPIC - Abnormal    Color, Urine Yellow      Appearance, Urine Hazy (*)     Specific Gravity, Urine 1.014      pH, Urine 5.0      Protein, Urine NEGATIVE       Glucose, Urine NEGATIVE      Blood, Urine NEGATIVE      Ketones, Urine 5 (TRACE) (*)     Bilirubin, Urine NEGATIVE      Urobilinogen, Urine 2.0 (*)     Nitrite, Urine NEGATIVE      Leukocyte Esterase, Urine MODERATE (2+) (*)    MICROSCOPIC ONLY, URINE - Abnormal    WBC, Urine >50 (*)     RBC, Urine 1-2      Squamous Epithelial Cells, Urine 1-9 (SPARSE)      Bacteria, Urine 1+ (*)     Hyaline Casts, Urine OCCASIONAL (*)    LIPASE - Normal    Lipase 51      Narrative:     Venipuncture immediately after or during the administration of Metamizole may lead to falsely low results. Testing should be performed immediately prior to Metamizole dosing.   TSH WITH REFLEX TO FREE T4 IF ABNORMAL - Normal    Thyroid Stimulating Hormone 2.21      Narrative:     TSH testing is performed using different testing methodology at [facility] than at other [facility]. Direct result comparisons should only be made within the same method.     CREATINE KINASE - Normal    Creatine Kinase 40     SARS-COV-2 AND INFLUENZA A/B PCR - Normal    Flu A Result Not Detected      Flu B Result Not Detected      Coronavirus 2019, PCR Not Detected      Narrative:     This assay has received FDA Emergency Use Authorization (EUA) and  is only authorized for the duration of time that circumstances exist to justify the authorization of the emergency use of in vitro diagnostic tests for the detection of SARS-CoV-2 virus and/or diagnosis of COVID-19 infection under section 564(b)(1) of the Act, 21 U.S.C. 360bbb-3(b)(1). Testing for SARS-CoV-2 is only recommended for patients who meet current clinical and/or epidemiological criteria as defined by federal, state, or local public health directives. This assay is an in vitro diagnostic nucleic acid amplification test for the qualitative detection of SARS-CoV-2, Influenza A, and Influenza B from nasopharyngeal specimens and has been validated for use at [facility]. Negative  results do not preclude COVID-19 infections or Influenza A/B infections, and should not be used as the sole basis for diagnosis, treatment, or other management decisions. If Influenza A/B and RSV PCR results are negative, testing for Parainfluenza virus, Adenovirus and Metapneumovirus is routinely performed for [facility] [place] and intensive care inpatients, and is available on other patients by placing an add-on request.    BLOOD CULTURE   BLOOD CULTURE   URINE CULTURE   TROPONIN SERIES- (INITIAL, 1 HR)    Narrative:     The following orders were created for panel order Troponin Series, (0, 1 HR).  Procedure                               Abnormality         Status                     ---------                               -----------         ------                     Troponin I, High Sensiti...[[id number]]  Abnormal            Final result               Troponin, High Sensitivi...[[id number]]  Abnormal            Final result                 Please view results for these tests on the individual orders.     XR chest 1 view   Final Result   Mild interstitial pulmonary edema, less pronounced compared to   01/13/2024. Additionally, bilateral pleural effusions have resolved.        No evidence of pneumonia or aspiration.        MACRO:   None.        Signed by: Mick Mandel 2/25/2024 5:55 AM   Dictation workstation:   [id number]      CT abdomen pelvis wo IV contrast   Final Result   1. Single nonobstructing 0.2 cm right renal calculus. No direct or   indirect evidence of pyelonephritis. No obstructive uropathy. No   discernible bladder wall thickening or inflammation.        2. Dilated gallbladder without radiodense stones, wall thickening, or   pericholecystic fluid/inflammation. Correlate with clinical concern   for possible early acute cholecystitis.        3. Extrahepatic biliary ductal dilatation of indeterminate   chronicity. Given relatively benign LFTs, this could be caused by   sphincter motor  dysfunction, distal stricturing, or mild compression   by duodenal diverticulum.        4. Subacute nondisplaced fracture of the sacrum and S4-S5.        Mild interstitial edema.        MACRO:   None.        Signed by: Mick Mandel 2/25/2024 6:11 AM   Dictation workstation:   [id number]      CT head wo IV contrast   Final Result   1. No acute intracranial abnormality.        2. Moderate burden of supratentorial chronic small vessel ischemic   disease.             MACRO:   None.        Signed by: Mick Mandel 2/25/2024 5:58 AM   Dictation workstation:   [id number]          Assessment/Plan  89 year old female with history of dementia presented from memory care units with acute metabolic encephalopathy and VERONICA secondary to dehydration    -not eating or drinking due to oral thrush, tsang start on renally adjusted IV fluconazole for now, BMX prn  -speech consulted     VERONICA: secondary to above, hydrate, monitor urine output,   -renal U/S inremarkable    UTI: will continue ceftriaxone for now, follow urine culture  -peralta placed for urinary retention 515 in bladder    Hypokalemia: IV and PO replacement     Hx of Permanent afib: on coumadin holding due to elevated INR, follows with Dr. Finley    CAD/HTN/HLD: continue home meds as tolerated    DMII: SSI for now monitor PO intake, check A1c     Hx of Dementia: at memory care unit, per grandaughter (POA) had been walking up till last year, unsure why she is not ambulating. Had been doing ok since thrush was diagnosed.  -full code and want treatment for thrush but understand that advanced procedures may not be beneficial for her if PO intake does not improve  -agreeable to palliative care consult, would like PT/OT to evaluate her as well    DVT ppx: on coumadin with supra therapeutic INR,       Barrett Le MD

## 2024-02-26 ENCOUNTER — APPOINTMENT (OUTPATIENT)
Dept: CARDIOLOGY | Facility: HOSPITAL | Age: 89
DRG: 871 | End: 2024-02-26
Payer: COMMERCIAL

## 2024-02-26 LAB
ANION GAP SERPL CALC-SCNC: 13 MMOL/L (ref 10–20)
ANION GAP SERPL CALC-SCNC: 14 MMOL/L (ref 10–20)
BUN SERPL-MCNC: 108 MG/DL (ref 6–23)
BUN SERPL-MCNC: 89 MG/DL (ref 6–23)
CALCIUM SERPL-MCNC: 9.5 MG/DL (ref 8.6–10.3)
CALCIUM SERPL-MCNC: 9.8 MG/DL (ref 8.6–10.3)
CHLORIDE SERPL-SCNC: 93 MMOL/L (ref 98–107)
CHLORIDE SERPL-SCNC: 97 MMOL/L (ref 98–107)
CO2 SERPL-SCNC: 37 MMOL/L (ref 21–32)
CO2 SERPL-SCNC: 42 MMOL/L (ref 21–32)
CREAT SERPL-MCNC: 1.9 MG/DL (ref 0.5–1.05)
CREAT SERPL-MCNC: 2.38 MG/DL (ref 0.5–1.05)
EGFRCR SERPLBLD CKD-EPI 2021: 19 ML/MIN/1.73M*2
EGFRCR SERPLBLD CKD-EPI 2021: 25 ML/MIN/1.73M*2
ERYTHROCYTE [DISTWIDTH] IN BLOOD BY AUTOMATED COUNT: 13.7 % (ref 11.5–14.5)
EST. AVERAGE GLUCOSE BLD GHB EST-MCNC: 163 MG/DL
GLUCOSE BLD MANUAL STRIP-MCNC: 125 MG/DL (ref 74–99)
GLUCOSE BLD MANUAL STRIP-MCNC: 157 MG/DL (ref 74–99)
GLUCOSE BLD MANUAL STRIP-MCNC: 166 MG/DL (ref 74–99)
GLUCOSE BLD MANUAL STRIP-MCNC: 193 MG/DL (ref 74–99)
GLUCOSE SERPL-MCNC: 111 MG/DL (ref 74–99)
GLUCOSE SERPL-MCNC: 153 MG/DL (ref 74–99)
HBA1C MFR BLD: 7.3 %
HCT VFR BLD AUTO: 35.6 % (ref 36–46)
HGB BLD-MCNC: 11.6 G/DL (ref 12–16)
HOLD SPECIMEN: NORMAL
INR PPP: 1.4 (ref 0.9–1.1)
LACTATE SERPL-SCNC: 1.7 MMOL/L (ref 0.4–2)
MAGNESIUM SERPL-MCNC: 2.76 MG/DL (ref 1.6–2.4)
MCH RBC QN AUTO: 28.6 PG (ref 26–34)
MCHC RBC AUTO-ENTMCNC: 32.6 G/DL (ref 32–36)
MCV RBC AUTO: 88 FL (ref 80–100)
NRBC BLD-RTO: 0 /100 WBCS (ref 0–0)
PLATELET # BLD AUTO: 235 X10*3/UL (ref 150–450)
POTASSIUM SERPL-SCNC: 2.5 MMOL/L (ref 3.5–5.3)
POTASSIUM SERPL-SCNC: 2.9 MMOL/L (ref 3.5–5.3)
PROTHROMBIN TIME: 15.8 SECONDS (ref 9.8–12.8)
RBC # BLD AUTO: 4.05 X10*6/UL (ref 4–5.2)
SODIUM SERPL-SCNC: 145 MMOL/L (ref 136–145)
SODIUM SERPL-SCNC: 145 MMOL/L (ref 136–145)
WBC # BLD AUTO: 10.9 X10*3/UL (ref 4.4–11.3)

## 2024-02-26 PROCEDURE — 82947 ASSAY GLUCOSE BLOOD QUANT: CPT

## 2024-02-26 PROCEDURE — 1210000001 HC SEMI-PRIVATE ROOM DAILY

## 2024-02-26 PROCEDURE — 93280 PM DEVICE PROGR EVAL DUAL: CPT | Performed by: INTERNAL MEDICINE

## 2024-02-26 PROCEDURE — 92610 EVALUATE SWALLOWING FUNCTION: CPT | Mod: GN

## 2024-02-26 PROCEDURE — 97165 OT EVAL LOW COMPLEX 30 MIN: CPT | Mod: GO

## 2024-02-26 PROCEDURE — 83605 ASSAY OF LACTIC ACID: CPT | Performed by: HOSPITALIST

## 2024-02-26 PROCEDURE — 2500000004 HC RX 250 GENERAL PHARMACY W/ HCPCS (ALT 636 FOR OP/ED): Performed by: INTERNAL MEDICINE

## 2024-02-26 PROCEDURE — 99232 SBSQ HOSP IP/OBS MODERATE 35: CPT | Performed by: NURSE PRACTITIONER

## 2024-02-26 PROCEDURE — 2500000001 HC RX 250 WO HCPCS SELF ADMINISTERED DRUGS (ALT 637 FOR MEDICARE OP): Performed by: HOSPITALIST

## 2024-02-26 PROCEDURE — 97161 PT EVAL LOW COMPLEX 20 MIN: CPT | Mod: GP | Performed by: PHYSICAL THERAPIST

## 2024-02-26 PROCEDURE — 80048 BASIC METABOLIC PNL TOTAL CA: CPT | Performed by: HOSPITALIST

## 2024-02-26 PROCEDURE — 2500000004 HC RX 250 GENERAL PHARMACY W/ HCPCS (ALT 636 FOR OP/ED): Performed by: HOSPITALIST

## 2024-02-26 PROCEDURE — 83735 ASSAY OF MAGNESIUM: CPT | Performed by: HOSPITALIST

## 2024-02-26 PROCEDURE — 36415 COLL VENOUS BLD VENIPUNCTURE: CPT | Performed by: HOSPITALIST

## 2024-02-26 PROCEDURE — 93280 PM DEVICE PROGR EVAL DUAL: CPT

## 2024-02-26 PROCEDURE — 85610 PROTHROMBIN TIME: CPT | Performed by: HOSPITALIST

## 2024-02-26 PROCEDURE — 85027 COMPLETE CBC AUTOMATED: CPT | Performed by: HOSPITALIST

## 2024-02-26 PROCEDURE — 93290 INTERROG DEV EVAL ICPMS IP: CPT | Performed by: INTERNAL MEDICINE

## 2024-02-26 PROCEDURE — 2500000002 HC RX 250 W HCPCS SELF ADMINISTERED DRUGS (ALT 637 FOR MEDICARE OP, ALT 636 FOR OP/ED): Performed by: HOSPITALIST

## 2024-02-26 PROCEDURE — 2500000004 HC RX 250 GENERAL PHARMACY W/ HCPCS (ALT 636 FOR OP/ED)

## 2024-02-26 RX ORDER — VANCOMYCIN HYDROCHLORIDE 1 G/20ML
INJECTION, POWDER, LYOPHILIZED, FOR SOLUTION INTRAVENOUS DAILY PRN
Status: DISCONTINUED | OUTPATIENT
Start: 2024-02-26 | End: 2024-03-01 | Stop reason: HOSPADM

## 2024-02-26 RX ORDER — HYDROXYZINE HYDROCHLORIDE 25 MG/1
25 TABLET, FILM COATED ORAL ONCE
Status: COMPLETED | OUTPATIENT
Start: 2024-02-26 | End: 2024-02-26

## 2024-02-26 RX ORDER — FLUCONAZOLE 2 MG/ML
100 INJECTION, SOLUTION INTRAVENOUS EVERY 24 HOURS
Status: DISCONTINUED | OUTPATIENT
Start: 2024-02-26 | End: 2024-03-01 | Stop reason: HOSPADM

## 2024-02-26 RX ORDER — POTASSIUM CHLORIDE 14.9 MG/ML
20 INJECTION INTRAVENOUS ONCE
Status: COMPLETED | OUTPATIENT
Start: 2024-02-26 | End: 2024-02-26

## 2024-02-26 RX ORDER — ZIPRASIDONE MESYLATE 20 MG/ML
10 INJECTION, POWDER, LYOPHILIZED, FOR SOLUTION INTRAMUSCULAR ONCE
Status: COMPLETED | OUTPATIENT
Start: 2024-02-26 | End: 2024-02-26

## 2024-02-26 RX ORDER — POTASSIUM CHLORIDE 20 MEQ/1
20 TABLET, EXTENDED RELEASE ORAL ONCE
Status: COMPLETED | OUTPATIENT
Start: 2024-02-26 | End: 2024-02-26

## 2024-02-26 RX ORDER — VANCOMYCIN HYDROCHLORIDE 1 G/200ML
1 INJECTION, SOLUTION INTRAVENOUS ONCE
Status: COMPLETED | OUTPATIENT
Start: 2024-02-26 | End: 2024-02-26

## 2024-02-26 RX ADMIN — POTASSIUM CHLORIDE 20 MEQ: 14.9 INJECTION, SOLUTION INTRAVENOUS at 09:36

## 2024-02-26 RX ADMIN — INSULIN LISPRO 1 UNITS: 100 INJECTION, SOLUTION INTRAVENOUS; SUBCUTANEOUS at 12:15

## 2024-02-26 RX ADMIN — HYDROXYZINE HYDROCHLORIDE 25 MG: 25 TABLET ORAL at 10:57

## 2024-02-26 RX ADMIN — POTASSIUM CHLORIDE 20 MEQ: 1500 TABLET, EXTENDED RELEASE ORAL at 10:15

## 2024-02-26 RX ADMIN — ZIPRASIDONE MESYLATE 10 MG: 20 INJECTION, POWDER, LYOPHILIZED, FOR SOLUTION INTRAMUSCULAR at 15:42

## 2024-02-26 RX ADMIN — Medication 1 TABLET: at 10:15

## 2024-02-26 RX ADMIN — CEFTRIAXONE SODIUM 1 G: 1 INJECTION, SOLUTION INTRAVENOUS at 08:23

## 2024-02-26 RX ADMIN — HYDROXYZINE HYDROCHLORIDE 25 MG: 25 TABLET ORAL at 10:19

## 2024-02-26 RX ADMIN — HYDROXYZINE HYDROCHLORIDE 25 MG: 25 TABLET ORAL at 21:20

## 2024-02-26 RX ADMIN — PANTOPRAZOLE SODIUM 20 MG: 20 TABLET, DELAYED RELEASE ORAL at 05:41

## 2024-02-26 RX ADMIN — SODIUM CHLORIDE 125 ML/HR: 9 INJECTION, SOLUTION INTRAVENOUS at 21:28

## 2024-02-26 RX ADMIN — VANCOMYCIN HYDROCHLORIDE 1 G: 1 INJECTION, SOLUTION INTRAVENOUS at 19:17

## 2024-02-26 RX ADMIN — METOPROLOL SUCCINATE 100 MG: 50 TABLET, EXTENDED RELEASE ORAL at 10:15

## 2024-02-26 RX ADMIN — LEVOTHYROXINE SODIUM 75 MCG: 75 TABLET ORAL at 05:41

## 2024-02-26 RX ADMIN — FLUCONAZOLE IN SODIUM CHLORIDE 100 MG: 2 INJECTION, SOLUTION INTRAVENOUS at 16:37

## 2024-02-26 RX ADMIN — METOPROLOL SUCCINATE 100 MG: 50 TABLET, EXTENDED RELEASE ORAL at 21:20

## 2024-02-26 ASSESSMENT — PAIN SCALES - GENERAL: PAINLEVEL_OUTOF10: 0 - NO PAIN

## 2024-02-26 ASSESSMENT — COGNITIVE AND FUNCTIONAL STATUS - GENERAL
DAILY ACTIVITIY SCORE: 12
WALKING IN HOSPITAL ROOM: TOTAL
EATING MEALS: TOTAL
TOILETING: A LOT
DRESSING REGULAR LOWER BODY CLOTHING: A LOT
PERSONAL GROOMING: A LOT
TURNING FROM BACK TO SIDE WHILE IN FLAT BAD: A LITTLE
DRESSING REGULAR LOWER BODY CLOTHING: TOTAL
HELP NEEDED FOR BATHING: A LOT
MOBILITY SCORE: 13
MOVING FROM LYING ON BACK TO SITTING ON SIDE OF FLAT BED WITH BEDRAILS: TOTAL
WALKING IN HOSPITAL ROOM: A LOT
MOVING TO AND FROM BED TO CHAIR: TOTAL
CLIMB 3 TO 5 STEPS WITH RAILING: TOTAL
HELP NEEDED FOR BATHING: TOTAL
MOBILITY SCORE: 6
EATING MEALS: A LOT
MOVING TO AND FROM BED TO CHAIR: A LOT
STANDING UP FROM CHAIR USING ARMS: TOTAL
DRESSING REGULAR UPPER BODY CLOTHING: A LOT
PERSONAL GROOMING: TOTAL
MOVING FROM LYING ON BACK TO SITTING ON SIDE OF FLAT BED WITH BEDRAILS: A LOT
TURNING FROM BACK TO SIDE WHILE IN FLAT BAD: TOTAL
DRESSING REGULAR UPPER BODY CLOTHING: TOTAL
DAILY ACTIVITIY SCORE: 6
TOILETING: TOTAL
STANDING UP FROM CHAIR USING ARMS: A LOT
CLIMB 3 TO 5 STEPS WITH RAILING: A LOT

## 2024-02-26 ASSESSMENT — ACTIVITIES OF DAILY LIVING (ADL): BATHING_ASSISTANCE: TOTAL

## 2024-02-26 ASSESSMENT — PAIN - FUNCTIONAL ASSESSMENT: PAIN_FUNCTIONAL_ASSESSMENT: 0-10

## 2024-02-26 NOTE — PROGRESS NOTES
02/26/24 1233   Discharge Planning   Living Arrangements Other (Comment)  (LTC at NH)   Type of Residence Nursing home/residential care   Home or Post Acute Services None   Patient expects to be discharged to: [facility]   Does the patient need discharge transport arranged? Yes   RoundTrip coordination needed? Yes   Has discharge transport been arranged? No   Patient Choice   Provider Choice list and CMS website (https://medicare.gov/care-compare#search) for post-acute Quality and Resource Measure Data were provided and reviewed with: Patient;Family   Patient / Family choosing to utilize agency / facility established prior to hospitalization Yes     Updated clinical information sent to facility. Called and updated RON Nguyen. Wendy in agreement with plan to return to [facility] when medically ready.

## 2024-02-26 NOTE — PROGRESS NOTES
Speech-Language Pathology  Inpatient Speech-Language Pathology Clinical Swallow Evaluation       Patient Name: Ernestina Argueta  MRN: [id number]  : 1934  Today's Date: 24  Time Calculation  Start Time: 1000  Stop Time: 1030  Time Calculation (min): 30 min       Assessment:  Consistencies Trialed: Consistencies Trialed: Thin (IDDSI Level 0) - Straw, Thin (IDDSI Level 0) - Cup, Pureed/extremely thick (IDDSI Level 4), Regular (IDDSI Level 7), Other (Comment) (Meds with applesauce and thin liquids)   Oral Motor: Within Functional Limits  Lingual Strength: Within Functional Limits   Dentition: Some Missing Teeth   Assessment Results:   DYSPHAGIA EVALUATION: (Consistencies attempted: Puree, gram cracker trial, thin liquids via cup and straws)    Oral Status: Patient had dried secretions on lips and throughout oral cavity.  She was initially resistive to oral care and was yelling out but was able to be encouraged to participate with oral care.  75% of dried secretions were able to be removed from patient's oral cavity and 90% of secretions removed from lips.  No asymmetry was noted at rest or during oral care.  Patient was not able to complete oral motor movements on command.    Oral Phase: Patient's oral phase of the swallow characterized by significant oral holding of puréed consistency food.  With cueing patient was able to swallow multiple bites of puréed consistency foods after oral episodes of oral holding x 1 to 2 minutes.  She was also able to swallow several bites of puréed foods with minimal oral delay and good oral clearance.  Patient's nurse brought patient's meds into the room for speech therapist to attempt to give patient meds.  Patient was able to tolerate her meds whole in applesauce with sips of thin liquids between bites of meds and applesauce.  Again patient had intermittent holding of bolus within oral cavity but was able to clear her meds in applesauce out of her mouth with sips of thin  liquids.  Patient was unable to masticate a faiza cracker trial that had to be removed from oral cavity.  She was holding a faiza cracker on the left side of her oral cavity.  Tolerated thin liquids via cup and straw and held liquid intermittently in her mouth and then swallowed with out leakage out of her mouth.  On 1 occasion patient did have thin liquids leak out of her mouth because she started talking while liquids were in her mouth.    Pharyngeal Phase: Patient's pharyngeal phase of the swallow characterized by no signs or symptoms of aspiration were seen during or post swallows of puréed foods and thin liquids.      DIAGNOSTIC IMPRESSIONS:  Patient presents with a severe oral phase dysphagia and no overt signs symptoms aspiration with food or liquids.  Recommending diet upgrade to purée with thin liquids.  Also recommending that patient be a one-on-one feed at meals with continual encouragement and cueing by person feeding patient.  Prognosis: Fair, Good   Medical Staff Made Aware: Yes     Plan:  SLP Plan: Skilled SLP Skilled speech therapy for dysphagia treatment is warranted in order to provide training and instruction regarding the use of compensatory swallow strategies, oropharyngeal strengthening exercises, and pt/caregiver education in order to reduce risk of aspiration, dehydration and malnutrition.  SLP Frequency: 3x per week   Duration: 30 days       Discussed POC: Patient, Nursing, Other (Comment) (Dietician)   Discussed Risks/Benefits: Yes, Patient, Nursing   Patient/Caregiver Agreeable: Yes   SLP Discharge Recommendations: unable to determine at this time, refer to subsequent notes    Subjective   Patient was seen at bedside.  Patient was yelling out with 75% to 80% of her verbalizations unintelligible.  Patient was repositioned in the bed and after much encouragement and cueing from the therapist patient was able to participate in the bedside swallow evaluation.    General Visit  Information:  Pt. Admitted with change in mental status.  She was diagnosed with severe dehydration.  He was also confused and agitated.  Chest x-ray revealed no evidence of pneumonia or aspiration.  Past medical history: A-fib, CHF, CAD, chronic kidney disease stage III, depression, dementia, hyperlipidemia, HTN, hypothyroidism, interstitial cystitis, diabetes type 2 and obstructive sleep apnea.    Living Environment: Nursing home (skilled/long-term)           Reason for Referral: Dysphagia      Current Diet : NPO   Prior to Session Communication: Bedside nurse     Pain:  Pain Assessment  Pain Assessment: 0-10  Pain Score: 0 - No pain       Baseline Assessment:  Respiratory Status: Oxygen via nasal cannula   Patient Positioning: Upright in Bed       Goals:  1.  Patient will tolerate oral care being completed 2-3 times per day to improve oral status.  Start Date: 02/26/2024  Progress: Oral care completed with patient today.  Patient was initially resistive to oral care but was cooperative after encouragement and cueing.  Status: Continue goal    2.  Patient will be able to tolerate puréed consistency foods without oral delay or oral holding with 75% to 80% accuracy  Start Date: 02/26/2024  Progress: Patient holding food in oral cavity intermittently approximately 65% of the time.  Status: Continue goal    3.  Patient will be able to tolerate thin liquids without oral delay or signs symptoms aspiration with 90 % accuracy  Start Date: 02/26/2024  Progress: Patient intermittently holding thin liquids approximately 25% of the time  Status: Continue goal    Treatment:    Inpatient Education:  Pt. educated on safe swallow strategies, role of SLP, S/S of aspiration to be aware of, risks of aspiration, current swallow function, recommended diet  Patient unable to determine understanding

## 2024-02-26 NOTE — ACP (ADVANCE CARE PLANNING)
Confirming Previous Code Status:   Advance Care Planning Note     Discussion Date: 02/26/24   Discussion Participants: Granddaughter, Wendy Rodriguez    The patient wishes to discuss Advance Care Planning today and the following is a brief summary of our discussion.     Patient has capacity to make their own medical decisions: No  Health Care Agent/Surrogate Decision Maker documented in chart: Wendy Rodriguez    Documents on file and valid:  Advance Directive/Living Will: Yes   Health Care Power of : Yes  -Requested Wendy provide copies of advance directives upon visitation later today    Communication of Medical Status/Prognosis:   -Updates and recommendations provided.  Discussed natural progression of dementia which she appears to understand but having a hard time accepting.    Communication of Treatment Goals/Options:   Discussed with Wendy, she states grandmother is supposed to be a DNR and would not want any heroic measures.  She is ok with some interventions such as ivf and atb 's but would not want ICU level of care or feeding tube.    Treatment Decisions  -continue plan of care.  Not interested in hospice care at this time.  Agreeable with palliative care referral.    Follow Up Plan  -Once HPOA provided will update code status accordingly  Team Members  Bryant Corona  Time Statement: Total face to face time spent on advance care planning was 35 minutes.  Bryant Corona, JACLYN-CNP  2/26/2024 12:45 PM

## 2024-02-26 NOTE — PROGRESS NOTES
"Vancomycin Dosing by Pharmacy- INITIAL    Ernestina Argueta is a 89 y.o. year old female who Pharmacy has been consulted for vancomycin dosing for other complicated UTI . Based on the patient's indication and renal status this patient will be dosed based on a goal trough/random level of 15-20.     Renal function is currently declining.    Visit Vitals  /72   Pulse 86   Temp 36.6 °C (97.9 °F) (Temporal)   Resp 14        Lab Results   Component Value Date    CREATININE 2.38 (H) 02/26/2024    CREATININE 3.56 (H) 02/25/2024    CREATININE 1.34 (H) 01/13/2024    CREATININE 1.36 (H) 08/18/2022    CREATININE 1.65 (H) 03/01/2021    CREATININE 1.49 (H) 10/08/2020        Patient weight is No results found for: \"PTWEIGHT\"    No results found for: \"CULTURE\"     I/O last 3 completed shifts:  In: - (0 mL/kg)   Out: 1320 (20.8 mL/kg) [Urine:1320 (0.6 mL/kg/hr)]  Weight: 63.5 kg   [unfilled]    No results found for: \"PATIENTTEMP\"       Assessment/Plan     Patient will not be given a loading dose.  Will initiate vancomycin maintenance, a one time dose of 1000 mg, FURTHER DOSES BASED ON LEVELS    This dosing regimen is predicted by [employer] to result in the following pharmacokinetic parameters:  TRADITIONAL DOSING DUE TO CRCL = 13.3    Follow-up level will be ordered on 02/27/27 at 24 HOURS AFTER ADMIN OF FIRST DOSE unless clinically indicated sooner.  Will continue to monitor renal function daily while on vancomycin and order serum creatinine at least every 48 hours if not already ordered.  Follow for continued vancomycin needs, clinical response, and signs/symptoms of toxicity.       Janis Julien, PharmD       "

## 2024-02-26 NOTE — PROGRESS NOTES
PROGRESS NOTE    ASSESSMENT AND PLAN:   89 year old female with history of dementia presented from memory care units with acute metabolic encephalopathy and VERONICA secondary to dehydration     -not eating or drinking due to oral thrush, tsang start on renally adjusted IV fluconazole for now, BMX prn  -speech consulted      VERONICA: secondary to above, hydrate, monitor urine output,   -renal U/S inremarkable     UTI: Urine culture is showing  -peralta placed for urinary retention 515 in bladder     Hypokalemia: IV and PO replacement      Hx of Permanent afib: on coumadin holding due to elevated INR, follows with Dr. Finley     CAD/HTN/HLD: continue home meds as tolerated    DMII: SSI for now monitor PO intake, check A1c      Hx of Dementia: at memory care unit, per grandaughter (POA) had been walking up till last year, unsure why she is not ambulating. Had been doing ok since thrush was diagnosed.  -full code and want treatment for thrush but understand that advanced procedures may not be beneficial for her if PO intake does not improve  -agreeable to palliative care consult, would like PT/OT to evaluate her as well     DVT ppx: on coumadin with supra therapeutic INR,     Updated: Updated daughter about findings.    SUBJECTIVE:   Admit Date: 2/25/2024    Interval History:       OBJECTIVE:   Vitals: /63 (BP Location: Right arm, Patient Position: Lying)   Pulse 77   Temp 36.2 °C (97.2 °F) (Temporal)   Resp 13   Ht 1.524 m (5')   Wt 63.5 kg (140 lb)   SpO2 98%   BMI 27.34 kg/m²    Wt Readings from Last 3 Encounters:   02/26/24 63.5 kg (140 lb)   02/23/24 66.7 kg (147 lb)   09/26/23 72.1 kg (159 lb)      24HR INTAKE/OUTPUT:    Intake/Output Summary (Last 24 hours) at 2/26/2024 1524  Last data filed at 2/26/2024 1300  Gross per 24 hour   Intake --   Output 2171 ml   Net -2171 ml       PHYSICAL EXAM:   GENERAL: Laying in bed, does not appear to be in any distress.   HEENT: HEAD: Normocephalic atraumatic.  Neck: Supple.   "Eyes: Pupils are reactive to direct light.   CVS: S1, S2 heard. Regular rate and rhythm  LUNGS: Clear to auscultate bilaterally. No wheezing or rhonchi appreciated.  ABDOMEN: Soft, nontender to palpate. Positive bowel sounds. No guarding or rebound appreciated.  NEUROLOGICAL: No focal neurological deficits appreciated. Cranial nerves are grossly intact.  EXTREMITIES: No edema appreciated.  SKIN:  Grossly intact, warm and dry.      LABS/IMAGING AND MEDICATIONS:   Scheduled Meds:bisacodyl, 10 mg, rectal, Once  cefTRIAXone, 1 g, intravenous, q24h  fluconazole in NaCl (iso-osm), 100 mg, intravenous, q24h  hydrOXYzine HCL, 25 mg, oral, BID  insulin lispro, 0-5 Units, subcutaneous, TID with meals  lactobacillus acidophilus, 1 tablet, oral, Daily  levothyroxine, 75 mcg, oral, Daily  metoprolol succinate XL, 100 mg, oral, BID  pantoprazole, 20 mg, oral, Daily      PRN Meds:PRN medications: acetaminophen **OR** acetaminophen **OR** acetaminophen, ammonium lactate, dextrose 10 % in water (D10W), dextrose, glucagon, lidocaine-diphenhydraMINE-Maalox 1:1:1, meclizine, OLANZapine, ondansetron **OR** ondansetron, polyethylene glycol    No lab exists for component: \"CBC\"   No lab exists for component: \"CMP\"   No lab exists for component: \"TROPONIN\"  Results from last 7 days   Lab Units 02/25/24  0432   BNP pg/mL 297*     Results from last 7 days   Lab Units 02/26/24  0634   INR  1.4*     No lab exists for component: \"LIPIDS\"       No lab exists for component: \"URINALYSIS\"          BMP:  Results from last 7 days   Lab Units 02/26/24  0634 02/25/24  0432   SODIUM mmol/L 145 141   POTASSIUM mmol/L 2.5* 2.8*   CHLORIDE mmol/L 93* 83*   CO2 mmol/L 42* 38*   BUN mg/dL 108* 129*   CREATININE mg/dL 2.38* 3.56*       CBC:  Results from last 7 days   Lab Units 02/26/24  0634 02/25/24  0432   WBC AUTO x10*3/uL 10.9 13.8*   RBC AUTO x10*6/uL 4.05 4.60   HEMOGLOBIN g/dL 11.6* 13.4   HEMATOCRIT % 35.6* 40.4   MCV fL 88 88   MCH pg 28.6 29.1 " "  MCHC g/dL 32.6 33.2   RDW % 13.7 13.3   PLATELETS AUTO x10*3/uL 235 306       Cardiac Enzymes:   Results from last 7 days   Lab Units 02/25/24  0538 02/25/24  0432   TROPHS ng/L 42* 45*   CK TOTAL U/L  --  40         Hepatic Function Panel:  Results from last 7 days   Lab Units 02/25/24  0432   ALK PHOS U/L 61   ALT U/L 15   AST U/L 24   PROTEIN TOTAL g/dL 8.4*   BILIRUBIN TOTAL mg/dL 1.6*   BILIRUBIN DIRECT mg/dL 0.4*       Magnesium:  Results from last 7 days   Lab Units 02/26/24  0634   MAGNESIUM mg/dL 2.76*       Pro-BNP:  No results found for: \"PROBNP\"    INR:  Results from last 7 days   Lab Units 02/26/24  0634 02/25/24 0432   PROTIME seconds 15.8* 69.4*   INR  1.4* 6.0*       TSH:  Lab Results   Component Value Date    TSH 2.21 02/25/2024       Lipid Profile:        No lab exists for component: \"LABVLDL\"    HgbA1C:        Lactate Level:  No lab exists for component: \"LACTA\"    CMP:  Results from last 7 days   Lab Units 02/26/24  0634 02/25/24 0432   SODIUM mmol/L 145 141   POTASSIUM mmol/L 2.5* 2.8*   CHLORIDE mmol/L 93* 83*   CO2 mmol/L 42* 38*   BUN mg/dL 108* 129*   CREATININE mg/dL 2.38* 3.56*   GLUCOSE mg/dL 153* 210*   CALCIUM mg/dL 9.5 10.5*   PROTEIN TOTAL g/dL  --  8.4*   BILIRUBIN TOTAL mg/dL  --  1.6*   ALK PHOS U/L  --  61   AST U/L  --  24   ALT U/L  --  15       Amylase:        Lipase:  Results from last 7 days   Lab Units 02/25/24  0432   LIPASE U/L 51       ABG:        No lab exists for component: \"PO2\", \"PCO2\", \"HCO3\", \"BE\", \"O2SAT\"       "

## 2024-02-26 NOTE — PROGRESS NOTES
Physical Therapy    Physical Therapy Evaluation    Patient Name: Ernestina Argueta  MRN: [id number]  Today's Date: 2/26/2024   Time Calculation  Start Time: 0836  Stop Time: 0851  Time Calculation (min): 15 min    Assessment/Plan   PT Assessment  Rehab Prognosis: Poor  Evaluation/Treatment Tolerance: Other (Comment)  End of Session Communication: Bedside nurse  End of Session Patient Position: Bed, 3 rail up, Alarm on  IP OR SWING BED PT PLAN  Inpatient or Swing Bed: Inpatient  PT Plan  PT Plan: PT Eval only  PT Eval Only Reason: Does not meet criteria for skilled interventions  PT Discharge Recommendations:  (ltc)  PT Recommended Transfer Status: Total assist  PT - OK to Discharge: (once deemed medically appropriate back to LTC)      Subjective   General Visit Information:  General  Reason for Referral: Impaired mobility  Referred By: PT/OT/SLP 2/25/24 Rey  Past Medical History Relevant to Rehab: VANESA, dyslipidemia, HTN, dementia, afib, depression, anxiety, DM, CAD, GERD, hypothyroidism, hyster, back pain, PPM, vertigo. Recent treatment  for thrush, decreased po intake  Patient Position Received: Bed, 3 rail up, Alarm on  General Comment: To ED 2/25/24 from LT with change in mental status since 2/22/24. Dx: acute on chronic renal failure with severe dehydration. + UA, CT abdomen 2/25/24 non obstructive renal calculus; Flu A/B (-); C19(-); INR 6.0; CT 2/25/24 Head (-); CXR 2/25/24/ ild interstitial pulmonary edema; US Renal 2/25/24/ (-) hydronephrosis  Home Living:  Home Living  Home Living Comments: [facility] E[facility] LTC ONR  Prior Level of Function:  Prior Function Per Pt/Caregiver Report  Prior Function Comments: Poor historian  Precautions:  Precautions  Medical Precautions: Fall precautions         Objective   Pain:  Pain Assessment  Pain Score:  (Patient yelling out about left great toe, but unable to provide number or elaborate on pain)  Cognition:  Cognition  Overall Cognitive Status: Impaired at baseline  Orientation  Level: Disoriented X4  Following Commands:  (Does not follow commands)    General Assessments:  General Observation  General Observation: Patient lying in bed with peralta catheter. Preoccupied with peeing. Doesnot understand concept of peralta. Tugging at tubing. Nonsensical converstation. (P)     Static Sitting Balance  Static Sitting-Comment/Number of Minutes: Fair  Dynamic Sitting Balance  Dynamic Sitting-Comments: Poor       Functional Assessments:  Bed Mobility  Bed Mobility:  (Supine <> sit dependent x2, Scooting up in bed with trendenlenberg dependent x 2)    Transfers  Transfer:  (Attempted stand but patient does not participate in stand with use of ww doesnot  follow commands)  Extremity/Trunk Assessments:  RUE   RUE : Within Functional Limits     RLE   RLE : Within Functional Limits (AROM WFL unable to assess strength due to cognitive limitations.)  LLE   LLE :  (AROM WFL unable to assess strength due to cognitive limitations.)  Outcome Measures:  [facility] Basic Mobility  Turning from your back to your side while in a flat bed without using bedrails: Total  Moving from lying on your back to sitting on the side of a flat bed without using bedrails: Total  Moving to and from bed to chair (including a wheelchair): Total  Standing up from a chair using your arms (e.g. wheelchair or bedside chair): Total  To walk in hospital room: Total  Climbing 3-5 steps with railing: Total  Basic Mobility - Total Score: 6

## 2024-02-26 NOTE — CONSULTS
Nutrition Initial Assessment:   Nutrition Assessment    Reason for Assessment: Admission nursing screening    Patient is a 89 y.o. female presenting with: Dementia with anxiety, unspecified dementia severity, unspecified dementia type. Pt with hx CAD, HTN, HLD, T2DM. Pt recently diagnosed with thrush.      Nutrition History:  Food and Nutrient History: RDN consult for MST score of 2. Discussed with RN, pt unable to provide history. Pt is more confused, having increased difficulty tolerating po intake. Will downgrade diet to NPO - SLP consulted and recommends Puree diet with thin liquids, see SLP note for more detail. No family available at time of assessment - wt history unknown. Will add Ensure Plus BID given report of poor po intake since thrush diagnosis. Will continue to monitor and follow for acceptance of supplement  Food Allergies/Intolerances:  None  GI Symptoms: None  Oral Problems: Swallowing difficulty       Anthropometrics:  Height: 152.4 cm (5')   Weight: 63.5 kg (140 lb)   BMI (Calculated): 27.34  IBW/kg (Dietitian Calculated): 45.5 kg  Percent of IBW: 140 %       Weight History:   Wt Readings from Last 10 Encounters:   02/26/24 63.5 kg (140 lb)   02/23/24 66.7 kg (147 lb)   09/26/23 72.1 kg (159 lb)   03/29/23 76.7 kg (169 lb)   09/30/22 77.6 kg (171 lb)   09/07/22 76.7 kg (169 lb)   08/15/22 78.9 kg (174 lb)   06/28/22 83 kg (183 lb)   03/21/22 85.3 kg (188 lb)   03/07/22 84.4 kg (186 lb)      Weight Change %:  Weight History / % Weight Change: Per chart review, pt with 19 lb, 11.9% wt loss x 5 months, 29 lb, 17% wt loss x 11 months  Significant Weight Loss: No    Nutrition Focused Physical Exam Findings:  defer: due to agitation at time of assessment  Subcutaneous Fat Loss:   Orbital Fat Pads: Defer (due to agitation)  Muscle Wasting:  Temporalis: Defer (due to agitation)  Edema:  Edema: none  Physical Findings:  Skin: Negative    Nutrition Significant Labs:  CBC Trend:   Results from last 7 days    Lab Units 02/26/24  0634 02/25/24  0432   WBC AUTO x10*3/uL 10.9 13.8*   RBC AUTO x10*6/uL 4.05 4.60   HEMOGLOBIN g/dL 11.6* 13.4   HEMATOCRIT % 35.6* 40.4   MCV fL 88 88   PLATELETS AUTO x10*3/uL 235 306    , BMP Trend:   Results from last 7 days   Lab Units 02/26/24  0634 02/25/24  0432   GLUCOSE mg/dL 153* 210*   CALCIUM mg/dL 9.5 10.5*   SODIUM mmol/L 145 141   POTASSIUM mmol/L 2.5* 2.8*   CO2 mmol/L 42* 38*   CHLORIDE mmol/L 93* 83*   BUN mg/dL 108* 129*   CREATININE mg/dL 2.38* 3.56*    , BG POCT trend:   Results from last 7 days   Lab Units 02/26/24  1117 02/26/24  0609 02/25/24  2015 02/25/24  1557   POCT GLUCOSE mg/dL 193* 166* 203* 192*    , Renal Lab Trend:   Results from last 7 days   Lab Units 02/26/24  0634 02/25/24  0432   POTASSIUM mmol/L 2.5* 2.8*   SODIUM mmol/L 145 141   MAGNESIUM mg/dL 2.76* 3.06*   EGFR mL/min/1.73m*2 19* 12*   BUN mg/dL 108* 129*   CREATININE mg/dL 2.38* 3.56*        Nutrition Specific Medications:  Reviewed. NaCl at 100 ml/hr    I/O:    ; Stool Appearance:  (liquid) (02/26/24 0812)        Dietary Orders (From admission, onward)       Start     Ordered    02/26/24 1036  Adult diet Regular; Pureed 4; Thin 0  Diet effective now        Question Answer Comment   Diet type Regular    Texture Pureed 4    Fluid consistency Thin 0        02/26/24 1035                     Estimated Needs:   Total Energy Estimated Needs (kCal): 1587 kCal  Method for Estimating Needs: 25 kcal/kg ABW  Total Protein Estimated Needs (g): 64 g  Method for Estimating Needs: 1 g/kg ABW  Total Fluid Estimated Needs (mL): 1587 mL  Method for Estimating Needs: 1 ml/kcal or per MD        Nutrition Diagnosis   Malnutrition Diagnosis  Patient has Malnutrition Diagnosis: No (not enough evidence to support diagnosis at this time)    Nutrition Diagnosis  Patient has Nutrition Diagnosis: Yes  Diagnosis Status (1): New  Nutrition Diagnosis 1: Inadequate oral intake  Related to (1): thrush, hx dementia  As Evidenced by  (1): H&P report of poor po intake since thrush diagnosis  Additional Nutrition Diagnosis: Diagnosis 2  Diagnosis Status (2): New  Nutrition Diagnosis 2: Swallowing difficulity  Related to (2): thrush  As Evidenced by (2): need for modified texture per SLP       Nutrition Interventions/Recommendations         Nutrition Prescription:  Individualized Nutrition Prescription Provided for : Puree diet with thin liquids, ONS. Textue/consistency per SLP. Monitor blood glucose and need for carb restriction. Will hold off for now to help encourge increased po intake.        Nutrition Interventions:   Interventions: Meals and snacks, Medical food supplement  Meals and Snacks: Texture-modified diet, General healthful diet  Goal: Consumes 3 meals per day  Medical Food Supplement: Commercial beverage  Goal: Ensure Plus High Protein BID  (provides 350 kcal, 20 g protein per serving).  Additional Interventions: assist with feeding per SLP recommendations.    Collaboration and Referral of Nutrition Care: Collaboration by nutrition professional with other providers  Goal: SLP, attending, RN    Nutrition Education:   Not appropriate       Nutrition Monitoring and Evaluation   Food/Nutrient Related History Monitoring  Monitoring and Evaluation Plan: Energy intake, Amount of food, Fluid intake  Energy Intake: Estimated energy intake  Criteria: Pt meets >75% of estimated energy needs  Fluid Intake: Estimated fluid intake  Criteria: Meets >75% of estimated fluid needs  Amount of Food: Estimated amout of food, Medical food intake  Criteria: Pt consumes >75% of meals and supplements    Body Composition/Growth/Weight History  Monitoring and Evaluation Plan: Weight  Weight: Measured weight  Criteria: Maintains stable weight    Biochemical Data, Medical Tests and Procedures  Monitoring and Evaluation Plan: Glucose/endocrine profile, Electrolyte/renal panel  Criteria: WNL  Glucose/Endocrine Profile: Glucose, casual  Criteria: BG within  desirable range            Time Spent/Follow-up Reminder:   Time Spent (min): 40 minutes  Last Date of Nutrition Visit: 02/26/24  Nutrition Follow-Up Needed?: 3-5 days  Follow up Comment: ensure BID

## 2024-02-26 NOTE — PROGRESS NOTES
"Occupational Therapy    Evaluation/Treatment    Patient Name: Ernestina Argueta  \"Carmita\"  MRN: [id number]  : 1934  Today's Date: 24  Time Calculation  Start Time: 837  Stop Time: 0850  Time Calculation (min): 13 min       Assessment:  OT Assessment: poor ability to follow directions, participate in OT eval. Not appropriate for skilled OT at this time  OT Assessment Results: Decreased ADL status, Decreased endurance, Decreased functional mobility    Plan:  No Skilled OT: No acute OT goals identified  OT Frequency: OT eval only  OT Discharge Recommendations: No OT needed after discharge (return to LTC)  OT Recommended Transfer Status: Maximum assist, Assist of 2 (vs brandon)  OT - OK to Discharge: Yes     Subjective           General:   OT Received On: 24  General  Reason for Referral: ADL impairment  Referred By: Rey PT/OT/SLP eval and tx 24  Past Medical History Relevant to Rehab: VANESA, dyslipidemia, HTN, dementia, afib, depression, anxiety, DM, CAD, GERD, hypothyroidism, hyster, back pain, PPM, vertigo. REcent tx for thrush, decreased po intake  Patient Position Received: Bed, 3 rail up, Alarm on (pt has peralta catheter, speaking primarily non sensical speech, intermittent \"I have to pee/poop\")  General Comment: pt to ED  from LTC with change in mental status since . Dx: acute on chronic renal failure with severe dehydration. + UA, CT abd  non obstructive renal calculus    Precautions:  Medical Precautions: Fall precautions           Pain:  Pain Assessment  Pain Assessment:  (no specific reports of pain)  Objective     Cognition:  Overall Cognitive Status: Impaired at baseline  Orientation Level: Disoriented X4             Home Living:  Home Living Comments: LTC [facility]    Prior Function:  Prior Function Comments: pt is poor historian unable to determine prior level of function. Per EMR pt non amb x 1 year           Activities of Daily Living:   Eating Assistance: Maximal  Grooming " Assistance: Total  Bathing Assistance: Total  UE Dressing Assistance: Total  LE Dressing Assistance: Total  Toileting Assistance with Device: Total  Functional Assistance:  (non ambulatory)                           Bed Mobility/Transfers: Bed Mobility  Bed Mobility:  (sup to sit to sup dependent)  Transfers  Transfer:  (sit to stand at EOB dependent,uanble to clear buttocks from EOB)                Balance:  Static Sitting: fair  Dynamic Sitting: fair -  Static Standing: absent    Strength:  Strength Comments: B  strength and bicep strength 4/5, unable to follow directions for triceps/shoulder MMT            Extremities: ELIAZAR PEREA :  (observed WFL) and RONALD CARDENAS:  (observed WFL)    Outcome Measures: [facility] Daily Activity  Putting on and taking off regular lower body clothing: Total  Bathing (including washing, rinsing, drying): Total  Putting on and taking off regular upper body clothing: Total  Toileting, which includes using toilet, bedpan or urinal: Total  Taking care of personal grooming such as brushing teeth: Total  Eating Meals: Total  Daily Activity - Total Score: 6

## 2024-02-27 LAB
ANION GAP SERPL CALC-SCNC: 15 MMOL/L (ref 10–20)
BACTERIA UR CULT: ABNORMAL
BUN SERPL-MCNC: 65 MG/DL (ref 6–23)
CALCIUM SERPL-MCNC: 9.4 MG/DL (ref 8.6–10.3)
CHLORIDE SERPL-SCNC: 101 MMOL/L (ref 98–107)
CO2 SERPL-SCNC: 36 MMOL/L (ref 21–32)
CREAT SERPL-MCNC: 1.47 MG/DL (ref 0.5–1.05)
EGFRCR SERPLBLD CKD-EPI 2021: 34 ML/MIN/1.73M*2
ERYTHROCYTE [DISTWIDTH] IN BLOOD BY AUTOMATED COUNT: 13.6 % (ref 11.5–14.5)
GLUCOSE BLD MANUAL STRIP-MCNC: 128 MG/DL (ref 74–99)
GLUCOSE BLD MANUAL STRIP-MCNC: 146 MG/DL (ref 74–99)
GLUCOSE BLD MANUAL STRIP-MCNC: 150 MG/DL (ref 74–99)
GLUCOSE BLD MANUAL STRIP-MCNC: 193 MG/DL (ref 74–99)
GLUCOSE SERPL-MCNC: 159 MG/DL (ref 74–99)
HCT VFR BLD AUTO: 38.6 % (ref 36–46)
HGB BLD-MCNC: 12.8 G/DL (ref 12–16)
HOLD SPECIMEN: NORMAL
MCH RBC QN AUTO: 29.6 PG (ref 26–34)
MCHC RBC AUTO-ENTMCNC: 33.2 G/DL (ref 32–36)
MCV RBC AUTO: 89 FL (ref 80–100)
NRBC BLD-RTO: 0 /100 WBCS (ref 0–0)
PLATELET # BLD AUTO: 247 X10*3/UL (ref 150–450)
POTASSIUM SERPL-SCNC: 3 MMOL/L (ref 3.5–5.3)
RBC # BLD AUTO: 4.33 X10*6/UL (ref 4–5.2)
SODIUM SERPL-SCNC: 149 MMOL/L (ref 136–145)
VANCOMYCIN SERPL-MCNC: 10.1 UG/ML (ref 5–20)
WBC # BLD AUTO: 11.5 X10*3/UL (ref 4.4–11.3)

## 2024-02-27 PROCEDURE — 2500000004 HC RX 250 GENERAL PHARMACY W/ HCPCS (ALT 636 FOR OP/ED): Performed by: HOSPITALIST

## 2024-02-27 PROCEDURE — 36415 COLL VENOUS BLD VENIPUNCTURE: CPT

## 2024-02-27 PROCEDURE — 99232 SBSQ HOSP IP/OBS MODERATE 35: CPT | Performed by: HOSPITALIST

## 2024-02-27 PROCEDURE — 85027 COMPLETE CBC AUTOMATED: CPT | Performed by: HOSPITALIST

## 2024-02-27 PROCEDURE — 2500000004 HC RX 250 GENERAL PHARMACY W/ HCPCS (ALT 636 FOR OP/ED): Performed by: INTERNAL MEDICINE

## 2024-02-27 PROCEDURE — 80202 ASSAY OF VANCOMYCIN: CPT

## 2024-02-27 PROCEDURE — 2500000001 HC RX 250 WO HCPCS SELF ADMINISTERED DRUGS (ALT 637 FOR MEDICARE OP): Performed by: HOSPITALIST

## 2024-02-27 PROCEDURE — 80048 BASIC METABOLIC PNL TOTAL CA: CPT | Performed by: HOSPITALIST

## 2024-02-27 PROCEDURE — 2500000004 HC RX 250 GENERAL PHARMACY W/ HCPCS (ALT 636 FOR OP/ED)

## 2024-02-27 PROCEDURE — 1210000001 HC SEMI-PRIVATE ROOM DAILY

## 2024-02-27 PROCEDURE — 2500000002 HC RX 250 W HCPCS SELF ADMINISTERED DRUGS (ALT 637 FOR MEDICARE OP, ALT 636 FOR OP/ED): Performed by: HOSPITALIST

## 2024-02-27 PROCEDURE — 82947 ASSAY GLUCOSE BLOOD QUANT: CPT

## 2024-02-27 PROCEDURE — 36415 COLL VENOUS BLD VENIPUNCTURE: CPT | Performed by: HOSPITALIST

## 2024-02-27 RX ORDER — POTASSIUM CHLORIDE 14.9 MG/ML
20 INJECTION INTRAVENOUS ONCE
Status: COMPLETED | OUTPATIENT
Start: 2024-02-27 | End: 2024-02-27

## 2024-02-27 RX ORDER — MORPHINE SULFATE 2 MG/ML
1 INJECTION, SOLUTION INTRAMUSCULAR; INTRAVENOUS ONCE
Status: COMPLETED | OUTPATIENT
Start: 2024-02-27 | End: 2024-02-27

## 2024-02-27 RX ORDER — VANCOMYCIN HYDROCHLORIDE 1 G/200ML
1 INJECTION, SOLUTION INTRAVENOUS ONCE
Status: COMPLETED | OUTPATIENT
Start: 2024-02-27 | End: 2024-02-27

## 2024-02-27 RX ORDER — NYSTATIN 100000 [USP'U]/ML
4 SUSPENSION ORAL 2 TIMES DAILY
Status: DISCONTINUED | OUTPATIENT
Start: 2024-02-27 | End: 2024-03-01 | Stop reason: HOSPADM

## 2024-02-27 RX ORDER — OLANZAPINE 10 MG/2ML
2.5 INJECTION, POWDER, FOR SOLUTION INTRAMUSCULAR ONCE AS NEEDED
Status: DISCONTINUED | OUTPATIENT
Start: 2024-02-27 | End: 2024-03-01 | Stop reason: HOSPADM

## 2024-02-27 RX ADMIN — CEFTRIAXONE SODIUM 1 G: 1 INJECTION, SOLUTION INTRAVENOUS at 10:24

## 2024-02-27 RX ADMIN — LEVOTHYROXINE SODIUM 75 MCG: 75 TABLET ORAL at 06:00

## 2024-02-27 RX ADMIN — NYSTATIN 400000 UNITS: 100000 SUSPENSION ORAL at 21:40

## 2024-02-27 RX ADMIN — SODIUM CHLORIDE 125 ML/HR: 9 INJECTION, SOLUTION INTRAVENOUS at 05:42

## 2024-02-27 RX ADMIN — MORPHINE SULFATE 1 MG: 2 INJECTION, SOLUTION INTRAMUSCULAR; INTRAVENOUS at 10:24

## 2024-02-27 RX ADMIN — POTASSIUM CHLORIDE 20 MEQ: 14.9 INJECTION, SOLUTION INTRAVENOUS at 17:47

## 2024-02-27 RX ADMIN — OLANZAPINE 2.5 MG: 10 INJECTION, POWDER, FOR SOLUTION INTRAMUSCULAR at 22:24

## 2024-02-27 RX ADMIN — VANCOMYCIN HYDROCHLORIDE 1 G: 1 INJECTION, SOLUTION INTRAVENOUS at 21:40

## 2024-02-27 RX ADMIN — FLUCONAZOLE IN SODIUM CHLORIDE 100 MG: 2 INJECTION, SOLUTION INTRAVENOUS at 16:35

## 2024-02-27 RX ADMIN — SODIUM CHLORIDE 125 ML/HR: 9 INJECTION, SOLUTION INTRAVENOUS at 21:56

## 2024-02-27 RX ADMIN — PANTOPRAZOLE SODIUM 20 MG: 20 TABLET, DELAYED RELEASE ORAL at 06:00

## 2024-02-27 ASSESSMENT — COGNITIVE AND FUNCTIONAL STATUS - GENERAL
DAILY ACTIVITIY SCORE: 12
TURNING FROM BACK TO SIDE WHILE IN FLAT BAD: A LITTLE
MOVING FROM LYING ON BACK TO SITTING ON SIDE OF FLAT BED WITH BEDRAILS: A LOT
PERSONAL GROOMING: A LOT
DRESSING REGULAR UPPER BODY CLOTHING: A LOT
CLIMB 3 TO 5 STEPS WITH RAILING: A LOT
DRESSING REGULAR LOWER BODY CLOTHING: A LOT
TOILETING: A LOT
EATING MEALS: A LOT
MOVING TO AND FROM BED TO CHAIR: A LOT
WALKING IN HOSPITAL ROOM: A LOT
HELP NEEDED FOR BATHING: A LOT
MOBILITY SCORE: 13
STANDING UP FROM CHAIR USING ARMS: A LOT

## 2024-02-27 ASSESSMENT — PAIN - FUNCTIONAL ASSESSMENT: PAIN_FUNCTIONAL_ASSESSMENT: 0-10

## 2024-02-27 ASSESSMENT — PAIN SCALES - GENERAL: PAINLEVEL_OUTOF10: 0 - NO PAIN

## 2024-02-27 NOTE — PROGRESS NOTES
PROGRESS NOTE    ASSESSMENT AND PLAN:   89 year old female with history of dementia presented from memory care units with acute metabolic encephalopathy and VERONICA secondary to dehydration    Failure to thrive  Severe dysphagia and odynophagia secondary to thrush  History of dementia with delirium  -Presented to the emergency room with inability to eat  -Currently remains on IV fluconazole  -Has moments of improvement in mentation  -She was seen by speech therapy who recommended puréed diet  -Unfortunately, patient still continues to have poor oral intake    Persistent hypokalemia  -Continue IV repletion    Acute kidney injury, prerenal etiology  -Improving with IV fluids    Permanent atrial fibrillation  -Continue metoprolol, off anticoagulation for now    Urinary tract infection  -Urine culture is growing Enterococcus, continue vancomycin.    VERONICA: secondary to above, hydrate, monitor urine output,   -renal U/S inremarkable                SUBJECTIVE:   Admit Date: 2/25/2024    Interval History: Patient has moments where she is screaming in pain, after giving 1 mg of morphine, pain is better controlled.  When she is with her granddaughter, she is able to have a conversation.      OBJECTIVE:   Vitals: /63 (BP Location: Right arm, Patient Position: Lying)   Pulse 80   Temp 36.7 °C (98.1 °F) (Temporal)   Resp 16   Ht 1.524 m (5')   Wt 63.5 kg (140 lb)   SpO2 93%   BMI 27.34 kg/m²    Wt Readings from Last 3 Encounters:   02/26/24 63.5 kg (140 lb)   02/23/24 66.7 kg (147 lb)   09/26/23 72.1 kg (159 lb)      24HR INTAKE/OUTPUT:    Intake/Output Summary (Last 24 hours) at 2/27/2024 1650  Last data filed at 2/27/2024 1330  Gross per 24 hour   Intake 1304.16 ml   Output 1500 ml   Net -195.84 ml       PHYSICAL EXAM:   GENERAL: Laying in bed, does not appear to be in any distress.   HEENT: HEAD: Normocephalic atraumatic.  Neck: Supple.  Eyes: Pupils are reactive to direct light.   CVS: S1, S2 heard. Regular rate and  "rhythm  LUNGS: Clear to auscultate bilaterally. No wheezing or rhonchi appreciated.  ABDOMEN: Soft, nontender to palpate. Positive bowel sounds. No guarding or rebound appreciated.  NEUROLOGICAL: No focal neurological deficits appreciated. Cranial nerves are grossly intact.  EXTREMITIES:   SKIN:  Grossly intact, warm and dry.      LABS/IMAGING AND MEDICATIONS:   Scheduled Meds:bisacodyl, 10 mg, rectal, Once  fluconazole in NaCl (iso-osm), 100 mg, intravenous, q24h  hydrOXYzine HCL, 25 mg, oral, BID  insulin lispro, 0-5 Units, subcutaneous, TID with meals  lactobacillus acidophilus, 1 tablet, oral, Daily  levothyroxine, 75 mcg, oral, Daily  metoprolol succinate XL, 100 mg, oral, BID  nystatin, 4 mL, Mouth/Throat, BID  pantoprazole, 20 mg, oral, Daily  potassium chloride, 20 mEq, intravenous, Once      PRN Meds:PRN medications: acetaminophen **OR** acetaminophen **OR** acetaminophen, ammonium lactate, dextrose 10 % in water (D10W), dextrose, glucagon, lidocaine-diphenhydraMINE-Maalox 1:1:1, meclizine, OLANZapine, polyethylene glycol, vancomycin    No lab exists for component: \"CBC\"   No lab exists for component: \"CMP\"   No lab exists for component: \"TROPONIN\"  Results from last 7 days   Lab Units 02/25/24  0432   BNP pg/mL 297*     Results from last 7 days   Lab Units 02/26/24  0634   INR  1.4*     No lab exists for component: \"LIPIDS\"       No lab exists for component: \"URINALYSIS\"          BMP:  Results from last 7 days   Lab Units 02/27/24  0954 02/26/24  1750 02/26/24  0634   SODIUM mmol/L 149* 145 145   POTASSIUM mmol/L 3.0* 2.9* 2.5*   CHLORIDE mmol/L 101 97* 93*   CO2 mmol/L 36* 37* 42*   BUN mg/dL 65* 89* 108*   CREATININE mg/dL 1.47* 1.90* 2.38*       CBC:  Results from last 7 days   Lab Units 02/27/24  0953 02/26/24  0634 02/25/24  0432   WBC AUTO x10*3/uL 11.5* 10.9 13.8*   RBC AUTO x10*6/uL 4.33 4.05 4.60   HEMOGLOBIN g/dL 12.8 11.6* 13.4   HEMATOCRIT % 38.6 35.6* 40.4   MCV fL 89 88 88   MCH pg 29.6 28.6 " "29.1   MCHC g/dL 33.2 32.6 33.2   RDW % 13.6 13.7 13.3   PLATELETS AUTO x10*3/uL 247 235 306       Cardiac Enzymes:   Results from last 7 days   Lab Units 02/25/24  0538 02/25/24 0432   TROPHS ng/L 42* 45*   CK TOTAL U/L  --  40         Hepatic Function Panel:  Results from last 7 days   Lab Units 02/25/24 0432   ALK PHOS U/L 61   ALT U/L 15   AST U/L 24   PROTEIN TOTAL g/dL 8.4*   BILIRUBIN TOTAL mg/dL 1.6*   BILIRUBIN DIRECT mg/dL 0.4*       Magnesium:  Results from last 7 days   Lab Units 02/26/24  0634   MAGNESIUM mg/dL 2.76*       Pro-BNP:  No results found for: \"PROBNP\"    INR:  Results from last 7 days   Lab Units 02/26/24  0634 02/25/24 0432   PROTIME seconds 15.8* 69.4*   INR  1.4* 6.0*       TSH:  Lab Results   Component Value Date    TSH 2.21 02/25/2024       Lipid Profile:        No lab exists for component: \"LABVLDL\"    HgbA1C:        Lactate Level:  No lab exists for component: \"LACTA\"    CMP:  Results from last 7 days   Lab Units 02/27/24  0954 02/26/24  1750 02/26/24  0634 02/25/24 0432   SODIUM mmol/L 149* 145 145 141   POTASSIUM mmol/L 3.0* 2.9* 2.5* 2.8*   CHLORIDE mmol/L 101 97* 93* 83*   CO2 mmol/L 36* 37* 42* 38*   BUN mg/dL 65* 89* 108* 129*   CREATININE mg/dL 1.47* 1.90* 2.38* 3.56*   GLUCOSE mg/dL 159* 111* 153* 210*   CALCIUM mg/dL 9.4 9.8 9.5 10.5*   PROTEIN TOTAL g/dL  --   --   --  8.4*   BILIRUBIN TOTAL mg/dL  --   --   --  1.6*   ALK PHOS U/L  --   --   --  61   AST U/L  --   --   --  24   ALT U/L  --   --   --  15       Amylase:        Lipase:  Results from last 7 days   Lab Units 02/25/24  0432   LIPASE U/L 51       ABG:        No lab exists for component: \"PO2\", \"PCO2\", \"HCO3\", \"BE\", \"O2SAT\"       "

## 2024-02-27 NOTE — SIGNIFICANT EVENT
Living will and HPOA documents provided by pt's granddaughter and available on chart.  Reviewed by this provider.  As per discussion 2/26 will enter DNR/DNI and no ICU now that advance directives available.

## 2024-02-27 NOTE — DOCUMENTATION CLARIFICATION NOTE
"    PATIENT:               AURA ROSE  ACCT #:                  [id number]  MRN:                       [id number]  :                       1934  ADMIT DATE:       2024 4:08 AM  DISCH DATE:  RESPONDING PROVIDER #:        [id number]          PROVIDER RESPONSE TEXT:    Sepsis with acute renal organ dysfunction of VERONICA    CDI QUERY TEXT:    [facility]_Sepsis      Instruction:  Based on your assessment of the patient and the clinical information, please provide the requested documentation by clicking on the appropriate radio button and enter any additional information if prompted.    Question: Is there a diagnosis indicative of a patient meeting SIRS criteria and with organ dysfunction in the setting of UTI infection    When answering this query, please exercise your independent professional judgment. The fact that a question is being asked, does not imply that any particular answer is desired or expected.    The patient's clinical indicators include:  Clinical Information: 88 yo female admitted with metabolic encephalopathy, dehydration, and VERONICA. PMHx: dementia, CHF, CKD3, HTN, interstitial cystitis, DM2, VANESA    Clinical Indicators:  Vitals (410) Temp-36.6 HR-96 RR-30 BP-111/59 SPO2-97 RA  ( 0645)  HR-102 RR-28  ( 1112) HR-99  ( 1156) HR-97 RR-20 BP-90/49    Urine culture: >100,000 Enterococcus faecalis  Blood culture: no growth at 2 days ()  WBC: 13.8 (432), 10.9 (0634), 11.5 ( 0953)  Neutrophils Absolute: 10.49 (432)  Lactate: 2.4 (432), 2.5 ( 0538), 1.7 ( 0945)  Bilirubin: 1.6 ( 043)  Creatinine: 3.56 ( 043), 2.38 ( 0634), 1.90 ( 1750), 1.47 ( 0954)  Troponin: 45 (432), 42 ( 0538)      Dr. Le: \"Per daughter she recently started treatment for thrush and has stopped eating or drinking because of the pain in her mouth. The oral therapy is not working. In the ED extensive thrush noted in the mouth. Lab work showed VERONICA and " "hypokalemia and possible UTI. She was give, fluids, IV abx and admitted for further care.\"  \"UTI: will continue ceftriaxone for now, follow urine culture -peralta placed for urinary retention 515 in bladder\"    Treatment: LR Bolus 500 ml, Rocephin IV 1 g, Vanco IV 1 g, Fluconazole    Risk Factors: SIRS, positive urine culture  Options provided:  -- Sepsis with acute renal organ dysfunction of VERONICA  -- Sepsis with acute cardiac organ dysfunction of Non Ischemic Myocardial Injury  -- Sepsis with acute neurological organ dysfunction of metabolic encephalopathy superimposed on dementia  -- Sepsis with acute multi-system organ dysfunction VERONICA, Non Ischemic Myocardial Injury, and Metabolic encephalopathy superimposed on dementia  -- Sepsis with other organ dysfunction, Please specify additional information below  -- Patient treated for UTI without sepsis  -- Other - I will add my own diagnosis  -- Refer to Clinical Documentation Reviewer    Query created by: Araceli Covington on 2/27/2024 12:49 PM      Electronically signed by:  KENNA HALL MD 2/27/2024 12:55 PM          "

## 2024-02-28 ENCOUNTER — APPOINTMENT (OUTPATIENT)
Dept: RADIOLOGY | Facility: HOSPITAL | Age: 89
DRG: 871 | End: 2024-02-28
Payer: COMMERCIAL

## 2024-02-28 LAB
ANION GAP SERPL CALC-SCNC: 10 MMOL/L (ref 10–20)
ANION GAP SERPL CALC-SCNC: 13 MMOL/L (ref 10–20)
BUN SERPL-MCNC: 38 MG/DL (ref 6–23)
BUN SERPL-MCNC: 43 MG/DL (ref 6–23)
CALCIUM SERPL-MCNC: 8.7 MG/DL (ref 8.6–10.3)
CALCIUM SERPL-MCNC: 8.9 MG/DL (ref 8.6–10.3)
CHLORIDE SERPL-SCNC: 108 MMOL/L (ref 98–107)
CHLORIDE SERPL-SCNC: 109 MMOL/L (ref 98–107)
CO2 SERPL-SCNC: 36 MMOL/L (ref 21–32)
CO2 SERPL-SCNC: 36 MMOL/L (ref 21–32)
CREAT SERPL-MCNC: 1.15 MG/DL (ref 0.5–1.05)
CREAT SERPL-MCNC: 1.27 MG/DL (ref 0.5–1.05)
EGFRCR SERPLBLD CKD-EPI 2021: 41 ML/MIN/1.73M*2
EGFRCR SERPLBLD CKD-EPI 2021: 46 ML/MIN/1.73M*2
ERYTHROCYTE [DISTWIDTH] IN BLOOD BY AUTOMATED COUNT: 13.7 % (ref 11.5–14.5)
GLUCOSE BLD MANUAL STRIP-MCNC: 169 MG/DL (ref 74–99)
GLUCOSE BLD MANUAL STRIP-MCNC: 195 MG/DL (ref 74–99)
GLUCOSE SERPL-MCNC: 127 MG/DL (ref 74–99)
GLUCOSE SERPL-MCNC: 212 MG/DL (ref 74–99)
HCT VFR BLD AUTO: 36.6 % (ref 36–46)
HGB BLD-MCNC: 11.9 G/DL (ref 12–16)
HOLD SPECIMEN: NORMAL
HOLD SPECIMEN: NORMAL
INR PPP: 2.3 (ref 0.9–1.1)
MAGNESIUM SERPL-MCNC: 1.89 MG/DL (ref 1.6–2.4)
MCH RBC QN AUTO: 29 PG (ref 26–34)
MCHC RBC AUTO-ENTMCNC: 32.5 G/DL (ref 32–36)
MCV RBC AUTO: 89 FL (ref 80–100)
NRBC BLD-RTO: 0 /100 WBCS (ref 0–0)
PLATELET # BLD AUTO: 237 X10*3/UL (ref 150–450)
POTASSIUM SERPL-SCNC: 2.8 MMOL/L (ref 3.5–5.3)
POTASSIUM SERPL-SCNC: 3.4 MMOL/L (ref 3.5–5.3)
PROTHROMBIN TIME: 26.3 SECONDS (ref 9.8–12.8)
RBC # BLD AUTO: 4.11 X10*6/UL (ref 4–5.2)
SODIUM SERPL-SCNC: 152 MMOL/L (ref 136–145)
SODIUM SERPL-SCNC: 154 MMOL/L (ref 136–145)
VANCOMYCIN SERPL-MCNC: 14 UG/ML (ref 5–20)
WBC # BLD AUTO: 8.9 X10*3/UL (ref 4.4–11.3)

## 2024-02-28 PROCEDURE — 2500000001 HC RX 250 WO HCPCS SELF ADMINISTERED DRUGS (ALT 637 FOR MEDICARE OP): Performed by: HOSPITALIST

## 2024-02-28 PROCEDURE — 85027 COMPLETE CBC AUTOMATED: CPT | Performed by: HOSPITALIST

## 2024-02-28 PROCEDURE — 85610 PROTHROMBIN TIME: CPT | Performed by: HOSPITALIST

## 2024-02-28 PROCEDURE — 2500000004 HC RX 250 GENERAL PHARMACY W/ HCPCS (ALT 636 FOR OP/ED): Performed by: HOSPITALIST

## 2024-02-28 PROCEDURE — 1210000001 HC SEMI-PRIVATE ROOM DAILY

## 2024-02-28 PROCEDURE — 83735 ASSAY OF MAGNESIUM: CPT | Performed by: HOSPITALIST

## 2024-02-28 PROCEDURE — 80202 ASSAY OF VANCOMYCIN: CPT

## 2024-02-28 PROCEDURE — 82947 ASSAY GLUCOSE BLOOD QUANT: CPT

## 2024-02-28 PROCEDURE — 36415 COLL VENOUS BLD VENIPUNCTURE: CPT | Performed by: HOSPITALIST

## 2024-02-28 PROCEDURE — 2500000004 HC RX 250 GENERAL PHARMACY W/ HCPCS (ALT 636 FOR OP/ED)

## 2024-02-28 PROCEDURE — 99232 SBSQ HOSP IP/OBS MODERATE 35: CPT | Performed by: HOSPITALIST

## 2024-02-28 PROCEDURE — 2500000004 HC RX 250 GENERAL PHARMACY W/ HCPCS (ALT 636 FOR OP/ED): Performed by: INTERNAL MEDICINE

## 2024-02-28 PROCEDURE — 87081 CULTURE SCREEN ONLY: CPT | Mod: ELYLAB | Performed by: HOSPITALIST

## 2024-02-28 PROCEDURE — 80048 BASIC METABOLIC PNL TOTAL CA: CPT | Performed by: HOSPITALIST

## 2024-02-28 RX ORDER — LIDOCAINE HYDROCHLORIDE 10 MG/ML
5 INJECTION INFILTRATION; PERINEURAL ONCE
Status: DISCONTINUED | OUTPATIENT
Start: 2024-02-28 | End: 2024-03-01 | Stop reason: HOSPADM

## 2024-02-28 RX ORDER — VANCOMYCIN HYDROCHLORIDE 1 G/200ML
1000 INJECTION, SOLUTION INTRAVENOUS ONCE
Status: COMPLETED | OUTPATIENT
Start: 2024-02-28 | End: 2024-02-29

## 2024-02-28 RX ORDER — POTASSIUM CHLORIDE 14.9 MG/ML
20 INJECTION INTRAVENOUS
Status: COMPLETED | OUTPATIENT
Start: 2024-02-28 | End: 2024-02-28

## 2024-02-28 RX ORDER — SODIUM CHLORIDE AND POTASSIUM CHLORIDE 150; 900 MG/100ML; MG/100ML
75 INJECTION, SOLUTION INTRAVENOUS CONTINUOUS
Status: DISCONTINUED | OUTPATIENT
Start: 2024-02-28 | End: 2024-02-29

## 2024-02-28 RX ADMIN — FLUCONAZOLE IN SODIUM CHLORIDE 100 MG: 2 INJECTION, SOLUTION INTRAVENOUS at 16:30

## 2024-02-28 RX ADMIN — INSULIN LISPRO 1 UNITS: 100 INJECTION, SOLUTION INTRAVENOUS; SUBCUTANEOUS at 11:03

## 2024-02-28 RX ADMIN — INSULIN LISPRO 1 UNITS: 100 INJECTION, SOLUTION INTRAVENOUS; SUBCUTANEOUS at 16:33

## 2024-02-28 RX ADMIN — POTASSIUM CHLORIDE 20 MEQ: 14.9 INJECTION, SOLUTION INTRAVENOUS at 10:30

## 2024-02-28 RX ADMIN — Medication 1 TABLET: at 10:03

## 2024-02-28 RX ADMIN — POTASSIUM CHLORIDE 20 MEQ: 14.9 INJECTION, SOLUTION INTRAVENOUS at 08:46

## 2024-02-28 RX ADMIN — POTASSIUM CHLORIDE AND SODIUM CHLORIDE 75 ML/HR: 900; 150 INJECTION, SOLUTION INTRAVENOUS at 23:23

## 2024-02-28 RX ADMIN — HYDROXYZINE HYDROCHLORIDE 25 MG: 25 TABLET ORAL at 10:03

## 2024-02-28 RX ADMIN — NYSTATIN 400000 UNITS: 100000 SUSPENSION ORAL at 08:54

## 2024-02-28 RX ADMIN — METOPROLOL SUCCINATE 100 MG: 50 TABLET, EXTENDED RELEASE ORAL at 21:03

## 2024-02-28 RX ADMIN — NYSTATIN 400000 UNITS: 100000 SUSPENSION ORAL at 21:02

## 2024-02-28 RX ADMIN — VANCOMYCIN HYDROCHLORIDE 1000 MG: 1 INJECTION, SOLUTION INTRAVENOUS at 23:22

## 2024-02-28 RX ADMIN — METOPROLOL SUCCINATE 100 MG: 50 TABLET, EXTENDED RELEASE ORAL at 10:03

## 2024-02-28 RX ADMIN — POTASSIUM CHLORIDE AND SODIUM CHLORIDE 75 ML/HR: 900; 150 INJECTION, SOLUTION INTRAVENOUS at 09:53

## 2024-02-28 RX ADMIN — HYDROXYZINE HYDROCHLORIDE 25 MG: 25 TABLET ORAL at 21:03

## 2024-02-28 RX ADMIN — SODIUM CHLORIDE 125 ML/HR: 9 INJECTION, SOLUTION INTRAVENOUS at 06:42

## 2024-02-28 ASSESSMENT — COGNITIVE AND FUNCTIONAL STATUS - GENERAL
TOILETING: A LOT
STANDING UP FROM CHAIR USING ARMS: A LOT
DAILY ACTIVITIY SCORE: 6
DRESSING REGULAR LOWER BODY CLOTHING: TOTAL
TURNING FROM BACK TO SIDE WHILE IN FLAT BAD: A LOT
MOVING FROM LYING ON BACK TO SITTING ON SIDE OF FLAT BED WITH BEDRAILS: A LOT
WALKING IN HOSPITAL ROOM: TOTAL
PERSONAL GROOMING: TOTAL
TURNING FROM BACK TO SIDE WHILE IN FLAT BAD: A LOT
MOBILITY SCORE: 10
MOVING TO AND FROM BED TO CHAIR: A LOT
WALKING IN HOSPITAL ROOM: A LOT
DRESSING REGULAR UPPER BODY CLOTHING: TOTAL
TOILETING: TOTAL
EATING MEALS: A LOT
EATING MEALS: TOTAL
HELP NEEDED FOR BATHING: A LOT
DAILY ACTIVITIY SCORE: 12
DRESSING REGULAR LOWER BODY CLOTHING: A LOT
HELP NEEDED FOR BATHING: TOTAL
MOVING FROM LYING ON BACK TO SITTING ON SIDE OF FLAT BED WITH BEDRAILS: A LOT
DRESSING REGULAR UPPER BODY CLOTHING: A LOT
CLIMB 3 TO 5 STEPS WITH RAILING: A LOT
PERSONAL GROOMING: A LOT
STANDING UP FROM CHAIR USING ARMS: A LOT
CLIMB 3 TO 5 STEPS WITH RAILING: TOTAL
MOBILITY SCORE: 12
MOVING TO AND FROM BED TO CHAIR: A LOT

## 2024-02-28 ASSESSMENT — PAIN SCALES - GENERAL: PAINLEVEL_OUTOF10: 0 - NO PAIN

## 2024-02-28 NOTE — PROGRESS NOTES
02/28/24 1104   Current Planned Discharge Disposition   Current Planned Discharge Disposition Inter  (Irena NR)     Facility updated that ADOD is tomorrow. Updated clinical information sent to facility.

## 2024-02-28 NOTE — DISCHARGE INSTRUCTIONS
It was nice caring for you during your stay at [facility]. As we discussed,  -Please hold escitalopram and trazodone until March 15.  -Please repeat INR level in 3 days.  -Please repeat a BMP in 3 days.      Wishing you a healthy recovery!    Requires referral to palliative care services.

## 2024-02-28 NOTE — NURSING NOTE
Order received for a midline on this pt for 2 weeks of fluconazole. The pH of this medication is 4-8 and midline the pH needs to be between 5-9. Request made to change order to a PICC line. Pt is not to discharge until 2/29 and ok to wait till till 2/29 to place PICC if needed.

## 2024-02-28 NOTE — PROGRESS NOTES
"Vancomycin Dosing by Pharmacy- FOLLOW UP    Ernestina Argueta is a 89 y.o. year old female who Pharmacy has been consulted for vancomycin dosing for other UTI . Based on the patient's indication and renal status this patient is being dosed based on a goal trough/random level of 10-15.     Renal function is currently improving.    Current vancomycin dose: 1000 mg given x 1 dose    Most recent random level: 10.1 mcg/mL    Visit Vitals  /63   Pulse 95   Temp 36.6 °C (97.9 °F)   Resp 18        Lab Results   Component Value Date    CREATININE 1.47 (H) 02/27/2024    CREATININE 1.90 (H) 02/26/2024    CREATININE 2.38 (H) 02/26/2024    CREATININE 3.56 (H) 02/25/2024        Patient weight is No results found for: \"PTWEIGHT\"    No results found for: \"CULTURE\"     I/O last 3 completed shifts:  In: 1304.2 (20.5 mL/kg) [I.V.:1004.2 (15.8 mL/kg); IV Piggyback:300]  Out: 2351 (37 mL/kg) [Urine:2350 (1 mL/kg/hr); Stool:1]  Weight: 63.5 kg   [unfilled]    No results found for: \"PATIENTTEMP\"     Assessment/Plan    Within goal random/trough level    The next level will be obtained on 2/28/24 at 2000. May be obtained sooner if clinically indicated.   Will continue to monitor renal function daily while on vancomycin and order serum creatinine at least every 48 hours if not already ordered.  Follow for continued vancomycin needs, clinical response, and signs/symptoms of toxicity.       Araceli Oquendo, PharmD           "

## 2024-02-28 NOTE — CARE PLAN
The patient's goals for the shift include      The clinical goals for the shift include No pulling at lines or Poe catheter

## 2024-02-28 NOTE — PROGRESS NOTES
Per  attending MD whom advises possible dc to SNF 2/29/24. Family does not want palliative nor hospice care at this time.

## 2024-02-28 NOTE — PROGRESS NOTES
PROGRESS NOTE    ASSESSMENT AND PLAN:    89 year old female with history of dementia presented from memory care units with acute metabolic encephalopathy and VERONICA secondary to dehydration     Failure to thrive  Severe dysphagia and odynophagia secondary to thrush  History of dementia with delirium  -Presented to the emergency room with inability to eat  -Currently remains on IV fluconazole  -Has moments of improvement in mentation  -She was seen by speech therapy who recommended puréed diet  -Eating better today, plan for midline and IV fluconazole    Persistent hypokalemia  -Continue IV repletion     Acute kidney injury, prerenal etiology  -Improving with IV fluids     Permanent atrial fibrillation  -Continue metoprolol, off anticoagulation for now     Urinary tract infection  -Urine culture is growing Enterococcus, continue vancomycin.  Switch to Augmentin on discharge    8.  Hypernatremia  -Change fluids to half-normal saline, continue to monitor BMP         SUBJECTIVE:   Admit Date: 2/25/2024    Interval History: More awake today, was able to tolerate pudding and Ensure      OBJECTIVE:   Vitals: /84   Pulse 96   Temp 36.7 °C (98.1 °F)   Resp 16   Ht 1.524 m (5')   Wt 63.5 kg (140 lb)   SpO2 98%   BMI 27.34 kg/m²    Wt Readings from Last 3 Encounters:   02/26/24 63.5 kg (140 lb)   02/23/24 66.7 kg (147 lb)   09/26/23 72.1 kg (159 lb)      24HR INTAKE/OUTPUT:    Intake/Output Summary (Last 24 hours) at 2/28/2024 1626  Last data filed at 2/28/2024 1337  Gross per 24 hour   Intake 2008.33 ml   Output 1600 ml   Net 408.33 ml       PHYSICAL EXAM:   GENERAL: Laying in bed, does not appear to be in any distress.   HEENT: HEAD: Normocephalic atraumatic.  Neck: Supple.  Eyes: Pupils are reactive to direct light.  Oral thrush improved.  CVS: S1, S2 heard. Regular rate and rhythm  LUNGS: Clear to auscultate bilaterally. No wheezing or rhonchi appreciated.  ABDOMEN: Soft, nontender to palpate. Positive bowel  "sounds. No guarding or rebound appreciated.  NEUROLOGICAL: No focal neurological deficits appreciated. Cranial nerves are grossly intact.  EXTREMITIES: No edema appreciated.  SKIN:  Grossly intact, warm and dry.      LABS/IMAGING AND MEDICATIONS:   Scheduled Meds:bisacodyl, 10 mg, rectal, Once  fluconazole in NaCl (iso-osm), 100 mg, intravenous, q24h  hydrOXYzine HCL, 25 mg, oral, BID  insulin lispro, 0-5 Units, subcutaneous, TID with meals  lactobacillus acidophilus, 1 tablet, oral, Daily  levothyroxine, 75 mcg, oral, Daily  lidocaine, 5 mL, infiltration, Once  metoprolol succinate XL, 100 mg, oral, BID  nystatin, 4 mL, Mouth/Throat, BID  pantoprazole, 20 mg, oral, Daily      PRN Meds:PRN medications: acetaminophen **OR** acetaminophen **OR** acetaminophen, ammonium lactate, dextrose 10 % in water (D10W), dextrose, glucagon, lidocaine-diphenhydraMINE-Maalox 1:1:1, meclizine, OLANZapine, OLANZapine, polyethylene glycol, vancomycin    No lab exists for component: \"CBC\"   No lab exists for component: \"CMP\"   No lab exists for component: \"TROPONIN\"  Results from last 7 days   Lab Units 02/25/24  0432   BNP pg/mL 297*     Results from last 7 days   Lab Units 02/26/24  0634   INR  1.4*     No lab exists for component: \"LIPIDS\"       No lab exists for component: \"URINALYSIS\"          BMP:  Results from last 7 days   Lab Units 02/28/24  1328 02/28/24  0605 02/27/24  0954   SODIUM mmol/L 152* 154* 149*   POTASSIUM mmol/L 3.4* 2.8* 3.0*   CHLORIDE mmol/L 109* 108* 101   CO2 mmol/L 36* 36* 36*   BUN mg/dL 38* 43* 65*   CREATININE mg/dL 1.27* 1.15* 1.47*       CBC:  Results from last 7 days   Lab Units 02/28/24  0605 02/27/24  0953 02/26/24  0634   WBC AUTO x10*3/uL 8.9 11.5* 10.9   RBC AUTO x10*6/uL 4.11 4.33 4.05   HEMOGLOBIN g/dL 11.9* 12.8 11.6*   HEMATOCRIT % 36.6 38.6 35.6*   MCV fL 89 89 88   MCH pg 29.0 29.6 28.6   MCHC g/dL 32.5 33.2 32.6   RDW % 13.7 13.6 13.7   PLATELETS AUTO x10*3/uL 237 247 235       Cardiac " "Enzymes:   Results from last 7 days   Lab Units 02/25/24  0538 02/25/24  0432   TROPHS ng/L 42* 45*   CK TOTAL U/L  --  40         Hepatic Function Panel:  Results from last 7 days   Lab Units 02/25/24  0432   ALK PHOS U/L 61   ALT U/L 15   AST U/L 24   PROTEIN TOTAL g/dL 8.4*   BILIRUBIN TOTAL mg/dL 1.6*   BILIRUBIN DIRECT mg/dL 0.4*       Magnesium:  Results from last 7 days   Lab Units 02/28/24  0605   MAGNESIUM mg/dL 1.89       Pro-BNP:  No results found for: \"PROBNP\"    INR:  Results from last 7 days   Lab Units 02/26/24  0634 02/25/24  0432   PROTIME seconds 15.8* 69.4*   INR  1.4* 6.0*         CMP:  Results from last 7 days   Lab Units 02/28/24  1328 02/28/24  0605 02/27/24  0954 02/26/24  0634 02/25/24  0432   SODIUM mmol/L 152* 154* 149*   < > 141   POTASSIUM mmol/L 3.4* 2.8* 3.0*   < > 2.8*   CHLORIDE mmol/L 109* 108* 101   < > 83*   CO2 mmol/L 36* 36* 36*   < > 38*   BUN mg/dL 38* 43* 65*   < > 129*   CREATININE mg/dL 1.27* 1.15* 1.47*   < > 3.56*   GLUCOSE mg/dL 212* 127* 159*   < > 210*   CALCIUM mg/dL 8.9 8.7 9.4   < > 10.5*   PROTEIN TOTAL g/dL  --   --   --   --  8.4*   BILIRUBIN TOTAL mg/dL  --   --   --   --  1.6*   ALK PHOS U/L  --   --   --   --  61   AST U/L  --   --   --   --  24   ALT U/L  --   --   --   --  15    < > = values in this interval not displayed.       Amylase:        Lipase:  Results from last 7 days   Lab Units 02/25/24  0432   LIPASE U/L 51        "

## 2024-02-29 ENCOUNTER — APPOINTMENT (OUTPATIENT)
Dept: RADIOLOGY | Facility: HOSPITAL | Age: 89
DRG: 871 | End: 2024-02-29
Payer: COMMERCIAL

## 2024-02-29 LAB
ANION GAP SERPL CALC-SCNC: 11 MMOL/L (ref 10–20)
ANION GAP SERPL CALC-SCNC: 12 MMOL/L (ref 10–20)
BACTERIA BLD CULT: NORMAL
BACTERIA BLD CULT: NORMAL
BUN SERPL-MCNC: 26 MG/DL (ref 6–23)
BUN SERPL-MCNC: 28 MG/DL (ref 6–23)
CALCIUM SERPL-MCNC: 8.5 MG/DL (ref 8.6–10.3)
CALCIUM SERPL-MCNC: 8.8 MG/DL (ref 8.6–10.3)
CHLORIDE SERPL-SCNC: 106 MMOL/L (ref 98–107)
CHLORIDE SERPL-SCNC: 111 MMOL/L (ref 98–107)
CO2 SERPL-SCNC: 33 MMOL/L (ref 21–32)
CO2 SERPL-SCNC: 34 MMOL/L (ref 21–32)
CREAT SERPL-MCNC: 0.97 MG/DL (ref 0.5–1.05)
CREAT SERPL-MCNC: 1.05 MG/DL (ref 0.5–1.05)
EGFRCR SERPLBLD CKD-EPI 2021: 51 ML/MIN/1.73M*2
EGFRCR SERPLBLD CKD-EPI 2021: 56 ML/MIN/1.73M*2
ERYTHROCYTE [DISTWIDTH] IN BLOOD BY AUTOMATED COUNT: 13.8 % (ref 11.5–14.5)
GLUCOSE BLD MANUAL STRIP-MCNC: 120 MG/DL (ref 74–99)
GLUCOSE BLD MANUAL STRIP-MCNC: 134 MG/DL (ref 74–99)
GLUCOSE BLD MANUAL STRIP-MCNC: 159 MG/DL (ref 74–99)
GLUCOSE BLD MANUAL STRIP-MCNC: 163 MG/DL (ref 74–99)
GLUCOSE SERPL-MCNC: 137 MG/DL (ref 74–99)
GLUCOSE SERPL-MCNC: 151 MG/DL (ref 74–99)
HCT VFR BLD AUTO: 38.4 % (ref 36–46)
HGB BLD-MCNC: 12.1 G/DL (ref 12–16)
HOLD SPECIMEN: NORMAL
HOLD SPECIMEN: NORMAL
MAGNESIUM SERPL-MCNC: 1.72 MG/DL (ref 1.6–2.4)
MCH RBC QN AUTO: 28.5 PG (ref 26–34)
MCHC RBC AUTO-ENTMCNC: 31.5 G/DL (ref 32–36)
MCV RBC AUTO: 90 FL (ref 80–100)
NRBC BLD-RTO: 0 /100 WBCS (ref 0–0)
PLATELET # BLD AUTO: 230 X10*3/UL (ref 150–450)
POTASSIUM SERPL-SCNC: 3.1 MMOL/L (ref 3.5–5.3)
POTASSIUM SERPL-SCNC: 3.1 MMOL/L (ref 3.5–5.3)
RBC # BLD AUTO: 4.25 X10*6/UL (ref 4–5.2)
SODIUM SERPL-SCNC: 148 MMOL/L (ref 136–145)
SODIUM SERPL-SCNC: 153 MMOL/L (ref 136–145)
VANCOMYCIN SERPL-MCNC: 14.9 UG/ML (ref 5–20)
WBC # BLD AUTO: 9.9 X10*3/UL (ref 4.4–11.3)

## 2024-02-29 PROCEDURE — 02HV33Z INSERTION OF INFUSION DEVICE INTO SUPERIOR VENA CAVA, PERCUTANEOUS APPROACH: ICD-10-PCS | Performed by: HOSPITALIST

## 2024-02-29 PROCEDURE — 1210000001 HC SEMI-PRIVATE ROOM DAILY

## 2024-02-29 PROCEDURE — C1751 CATH, INF, PER/CENT/MIDLINE: HCPCS

## 2024-02-29 PROCEDURE — 36573 INSJ PICC RS&I 5 YR+: CPT | Performed by: RADIOLOGY

## 2024-02-29 PROCEDURE — 36415 COLL VENOUS BLD VENIPUNCTURE: CPT | Performed by: HOSPITALIST

## 2024-02-29 PROCEDURE — 36569 INSJ PICC 5 YR+ W/O IMAGING: CPT

## 2024-02-29 PROCEDURE — 85027 COMPLETE CBC AUTOMATED: CPT | Performed by: HOSPITALIST

## 2024-02-29 PROCEDURE — 2500000001 HC RX 250 WO HCPCS SELF ADMINISTERED DRUGS (ALT 637 FOR MEDICARE OP): Performed by: HOSPITALIST

## 2024-02-29 PROCEDURE — 82947 ASSAY GLUCOSE BLOOD QUANT: CPT

## 2024-02-29 PROCEDURE — 80048 BASIC METABOLIC PNL TOTAL CA: CPT | Performed by: HOSPITALIST

## 2024-02-29 PROCEDURE — 2500000002 HC RX 250 W HCPCS SELF ADMINISTERED DRUGS (ALT 637 FOR MEDICARE OP, ALT 636 FOR OP/ED): Performed by: HOSPITALIST

## 2024-02-29 PROCEDURE — 83735 ASSAY OF MAGNESIUM: CPT | Performed by: HOSPITALIST

## 2024-02-29 PROCEDURE — 2500000004 HC RX 250 GENERAL PHARMACY W/ HCPCS (ALT 636 FOR OP/ED): Performed by: HOSPITALIST

## 2024-02-29 PROCEDURE — 80202 ASSAY OF VANCOMYCIN: CPT

## 2024-02-29 PROCEDURE — 2780000003 HC OR 278 NO HCPCS

## 2024-02-29 PROCEDURE — 2500000004 HC RX 250 GENERAL PHARMACY W/ HCPCS (ALT 636 FOR OP/ED): Performed by: INTERNAL MEDICINE

## 2024-02-29 PROCEDURE — 99232 SBSQ HOSP IP/OBS MODERATE 35: CPT | Performed by: HOSPITALIST

## 2024-02-29 RX ORDER — SODIUM CHLORIDE 450 MG/100ML
75 INJECTION, SOLUTION INTRAVENOUS CONTINUOUS
Status: DISCONTINUED | OUTPATIENT
Start: 2024-02-29 | End: 2024-02-29

## 2024-02-29 RX ORDER — SODIUM CHLORIDE AND POTASSIUM CHLORIDE 150; 450 MG/100ML; MG/100ML
75 INJECTION, SOLUTION INTRAVENOUS CONTINUOUS
Status: DISCONTINUED | OUTPATIENT
Start: 2024-02-29 | End: 2024-03-01

## 2024-02-29 RX ORDER — POTASSIUM CHLORIDE, DEXTROSE MONOHYDRATE 150; 5 MG/100ML; G/100ML
75 INJECTION, SOLUTION INTRAVENOUS CONTINUOUS
Status: DISCONTINUED | OUTPATIENT
Start: 2024-02-29 | End: 2024-02-29

## 2024-02-29 RX ADMIN — LEVOTHYROXINE SODIUM 75 MCG: 75 TABLET ORAL at 10:45

## 2024-02-29 RX ADMIN — METOPROLOL SUCCINATE 100 MG: 50 TABLET, EXTENDED RELEASE ORAL at 21:36

## 2024-02-29 RX ADMIN — Medication 1 TABLET: at 10:45

## 2024-02-29 RX ADMIN — SODIUM CHLORIDE 75 ML/HR: 4.5 INJECTION, SOLUTION INTRAVENOUS at 12:13

## 2024-02-29 RX ADMIN — POTASSIUM CHLORIDE AND SODIUM CHLORIDE 75 ML/HR: 450; 150 INJECTION, SOLUTION INTRAVENOUS at 13:22

## 2024-02-29 RX ADMIN — HYDROXYZINE HYDROCHLORIDE 25 MG: 25 TABLET ORAL at 21:37

## 2024-02-29 RX ADMIN — NYSTATIN 400000 UNITS: 100000 SUSPENSION ORAL at 21:36

## 2024-02-29 RX ADMIN — METOPROLOL SUCCINATE 100 MG: 50 TABLET, EXTENDED RELEASE ORAL at 10:44

## 2024-02-29 RX ADMIN — HYDROXYZINE HYDROCHLORIDE 25 MG: 25 TABLET ORAL at 10:45

## 2024-02-29 RX ADMIN — NYSTATIN 400000 UNITS: 100000 SUSPENSION ORAL at 10:44

## 2024-02-29 RX ADMIN — PANTOPRAZOLE SODIUM 20 MG: 20 TABLET, DELAYED RELEASE ORAL at 10:45

## 2024-02-29 RX ADMIN — FLUCONAZOLE IN SODIUM CHLORIDE 100 MG: 2 INJECTION, SOLUTION INTRAVENOUS at 15:27

## 2024-02-29 ASSESSMENT — COGNITIVE AND FUNCTIONAL STATUS - GENERAL
HELP NEEDED FOR BATHING: TOTAL
MOVING FROM LYING ON BACK TO SITTING ON SIDE OF FLAT BED WITH BEDRAILS: A LOT
PERSONAL GROOMING: TOTAL
EATING MEALS: TOTAL
MOVING TO AND FROM BED TO CHAIR: A LOT
TURNING FROM BACK TO SIDE WHILE IN FLAT BAD: A LOT
CLIMB 3 TO 5 STEPS WITH RAILING: TOTAL
DRESSING REGULAR UPPER BODY CLOTHING: TOTAL
DRESSING REGULAR LOWER BODY CLOTHING: TOTAL
TOILETING: TOTAL
DAILY ACTIVITIY SCORE: 6
WALKING IN HOSPITAL ROOM: TOTAL
MOBILITY SCORE: 10
STANDING UP FROM CHAIR USING ARMS: A LOT

## 2024-02-29 ASSESSMENT — PAIN SCALES - GENERAL
PAINLEVEL_OUTOF10: 0 - NO PAIN
PAINLEVEL_OUTOF10: 0 - NO PAIN

## 2024-02-29 ASSESSMENT — PAIN - FUNCTIONAL ASSESSMENT: PAIN_FUNCTIONAL_ASSESSMENT: 0-10

## 2024-02-29 NOTE — CARE PLAN
The patient's goals for the shift include      The clinical goals for the shift include pt will be able to take PO meds.

## 2024-02-29 NOTE — CARE PLAN
Problem: Safety  Goal: Patient will be injury free during hospitalization  Outcome: Progressing     Problem: Skin  Goal: Decreased wound size/increased tissue granulation at next dressing change  Outcome: Progressing  Goal: Participates in plan/prevention/treatment measures  Outcome: Progressing  Goal: Prevent/manage excess moisture  Outcome: Progressing  Goal: Prevent/minimize sheer/friction injuries  Outcome: Progressing  Goal: Promote/optimize nutrition  Outcome: Progressing  Goal: Promote skin healing  Outcome: Progressing     Problem: Fall/Injury  Goal: Not fall by end of shift  Outcome: Progressing  Goal: Be free from injury by end of the shift  Outcome: Progressing     Problem: Mobility  Goal: Maintains or increases mobility and physical activity  Outcome: Progressing     Problem: Family/Caregiver Engagement  Goal: Family members and/or caregivers are engaged in care delivery process  Outcome: Progressing     Problem: Sleep  Goal: Patient obtains adequate amount of sleep  Outcome: Progressing     Problem: Agitation  Goal: Patient experiences decreased agitation  Outcome: Progressing     Problem: Hydration  Goal: Patient maintains adequate hydration  Outcome: Progressing     Problem: Airway  Goal: Patient's duration of intubation is minimized  Outcome: Progressing   he patient's goals for the shift include      The clinical goals for the shift include No pulling at lines or Poe catheter    Over the shift, the patient did not make progress toward the following goals. Barriers to progression include . Recommendations to address these barriers include

## 2024-02-29 NOTE — PROGRESS NOTES
PROGRESS NOTE    ASSESSMENT AND PLAN:   89 year old female with history of dementia presented from memory care units with acute metabolic encephalopathy and VERONICA secondary to dehydration     Failure to thrive  Severe dysphagia and odynophagia secondary to thrush  History of dementia with delirium  -Presented to the emergency room with inability to eat  -Currently remains on IV fluconazole  -Has moments of improvement in mentation  -She was seen by speech therapy who recommended puréed diet  -Eating better today, plan for midline and IV fluconazole     Persistent hypokalemia  -Continue IV repletion     Acute kidney injury, prerenal etiology  -Improving with IV fluids     Permanent atrial fibrillation  -Continue metoprolol, continue Coumadin     Urinary tract infection  -Urine culture is growing Enterococcus, continue vancomycin.  Switch to Augmentin on discharge     8.  Hypernatremia  -Change fluids to half-normal saline, continue to monitor BMP    SUBJECTIVE:   Admit Date: 2/25/2024    Interval History: She is more awake, is able to have a conversation.      OBJECTIVE:   Vitals: /73   Pulse 98   Temp 36.2 °C (97.2 °F)   Resp 18   Ht 1.524 m (5')   Wt 63.5 kg (140 lb)   SpO2 98%   BMI 27.34 kg/m²    Wt Readings from Last 3 Encounters:   02/26/24 63.5 kg (140 lb)   02/23/24 66.7 kg (147 lb)   09/26/23 72.1 kg (159 lb)      24HR INTAKE/OUTPUT:    Intake/Output Summary (Last 24 hours) at 2/29/2024 1611  Last data filed at 2/29/2024 1048  Gross per 24 hour   Intake 1918.75 ml   Output 925 ml   Net 993.75 ml       PHYSICAL EXAM:   GENERAL: Laying in bed, does not appear to be in any distress.   HEENT: HEAD: Normocephalic atraumatic.  Neck: Supple.  Eyes: Pupils are reactive to direct light.   CVS: S1, S2 heard. Regular rate and rhythm  LUNGS: Clear to auscultate bilaterally. No wheezing or rhonchi appreciated.  ABDOMEN: Soft, nontender to palpate. Positive bowel sounds. No guarding or rebound  "appreciated.  NEUROLOGICAL: No focal neurological deficits appreciated. Cranial nerves are grossly intact.  EXTREMITIES: No edema appreciated.  SKIN:  Grossly intact, warm and dry.      LABS/IMAGING AND MEDICATIONS:   Scheduled Meds:bisacodyl, 10 mg, rectal, Once  fluconazole in NaCl (iso-osm), 100 mg, intravenous, q24h  hydrOXYzine HCL, 25 mg, oral, BID  insulin lispro, 0-5 Units, subcutaneous, TID with meals  lactobacillus acidophilus, 1 tablet, oral, Daily  levothyroxine, 75 mcg, oral, Daily  lidocaine, 5 mL, infiltration, Once  metoprolol succinate XL, 100 mg, oral, BID  nystatin, 4 mL, Mouth/Throat, BID  pantoprazole, 20 mg, oral, Daily      PRN Meds:PRN medications: acetaminophen **OR** acetaminophen **OR** acetaminophen, alteplase, ammonium lactate, dextrose 10 % in water (D10W), dextrose, glucagon, lidocaine-diphenhydraMINE-Maalox 1:1:1, meclizine, OLANZapine, OLANZapine, polyethylene glycol, vancomycin    No lab exists for component: \"CBC\"   No lab exists for component: \"CMP\"   No lab exists for component: \"TROPONIN\"  Results from last 7 days   Lab Units 02/25/24  0432   BNP pg/mL 297*     Results from last 7 days   Lab Units 02/28/24  1708   INR  2.3*     No lab exists for component: \"LIPIDS\"       No lab exists for component: \"URINALYSIS\"          BMP:  Results from last 7 days   Lab Units 02/29/24  0549 02/28/24  1328 02/28/24  0605   SODIUM mmol/L 153* 152* 154*   POTASSIUM mmol/L 3.1* 3.4* 2.8*   CHLORIDE mmol/L 111* 109* 108*   CO2 mmol/L 34* 36* 36*   BUN mg/dL 28* 38* 43*   CREATININE mg/dL 1.05 1.27* 1.15*       CBC:  Results from last 7 days   Lab Units 02/29/24  0549 02/28/24  0605 02/27/24  0953   WBC AUTO x10*3/uL 9.9 8.9 11.5*   RBC AUTO x10*6/uL 4.25 4.11 4.33   HEMOGLOBIN g/dL 12.1 11.9* 12.8   HEMATOCRIT % 38.4 36.6 38.6   MCV fL 90 89 89   MCH pg 28.5 29.0 29.6   MCHC g/dL 31.5* 32.5 33.2   RDW % 13.8 13.7 13.6   PLATELETS AUTO x10*3/uL 230 237 247       Cardiac Enzymes:   Results from " "last 7 days   Lab Units 02/25/24  0538 02/25/24  0432   TROPHS ng/L 42* 45*   CK TOTAL U/L  --  40         Hepatic Function Panel:  Results from last 7 days   Lab Units 02/25/24  0432   ALK PHOS U/L 61   ALT U/L 15   AST U/L 24   PROTEIN TOTAL g/dL 8.4*   BILIRUBIN TOTAL mg/dL 1.6*   BILIRUBIN DIRECT mg/dL 0.4*       Magnesium:  Results from last 7 days   Lab Units 02/29/24  0549   MAGNESIUM mg/dL 1.72       Pro-BNP:  No results found for: \"PROBNP\"    INR:  Results from last 7 days   Lab Units 02/28/24  1708 02/26/24  0634 02/25/24  0432   PROTIME seconds 26.3* 15.8* 69.4*   INR  2.3* 1.4* 6.0*       TSH:  No results found for: \"TSH\"    Lipid Profile:        No lab exists for component: \"LABVLDL\"    HgbA1C:        Lactate Level:  No lab exists for component: \"LACTA\"    CMP:  Results from last 7 days   Lab Units 02/29/24  0549 02/28/24  1328 02/28/24  0605 02/26/24  0634 02/25/24  0432   SODIUM mmol/L 153* 152* 154*   < > 141   POTASSIUM mmol/L 3.1* 3.4* 2.8*   < > 2.8*   CHLORIDE mmol/L 111* 109* 108*   < > 83*   CO2 mmol/L 34* 36* 36*   < > 38*   BUN mg/dL 28* 38* 43*   < > 129*   CREATININE mg/dL 1.05 1.27* 1.15*   < > 3.56*   GLUCOSE mg/dL 137* 212* 127*   < > 210*   CALCIUM mg/dL 8.8 8.9 8.7   < > 10.5*   PROTEIN TOTAL g/dL  --   --   --   --  8.4*   BILIRUBIN TOTAL mg/dL  --   --   --   --  1.6*   ALK PHOS U/L  --   --   --   --  61   AST U/L  --   --   --   --  24   ALT U/L  --   --   --   --  15    < > = values in this interval not displayed.       Amylase:        Lipase:  Results from last 7 days   Lab Units 02/25/24  0432   LIPASE U/L 51       ABG:        No lab exists for component: \"PO2\", \"PCO2\", \"HCO3\", \"BE\", \"O2SAT\"       "

## 2024-02-29 NOTE — PROCEDURES
Pre-Procedure Checklist:  Emergent Line Insertion: No  Type of Line to be Placed: PICC  Consent Obtained: Yes  Emergency Medication Necessary: No  Patient Identified with 2 Independent Identifiers: Yes  Review of Allergies, Anticoagulation, Relevant Labs, ECG/Telemetry: Yes  Risks/Benefits/Alternatives Discussed with Patient/POA/Legal Representative: Yes  Stop Sign on Door: Yes  Time Out Performed: Yes  Catheter Exchange: No    Positioning Checklist:  All People, Including Patient, in the Room with Cap and Mask: Yes   Fluoroscopy Used to Identify Vessel and Guide Insertion: Yes   Sterile Cover Used: Yes   Full Barrier Precautions Followed (Mask, Cap, Gown, Gloves): Yes   Hands Washed: Yes   Monitors Attached with Sound Alarms On: Yes  Full Body Sterile Drape (Head-to-Toe) Used to Cover Patient: Yes   Trendelenburg Position (For IJ and Subclavian): No   CHG Skin Prep Used and Allowed to Air Dry to Skin Procedure: Yes     Procedure Checklist:  Blood Aspirated From All Lumens, All Ports Subsequently Flushed: Yes   Catheter Caps Placed on All Lumens; Lumens Clamped: Yes   Maintain Guidewire Control Throughout, Ensuring Guidewire Removal: Yes   Maintain Sterile Field Throughout Insertion: Yes   Catheter Secured: Yes   Confirmatory Test of Venous Placement: Non-Pulsatile Blood     Post Procedure Checklist:  Date and Time Written on Dressing: Yes   Sharp and Wire Count and Safe Disposal of all Sharps/Wires: Yes   Sterile Dressing Applied Per Protocol: Yes   X-ray Ordered or ECG Image: Yes     PICC Insertion Details:  See LDA for further details  PICC line expires in 1 year  Additional Details: NA  Placed by: Abigail Castillo RN  PICC ready for immediate use.

## 2024-02-29 NOTE — PROGRESS NOTES
"Vancomycin Dosing by Pharmacy- FOLLOW UP    Ernestina Argueta is a 89 y.o. year old female who Pharmacy has been consulted for vancomycin dosing for other UTI . Based on the patient's indication and renal status this patient is being dosed based on a goal trough/random level of 10-15.     Renal function is currently improving.    Current vancomycin dose: 1000 mg given every 24 hours    Most recent trough level: 14 mcg/mL    Visit Vitals  /66 (Patient Position: Sitting)   Pulse 79   Temp 36.5 °C (97.7 °F)   Resp 19        Lab Results   Component Value Date    CREATININE 1.27 (H) 02/28/2024    CREATININE 1.15 (H) 02/28/2024    CREATININE 1.47 (H) 02/27/2024    CREATININE 1.90 (H) 02/26/2024        Patient weight is No results found for: \"PTWEIGHT\"    No results found for: \"CULTURE\"     I/O last 3 completed shifts:  In: 2008.3 (31.6 mL/kg) [I.V.:1908.3 (30.1 mL/kg); IV Piggyback:100]  Out: 3100 (48.8 mL/kg) [Urine:3100 (1.4 mL/kg/hr)]  Weight: 63.5 kg   [unfilled]    No results found for: \"PATIENTTEMP\"     Assessment/Plan    Within goal random/trough level.  Continue vanco 1 gram x 1 dose now.    This dosing regimen is predicted by [employer] to result in the following pharmacokinetic parameters:  <<<<<PASTE [employer] DATA HERE>>>>>    The next level will be obtained on 2-29 at 2100. May be obtained sooner if clinically indicated.   Will continue to monitor renal function daily while on vancomycin and order serum creatinine at least every 48 hours if not already ordered.  Follow for continued vancomycin needs, clinical response, and signs/symptoms of toxicity.       Kunal Flores, PharmD           "

## 2024-03-01 VITALS
BODY MASS INDEX: 27.48 KG/M2 | HEART RATE: 95 BPM | SYSTOLIC BLOOD PRESSURE: 121 MMHG | DIASTOLIC BLOOD PRESSURE: 69 MMHG | OXYGEN SATURATION: 96 % | HEIGHT: 60 IN | RESPIRATION RATE: 18 BRPM | TEMPERATURE: 98.4 F | WEIGHT: 140 LBS

## 2024-03-01 LAB
ANION GAP SERPL CALC-SCNC: 10 MMOL/L (ref 10–20)
BUN SERPL-MCNC: 21 MG/DL (ref 6–23)
CALCIUM SERPL-MCNC: 8.1 MG/DL (ref 8.6–10.3)
CHLORIDE SERPL-SCNC: 105 MMOL/L (ref 98–107)
CO2 SERPL-SCNC: 31 MMOL/L (ref 21–32)
CREAT SERPL-MCNC: 0.95 MG/DL (ref 0.5–1.05)
EGFRCR SERPLBLD CKD-EPI 2021: 57 ML/MIN/1.73M*2
ERYTHROCYTE [DISTWIDTH] IN BLOOD BY AUTOMATED COUNT: 13.3 % (ref 11.5–14.5)
GLUCOSE BLD MANUAL STRIP-MCNC: 133 MG/DL (ref 74–99)
GLUCOSE BLD MANUAL STRIP-MCNC: 142 MG/DL (ref 74–99)
GLUCOSE BLD MANUAL STRIP-MCNC: 173 MG/DL (ref 74–99)
GLUCOSE SERPL-MCNC: 117 MG/DL (ref 74–99)
HCT VFR BLD AUTO: 32.7 % (ref 36–46)
HGB BLD-MCNC: 10.7 G/DL (ref 12–16)
INR PPP: 2.7 (ref 0.9–1.1)
MAGNESIUM SERPL-MCNC: 1.38 MG/DL (ref 1.6–2.4)
MCH RBC QN AUTO: 29.4 PG (ref 26–34)
MCHC RBC AUTO-ENTMCNC: 32.7 G/DL (ref 32–36)
MCV RBC AUTO: 90 FL (ref 80–100)
NRBC BLD-RTO: 0 /100 WBCS (ref 0–0)
PLATELET # BLD AUTO: 199 X10*3/UL (ref 150–450)
POTASSIUM SERPL-SCNC: 3.1 MMOL/L (ref 3.5–5.3)
PROTHROMBIN TIME: 30.8 SECONDS (ref 9.8–12.8)
RBC # BLD AUTO: 3.64 X10*6/UL (ref 4–5.2)
SODIUM SERPL-SCNC: 143 MMOL/L (ref 136–145)
STAPHYLOCOCCUS SPEC CULT: NORMAL
WBC # BLD AUTO: 8.4 X10*3/UL (ref 4.4–11.3)

## 2024-03-01 PROCEDURE — 85027 COMPLETE CBC AUTOMATED: CPT | Performed by: HOSPITALIST

## 2024-03-01 PROCEDURE — 2500000004 HC RX 250 GENERAL PHARMACY W/ HCPCS (ALT 636 FOR OP/ED): Performed by: INTERNAL MEDICINE

## 2024-03-01 PROCEDURE — 2500000002 HC RX 250 W HCPCS SELF ADMINISTERED DRUGS (ALT 637 FOR MEDICARE OP, ALT 636 FOR OP/ED): Performed by: HOSPITALIST

## 2024-03-01 PROCEDURE — 2500000001 HC RX 250 WO HCPCS SELF ADMINISTERED DRUGS (ALT 637 FOR MEDICARE OP): Performed by: HOSPITALIST

## 2024-03-01 PROCEDURE — 80048 BASIC METABOLIC PNL TOTAL CA: CPT | Performed by: HOSPITALIST

## 2024-03-01 PROCEDURE — 83735 ASSAY OF MAGNESIUM: CPT | Performed by: HOSPITALIST

## 2024-03-01 PROCEDURE — 2500000004 HC RX 250 GENERAL PHARMACY W/ HCPCS (ALT 636 FOR OP/ED): Performed by: HOSPITALIST

## 2024-03-01 PROCEDURE — 85610 PROTHROMBIN TIME: CPT | Performed by: HOSPITALIST

## 2024-03-01 PROCEDURE — 99239 HOSP IP/OBS DSCHRG MGMT >30: CPT | Performed by: HOSPITALIST

## 2024-03-01 PROCEDURE — 82947 ASSAY GLUCOSE BLOOD QUANT: CPT

## 2024-03-01 RX ORDER — FLUCONAZOLE 2 MG/ML
100 INJECTION, SOLUTION INTRAVENOUS DAILY
Qty: 500 ML | Refills: 0 | Status: SHIPPED | OUTPATIENT
Start: 2024-03-01 | End: 2024-03-11

## 2024-03-01 RX ORDER — FLUCONAZOLE 2 MG/ML
100 INJECTION, SOLUTION INTRAVENOUS EVERY 24 HOURS
Status: CANCELLED | OUTPATIENT
Start: 2024-03-01

## 2024-03-01 RX ORDER — NITROFURANTOIN (MACROCRYSTALS) 100 MG/1
100 CAPSULE ORAL 2 TIMES DAILY
Qty: 6 CAPSULE | Refills: 0 | Status: SHIPPED | OUTPATIENT
Start: 2024-03-01 | End: 2024-03-04

## 2024-03-01 RX ORDER — MAGNESIUM SULFATE HEPTAHYDRATE 40 MG/ML
2 INJECTION, SOLUTION INTRAVENOUS ONCE
Status: COMPLETED | OUTPATIENT
Start: 2024-03-01 | End: 2024-03-01

## 2024-03-01 RX ORDER — TRAZODONE HYDROCHLORIDE 50 MG/1
50 TABLET ORAL NIGHTLY
Start: 2024-03-15

## 2024-03-01 RX ORDER — FUROSEMIDE 40 MG/1
20 TABLET ORAL 3 TIMES WEEKLY
Qty: 1 TABLET | Refills: 0 | Status: SHIPPED | OUTPATIENT
Start: 2024-03-01

## 2024-03-01 RX ORDER — ESCITALOPRAM OXALATE 10 MG/1
10 TABLET ORAL DAILY
Start: 2024-03-15

## 2024-03-01 RX ORDER — POTASSIUM CHLORIDE 14.9 MG/ML
20 INJECTION INTRAVENOUS
Status: COMPLETED | OUTPATIENT
Start: 2024-03-01 | End: 2024-03-01

## 2024-03-01 RX ORDER — WARFARIN 3 MG/1
TABLET ORAL
Qty: 1 TABLET | Refills: 0 | Status: SHIPPED | OUTPATIENT
Start: 2024-03-01 | End: 2024-03-26 | Stop reason: WASHOUT

## 2024-03-01 RX ADMIN — NYSTATIN 400000 UNITS: 100000 SUSPENSION ORAL at 08:05

## 2024-03-01 RX ADMIN — LEVOTHYROXINE SODIUM 75 MCG: 75 TABLET ORAL at 05:28

## 2024-03-01 RX ADMIN — HYDROXYZINE HYDROCHLORIDE 25 MG: 25 TABLET ORAL at 08:05

## 2024-03-01 RX ADMIN — METOPROLOL SUCCINATE 100 MG: 50 TABLET, EXTENDED RELEASE ORAL at 08:05

## 2024-03-01 RX ADMIN — FLUCONAZOLE IN SODIUM CHLORIDE 100 MG: 2 INJECTION, SOLUTION INTRAVENOUS at 15:54

## 2024-03-01 RX ADMIN — POTASSIUM CHLORIDE AND SODIUM CHLORIDE 75 ML/HR: 450; 150 INJECTION, SOLUTION INTRAVENOUS at 03:56

## 2024-03-01 RX ADMIN — ACETAMINOPHEN 650 MG: 325 TABLET ORAL at 16:52

## 2024-03-01 RX ADMIN — POTASSIUM CHLORIDE 20 MEQ: 14.9 INJECTION, SOLUTION INTRAVENOUS at 10:00

## 2024-03-01 RX ADMIN — INSULIN LISPRO 1 UNITS: 100 INJECTION, SOLUTION INTRAVENOUS; SUBCUTANEOUS at 16:44

## 2024-03-01 RX ADMIN — MAGNESIUM SULFATE HEPTAHYDRATE 2 G: 40 INJECTION, SOLUTION INTRAVENOUS at 13:07

## 2024-03-01 RX ADMIN — POTASSIUM CHLORIDE 20 MEQ: 14.9 INJECTION, SOLUTION INTRAVENOUS at 07:54

## 2024-03-01 RX ADMIN — PANTOPRAZOLE SODIUM 20 MG: 20 TABLET, DELAYED RELEASE ORAL at 05:28

## 2024-03-01 RX ADMIN — Medication 1 TABLET: at 08:05

## 2024-03-01 ASSESSMENT — COGNITIVE AND FUNCTIONAL STATUS - GENERAL
EATING MEALS: TOTAL
TURNING FROM BACK TO SIDE WHILE IN FLAT BAD: A LOT
MOBILITY SCORE: 10
DAILY ACTIVITIY SCORE: 6
WALKING IN HOSPITAL ROOM: TOTAL
MOBILITY SCORE: 10
HELP NEEDED FOR BATHING: TOTAL
MOVING TO AND FROM BED TO CHAIR: A LOT
STANDING UP FROM CHAIR USING ARMS: A LOT
WALKING IN HOSPITAL ROOM: TOTAL
PERSONAL GROOMING: TOTAL
CLIMB 3 TO 5 STEPS WITH RAILING: TOTAL
TURNING FROM BACK TO SIDE WHILE IN FLAT BAD: A LOT
CLIMB 3 TO 5 STEPS WITH RAILING: TOTAL
TOILETING: TOTAL
DRESSING REGULAR LOWER BODY CLOTHING: TOTAL
MOVING FROM LYING ON BACK TO SITTING ON SIDE OF FLAT BED WITH BEDRAILS: A LOT
DRESSING REGULAR UPPER BODY CLOTHING: TOTAL
MOVING TO AND FROM BED TO CHAIR: A LOT
TOILETING: TOTAL
HELP NEEDED FOR BATHING: TOTAL
MOVING FROM LYING ON BACK TO SITTING ON SIDE OF FLAT BED WITH BEDRAILS: A LOT
STANDING UP FROM CHAIR USING ARMS: A LOT
PERSONAL GROOMING: TOTAL
DRESSING REGULAR LOWER BODY CLOTHING: TOTAL
EATING MEALS: TOTAL
DRESSING REGULAR UPPER BODY CLOTHING: TOTAL
DAILY ACTIVITIY SCORE: 6

## 2024-03-01 ASSESSMENT — PAIN SCALES - GENERAL: PAINLEVEL_OUTOF10: 0 - NO PAIN

## 2024-03-01 NOTE — PROGRESS NOTES
Nutrition Follow Up Assessment:   Nutrition Assessment         Patient is a 89 y.o. female presenting with: dementia      Nutrition History:  Food and Nutrient History: RDN follow up. Met with pt, pt is a poor historian, sitter at bedside who reports pt did not eat much for breakfast. Per chart review, pt with < 25% average intake x 3 documeted meals. Pt is receiving Ensure Plus with lunch and dinner - sitter states pt did not drink much of them maybe a few sips - will trial Mighty Shakes for better acceptance. Pt continues on Puree diet. Will continue to monitor.  Food Allergies/Intolerances:  None  GI Symptoms: None  Oral Problems: Swallowing difficulty       Anthropometrics:  Height: 152.4 cm (5')   Weight: 63.5 kg (140 lb)   BMI (Calculated): 27.34  IBW/kg (Dietitian Calculated): 45.5 kg  Percent of IBW: 140 %       Weight History:   Wt Readings from Last 10 Encounters:   02/26/24 63.5 kg (140 lb)   02/23/24 66.7 kg (147 lb)   09/26/23 72.1 kg (159 lb)   03/29/23 76.7 kg (169 lb)   09/30/22 77.6 kg (171 lb)   09/07/22 76.7 kg (169 lb)   08/15/22 78.9 kg (174 lb)   06/28/22 83 kg (183 lb)   03/21/22 85.3 kg (188 lb)   03/07/22 84.4 kg (186 lb)      Weight Change %:  Weight History / % Weight Change: no new wt to assess    Nutrition Focused Physical Exam Findings:    Subcutaneous Fat Loss:   Orbital Fat Pads: Well nourshed (slightly bulging fat pads)  Buccal Fat Pads: Well nourished (full, rounded cheeks)  Muscle Wasting:  Temporalis: Well nourished (well-defined muscle)  Pectoralis (Clavicular Region): Well nourished (clavicle not visible)  Edema:  Edema: none  Physical Findings:  Skin: Negative    Nutrition Significant Labs:  CBC Trend:   Results from last 7 days   Lab Units 03/01/24  0453 02/29/24  0549 02/28/24  0605 02/27/24  0953   WBC AUTO x10*3/uL 8.4 9.9 8.9 11.5*   RBC AUTO x10*6/uL 3.64* 4.25 4.11 4.33   HEMOGLOBIN g/dL 10.7* 12.1 11.9* 12.8   HEMATOCRIT % 32.7* 38.4 36.6 38.6   MCV fL 90 90 89 89    PLATELETS AUTO x10*3/uL 199 230 237 247    , BMP Trend:   Results from last 7 days   Lab Units 03/01/24  0453 02/29/24  1635 02/29/24  0549 02/28/24  1328   GLUCOSE mg/dL 117* 151* 137* 212*   CALCIUM mg/dL 8.1* 8.5* 8.8 8.9   SODIUM mmol/L 143 148* 153* 152*   POTASSIUM mmol/L 3.1* 3.1* 3.1* 3.4*   CO2 mmol/L 31 33* 34* 36*   CHLORIDE mmol/L 105 106 111* 109*   BUN mg/dL 21 26* 28* 38*   CREATININE mg/dL 0.95 0.97 1.05 1.27*    , BG POCT trend:   Results from last 7 days   Lab Units 03/01/24  1110 03/01/24  0537 02/29/24  1932 02/29/24  1607 02/29/24  1048   POCT GLUCOSE mg/dL 142* 133* 163* 159* 120*        Nutrition Specific Medications:  reviewed    I/O:   Last BM Date: 02/28/24; Stool Appearance: Formed (02/28/24 0637)        Dietary Orders (From admission, onward)       Start     Ordered    02/26/24 1145  Oral nutritional supplements  Until discontinued        Question Answer Comment   Deliver with Lunch    Deliver with Dinner    Select supplement: Ensure Plus High Protein        02/26/24 1144    02/26/24 1036  Adult diet Regular; Pureed 4; Thin 0  Diet effective now        Question Answer Comment   Diet type Regular    Texture Pureed 4    Fluid consistency Thin 0        02/26/24 1035                     Estimated Needs:   Total Energy Estimated Needs (kCal): 1587 kCal  Method for Estimating Needs: 25 kcal/kg ABW  Total Protein Estimated Needs (g): 64 g  Method for Estimating Needs: 1 g/kg ABW  Total Fluid Estimated Needs (mL): 1587 mL  Method for Estimating Needs: 1 ml/kcal or per MD        Nutrition Diagnosis   Malnutrition Diagnosis  Patient has Malnutrition Diagnosis: No    Nutrition Diagnosis  Patient has Nutrition Diagnosis: Yes  Diagnosis Status (1): Ongoing  Nutrition Diagnosis 1: Inadequate oral intake  Related to (1): thrush, hx dementia  As Evidenced by (1): H&P report of poor po intake since thrush diagnosis  Additional Nutrition Diagnosis: Diagnosis 2  Diagnosis Status (2): Ongoing  Nutrition  Diagnosis 2: Swallowing difficulity  Related to (2): thrush  As Evidenced by (2): need for modified texture per SLP       Nutrition Interventions/Recommendations         Nutrition Prescription:  Individualized Nutrition Prescription Provided for : Puree diet, thin liquids with ONS - texture/consistency per SLP recommendations. change supplement to Mighty Shake TID        Nutrition Interventions:   Interventions: Meals and snacks, Medical food supplement  Meals and Snacks: Texture-modified diet, General healthful diet  Goal: Consumes 3 meals per day  Medical Food Supplement: Commercial beverage  Goal: Mighty Shake TID (220 kcal and 6 g protein per serving).  Additional Interventions: pt requires assist with feeds per SLP recommendations         Nutrition Education:   Not appropriate       Nutrition Monitoring and Evaluation   Food/Nutrient Related History Monitoring  Monitoring and Evaluation Plan: Energy intake, Amount of food, Fluid intake  Energy Intake: Estimated energy intake  Criteria: Pt meets >75% of estimated energy needs  Fluid Intake: Estimated fluid intake  Criteria: Meets >75% of estimated fluid needs  Amount of Food: Estimated amout of food, Medical food intake  Criteria: Pt consumes >75% of meals and supplements    Body Composition/Growth/Weight History  Monitoring and Evaluation Plan: Weight  Weight: Measured weight  Criteria: Maintains stable weight    Biochemical Data, Medical Tests and Procedures  Monitoring and Evaluation Plan: Glucose/endocrine profile  Glucose/Endocrine Profile: Glucose, casual  Criteria: BG within desirable range            Time Spent/Follow-up Reminder:   Time Spent (min): 30 minutes  Last Date of Nutrition Visit: 03/01/24  Nutrition Follow-Up Needed?: 3-5 days  Follow up Comment: mighty shake TID

## 2024-03-01 NOTE — PROGRESS NOTES
03/01/24 1344   Current Planned Discharge Disposition   Current Planned Discharge Disposition SNF  ([facility])     Pt cleared for discharge today. SW notified facility and confirmed they are able to accommodate pt today. SW requested transport in roundtrip, transport confirms 5pm pickup. Care team and facility updated. SW contacted pt DELPHINEANNA Wendy, unable to reach her, did leave a VM with discharge information and SW contact info.     2:15pm- Return call from RON Nguyen. All questions answered. Wendy in agreement with plan to return to [facility] this evening.

## 2024-03-01 NOTE — DISCHARGE SUMMARY
DISCHARGE DIAGNOSIS     Failure to thrive  Severe dysphagia and odynophagia secondary to thrush  History of dementia with delirium  Persistent hypokalemia  Acute kidney injury  Permanent atrial fibrillation  Urinary tract infection  Hypernatremia, improved    HOSPITAL COURSE AND DETAILS     Ms. Argueta is an 89-year-old with past medical history of dementia, history of progression of dementia who presented to the emergency room with acute encephalopathy and acute kidney injury.    She presented to the emergency room with inability to eat.  She unfortunately has had a stepwise decline in her mentation since October 2023 after she was hospitalized for urinary tract infection.    She was profoundly dehydrated, started on IV fluids.  Initially she was confused, delirious and moaning.  She was found to have severe thrush, started on IV fluconazole.  She slowly continued to improve in terms of her mentation, she was seen by speech therapy who recommended a puréed diet.  Her oral thrush significantly improved on discharge.  She will be discharged on a fluconazole for 10 days, total duration is 14 days.  After that PCP can assess if she needs treatment for longer.    She was treated for urinary tract infection, her urine culture grew Enterococcus, she remained on vancomycin for 4 days and switch to nitrofurantoin to complete 7 days.    She was also found to be profoundly dehydrated and persistently hypokalemic.  At that skilled nursing feels that she was on Lasix as well as metolazone.  She was also noted to have acute kidney injury with a creatinine of 3 on admission.  On discharge, her metolazone was discontinued, her Lasix was reduced to 20 mg 3 times weekly.    She has a history of diabetes, but blood sugars were within normal range.  Her Lantus was discontinued, discharged on a sliding scale.    She has a history of permanent atrial fibrillation and history of a pacemaker, spoke with EP cardiology, plan to have an  outpatient visit in 2 weeks for pacemaker battery exchange.      Total time spent on discharge planning and coordination of care 60 minutes.  Discharge planning was discussed with patient's granddaughter Wendy over the phone , she understands and agrees with plan of care.    * Some of these notes were completed using Dragon voice recognition technology and may include unintended areas with respect to translation of word, typographical errors or primary areas which may not have been identified prior to finalization of the document.    DISCHARGE PHYSICAL EXAM     Last Recorded Vitals:  Vitals:    02/29/24 1459 02/29/24 1955 03/01/24 0012 03/01/24 0735   BP: 131/73 98/54 132/63 121/69   Patient Position:       Pulse: 98 80 80 95   Resp:  16 18    Temp: 36.2 °C (97.2 °F) 36.8 °C (98.2 °F) 36.9 °C (98.4 °F) 36.9 °C (98.4 °F)   TempSrc:       SpO2: 98% 96% 96% 96%   Weight:       Height:           Physical Exam  PHYSICAL EXAM:   GENERAL: Laying in bed, does not appear to be in any distress.   HEENT: HEAD: Normocephalic atraumatic.  Neck: Supple.  Eyes: Pupils are reactive to direct light.   CVS: S1, S2 heard. Regular rate and rhythm  LUNGS: Clear to auscultate bilaterally. No wheezing or rhonchi appreciated.  ABDOMEN: Soft, nontender to palpate. Positive bowel sounds. No guarding or rebound appreciated.  NEUROLOGICAL: No focal neurological deficits appreciated. Cranial nerves are grossly intact.  EXTREMITIES: Lower extremity edema appreciated  SKIN:  Grossly intact, warm and dry.    DISCHARGE MEDICATIONS        Your medication list        START taking these medications        Instructions Last Dose Given Next Dose Due   fluconazole in NaCl (iso-osm) 100 mg/50 mL IVPB  Commonly known as: Diflucan      Infuse 50 mL (100 mg) at 50 mL/hr over 60 minutes into a venous catheter once daily for 10 days.       lidocaine-diphenhydrAMINE-Maalox 1:1:1 421--40 mg/30 mL liquid mouthwash  Commonly known as: Magic Mouthwash       Swish and swallow 10 mL every 4 hours if needed (for oral pain).       nitrofurantoin 100 mg capsule  Commonly known as: Macrodantin      Take 1 capsule (100 mg) by mouth 2 times a day for 3 days.       warfarin 3 mg tablet  Commonly known as: Coumadin      Take as directed per After Visit Summary.              CHANGE how you take these medications        Instructions Last Dose Given Next Dose Due   furosemide 40 mg tablet  Commonly known as: Lasix  What changed: See the new instructions.      Take 0.5 tablets (20 mg) by mouth 3 (three) times a week.       escitalopram 10 mg tablet  Commonly known as: Lexapro  Start taking on: March 15, 2024  What changed: These instructions start on March 15, 2024. If you are unsure what to do until then, ask your doctor or other care provider.      Take 1 tablet (10 mg) by mouth once daily. Do not start before March 15, 2024.       traZODone 50 mg tablet  Commonly known as: Desyrel  Start taking on: March 15, 2024  What changed: These instructions start on March 15, 2024. If you are unsure what to do until then, ask your doctor or other care provider.      Take 1 tablet (50 mg) by mouth once daily at bedtime. Do not start before March 15, 2024.              CONTINUE taking these medications        Instructions Last Dose Given Next Dose Due   acetaminophen 650 mg suppository  Commonly known as: Tylenol           acetaminophen 325 mg tablet  Commonly known as: Tylenol           alum-mag hydroxide-simeth 200-200-20 mg/5 mL oral suspension  Commonly known as: Mylanta           ammonium lactate 12 % cream  Commonly known as: Amlactin           bisacodyl 10 mg suppository  Commonly known as: Dulcolax           cholecalciferol 25 MCG (1000 UT) tablet  Commonly known as: Vitamin D-3           Fleet Enema Extra 19-7 gram/197 mL enema  Generic drug: sodium phosphates           hydrOXYzine HCL 25 mg tablet  Commonly known as: Atarax           isosorbide mononitrate ER 60 mg 24 hr  tablet  Commonly known as: Imdur           lactobacillus acidophilus capsule           levothyroxine 75 mcg tablet  Commonly known as: Synthroid, Levoxyl           magnesium hydroxide 400 mg/5 mL suspension  Commonly known as: Milk of Magnesia           magnesium oxide 400 mg magnesium capsule           meclizine 12.5 mg tablet  Commonly known as: Antivert           metoprolol succinate  mg 24 hr tablet  Commonly known as: Toprol-XL           multivitamin with minerals iron-free  Commonly known as: Centrum Silver           nitroglycerin 0.4 mg SL tablet  Commonly known as: Nitrostat           NovoLOG Flexpen U-100 Insulin 100 unit/mL (3 mL) pen  Generic drug: insulin aspart           Nystatin/Doxycycline/Hydrocortisone/Diphenhydramine Oral Susp           pantoprazole 20 mg EC tablet  Commonly known as: ProtoNix           potassium chloride 40 mEq/15 mL liquid           ProAir HFA 90 mcg/actuation inhaler  Generic drug: albuterol           sodium chloride 0.65 % nasal spray  Commonly known as: [place]                  STOP taking these medications      Basaglar KwikPen U-100 Insulin 100 unit/mL (3 mL) pen  Generic drug: insulin glargine        hydrALAZINE 25 mg tablet  Commonly known as: Apresoline        metOLazone 2.5 mg tablet  Commonly known as: Zaroxolyn        telmisartan 40 mg tablet  Commonly known as: MIcarDIS                  Where to Get Your Medications        These medications were sent to [id number] - [place], [place] - [address]  [address] , [facility] [address]      Phone: [phone]   fluconazole in NaCl (iso-osm) 100 mg/50 mL IVPB  furosemide 40 mg tablet  nitrofurantoin 100 mg capsule  warfarin 3 mg tablet       You can get these medications from any pharmacy    Bring a paper prescription for each of these medications  lidocaine-diphenhydrAMINE-Maalox 1:1:1 789--40 mg/30 mL liquid mouthwash       Information about where to get these medications is not yet available     Ask your nurse or doctor about these medications  escitalopram 10 mg tablet  traZODone 50 mg tablet           OUTPATIENT FOLLOW-UP     Future Appointments   Date Time Provider Department Center   3/26/2024 10:45 AM Sj Subramanian MD [id number] [facility]   3/29/2024  9:00 AM Norbert Finley MD [id number] [facility]

## 2024-03-04 ENCOUNTER — OFFICE VISIT (OUTPATIENT)
Dept: GERIATRIC MEDICINE | Age: 89
End: 2024-03-04
Payer: MEDICARE

## 2024-03-04 DIAGNOSIS — L89.152 DECUBITUS ULCER OF SACRAL REGION, STAGE 2 (HCC): ICD-10-CM

## 2024-03-04 DIAGNOSIS — I10 HYPERTENSION, UNSPECIFIED TYPE: ICD-10-CM

## 2024-03-04 DIAGNOSIS — B37.0 ORAL THRUSH: ICD-10-CM

## 2024-03-04 DIAGNOSIS — E03.9 HYPOTHYROIDISM, UNSPECIFIED TYPE: ICD-10-CM

## 2024-03-04 DIAGNOSIS — I50.32 CHRONIC DIASTOLIC CHF (CONGESTIVE HEART FAILURE) (HCC): ICD-10-CM

## 2024-03-04 DIAGNOSIS — F03.90 DEMENTIA WITHOUT BEHAVIORAL DISTURBANCE (HCC): Primary | ICD-10-CM

## 2024-03-04 PROCEDURE — 99306 1ST NF CARE HIGH MDM 50: CPT | Performed by: INTERNAL MEDICINE

## 2024-03-04 PROCEDURE — 1123F ACP DISCUSS/DSCN MKR DOCD: CPT | Performed by: INTERNAL MEDICINE

## 2024-03-04 PROCEDURE — G8484 FLU IMMUNIZE NO ADMIN: HCPCS | Performed by: INTERNAL MEDICINE

## 2024-03-05 RX ORDER — MECLIZINE HCL 12.5 MG/1
12.5 TABLET ORAL 3 TIMES DAILY PRN
COMMUNITY
Start: 2024-03-01

## 2024-03-05 RX ORDER — TRAZODONE HYDROCHLORIDE 50 MG/1
50 TABLET ORAL NIGHTLY
COMMUNITY
Start: 2024-03-15

## 2024-03-05 RX ORDER — HYDROXYZINE HYDROCHLORIDE 25 MG/1
25 TABLET, FILM COATED ORAL DAILY
COMMUNITY
Start: 2024-03-01

## 2024-03-05 RX ORDER — NITROGLYCERIN 0.4 MG/1
0.4 TABLET SUBLINGUAL EVERY 5 MIN PRN
COMMUNITY
Start: 2024-03-01

## 2024-03-05 RX ORDER — ESCITALOPRAM OXALATE 10 MG/1
10 TABLET ORAL DAILY
COMMUNITY
Start: 2024-03-01

## 2024-03-05 RX ORDER — MAGNESIUM HYDROXIDE/ALUMINUM HYDROXICE/SIMETHICONE 120; 1200; 1200 MG/30ML; MG/30ML; MG/30ML
30 SUSPENSION ORAL EVERY 4 HOURS PRN
COMMUNITY

## 2024-03-05 RX ORDER — NITROFURANTOIN MACROCRYSTALS 100 MG/1
100 CAPSULE ORAL 2 TIMES DAILY
COMMUNITY
Start: 2024-03-01

## 2024-03-05 RX ORDER — FLUCONAZOLE 2 MG/ML
200 INJECTION, SOLUTION INTRAVENOUS EVERY 24 HOURS
COMMUNITY
Start: 2024-03-03

## 2024-03-05 NOTE — PROGRESS NOTES
History and Physical      CHIEF COMPLAINT:  oral thrush/ JANE     History of Present Illness:      A 89 y.o. female who is being seen at [facility] secondary to recent hospitalization for JANE and hypokalemia from extensive thrush. She was treated with IV fluconazole.     REVIEW OF SYSTEMS:  A complete 10 Point review of systems was preformed and negative unless previously stated      PMH:  Past Medical History:   Diagnosis Date    Abnormal liver ultrasound 5/15/2019    Allergic contact dermatitis due to plants, except food 8/19/2019    Arthritis     Atrial fibrillation (HCC)     CHF (congestive heart failure) (HCC)     Chronic kidney disease     Chronic kidney disease (CKD), stage II (mild)     Depression     Hyperlipidemia     Hypertension     Hypothyroidism     Interstitial cystitis     Dr. Chappell diagnosed this for patinet.    Type II or unspecified type diabetes mellitus without mention of complication, not stated as uncontrolled        Surgical History:  Past Surgical History:   Procedure Laterality Date    ABDOMEN SURGERY      CATARACT REMOVAL WITH IMPLANT      both eyes    CORONARY ANGIOPLASTY WITH STENT PLACEMENT      HYSTERECTOMY (CERVIX STATUS UNKNOWN)      OTHER SURGICAL HISTORY  09/07/2016    GENERATOR CHANGE     PACEMAKER PLACEMENT         Medications Prior to Admission:    Prior to Admission medications    Medication Sig Start Date End Date Taking? Authorizing Provider   escitalopram (LEXAPRO) 10 MG tablet Take 1 tablet by mouth daily 3/1/24   Shanda Pandey MD   fluconazole (DIFLUCAN) 2MG/ML in 0.9 % sodium chloride IVPB Infuse 100 mLs intravenously every 24 hours 3/3/24   Shanda Pandey MD   aluminum & magnesium hydroxide-simethicone (MAALOX) 200-200-20 MG/5ML SUSP suspension Take 30 mLs by mouth every 4 hours as needed for Indigestion    Shanda Pandey MD   hydrOXYzine HCl (ATARAX) 25 MG tablet Take 1 tablet by mouth daily 3/1/24   Shanda Pandey MD

## 2024-03-07 ENCOUNTER — APPOINTMENT (OUTPATIENT)
Dept: CARDIOLOGY | Facility: CLINIC | Age: 89
End: 2024-03-07
Payer: COMMERCIAL

## 2024-03-15 ENCOUNTER — APPOINTMENT (OUTPATIENT)
Dept: CARDIOLOGY | Facility: HOSPITAL | Age: 89
End: 2024-03-15
Payer: COMMERCIAL

## 2024-03-15 ENCOUNTER — OFFICE VISIT (OUTPATIENT)
Dept: GERIATRIC MEDICINE | Age: 89
End: 2024-03-15

## 2024-03-15 ENCOUNTER — APPOINTMENT (OUTPATIENT)
Dept: RADIOLOGY | Facility: HOSPITAL | Age: 89
End: 2024-03-15
Payer: COMMERCIAL

## 2024-03-15 ENCOUNTER — HOSPITAL ENCOUNTER (EMERGENCY)
Facility: HOSPITAL | Age: 89
Discharge: HOME | End: 2024-03-15
Attending: EMERGENCY MEDICINE
Payer: COMMERCIAL

## 2024-03-15 VITALS
TEMPERATURE: 97.2 F | HEIGHT: 64 IN | SYSTOLIC BLOOD PRESSURE: 131 MMHG | WEIGHT: 140 LBS | BODY MASS INDEX: 23.9 KG/M2 | DIASTOLIC BLOOD PRESSURE: 70 MMHG | RESPIRATION RATE: 16 BRPM | HEART RATE: 89 BPM | OXYGEN SATURATION: 98 %

## 2024-03-15 DIAGNOSIS — S01.01XA LACERATION OF OCCIPITAL REGION OF SCALP, INITIAL ENCOUNTER: ICD-10-CM

## 2024-03-15 DIAGNOSIS — L89.152 DECUBITUS ULCER OF SACRAL REGION, STAGE 2 (HCC): ICD-10-CM

## 2024-03-15 DIAGNOSIS — I10 HYPERTENSION, UNSPECIFIED TYPE: ICD-10-CM

## 2024-03-15 DIAGNOSIS — E03.9 HYPOTHYROIDISM, UNSPECIFIED TYPE: ICD-10-CM

## 2024-03-15 DIAGNOSIS — R79.1 ELEVATED INR: ICD-10-CM

## 2024-03-15 DIAGNOSIS — S09.90XA CLOSED HEAD INJURY, INITIAL ENCOUNTER: Primary | ICD-10-CM

## 2024-03-15 DIAGNOSIS — Z79.01 ANTICOAGULATED ON WARFARIN: ICD-10-CM

## 2024-03-15 DIAGNOSIS — F03.90 DEMENTIA WITHOUT BEHAVIORAL DISTURBANCE (HCC): Primary | ICD-10-CM

## 2024-03-15 DIAGNOSIS — I50.32 CHRONIC DIASTOLIC CHF (CONGESTIVE HEART FAILURE) (HCC): ICD-10-CM

## 2024-03-15 LAB
ABO GROUP (TYPE) IN BLOOD: NORMAL
ALBUMIN SERPL BCP-MCNC: 3.6 G/DL (ref 3.4–5)
ALP SERPL-CCNC: 61 U/L (ref 33–136)
ALT SERPL W P-5'-P-CCNC: 16 U/L (ref 7–45)
ANION GAP SERPL CALC-SCNC: 12 MMOL/L (ref 10–20)
ANTIBODY SCREEN: NORMAL
AST SERPL W P-5'-P-CCNC: 22 U/L (ref 9–39)
BASOPHILS # BLD AUTO: 0.01 X10*3/UL (ref 0–0.1)
BASOPHILS NFR BLD AUTO: 0.1 %
BILIRUB SERPL-MCNC: 0.4 MG/DL (ref 0–1.2)
BUN SERPL-MCNC: 21 MG/DL (ref 6–23)
CALCIUM SERPL-MCNC: 9 MG/DL (ref 8.6–10.3)
CHLORIDE SERPL-SCNC: 97 MMOL/L (ref 98–107)
CO2 SERPL-SCNC: 29 MMOL/L (ref 21–32)
CREAT SERPL-MCNC: 1.15 MG/DL (ref 0.5–1.05)
EGFRCR SERPLBLD CKD-EPI 2021: 46 ML/MIN/1.73M*2
EOSINOPHIL # BLD AUTO: 0.02 X10*3/UL (ref 0–0.4)
EOSINOPHIL NFR BLD AUTO: 0.2 %
ERYTHROCYTE [DISTWIDTH] IN BLOOD BY AUTOMATED COUNT: 14.2 % (ref 11.5–14.5)
ETHANOL SERPL-MCNC: <10 MG/DL
GLUCOSE SERPL-MCNC: 191 MG/DL (ref 74–99)
HCT VFR BLD AUTO: 37.4 % (ref 36–46)
HGB BLD-MCNC: 12.3 G/DL (ref 12–16)
HOLD SPECIMEN: NORMAL
HOLD SPECIMEN: NORMAL
IMM GRANULOCYTES # BLD AUTO: 0.03 X10*3/UL (ref 0–0.5)
IMM GRANULOCYTES NFR BLD AUTO: 0.3 % (ref 0–0.9)
INR PPP: 5.7 (ref 0.9–1.1)
LYMPHOCYTES # BLD AUTO: 1.74 X10*3/UL (ref 0.8–3)
LYMPHOCYTES NFR BLD AUTO: 18.7 %
MCH RBC QN AUTO: 28.5 PG (ref 26–34)
MCHC RBC AUTO-ENTMCNC: 32.9 G/DL (ref 32–36)
MCV RBC AUTO: 87 FL (ref 80–100)
MONOCYTES # BLD AUTO: 0.79 X10*3/UL (ref 0.05–0.8)
MONOCYTES NFR BLD AUTO: 8.5 %
NEUTROPHILS # BLD AUTO: 6.73 X10*3/UL (ref 1.6–5.5)
NEUTROPHILS NFR BLD AUTO: 72.2 %
NRBC BLD-RTO: 0 /100 WBCS (ref 0–0)
PLATELET # BLD AUTO: 315 X10*3/UL (ref 150–450)
POTASSIUM SERPL-SCNC: 4.6 MMOL/L (ref 3.5–5.3)
PROT SERPL-MCNC: 7.4 G/DL (ref 6.4–8.2)
PROTHROMBIN TIME: 65.3 SECONDS (ref 9.8–12.8)
RBC # BLD AUTO: 4.31 X10*6/UL (ref 4–5.2)
RH FACTOR (ANTIGEN D): NORMAL
SODIUM SERPL-SCNC: 133 MMOL/L (ref 136–145)
WBC # BLD AUTO: 9.3 X10*3/UL (ref 4.4–11.3)

## 2024-03-15 PROCEDURE — 70450 CT HEAD/BRAIN W/O DYE: CPT | Performed by: STUDENT IN AN ORGANIZED HEALTH CARE EDUCATION/TRAINING PROGRAM

## 2024-03-15 PROCEDURE — 99285 EMERGENCY DEPT VISIT HI MDM: CPT | Mod: 25

## 2024-03-15 PROCEDURE — 72125 CT NECK SPINE W/O DYE: CPT | Performed by: STUDENT IN AN ORGANIZED HEALTH CARE EDUCATION/TRAINING PROGRAM

## 2024-03-15 PROCEDURE — 2500000002 HC RX 250 W HCPCS SELF ADMINISTERED DRUGS (ALT 637 FOR MEDICARE OP, ALT 636 FOR OP/ED): Performed by: EMERGENCY MEDICINE

## 2024-03-15 PROCEDURE — 80053 COMPREHEN METABOLIC PANEL: CPT | Performed by: EMERGENCY MEDICINE

## 2024-03-15 PROCEDURE — 12001 RPR S/N/AX/GEN/TRNK 2.5CM/<: CPT | Performed by: EMERGENCY MEDICINE

## 2024-03-15 PROCEDURE — 85025 COMPLETE CBC W/AUTO DIFF WBC: CPT | Performed by: EMERGENCY MEDICINE

## 2024-03-15 PROCEDURE — G0390 TRAUMA RESPONS W/HOSP CRITI: HCPCS

## 2024-03-15 PROCEDURE — 70450 CT HEAD/BRAIN W/O DYE: CPT

## 2024-03-15 PROCEDURE — 82077 ASSAY SPEC XCP UR&BREATH IA: CPT | Performed by: EMERGENCY MEDICINE

## 2024-03-15 PROCEDURE — 86901 BLOOD TYPING SEROLOGIC RH(D): CPT | Performed by: EMERGENCY MEDICINE

## 2024-03-15 PROCEDURE — 72125 CT NECK SPINE W/O DYE: CPT

## 2024-03-15 PROCEDURE — 93005 ELECTROCARDIOGRAM TRACING: CPT | Mod: 59

## 2024-03-15 PROCEDURE — 85610 PROTHROMBIN TIME: CPT | Performed by: EMERGENCY MEDICINE

## 2024-03-15 RX ORDER — PHYTONADIONE 5 MG/1
2.5 TABLET ORAL ONCE
Status: COMPLETED | OUTPATIENT
Start: 2024-03-15 | End: 2024-03-15

## 2024-03-15 RX ORDER — LIDOCAINE HYDROCHLORIDE AND EPINEPHRINE 20; 10 MG/ML; UG/ML
5 INJECTION, SOLUTION INFILTRATION; PERINEURAL ONCE
Status: DISCONTINUED | OUTPATIENT
Start: 2024-03-15 | End: 2024-03-16 | Stop reason: HOSPADM

## 2024-03-15 RX ADMIN — PHYTONADIONE 2.5 MG: 5 TABLET ORAL at 21:05

## 2024-03-15 ASSESSMENT — PAIN SCALES - GENERAL: PAINLEVEL_OUTOF10: 0 - NO PAIN

## 2024-03-15 ASSESSMENT — LIFESTYLE VARIABLES
EVER HAD A DRINK FIRST THING IN THE MORNING TO STEADY YOUR NERVES TO GET RID OF A HANGOVER: NO
HAVE PEOPLE ANNOYED YOU BY CRITICIZING YOUR DRINKING: NO
EVER FELT BAD OR GUILTY ABOUT YOUR DRINKING: NO
HAVE YOU EVER FELT YOU SHOULD CUT DOWN ON YOUR DRINKING: NO

## 2024-03-15 ASSESSMENT — PAIN - FUNCTIONAL ASSESSMENT: PAIN_FUNCTIONAL_ASSESSMENT: 0-10

## 2024-03-15 NOTE — ED PROVIDER NOTES
HPI   Chief Complaint   Patient presents with    Fall     PT remembers falling. Does not recall what time. Bump on the back of the head. PT takes coumadin       Is a 89-year-old female with history of dementia atrial fibrillation kidney disease comes in brought by EMS for a fall as per the patient she turned around and lost her balance and fell and hit her head on the ground and as per EMS it was not witnessed but the nursing home people heard her and came to check on her she was the ground, patient was bleeding to the back of the head and on Coumadin so brought him here.                          Nisreen Coma Scale Score: 14                     Patient History   Past Medical History:   Diagnosis Date    Muscle spasm of back     Back spasm    Pain in thoracic spine 08/15/2022    Back pain, thoracic    Personal history of other specified conditions     History of vertigo    Personal history of other specified conditions     History of itching     Past Surgical History:   Procedure Laterality Date    IR CVC PICC  2/29/2024    IR CVC PICC 2/29/2024 EL[facility] ANGIO    OTHER SURGICAL HISTORY  01/03/2022    Cardioversion    OTHER SURGICAL HISTORY  01/03/2022    Total hysterectomy abdominal    OTHER SURGICAL HISTORY  01/03/2022    Tonsillectomy    OTHER SURGICAL HISTORY  01/03/2022    Pacemaker insertion    OTHER SURGICAL HISTORY  01/03/2022    Cataract surgery    OTHER SURGICAL HISTORY  01/03/2022    Arm surgery    OTHER SURGICAL HISTORY  01/03/2022    Atrial cardioversion    OTHER SURGICAL HISTORY  01/12/2022    Complete colonoscopy     No family history on file.  Social History     Tobacco Use    Smoking status: Never    Smokeless tobacco: Never   Substance Use Topics    Alcohol use: Not on file    Drug use: Not on file       Physical Exam   ED Triage Vitals [03/15/24 1944]   Temp Pulse Respirations BP   -- -- 18 147/78      SpO2 Temp src Heart Rate Source Patient Position   -- -- -- --      BP Location FiO2 (%)     -- --        Physical Exam  Vitals and nursing note reviewed.   HENT:      Head: Normocephalic.      Comments: Laceration to the back of the head SMALL 1 CM, no active bleeding  Cardiovascular:      Rate and Rhythm: Normal rate. Rhythm irregular.   Pulmonary:      Effort: Pulmonary effort is normal.      Breath sounds: Normal breath sounds.   Abdominal:      Palpations: Abdomen is soft.   Musculoskeletal:         General: No tenderness. Normal range of motion.      Comments: No pelvic tenderness no tenderness on the ribs   Skin:     General: Skin is warm.   Neurological:      General: No focal deficit present.      Mental Status: She is alert. Mental status is at baseline.         ED Course & MDM   Diagnoses as of 03/15/24 2123   Closed head injury, initial encounter   Laceration of occipital region of scalp, initial encounter   Anticoagulated on warfarin   Elevated INR       Medical Decision Making  My differential diagnosis  Acute intracerebral hemorrhage closed head injury likely injury cervical sprain cervical fracture dislocation acute electrolyte imbalance acute coagulopathy  I ordered CBC chemistries PT/INR CT scan of the head and neck and EKG further workup and management to be done based upon the results of the test ordered  EKG was interpreted by me as paced rhythm rate of 103 QRS is widened and nonspecific ST changes    Labs Reviewed  CBC WITH AUTO DIFFERENTIAL - Abnormal     WBC                           9.3                    nRBC                          0.0                    RBC                           4.31                   Hemoglobin                    12.3                   Hematocrit                    37.4                   MCV                           87                     MCH                           28.5                   MCHC                          32.9                   RDW                           14.2                   Platelets                     315                    Neutrophils %                  72.2                   Immature Granulocytes %, Automated   0.3                    Lymphocytes %                 18.7                   Monocytes %                   8.5                    Eosinophils %                 0.2                    Basophils %                   0.1                    Neutrophils Absolute          6.73 (*)               Immature Granulocytes Absolute, Au*   0.03                   Lymphocytes Absolute          1.74                   Monocytes Absolute            0.79                   Eosinophils Absolute          0.02                   Basophils Absolute            0.01                COMPREHENSIVE METABOLIC PANEL - Abnormal     Glucose                       191 (*)                Sodium                        133 (*)                Potassium                     4.6                    Chloride                      97 (*)                 Bicarbonate                   29                     Anion Gap                     12                     Urea Nitrogen                 21                     Creatinine                    1.15 (*)               eGFR                          46 (*)                 Calcium                       9.0                    Albumin                       3.6                    Alkaline Phosphatase          61                     Total Protein                 7.4                    AST                           22                     Bilirubin, Total              0.4                    ALT                           16                  PROTIME-INR - Abnormal     Protime                       65.3 (*)               INR                           5.7 (*)             ALCOHOL - Normal     Alcohol                       <10                 TYPE AND SCREEN     ABO TYPE                      O                      Rh TYPE                       POS                    ANTIBODY SCREEN               NEG                 GRAY TOP     CT head W O contrast trauma protocol    Final Result    CT HEAD:    1. No evidence of hemorrhage, depressed skull fracture, or other    acute intracranial trauma.    2. Patchy and confluence areas of diminished attenuation present in    the periventricular and subcortical white matter of bilateral    cerebral hemispheres are nonspecific, but favored to represent    chronic sequela of microvascular disease.          CT C-SPINE:    1. Within limitations of the study is somewhat degraded by motion, no    definite evidence of acute trauma to the cervical spine.    2. Multilevel degenerative changes of the cervical spine, with likely    at least mild spinal canal and mild-to-moderate neural foraminal    narrowing present at several levels due to disc osteophyte complexes,    ligamentum flavum thickening and hypertrophic facet changes.    3. Questionable partially visualized right-sided pleural effusion.          MACRO:    None          Signed by: Ludin Cano 3/15/2024 7:43 PM    Dictation workstation:   [id number]     CT cervical spine wo IV contrast   Final Result    CT HEAD:    1. No evidence of hemorrhage, depressed skull fracture, or other    acute intracranial trauma.    2. Patchy and confluence areas of diminished attenuation present in    the periventricular and subcortical white matter of bilateral    cerebral hemispheres are nonspecific, but favored to represent    chronic sequela of microvascular disease.          CT C-SPINE:    1. Within limitations of the study is somewhat degraded by motion, no    definite evidence of acute trauma to the cervical spine.    2. Multilevel degenerative changes of the cervical spine, with likely    at least mild spinal canal and mild-to-moderate neural foraminal    narrowing present at several levels due to disc osteophyte complexes,    ligamentum flavum thickening and hypertrophic facet changes.    3. Questionable partially visualized right-sided pleural effusion.          MACRO:    None          Signed  by: Ludin Cano 3/15/2024 7:43 PM    Dictation workstation:   [id number]      Patient's INR was elevated and I was already told by the nurses that nursing home and told that the hide INR was high earlier and they were holding the Coumadin.  At this time I ordered 2.5 mg of p.o. vitamin K, patient's not has no active bleeding otherwise comfortable in no acute distress will be discharged back to the nursing home.  Patient is DNR CC and no intubation and no ICU.  At this time patient was observed in the ED CT scan was negative patient was advised to stop Coumadin and I talked to the family who are aware of the elevated INR and they did tell me that the patient was supposed to be taken off Coumadin and put on Eliquis.  At this time discharged home.        Procedure  Laceration Repair    Performed by: Ailyn Diamond MD  Authorized by: Ailyn Diamond MD    Consent:     Consent obtained:  Emergent situation  Laceration details:     Location:  Scalp    Scalp location:  Occipital    Length (cm):  1  Pre-procedure details:     Preparation:  Patient was prepped and draped in usual sterile fashion  Exploration:     Hemostasis achieved with:  Direct pressure    Contaminated: no    Treatment:     Area cleansed with:  Chlorhexidine    Amount of cleaning:  Standard  Skin repair:     Repair method:  Tissue adhesive  Approximation:     Approximation:  Close  Repair type:     Repair type:  Simple       Ailyn Diamond MD  03/15/24 [id number]

## 2024-03-16 LAB
ATRIAL RATE: 102 BPM
Q ONSET: 216 MS
QRS COUNT: 17 BEATS
QRS DURATION: 126 MS
QT INTERVAL: 390 MS
QTC CALCULATION(BAZETT): 510 MS
QTC FREDERICIA: 467 MS
R AXIS: 47 DEGREES
T AXIS: 38 DEGREES
T OFFSET: 411 MS
VENTRICULAR RATE: 103 BPM

## 2024-03-16 NOTE — DISCHARGE INSTRUCTIONS
Your Coumadin level thinning effect also known as INR was 5.7.  There is no active bleeding CT scan was negative you need to have your Coumadin stopped for now and should have repeat blood work done to check the INR before any anticoagulation can be started

## 2024-03-16 NOTE — ED NOTES
Assigned to section patient is in at 1920, approximately. PT taken to CT scan as I took over and I accompanied them to CT.      Alexandro Peralta RN  03/15/24 2007

## 2024-03-21 ENCOUNTER — TELEPHONE (OUTPATIENT)
Dept: CARDIOLOGY | Facility: CLINIC | Age: 89
End: 2024-03-21
Payer: COMMERCIAL

## 2024-03-21 ENCOUNTER — PREP FOR PROCEDURE (OUTPATIENT)
Dept: CARDIOLOGY | Facility: CLINIC | Age: 89
End: 2024-03-21
Payer: COMMERCIAL

## 2024-03-21 DIAGNOSIS — Z79.01 LONG TERM CURRENT USE OF ANTICOAGULANT THERAPY: ICD-10-CM

## 2024-03-21 DIAGNOSIS — I48.11 LONGSTANDING PERSISTENT ATRIAL FIBRILLATION (MULTI): ICD-10-CM

## 2024-03-21 DIAGNOSIS — I49.5 SINUS NODE DYSFUNCTION (MULTI): Primary | ICD-10-CM

## 2024-03-21 NOTE — TELEPHONE ENCOUNTER
----- Message from Rose Martinez sent at 3/20/2024  2:40 PM EDT -----  Regarding: FW: Patient Appt.  Need orders for procedure, we can use the lab orders from last time.   ----- Message -----  From: Norbert Finley MD  Sent: 3/20/2024   1:34 PM EDT  To: Rose Martinez  Subject: RE: Patient Appt.                                Yes no problem but be sure that one lab has been done recently   Get Inr now   Schedule her for change out pacemaker next Tuesday at [facility]  Also depends of inr I will let her  Know when to stop warfarin   Please text me when inr is done  Also get cbc and cmp  ----- Message -----  From: Rose Martinez  Sent: 3/20/2024   1:26 PM EDT  To: Norbert Finley MD  Subject: Patient Appt.                                    Patient was previously scheduled for Gen change with you.  She ended up in hospital so it was cancelled.  She is on schedule with you for office visit on 03/29, her granddaughter wants to know if she can just be rescheduled for the procedure with you and avoid the office visit, it is hard for her to leave nursing home.

## 2024-03-26 ENCOUNTER — TELEPHONE (OUTPATIENT)
Dept: CARDIOLOGY | Facility: CLINIC | Age: 89
End: 2024-03-26

## 2024-03-26 ENCOUNTER — OFFICE VISIT (OUTPATIENT)
Dept: CARDIOLOGY | Facility: CLINIC | Age: 89
End: 2024-03-26
Payer: COMMERCIAL

## 2024-03-26 ENCOUNTER — LAB (OUTPATIENT)
Dept: LAB | Facility: LAB | Age: 89
End: 2024-03-26
Payer: COMMERCIAL

## 2024-03-26 VITALS
DIASTOLIC BLOOD PRESSURE: 74 MMHG | WEIGHT: 140 LBS | HEART RATE: 98 BPM | BODY MASS INDEX: 24.03 KG/M2 | SYSTOLIC BLOOD PRESSURE: 126 MMHG

## 2024-03-26 DIAGNOSIS — G47.33 OBSTRUCTIVE SLEEP APNEA SYNDROME: ICD-10-CM

## 2024-03-26 DIAGNOSIS — Z98.61 CAD S/P PERCUTANEOUS CORONARY ANGIOPLASTY: ICD-10-CM

## 2024-03-26 DIAGNOSIS — Z79.01 LONG TERM CURRENT USE OF ANTICOAGULANT THERAPY: ICD-10-CM

## 2024-03-26 DIAGNOSIS — I48.0 PAROXYSMAL ATRIAL FIBRILLATION (MULTI): ICD-10-CM

## 2024-03-26 DIAGNOSIS — F03.B0 MODERATE DEMENTIA, UNSPECIFIED DEMENTIA TYPE, UNSPECIFIED WHETHER BEHAVIORAL, PSYCHOTIC, OR MOOD DISTURBANCE OR ANXIETY (MULTI): ICD-10-CM

## 2024-03-26 DIAGNOSIS — E03.9 ACQUIRED HYPOTHYROIDISM: ICD-10-CM

## 2024-03-26 DIAGNOSIS — Z95.0 CARDIAC PACEMAKER: ICD-10-CM

## 2024-03-26 DIAGNOSIS — I48.11 LONGSTANDING PERSISTENT ATRIAL FIBRILLATION (MULTI): ICD-10-CM

## 2024-03-26 DIAGNOSIS — I25.10 CAD S/P PERCUTANEOUS CORONARY ANGIOPLASTY: ICD-10-CM

## 2024-03-26 DIAGNOSIS — K21.9 GASTROESOPHAGEAL REFLUX DISEASE WITHOUT ESOPHAGITIS: ICD-10-CM

## 2024-03-26 DIAGNOSIS — E78.2 MIXED HYPERLIPIDEMIA: ICD-10-CM

## 2024-03-26 DIAGNOSIS — I49.5 SINUS NODE DYSFUNCTION (MULTI): ICD-10-CM

## 2024-03-26 DIAGNOSIS — I10 PRIMARY HYPERTENSION: ICD-10-CM

## 2024-03-26 DIAGNOSIS — I49.5 SINUS NODE DYSFUNCTION (MULTI): Primary | ICD-10-CM

## 2024-03-26 DIAGNOSIS — M15.9 OSTEOARTHRITIS OF MULTIPLE JOINTS, UNSPECIFIED OSTEOARTHRITIS TYPE: ICD-10-CM

## 2024-03-26 DIAGNOSIS — N18.31 STAGE 3A CHRONIC KIDNEY DISEASE (MULTI): ICD-10-CM

## 2024-03-26 LAB
ANION GAP SERPL CALC-SCNC: 12 MMOL/L (ref 10–20)
BUN SERPL-MCNC: 19 MG/DL (ref 6–23)
CALCIUM SERPL-MCNC: 8.6 MG/DL (ref 8.6–10.3)
CHLORIDE SERPL-SCNC: 101 MMOL/L (ref 98–107)
CO2 SERPL-SCNC: 30 MMOL/L (ref 21–32)
CREAT SERPL-MCNC: 0.9 MG/DL (ref 0.5–1.05)
EGFRCR SERPLBLD CKD-EPI 2021: 61 ML/MIN/1.73M*2
ERYTHROCYTE [DISTWIDTH] IN BLOOD BY AUTOMATED COUNT: 14.9 % (ref 11.5–14.5)
GLUCOSE SERPL-MCNC: 150 MG/DL (ref 74–99)
HCT VFR BLD AUTO: 35.2 % (ref 36–46)
HGB BLD-MCNC: 11.2 G/DL (ref 12–16)
INR PPP: 1.4 (ref 0.9–1.1)
MCH RBC QN AUTO: 28.1 PG (ref 26–34)
MCHC RBC AUTO-ENTMCNC: 31.8 G/DL (ref 32–36)
MCV RBC AUTO: 88 FL (ref 80–100)
NRBC BLD-RTO: 0 /100 WBCS (ref 0–0)
PLATELET # BLD AUTO: 268 X10*3/UL (ref 150–450)
POTASSIUM SERPL-SCNC: 4.8 MMOL/L (ref 3.5–5.3)
PROTHROMBIN TIME: 16.3 SECONDS (ref 9.8–12.8)
RBC # BLD AUTO: 3.98 X10*6/UL (ref 4–5.2)
SODIUM SERPL-SCNC: 138 MMOL/L (ref 136–145)
WBC # BLD AUTO: 10 X10*3/UL (ref 4.4–11.3)

## 2024-03-26 PROCEDURE — 1159F MED LIST DOCD IN RCRD: CPT | Performed by: INTERNAL MEDICINE

## 2024-03-26 PROCEDURE — 85610 PROTHROMBIN TIME: CPT

## 2024-03-26 PROCEDURE — 80048 BASIC METABOLIC PNL TOTAL CA: CPT

## 2024-03-26 PROCEDURE — 1157F ADVNC CARE PLAN IN RCRD: CPT | Performed by: INTERNAL MEDICINE

## 2024-03-26 PROCEDURE — 99214 OFFICE O/P EST MOD 30 MIN: CPT | Performed by: INTERNAL MEDICINE

## 2024-03-26 PROCEDURE — 1111F DSCHRG MED/CURRENT MED MERGE: CPT | Performed by: INTERNAL MEDICINE

## 2024-03-26 PROCEDURE — 1036F TOBACCO NON-USER: CPT | Performed by: INTERNAL MEDICINE

## 2024-03-26 PROCEDURE — 1123F ACP DISCUSS/DSCN MKR DOCD: CPT | Performed by: INTERNAL MEDICINE

## 2024-03-26 PROCEDURE — 3078F DIAST BP <80 MM HG: CPT | Performed by: INTERNAL MEDICINE

## 2024-03-26 PROCEDURE — 3074F SYST BP LT 130 MM HG: CPT | Performed by: INTERNAL MEDICINE

## 2024-03-26 PROCEDURE — 85027 COMPLETE CBC AUTOMATED: CPT

## 2024-03-26 RX ORDER — ACETAMINOPHEN 500 MG
2 TABLET ORAL EVERY 4 HOURS PRN
COMMUNITY

## 2024-03-26 RX ORDER — INSULIN LISPRO 100 [IU]/ML
INJECTION, SOLUTION INTRAVENOUS; SUBCUTANEOUS
COMMUNITY
Start: 2024-03-02

## 2024-03-26 NOTE — TELEPHONE ENCOUNTER
Called [facility] in [place] [phone] spoke with a nurse Maria Isabel who was notified patient having pacemaker generator change 4/2/24 and given orders as noted to hold Eliquis 48 hours prior per Dr. Norbert Finley.  She verbalized understanding.  Chart note also faxed to [phone].  Jennyfer Hernandez, CMA

## 2024-03-26 NOTE — H&P (VIEW-ONLY)
CARDIOLOGY OFFICE NOTE     Date:   3/26/2024    Patient:    Ernestina Argueta    YOB: 1934    Primary Physician: Jessica Manzano PA-C       Reason for Visit: 6-month follow-up.    HPI:     Ernestina Argueta was seen in cardiac evaluation at the  Cardiology office March 26, 2024.      The patients problems are listed as in the impression below.    Electronic medical records reviewed.    Patient returns with her 2 daughters.  She is in memory care now.  She has no recollection of her surroundings in general.    She is scheduled for pacemaker generator change due to end-of-life with Dr. Finley 4/2/2024.    She recently was in the hospital due to respiratory infections.  Her INR went up to 5.7 due to antibiotics.  Warfarin was changed to Eliquis.  She is doing well currently.    She has no new complaints.    Patient denies Chest Pain, SOB, Lightheadedness, Dizziness, TIA or CVA symptoms.  No CHF or Edema.  No Palpitations.  No GI,  or Bleeding Issues. No Recent Fever or Chills.     Cardiovascular and general review of systems is otherwise negative.    A 14-system review is otherwise negative, other than noted.     PHYSICAL EXAMINATION:      Vitals:    03/26/24 1052   BP: 126/74   Pulse: 98     General: No acute distress. Vital signs as noted. Alert and not oriented  Head And Neck Examination: No jugular venous distention, no carotid  bruits, no mass. Carotid upstrokes preserved. Oral mucosa moist.   No xanthelasma. Head and neck examination otherwise unremarkable.  Lungs: Clear to auscultation and percussion. No wheezes, no rales, and no rhonchi.  Chest: Excursion appeared to be normal. No chest wall tenderness on palpation. Pacemaker, left chest (pacemaker easily moves and with minimal effort can actually flip on at side).  Heart: Normal S1 and S2. No S3. No S4. No rub. Grade 1/6 systolic murmur best heard at left sternal border. Point of maximal impulse was within normal limits.  Abdomen: Obese.  Soft. Nontender. No organomegaly. No bruits.   Extremities: 1-2+ bipedal edema. No clubbing. No cyanosis. Pulses are weight loss strong throughout. No bruits.  Musculoskeletal Exam: No ulcers, otherwise unremarkable.  Neuro: Neurologically appeared grossly intact except for poor memory.    IMPRESSION:      1. Cardiovascular status stable  2. Asymptomatic  3. Coronary artery disease. History of coronary angioplasty.  4. Persistent atrial fibrillation, chronic persistent, rate control strategy  5. Sick sinus syndrome, status post pacemaker. [employer] ADVISA DR LOPEZ September 2016, near end-of-life.  6. Hypertension.  7. Hyperlipidemia.  8. Diabetes.  9. Obstructive sleep apnea.  10. Obesity.  11. As per active problem list, otherwise below.    RECOMMENDATIONS:      Patient appears to be well overall from a cardiovascular standpoint.  She does have end-of-life parameters on her pacemaker generator and this plans to be changed to 4/2/2024 with Dr. Finley.  Dr. Finley will manage anticoagulation and medications.  Procedure.    From a cardiovascular standpoint would suggest that she continue her current medications.  Refills were provided.    Exercise dietary program as tolerated.    Hydration was encouraged.    Pacemaker interrogations and EP follow-up as scheduled.    The [employer] portal use was encouraged.    We will plan to see back in 6 months with Laboratory Studies and ECG as ordered.     Patient will follow up with their primary physician for general care.    The patient knows to contact medical care earlier if need be.      ALLERGIES:     Allergies   Allergen Reactions    Latex Unknown    Moxifloxacin Other    Penicillins Other        MEDICATIONS:     Current Outpatient Medications   Medication Instructions    acetaminophen (Tylenol) 325 mg tablet 2 tablets, oral, Every 4 hours PRN    acetaminophen (Tylenol) 500 mg tablet 2 tablets, oral, Every 4 hours PRN    acetaminophen (TYLENOL) 650 mg, rectal, Every 4 hours PRN     albuterol (ProAir HFA) 90 mcg/actuation inhaler 2 puffs, inhalation, Every 6 hours PRN    alum-mag hydroxide-simeth (Mylanta) 200-200-20 mg/5 mL oral suspension 30 mL, oral, Every 6 hours PRN    ammonium lactate (Amlactin) 12 % cream Topical, As needed    apixaban (ELIQUIS) 2.5 mg, oral, 2 times daily    bisacodyl (DULCOLAX) 10 mg, rectal, Once    cholecalciferol (Vitamin D-3) 25 MCG (1000 UT) tablet 1 tablet, oral, Daily    escitalopram (LEXAPRO) 10 mg, oral, Daily    furosemide (LASIX) 20 mg, oral, 3 times weekly    HumaLOG KwikPen Insulin 100 unit/mL injection subcutaneous, 3 times daily with meals    hydrOXYzine HCL (ATARAX) 25 mg, oral, 2 times daily    insulin aspart (NovoLOG Flexpen U-100 Insulin) 100 unit/mL (3 mL) pen subcutaneous, 3 times daily before meals, Take as directed per insulin instructions.    isosorbide mononitrate ER (IMDUR) 60 mg, oral, Daily    lactobacillus acidophilus capsule 1 capsule, oral, Daily    levothyroxine (SYNTHROID, LEVOXYL) 75 mcg, oral, Daily    lidocaine-diphenhydrAMINE-Maalox 1:1:1 Magic Mouthwash 10 mL, Swish & Swallow, Every 4 hours PRN    magnesium hydroxide (Milk of Magnesia) 400 mg/5 mL suspension 30 mL, oral, Daily PRN    magnesium oxide 400 mg magnesium capsule 1 capsule, oral, Daily    meclizine (Antivert) 12.5 mg tablet 1 tablet, oral, Every 8 hours PRN    metoprolol succinate XL (TOPROL-XL) 100 mg, oral, 2 times daily    multivitamin with minerals iron-free (Centrum Silver) 1 tablet, oral, Every morning    nitroglycerin (Nitrostat) 0.4 mg SL tablet sublingual    Nystatin/Doxycycline/Hydrocortisone/Diphenhydramine Oral Susp 5 mL, Swish & Spit, 3 times daily, Shake well. Swish, gargle and spit. For 5 days    pantoprazole (PROTONIX) 20 mg, oral, Daily    potassium chloride 40 mEq/15 mL liquid 15 mL, oral, Once    sodium chloride (Ocean) 0.65 % nasal spray 1 spray, Each Nostril, As needed    sodium phosphates (Fleet Enema Extra) 19-7 gram/197 mL enema 1 suppository,  rectal, Daily    traZODone (DESYREL) 50 mg, oral, Nightly    warfarin (Coumadin) 3 mg tablet Take as directed per After Visit Summary.       ELECTROCARDIOGRAM:      None this visit    CARDIAC TESTING:      None this visit    LABORATORY DATA:      CBC:   Lab Results   Component Value Date    WBC 9.3 03/15/2024    RBC 4.31 03/15/2024    HGB 12.3 03/15/2024    HCT 37.4 03/15/2024     03/15/2024        CMP:    Lab Results   Component Value Date     (L) 03/15/2024    K 4.6 03/15/2024    CL 97 (L) 03/15/2024    CO2 29 03/15/2024    BUN 21 03/15/2024    CREATININE 1.15 (H) 03/15/2024    GLUCOSE 191 (H) 03/15/2024    CALCIUM 9.0 03/15/2024       Magnesium:    Lab Results   Component Value Date    MG 1.38 (L) 03/01/2024       Lipid Profile:    Lab Results   Component Value Date    CHOL 175 08/18/2022    TRIG 146 08/18/2022    HDL 36.0 (A) 08/18/2022    LDLF 110 (H) 08/18/2022       Hepatic Function Panel:    Lab Results   Component Value Date    ALKPHOS 61 03/15/2024    ALT 16 03/15/2024    AST 22 03/15/2024    PROT 7.4 03/15/2024    BILITOT 0.4 03/15/2024    BILIDIR 0.4 (H) 02/25/2024       TSH:    Lab Results   Component Value Date    TSH 2.21 02/25/2024       HgBA1c:    Lab Results   Component Value Date    HGBA1C 7.3 (H) 02/25/2024       BNP:   Lab Results   Component Value Date     (H) 02/25/2024        PT/INR:    Lab Results   Component Value Date    PROTIME 65.3 (HH) 03/15/2024    INR 5.7 (HH) 03/15/2024                         PROBLEM LIST:     Patient Active Problem List   Diagnosis    Atrial fibrillation (CMS/HCC)    Back pain, thoracic    CAD S/P percutaneous coronary angioplasty    Cardiac pacemaker    Chronic midline low back pain without sciatica    Chronic pain of both knees    Foot pain    Dementia (CMS/HCC)    DJD (degenerative joint disease), multiple sites    GERD (gastroesophageal reflux disease)    Hyperlipidemia    Hypertension    Hypothyroidism    Itching    MCI (mild cognitive  impairment) with memory loss    Sleep apnea    Stage 3a chronic kidney disease (CMS/HCC)    Sinus node dysfunction (CMS/HCC)    Long term current use of anticoagulant therapy    Dementia with anxiety, unspecified dementia severity, unspecified dementia type (CMS/[facility])             Sj Subramanian MD, [facility] /  Cardiology      Of Note:  [employer] voice recognition dictation software was utilized partially in the preparation of this note, therefore, inaccuracies in spelling, word choice and punctuation may have occurred which were not recognized the time of signing.    Patient was seen and examined with total time of visit including chart preparation, rooming, and chart completion exceeding 40 minutes.      ----

## 2024-03-26 NOTE — TELEPHONE ENCOUNTER
----- Message from Rose Martinez sent at 3/26/2024  2:11 PM EDT -----    ----- Message -----  From: Norbert Finley MD  Sent: 3/26/2024   1:42 PM EDT  To: oRse Martinez; Mariajose Whipple LPN    Please hold Eliquis 48 hours prior to procedure.  ----- Message -----  From: Rose Martinez  Sent: 3/26/2024  11:22 AM EDT  To: MD Dr. Tushar Quan Delores Trizna is scheduled with you on 04/02  for gen change. She is here in the office with Dr. Subramanian for an appt.  She is currently on Eliquis 2.5 instead of warfarin.   She is going to get her labs drawn today.  Please advise on the holding of her Eliquis, so I can let the nursing home know.  Thank you

## 2024-03-26 NOTE — PATIENT INSTRUCTIONS
PROCEED WITH PACEMAKER BATTERY CHANGE OUT WITH DR ROTHMAN.  THE LAB IS LOCATED DOWNSTAIRS FOR BLOOD WORK TODAY FOR YOUR PROCEDURE.      FASTING LABS TO BE DONE 2-3 DAYS PRIOR TO YOUR APPOINTMENT WITH DR ALMAZAN IN: 6 MONTHS

## 2024-03-26 NOTE — PROGRESS NOTES
CARDIOLOGY OFFICE NOTE     Date:   3/26/2024    Patient:    Ernestina Argueta    YOB: 1934    Primary Physician: Jessica Manzano PA-C       Reason for Visit: 6-month follow-up.    HPI:     Ernestina Argueta was seen in cardiac evaluation at the  Cardiology office March 26, 2024.      The patients problems are listed as in the impression below.    Electronic medical records reviewed.    Patient returns with her 2 daughters.  She is in memory care now.  She has no recollection of her surroundings in general.    She is scheduled for pacemaker generator change due to end-of-life with Dr. Finley 4/2/2024.    She recently was in the hospital due to respiratory infections.  Her INR went up to 5.7 due to antibiotics.  Warfarin was changed to Eliquis.  She is doing well currently.    She has no new complaints.    Patient denies Chest Pain, SOB, Lightheadedness, Dizziness, TIA or CVA symptoms.  No CHF or Edema.  No Palpitations.  No GI,  or Bleeding Issues. No Recent Fever or Chills.     Cardiovascular and general review of systems is otherwise negative.    A 14-system review is otherwise negative, other than noted.     PHYSICAL EXAMINATION:      Vitals:    03/26/24 1052   BP: 126/74   Pulse: 98     General: No acute distress. Vital signs as noted. Alert and not oriented  Head And Neck Examination: No jugular venous distention, no carotid  bruits, no mass. Carotid upstrokes preserved. Oral mucosa moist.   No xanthelasma. Head and neck examination otherwise unremarkable.  Lungs: Clear to auscultation and percussion. No wheezes, no rales, and no rhonchi.  Chest: Excursion appeared to be normal. No chest wall tenderness on palpation. Pacemaker, left chest (pacemaker easily moves and with minimal effort can actually flip on at side).  Heart: Normal S1 and S2. No S3. No S4. No rub. Grade 1/6 systolic murmur best heard at left sternal border. Point of maximal impulse was within normal limits.  Abdomen: Obese.  Soft. Nontender. No organomegaly. No bruits.   Extremities: 1-2+ bipedal edema. No clubbing. No cyanosis. Pulses are weight loss strong throughout. No bruits.  Musculoskeletal Exam: No ulcers, otherwise unremarkable.  Neuro: Neurologically appeared grossly intact except for poor memory.    IMPRESSION:      1. Cardiovascular status stable  2. Asymptomatic  3. Coronary artery disease. History of coronary angioplasty.  4. Persistent atrial fibrillation, chronic persistent, rate control strategy  5. Sick sinus syndrome, status post pacemaker. Medtronic ADVISA DR LOPEZ September 2016, near end-of-life.  6. Hypertension.  7. Hyperlipidemia.  8. Diabetes.  9. Obstructive sleep apnea.  10. Obesity.  11. As per active problem list, otherwise below.    RECOMMENDATIONS:      Patient appears to be well overall from a cardiovascular standpoint.  She does have end-of-life parameters on her pacemaker generator and this plans to be changed to 4/2/2024 with Dr. Finley.  Dr. Finley will manage anticoagulation and medications.  Procedure.    From a cardiovascular standpoint would suggest that she continue her current medications.  Refills were provided.    Exercise dietary program as tolerated.    Hydration was encouraged.    Pacemaker interrogations and EP follow-up as scheduled.    [employer] portal use was encouraged.    We will plan to see back in 6 months with Laboratory Studies and ECG as ordered.     Patient will follow up with their primary physician for general care.    The patient knows to contact medical care earlier if need be.      ALLERGIES:     Allergies   Allergen Reactions    Latex Unknown    Moxifloxacin Other    Penicillins Other        MEDICATIONS:     Current Outpatient Medications   Medication Instructions    acetaminophen (Tylenol) 325 mg tablet 2 tablets, oral, Every 4 hours PRN    acetaminophen (Tylenol) 500 mg tablet 2 tablets, oral, Every 4 hours PRN    acetaminophen (TYLENOL) 650 mg, rectal, Every 4 hours PRN     albuterol (ProAir HFA) 90 mcg/actuation inhaler 2 puffs, inhalation, Every 6 hours PRN    alum-mag hydroxide-simeth (Mylanta) 200-200-20 mg/5 mL oral suspension 30 mL, oral, Every 6 hours PRN    ammonium lactate (Amlactin) 12 % cream Topical, As needed    apixaban (ELIQUIS) 2.5 mg, oral, 2 times daily    bisacodyl (DULCOLAX) 10 mg, rectal, Once    cholecalciferol (Vitamin D-3) 25 MCG (1000 UT) tablet 1 tablet, oral, Daily    escitalopram (LEXAPRO) 10 mg, oral, Daily    furosemide (LASIX) 20 mg, oral, 3 times weekly    HumaLOG KwikPen Insulin 100 unit/mL injection subcutaneous, 3 times daily with meals    hydrOXYzine HCL (ATARAX) 25 mg, oral, 2 times daily    insulin aspart (NovoLOG Flexpen U-100 Insulin) 100 unit/mL (3 mL) pen subcutaneous, 3 times daily before meals, Take as directed per insulin instructions.    isosorbide mononitrate ER (IMDUR) 60 mg, oral, Daily    lactobacillus acidophilus capsule 1 capsule, oral, Daily    levothyroxine (SYNTHROID, LEVOXYL) 75 mcg, oral, Daily    lidocaine-diphenhydrAMINE-Maalox 1:1:1 Magic Mouthwash 10 mL, Swish & Swallow, Every 4 hours PRN    magnesium hydroxide (Milk of Magnesia) 400 mg/5 mL suspension 30 mL, oral, Daily PRN    magnesium oxide 400 mg magnesium capsule 1 capsule, oral, Daily    meclizine (Antivert) 12.5 mg tablet 1 tablet, oral, Every 8 hours PRN    metoprolol succinate XL (TOPROL-XL) 100 mg, oral, 2 times daily    multivitamin with minerals iron-free (Centrum Silver) 1 tablet, oral, Every morning    nitroglycerin (Nitrostat) 0.4 mg SL tablet sublingual    Nystatin/Doxycycline/Hydrocortisone/Diphenhydramine Oral Susp 5 mL, Swish & Spit, 3 times daily, Shake well. Swish, gargle and spit. For 5 days    pantoprazole (PROTONIX) 20 mg, oral, Daily    potassium chloride 40 mEq/15 mL liquid 15 mL, oral, Once    sodium chloride (Ocean) 0.65 % nasal spray 1 spray, Each Nostril, As needed    sodium phosphates (Fleet Enema Extra) 19-7 gram/197 mL enema 1 suppository,  rectal, Daily    traZODone (DESYREL) 50 mg, oral, Nightly    warfarin (Coumadin) 3 mg tablet Take as directed per After Visit Summary.       ELECTROCARDIOGRAM:      None this visit    CARDIAC TESTING:      None this visit    LABORATORY DATA:      CBC:   Lab Results   Component Value Date    WBC 9.3 03/15/2024    RBC 4.31 03/15/2024    HGB 12.3 03/15/2024    HCT 37.4 03/15/2024     03/15/2024        CMP:    Lab Results   Component Value Date     (L) 03/15/2024    K 4.6 03/15/2024    CL 97 (L) 03/15/2024    CO2 29 03/15/2024    BUN 21 03/15/2024    CREATININE 1.15 (H) 03/15/2024    GLUCOSE 191 (H) 03/15/2024    CALCIUM 9.0 03/15/2024       Magnesium:    Lab Results   Component Value Date    MG 1.38 (L) 03/01/2024       Lipid Profile:    Lab Results   Component Value Date    CHOL 175 08/18/2022    TRIG 146 08/18/2022    HDL 36.0 (A) 08/18/2022    LDLF 110 (H) 08/18/2022       Hepatic Function Panel:    Lab Results   Component Value Date    ALKPHOS 61 03/15/2024    ALT 16 03/15/2024    AST 22 03/15/2024    PROT 7.4 03/15/2024    BILITOT 0.4 03/15/2024    BILIDIR 0.4 (H) 02/25/2024       TSH:    Lab Results   Component Value Date    TSH 2.21 02/25/2024       HgBA1c:    Lab Results   Component Value Date    HGBA1C 7.3 (H) 02/25/2024       BNP:   Lab Results   Component Value Date     (H) 02/25/2024        PT/INR:    Lab Results   Component Value Date    PROTIME 65.3 (HH) 03/15/2024    INR 5.7 (HH) 03/15/2024                         PROBLEM LIST:     Patient Active Problem List   Diagnosis    Atrial fibrillation (CMS/HCC)    Back pain, thoracic    CAD S/P percutaneous coronary angioplasty    Cardiac pacemaker    Chronic midline low back pain without sciatica    Chronic pain of both knees    Foot pain    Dementia (CMS/HCC)    DJD (degenerative joint disease), multiple sites    GERD (gastroesophageal reflux disease)    Hyperlipidemia    Hypertension    Hypothyroidism    Itching    MCI (mild cognitive  impairment) with memory loss    Sleep apnea    Stage 3a chronic kidney disease (CMS/HCC)    Sinus node dysfunction (CMS/HCC)    Long term current use of anticoagulant therapy    Dementia with anxiety, unspecified dementia severity, unspecified dementia type (CMS/[facility])             Sj Subramanian MD, [facility] / [facility] /  Cardiology      Of Note:  [employer] voice recognition dictation software was utilized partially in the preparation of this note, therefore, inaccuracies in spelling, word choice and punctuation may have occurred which were not recognized the time of signing.    Patient was seen and examined with total time of visit including chart preparation, rooming, and chart completion exceeding 40 minutes.      ----

## 2024-03-29 ENCOUNTER — APPOINTMENT (OUTPATIENT)
Dept: CARDIOLOGY | Facility: CLINIC | Age: 89
End: 2024-03-29
Payer: COMMERCIAL

## 2024-04-01 ENCOUNTER — TELEPHONE (OUTPATIENT)
Dept: CARDIOLOGY | Facility: HOSPITAL | Age: 89
End: 2024-04-01

## 2024-04-02 ENCOUNTER — ANESTHESIA EVENT (OUTPATIENT)
Dept: CARDIOLOGY | Facility: HOSPITAL | Age: 89
End: 2024-04-02
Payer: COMMERCIAL

## 2024-04-02 ENCOUNTER — ANESTHESIA (OUTPATIENT)
Dept: CARDIOLOGY | Facility: HOSPITAL | Age: 89
End: 2024-04-02
Payer: COMMERCIAL

## 2024-04-02 ENCOUNTER — APPOINTMENT (OUTPATIENT)
Dept: CARDIOLOGY | Facility: HOSPITAL | Age: 89
End: 2024-04-02
Payer: COMMERCIAL

## 2024-04-02 ENCOUNTER — HOSPITAL ENCOUNTER (OUTPATIENT)
Facility: HOSPITAL | Age: 89
Setting detail: OUTPATIENT SURGERY
Discharge: HOME | End: 2024-04-02
Attending: INTERNAL MEDICINE | Admitting: INTERNAL MEDICINE
Payer: COMMERCIAL

## 2024-04-02 VITALS
BODY MASS INDEX: 24.46 KG/M2 | RESPIRATION RATE: 18 BRPM | WEIGHT: 143.3 LBS | SYSTOLIC BLOOD PRESSURE: 122 MMHG | DIASTOLIC BLOOD PRESSURE: 64 MMHG | HEIGHT: 64 IN | HEART RATE: 78 BPM | TEMPERATURE: 98.4 F

## 2024-04-02 DIAGNOSIS — Z79.01 LONG TERM CURRENT USE OF ANTICOAGULANT THERAPY: ICD-10-CM

## 2024-04-02 DIAGNOSIS — I49.5 SINUS NODE DYSFUNCTION (MULTI): Primary | ICD-10-CM

## 2024-04-02 DIAGNOSIS — I48.11 LONGSTANDING PERSISTENT ATRIAL FIBRILLATION (MULTI): ICD-10-CM

## 2024-04-02 LAB
ANION GAP SERPL CALC-SCNC: 10 MMOL/L (ref 10–20)
APTT PPP: 36 SECONDS (ref 27–38)
BUN SERPL-MCNC: 17 MG/DL (ref 6–23)
CALCIUM SERPL-MCNC: 8.7 MG/DL (ref 8.6–10.3)
CHLORIDE SERPL-SCNC: 104 MMOL/L (ref 98–107)
CO2 SERPL-SCNC: 30 MMOL/L (ref 21–32)
CREAT SERPL-MCNC: 0.99 MG/DL (ref 0.5–1.05)
EGFRCR SERPLBLD CKD-EPI 2021: 55 ML/MIN/1.73M*2
ERYTHROCYTE [DISTWIDTH] IN BLOOD BY AUTOMATED COUNT: 15.1 % (ref 11.5–14.5)
GLUCOSE BLD MANUAL STRIP-MCNC: 130 MG/DL (ref 74–99)
GLUCOSE SERPL-MCNC: 150 MG/DL (ref 74–99)
HCT VFR BLD AUTO: 31.7 % (ref 36–46)
HGB BLD-MCNC: 10.2 G/DL (ref 12–16)
INR PPP: 1.3 (ref 0.9–1.1)
MCH RBC QN AUTO: 27.9 PG (ref 26–34)
MCHC RBC AUTO-ENTMCNC: 32.2 G/DL (ref 32–36)
MCV RBC AUTO: 87 FL (ref 80–100)
NRBC BLD-RTO: 0 /100 WBCS (ref 0–0)
PLATELET # BLD AUTO: 247 X10*3/UL (ref 150–450)
POTASSIUM SERPL-SCNC: 3.6 MMOL/L (ref 3.5–5.3)
PROTHROMBIN TIME: 14.9 SECONDS (ref 9.8–12.8)
RBC # BLD AUTO: 3.65 X10*6/UL (ref 4–5.2)
SODIUM SERPL-SCNC: 140 MMOL/L (ref 136–145)
WBC # BLD AUTO: 6.5 X10*3/UL (ref 4.4–11.3)

## 2024-04-02 PROCEDURE — 93010 ELECTROCARDIOGRAM REPORT: CPT | Performed by: INTERNAL MEDICINE

## 2024-04-02 PROCEDURE — 2500000001 HC RX 250 WO HCPCS SELF ADMINISTERED DRUGS (ALT 637 FOR MEDICARE OP): Performed by: NURSE PRACTITIONER

## 2024-04-02 PROCEDURE — 33228 REMV&REPLC PM GEN DUAL LEAD: CPT | Performed by: INTERNAL MEDICINE

## 2024-04-02 PROCEDURE — 2500000004 HC RX 250 GENERAL PHARMACY W/ HCPCS (ALT 636 FOR OP/ED): Performed by: INTERNAL MEDICINE

## 2024-04-02 PROCEDURE — 93286 PERI-PX EVAL PM/LDLS PM IP: CPT | Performed by: INTERNAL MEDICINE

## 2024-04-02 PROCEDURE — 7100000010 HC PHASE TWO TIME - EACH INCREMENTAL 1 MINUTE: Performed by: INTERNAL MEDICINE

## 2024-04-02 PROCEDURE — 2500000005 HC RX 250 GENERAL PHARMACY W/O HCPCS: Performed by: INTERNAL MEDICINE

## 2024-04-02 PROCEDURE — C1785 PMKR, DUAL, RATE-RESP: HCPCS | Performed by: INTERNAL MEDICINE

## 2024-04-02 PROCEDURE — 93280 PM DEVICE PROGR EVAL DUAL: CPT | Performed by: INTERNAL MEDICINE

## 2024-04-02 PROCEDURE — 99152 MOD SED SAME PHYS/QHP 5/>YRS: CPT | Performed by: INTERNAL MEDICINE

## 2024-04-02 PROCEDURE — 2500000004 HC RX 250 GENERAL PHARMACY W/ HCPCS (ALT 636 FOR OP/ED): Performed by: NURSE PRACTITIONER

## 2024-04-02 PROCEDURE — 7100000009 HC PHASE TWO TIME - INITIAL BASE CHARGE: Performed by: INTERNAL MEDICINE

## 2024-04-02 PROCEDURE — 80048 BASIC METABOLIC PNL TOTAL CA: CPT | Performed by: NURSE PRACTITIONER

## 2024-04-02 PROCEDURE — 82947 ASSAY GLUCOSE BLOOD QUANT: CPT

## 2024-04-02 PROCEDURE — 85027 COMPLETE CBC AUTOMATED: CPT | Performed by: NURSE PRACTITIONER

## 2024-04-02 PROCEDURE — 2750000001 HC OR 275 NO HCPCS: Performed by: INTERNAL MEDICINE

## 2024-04-02 PROCEDURE — 99153 MOD SED SAME PHYS/QHP EA: CPT | Performed by: INTERNAL MEDICINE

## 2024-04-02 PROCEDURE — 85610 PROTHROMBIN TIME: CPT | Performed by: NURSE PRACTITIONER

## 2024-04-02 PROCEDURE — 2720000007 HC OR 272 NO HCPCS: Performed by: INTERNAL MEDICINE

## 2024-04-02 PROCEDURE — 93005 ELECTROCARDIOGRAM TRACING: CPT | Mod: 59

## 2024-04-02 DEVICE — IPG W1DR01 AZURE XT DR MRI WL USA BCP
Type: IMPLANTABLE DEVICE | Site: CHEST | Status: FUNCTIONAL
Brand: AZURE™ XT DR MRI SURESCAN™

## 2024-04-02 RX ORDER — SODIUM CHLORIDE 9 MG/ML
20 INJECTION, SOLUTION INTRAVENOUS CONTINUOUS
Status: DISCONTINUED | OUTPATIENT
Start: 2024-04-02 | End: 2024-04-02 | Stop reason: HOSPADM

## 2024-04-02 RX ORDER — MUPIROCIN 20 MG/G
1 OINTMENT TOPICAL ONCE
Status: COMPLETED | OUTPATIENT
Start: 2024-04-02 | End: 2024-04-02

## 2024-04-02 RX ORDER — VANCOMYCIN HYDROCHLORIDE 1 G/200ML
1000 INJECTION, SOLUTION INTRAVENOUS ONCE
Status: COMPLETED | OUTPATIENT
Start: 2024-04-02 | End: 2024-04-02

## 2024-04-02 RX ORDER — MIDAZOLAM HYDROCHLORIDE 5 MG/ML
INJECTION, SOLUTION INTRAMUSCULAR; INTRAVENOUS AS NEEDED
Status: DISCONTINUED | OUTPATIENT
Start: 2024-04-02 | End: 2024-04-02 | Stop reason: HOSPADM

## 2024-04-02 RX ORDER — FENTANYL CITRATE 50 UG/ML
INJECTION, SOLUTION INTRAMUSCULAR; INTRAVENOUS AS NEEDED
Status: DISCONTINUED | OUTPATIENT
Start: 2024-04-02 | End: 2024-04-02 | Stop reason: HOSPADM

## 2024-04-02 RX ORDER — LIDOCAINE HYDROCHLORIDE 10 MG/ML
INJECTION, SOLUTION EPIDURAL; INFILTRATION; INTRACAUDAL; PERINEURAL AS NEEDED
Status: DISCONTINUED | OUTPATIENT
Start: 2024-04-02 | End: 2024-04-02 | Stop reason: HOSPADM

## 2024-04-02 RX ORDER — MIDAZOLAM HYDROCHLORIDE 1 MG/ML
INJECTION INTRAMUSCULAR; INTRAVENOUS AS NEEDED
Status: DISCONTINUED | OUTPATIENT
Start: 2024-04-02 | End: 2024-04-02 | Stop reason: HOSPADM

## 2024-04-02 RX ORDER — CHLORHEXIDINE GLUCONATE 40 MG/ML
SOLUTION TOPICAL ONCE
Status: COMPLETED | OUTPATIENT
Start: 2024-04-02 | End: 2024-04-02

## 2024-04-02 RX ADMIN — SODIUM CHLORIDE 20 ML/HR: 9 INJECTION, SOLUTION INTRAVENOUS at 06:25

## 2024-04-02 RX ADMIN — VANCOMYCIN HYDROCHLORIDE 1000 MG: 1 INJECTION, SOLUTION INTRAVENOUS at 07:33

## 2024-04-02 RX ADMIN — MUPIROCIN 1 APPLICATION: 20 OINTMENT TOPICAL at 06:25

## 2024-04-02 RX ADMIN — Medication: at 06:25

## 2024-04-02 ASSESSMENT — PAIN - FUNCTIONAL ASSESSMENT
PAIN_FUNCTIONAL_ASSESSMENT: 0-10

## 2024-04-02 ASSESSMENT — PAIN SCALES - GENERAL
PAINLEVEL_OUTOF10: 0 - NO PAIN

## 2024-04-02 NOTE — NURSING NOTE
Patient from [facility]. Patients medications and allergies went over with Jacey at the [facility] and her medical history with her daughter Hilda.

## 2024-04-02 NOTE — Clinical Note
The PACEMAKER, DUAL CHAMBER, KEYON MRI XT DR - [id number] device was inserted. The leads were placed into the connector and visually verified to be in correct position. Injury current obtained.

## 2024-04-02 NOTE — Clinical Note
Patient ID band present and verified. Patient contact is in waiting room. Contact(s) present: daughter.

## 2024-04-02 NOTE — NURSING NOTE
Director of Nursing at [facility] given report and discharge orders, medications, and follow up appointments on this patient, and verbalized understanding. Patient's daughter also stated understanding of discharge instructions, medications, and follow up appointments. Left pect WNL with no complaints of pain. VSS, up to wheelchair with assist x 2. IV out. All belongings sent home with patient. Patient discharged via daughter's car back to the Nursing Home.

## 2024-04-02 NOTE — Clinical Note
The ECG shows a paced rhythm. The patient has permanent pacemaker. Pacer on demand mode. ECG rate  = 90 bpm.

## 2024-04-03 ENCOUNTER — TELEPHONE (OUTPATIENT)
Dept: CARDIOLOGY | Facility: CLINIC | Age: 89
End: 2024-04-03
Payer: COMMERCIAL

## 2024-04-03 NOTE — TELEPHONE ENCOUNTER
Mildred from [facility] left voicemail stating that patient has pacemaker yesterday, but they did not receive any after care instructions.  Please fax orders to [phone].  Any questions call Mildred at [phone]

## 2024-04-03 NOTE — SIGNIFICANT EVENT
Spoke with patient's grand daughter, patient is recovering without complications. Family raved about the care they received and rated their experience a 10/10.

## 2024-04-08 ENCOUNTER — APPOINTMENT (OUTPATIENT)
Dept: CARDIOLOGY | Facility: CLINIC | Age: 89
End: 2024-04-08
Payer: COMMERCIAL

## 2024-04-08 NOTE — TELEPHONE ENCOUNTER
Returned call to [facility]. They had already received follow up instructions.

## 2024-04-11 DIAGNOSIS — Z95.0 CARDIAC PACEMAKER: ICD-10-CM

## 2024-04-11 LAB
ATRIAL RATE: 88 BPM
Q ONSET: 196 MS
QRS COUNT: 15 BEATS
QRS DURATION: 158 MS
QT INTERVAL: 444 MS
QTC CALCULATION(BAZETT): 543 MS
QTC FREDERICIA: 508 MS
R AXIS: 83 DEGREES
T AXIS: -8 DEGREES
T OFFSET: 418 MS
VENTRICULAR RATE: 90 BPM

## 2024-04-11 NOTE — PROGRESS NOTES
Rec'd fax from [facility] device clinic requesting  order for services rendered.  Order entered for date of service rendered and x next year for routine device checks in-clinic and remotely or as directed by physician.

## 2024-04-15 ENCOUNTER — CLINICAL SUPPORT (OUTPATIENT)
Dept: CARDIOLOGY | Facility: CLINIC | Age: 89
End: 2024-04-15
Payer: COMMERCIAL

## 2024-04-15 DIAGNOSIS — I48.0 PAROXYSMAL ATRIAL FIBRILLATION (MULTI): ICD-10-CM

## 2024-04-15 DIAGNOSIS — I49.5 SINUS NODE DYSFUNCTION (MULTI): ICD-10-CM

## 2024-04-15 DIAGNOSIS — Z95.0 CARDIAC PACEMAKER: ICD-10-CM

## 2024-04-15 NOTE — PROGRESS NOTES
Pt. here for wound check of pacemaker replacement by Dr. Finley on 4/2/24 at [facility]. Left clavicular area is well approximated with no erythema or drainage. Pt. denies any fever or chills. Temp 98.3

## 2024-04-16 ENCOUNTER — OFFICE VISIT (OUTPATIENT)
Dept: GERIATRIC MEDICINE | Age: 89
End: 2024-04-16

## 2024-04-16 DIAGNOSIS — L89.152 DECUBITUS ULCER OF SACRAL REGION, STAGE 2 (HCC): ICD-10-CM

## 2024-04-16 DIAGNOSIS — I50.32 CHRONIC DIASTOLIC CHF (CONGESTIVE HEART FAILURE) (HCC): ICD-10-CM

## 2024-04-16 DIAGNOSIS — I10 HYPERTENSION, UNSPECIFIED TYPE: ICD-10-CM

## 2024-04-16 DIAGNOSIS — F03.90 DEMENTIA WITHOUT BEHAVIORAL DISTURBANCE (HCC): Primary | ICD-10-CM

## 2024-04-16 DIAGNOSIS — E03.9 HYPOTHYROIDISM, UNSPECIFIED TYPE: ICD-10-CM

## 2024-04-20 NOTE — PROGRESS NOTES
SNF PROGRESS NOTE      Cc- dementia       Patient is a Chen Medellin 89 y.o. female who is being seen at [place] secondary to recent hospitalization for JANE and hypokalemia from extensive thrush. She was treated with IV fluconazole.  She is being seen for her 30 day exam for dementia.     Over the last 30 days, patient had a pacemaker placed.  She also has had some weight gain with LE edema and pain in the legs.         Past Medical History:   Diagnosis Date    Abnormal liver ultrasound 5/15/2019    Allergic contact dermatitis due to plants, except food 8/19/2019    Arthritis     Atrial fibrillation (HCC)     CHF (congestive heart failure) (HCC)     Chronic kidney disease     Chronic kidney disease (CKD), stage II (mild)     Depression     Hyperlipidemia     Hypertension     Hypothyroidism     Interstitial cystitis     Dr. Chappell diagnosed this for michele.    Type II or unspecified type diabetes mellitus without mention of complication, not stated as uncontrolled      Latex, Avelox [moxifloxacin hcl in nacl], Penicillins, and Adhesive tape    VS reviewed    Gen- Alert and oriented x 1  Heart- RRR no murmur no LE edema   Lungs- CTA b/l no resp distress RA  oxygen   Abd- bs x 4           Assessment and Plan    Dementia  with behavioral disturbances  Psych .   Stage 2 sacral Decub  Wound care   Insomnia  Melatonin   Trazodone  HTN  Isosorbide, metoprolol, hydralazine    HLD   A fib   eliquis, metoprolol  Cardio follow up 3/26/24   Chronic Diastolic CHF  Lasix   Metoprolol    Cardio  DM   Hypothyroid  Synthroid      TIN Chance DO     Electronically signed by: Darline Knapp DO on 4/16/2024

## 2024-05-15 ENCOUNTER — OFFICE VISIT (OUTPATIENT)
Dept: GERIATRIC MEDICINE | Age: 89
End: 2024-05-15

## 2024-05-15 DIAGNOSIS — E03.9 HYPOTHYROIDISM, UNSPECIFIED TYPE: ICD-10-CM

## 2024-05-15 DIAGNOSIS — I50.32 CHRONIC DIASTOLIC CHF (CONGESTIVE HEART FAILURE) (HCC): ICD-10-CM

## 2024-05-15 DIAGNOSIS — I10 HYPERTENSION, UNSPECIFIED TYPE: ICD-10-CM

## 2024-05-15 DIAGNOSIS — F03.90 DEMENTIA WITHOUT BEHAVIORAL DISTURBANCE (HCC): Primary | ICD-10-CM

## 2024-05-15 DIAGNOSIS — L89.152 DECUBITUS ULCER OF SACRAL REGION, STAGE 2 (HCC): ICD-10-CM

## 2024-05-15 NOTE — PROGRESS NOTES
SNF PROGRESS NOTE      Cc- dementia       Patient is a Chen Medellin 89 y.o. female who is being seen at [place] for dementia. She is being seen for her 30 day exam for dementia.     Over the last 30 days, patient lasix was increased due to increased lower extremity edema. Patient did have a fall. Her hydroxyzine was changed to BID by psych.       Past Medical History:   Diagnosis Date    Abnormal liver ultrasound 5/15/2019    Allergic contact dermatitis due to plants, except food 8/19/2019    Arthritis     Atrial fibrillation (HCC)     CHF (congestive heart failure) (HCC)     Chronic kidney disease     Chronic kidney disease (CKD), stage II (mild)     Depression     Hyperlipidemia     Hypertension     Hypothyroidism     Interstitial cystitis     Dr. Chappell diagnosed this for michele.    Type II or unspecified type diabetes mellitus without mention of complication, not stated as uncontrolled      Latex, Avelox [moxifloxacin hcl in nacl], Penicillins, and Adhesive tape    VS reviewed    Gen- Alert and oriented x 1  Heart- RRR no murmur no LE edema   Lungs- CTA b/l no resp distress RA  oxygen   Abd- bs x 4           Assessment and Plan    Dementia  with behavioral disturbances  Psych .   Hydrpxyzine BID   Stage 2 sacral Decub  Wound care   Insomnia  Melatonin   Trazodone  HTN  Isosorbide, metoprolol, hydralazine    HLD   A fib   eliquis, metoprolol  Cardio follow up 3/26/24   Chronic Diastolic CHF  Lasix   Metoprolol    Cardio  DM   Hypothyroid  Synthroid         TIN Chance DO     Electronically signed by: Darline Knapp DO on 5/15/2024

## 2024-06-17 ENCOUNTER — OFFICE VISIT (OUTPATIENT)
Dept: GERIATRIC MEDICINE | Age: 89
End: 2024-06-17
Payer: MEDICARE

## 2024-06-17 DIAGNOSIS — B37.0 ORAL THRUSH: ICD-10-CM

## 2024-06-17 DIAGNOSIS — I50.32 CHRONIC DIASTOLIC CHF (CONGESTIVE HEART FAILURE) (HCC): ICD-10-CM

## 2024-06-17 DIAGNOSIS — I10 HYPERTENSION, UNSPECIFIED TYPE: ICD-10-CM

## 2024-06-17 DIAGNOSIS — F03.90 DEMENTIA WITHOUT BEHAVIORAL DISTURBANCE (HCC): Primary | ICD-10-CM

## 2024-06-17 DIAGNOSIS — E03.9 HYPOTHYROIDISM, UNSPECIFIED TYPE: ICD-10-CM

## 2024-06-17 DIAGNOSIS — L89.152 DECUBITUS ULCER OF SACRAL REGION, STAGE 2 (HCC): ICD-10-CM

## 2024-06-17 PROCEDURE — 1123F ACP DISCUSS/DSCN MKR DOCD: CPT | Performed by: INTERNAL MEDICINE

## 2024-06-17 PROCEDURE — 99308 SBSQ NF CARE LOW MDM 20: CPT | Performed by: INTERNAL MEDICINE

## 2024-06-19 NOTE — PROGRESS NOTES
SNF PROGRESS NOTE      Cc- dementia       Patient is a Chen Medellin 89 y.o. female who is being seen at [place] for dementia. She is being seen for her 30 day exam for dementia.     Over the last 30 days, patient was placed on abx for pneumonia.         Past Medical History:   Diagnosis Date    Abnormal liver ultrasound 5/15/2019    Allergic contact dermatitis due to plants, except food 8/19/2019    Arthritis     Atrial fibrillation (HCC)     CHF (congestive heart failure) (HCC)     Chronic kidney disease     Chronic kidney disease (CKD), stage II (mild)     Depression     Hyperlipidemia     Hypertension     Hypothyroidism     Interstitial cystitis     Dr. Chappell diagnosed this for michele.    Type II or unspecified type diabetes mellitus without mention of complication, not stated as uncontrolled      Latex, Avelox [moxifloxacin hcl in nacl], Penicillins, and Adhesive tape    VS reviewed    Gen- Alert and oriented x 1  Heart- RRR no murmur no LE edema   Lungs- CTA b/l no resp distress RA  oxygen   Abd- bs x 4           Assessment and Plan    Dementia  with behavioral disturbances  Psych .   Hydroxyzine BID   Stage 2 sacral Decub  Wound care   Insomnia  Melatonin   Trazodone  HTN  Isosorbide, metoprolol, hydralazine    HLD   A fib   eliquis, metoprolol  Cardio follow up 3/26/24   Chronic Diastolic CHF  Lasix   Metoprolol    Cardio  DM   Hypothyroid  Synthroid      TIN Chance DO     Electronically signed by: Darline Knapp DO on 6/17/2024

## 2024-06-30 ENCOUNTER — HOSPITAL ENCOUNTER (OUTPATIENT)
Dept: CARDIOLOGY | Age: 89
Discharge: HOME | End: 2024-06-30
Payer: MEDICARE

## 2024-06-30 PROCEDURE — 93294 REM INTERROG EVL PM/LDLS PM: CPT | Performed by: INTERNAL MEDICINE

## 2024-06-30 PROCEDURE — 93296 REM INTERROG EVL PM/IDS: CPT

## 2024-07-15 ENCOUNTER — OFFICE VISIT (OUTPATIENT)
Dept: GERIATRIC MEDICINE | Age: 89
End: 2024-07-15

## 2024-07-15 DIAGNOSIS — L89.152 DECUBITUS ULCER OF SACRAL REGION, STAGE 2 (HCC): ICD-10-CM

## 2024-07-15 DIAGNOSIS — E03.9 HYPOTHYROIDISM, UNSPECIFIED TYPE: ICD-10-CM

## 2024-07-15 DIAGNOSIS — I10 HYPERTENSION, UNSPECIFIED TYPE: ICD-10-CM

## 2024-07-15 DIAGNOSIS — F03.90 DEMENTIA WITHOUT BEHAVIORAL DISTURBANCE (HCC): Primary | ICD-10-CM

## 2024-07-15 DIAGNOSIS — I50.32 CHRONIC DIASTOLIC CHF (CONGESTIVE HEART FAILURE) (HCC): ICD-10-CM

## 2024-09-24 ENCOUNTER — APPOINTMENT (OUTPATIENT)
Dept: CARDIOLOGY | Facility: CLINIC | Age: 89
End: 2024-09-24
Payer: COMMERCIAL

## 2024-10-03 ENCOUNTER — HOSPITAL ENCOUNTER (OUTPATIENT)
Dept: CARDIOLOGY | Age: 89
Discharge: HOME | End: 2024-10-03
Payer: MEDICARE

## 2024-10-03 PROCEDURE — 93294 REM INTERROG EVL PM/LDLS PM: CPT | Performed by: INTERNAL MEDICINE

## 2024-10-03 PROCEDURE — 93296 REM INTERROG EVL PM/IDS: CPT

## 2024-10-07 ENCOUNTER — APPOINTMENT (OUTPATIENT)
Dept: CARDIOLOGY | Facility: CLINIC | Age: 89
End: 2024-10-07
Payer: COMMERCIAL

## 2024-10-07 ENCOUNTER — OFFICE VISIT (OUTPATIENT)
Dept: CARDIOLOGY | Facility: CLINIC | Age: 89
End: 2024-10-07
Payer: COMMERCIAL

## 2024-10-07 VITALS
HEART RATE: 98 BPM | DIASTOLIC BLOOD PRESSURE: 62 MMHG | SYSTOLIC BLOOD PRESSURE: 118 MMHG | BODY MASS INDEX: 24.6 KG/M2 | HEIGHT: 64 IN

## 2024-10-07 VITALS
HEIGHT: 64 IN | DIASTOLIC BLOOD PRESSURE: 62 MMHG | HEART RATE: 98 BPM | BODY MASS INDEX: 24.6 KG/M2 | SYSTOLIC BLOOD PRESSURE: 118 MMHG

## 2024-10-07 DIAGNOSIS — I10 PRIMARY HYPERTENSION: ICD-10-CM

## 2024-10-07 DIAGNOSIS — M15.9 OSTEOARTHRITIS OF MULTIPLE JOINTS, UNSPECIFIED OSTEOARTHRITIS TYPE: ICD-10-CM

## 2024-10-07 DIAGNOSIS — E78.2 MIXED HYPERLIPIDEMIA: ICD-10-CM

## 2024-10-07 DIAGNOSIS — Z79.01 LONG TERM CURRENT USE OF ANTICOAGULANT THERAPY: Primary | ICD-10-CM

## 2024-10-07 DIAGNOSIS — I49.5 SINUS NODE DYSFUNCTION (MULTI): ICD-10-CM

## 2024-10-07 DIAGNOSIS — Z79.01 LONG TERM CURRENT USE OF ANTICOAGULANT THERAPY: ICD-10-CM

## 2024-10-07 DIAGNOSIS — Z98.61 CAD S/P PERCUTANEOUS CORONARY ANGIOPLASTY: ICD-10-CM

## 2024-10-07 DIAGNOSIS — G47.33 OBSTRUCTIVE SLEEP APNEA SYNDROME: ICD-10-CM

## 2024-10-07 DIAGNOSIS — I25.10 CAD S/P PERCUTANEOUS CORONARY ANGIOPLASTY: ICD-10-CM

## 2024-10-07 DIAGNOSIS — F03.B0 MODERATE DEMENTIA, UNSPECIFIED DEMENTIA TYPE, UNSPECIFIED WHETHER BEHAVIORAL, PSYCHOTIC, OR MOOD DISTURBANCE OR ANXIETY: ICD-10-CM

## 2024-10-07 DIAGNOSIS — Z95.0 CARDIAC PACEMAKER: ICD-10-CM

## 2024-10-07 DIAGNOSIS — I48.0 PAROXYSMAL ATRIAL FIBRILLATION (MULTI): ICD-10-CM

## 2024-10-07 DIAGNOSIS — K21.9 GASTROESOPHAGEAL REFLUX DISEASE WITHOUT ESOPHAGITIS: ICD-10-CM

## 2024-10-07 DIAGNOSIS — E03.9 ACQUIRED HYPOTHYROIDISM: ICD-10-CM

## 2024-10-07 DIAGNOSIS — Z78.9 NEVER SMOKED TOBACCO: ICD-10-CM

## 2024-10-07 DIAGNOSIS — N18.31 STAGE 3A CHRONIC KIDNEY DISEASE (MULTI): ICD-10-CM

## 2024-10-07 PROCEDURE — 1036F TOBACCO NON-USER: CPT | Performed by: INTERNAL MEDICINE

## 2024-10-07 PROCEDURE — 1123F ACP DISCUSS/DSCN MKR DOCD: CPT | Performed by: INTERNAL MEDICINE

## 2024-10-07 PROCEDURE — 3078F DIAST BP <80 MM HG: CPT | Performed by: INTERNAL MEDICINE

## 2024-10-07 PROCEDURE — 99214 OFFICE O/P EST MOD 30 MIN: CPT | Performed by: INTERNAL MEDICINE

## 2024-10-07 PROCEDURE — 1159F MED LIST DOCD IN RCRD: CPT | Performed by: INTERNAL MEDICINE

## 2024-10-07 PROCEDURE — 3074F SYST BP LT 130 MM HG: CPT | Performed by: INTERNAL MEDICINE

## 2024-10-07 PROCEDURE — 1157F ADVNC CARE PLAN IN RCRD: CPT | Performed by: INTERNAL MEDICINE

## 2024-10-07 NOTE — PROGRESS NOTES
CARDIOLOGY OFFICE NOTE     Date:   10/7/2024    Patient:    Ernestina Argueta    YOB: 1934    Primary Physician: Katherin Montero DO       Reason for Visit: Radiology follow-up visit    HPI:     Ernestina Argueta was seen in cardiac evaluation at the  Cardiology office October 7, 2024.      The patients problems are listed as in the impression below.    Electronic medical records reviewed.    Patient returns.  She is in the memory unit at the nursing home.  She had her pacemaker recently upgraded with the generator change for 2024.    She has no significant changes otherwise.  She states that her whole body aches.    Patient denies Chest Pain, SOB, Lightheadedness, Dizziness, TIA or CVA symptoms.  No CHF or Edema.  No Palpitations.  No GI,  or Bleeding Issues. No Recent Fever or Chills.     Cardiovascular and general review of systems is otherwise negative.    A 14-system review is otherwise negative, other than noted.     PHYSICAL EXAMINATION:      Vitals:    10/07/24 1224   BP: 118/62   Pulse: 98     General: No acute distress.  Alert and not oriented  Head And Neck Examination: No jugular venous distention, no carotid  bruits, no mass. Carotid upstrokes preserved. Oral mucosa moist.   No xanthelasma. Head and neck examination otherwise unremarkable.  Lungs: Clear to auscultation and percussion. No wheezes, no rales, and no rhonchi.  Chest: Excursion appeared to be normal. No chest wall tenderness on palpation. Pacemaker, left chest (pacemaker easily moves and with minimal effort can actually flip on at side).  Heart: Normal S1 and S2. No S3. No S4. No rub. Grade 1/6 systolic murmur best heard at left sternal border. Point of maximal impulse was within normal limits.  Abdomen: Obese. Soft. Nontender. No organomegaly. No bruits.   Extremities: 1-2+ bipedal edema. No clubbing. No cyanosis. Pulses are weight loss strong throughout. No bruits.  Musculoskeletal Exam: No ulcers, otherwise  unremarkable.  Neuro: Neurologically appeared grossly intact except for poor memory.     IMPRESSION:       1. Cardiovascular status stable  2. Asymptomatic  3. Coronary artery disease. History of coronary angioplasty.  4. Persistent atrial fibrillation, chronic persistent, rate control strategy  5. Sick sinus syndrome, status post pacemaker, post generator change for 2024 ([employer] close her XT DR LOPEZ).  6. Hypertension.  7. Hyperlipidemia.  8. Diabetes.  9. Obstructive sleep apnea.  10. Obesity.  11.  Dementia  As per active problem list, otherwise below.    RECOMMENDATIONS:      He continues to do well overall.  She is now in the memory care unit nursing home.  Would suggest that she continue her current medications.  Refills were provided.    Pacemaker interrogations as scheduled.    Exercise dietary program.  Hydration.    [employer] portal use was encouraged.    We will plan to see back in 6 months with Laboratory Studies and ECG as ordered.     Patient will follow up with their primary physician for general care.    The patient knows to contact medical care earlier if need be.      ALLERGIES:     Allergies   Allergen Reactions    Latex Unknown    Moxifloxacin Other    Penicillins Other        MEDICATIONS:     Current Outpatient Medications   Medication Instructions    acetaminophen (Tylenol) 325 mg tablet 2 tablets, oral, Every 4 hours PRN    acetaminophen (Tylenol) 500 mg tablet 2 tablets, oral, Every 4 hours PRN    acetaminophen (TYLENOL) 650 mg, rectal, Every 4 hours PRN    albuterol (ProAir HFA) 90 mcg/actuation inhaler 2 puffs, inhalation, Every 6 hours PRN    alum-mag hydroxide-simeth (Mylanta) 200-200-20 mg/5 mL oral suspension 30 mL, oral, Every 6 hours PRN    ammonium lactate (Amlactin) 12 % cream Topical, As needed    apixaban (ELIQUIS) 2.5 mg, oral, 2 times daily, Hold for 2 days and resume on 4/5/24    bisacodyl (DULCOLAX) 10 mg, rectal, Once    cholecalciferol (Vitamin D-3) 25 MCG (1000 UT) tablet 1  tablet, oral, Daily    escitalopram (LEXAPRO) 10 mg, oral, Daily    furosemide (LASIX) 20 mg, oral, 3 times weekly    HumaLOG KwikPen Insulin 100 unit/mL injection subcutaneous, 3 times daily (morning, midday, late afternoon)    hydrOXYzine HCL (ATARAX) 25 mg, oral, 2 times daily    insulin aspart (NovoLOG Flexpen U-100 Insulin) 100 unit/mL (3 mL) pen subcutaneous, 3 times daily before meals, Take as directed per insulin instructions.    isosorbide mononitrate ER (IMDUR) 60 mg, oral, Daily    lactobacillus acidophilus capsule 1 capsule, oral, Daily    levothyroxine (SYNTHROID, LEVOXYL) 75 mcg, oral, Daily    magnesium hydroxide (Milk of Magnesia) 400 mg/5 mL suspension 30 mL, oral, Daily PRN    magnesium oxide 400 mg magnesium capsule 1 capsule, oral, Daily    meclizine (Antivert) 12.5 mg tablet 1 tablet, oral, Every 8 hours PRN    metoprolol succinate XL (TOPROL-XL) 100 mg, oral, 2 times daily    multivitamin with minerals iron-free (Centrum Silver) 1 tablet, oral, Every morning    nitroglycerin (Nitrostat) 0.4 mg SL tablet sublingual    pantoprazole (PROTONIX) 20 mg, oral, Daily    potassium chloride 40 mEq/15 mL liquid 15 mL, oral, Once    sodium chloride (Ocean) 0.65 % nasal spray 1 spray, Each Nostril, As needed    sodium phosphates (Fleet Enema Extra) 19-7 gram/197 mL enema 1 suppository, rectal, Daily    traZODone (DESYREL) 50 mg, oral, Nightly       ELECTROCARDIOGRAM:      None this visit    CARDIAC TESTING:      None this visit    LABORATORY DATA:      CBC:   Lab Results   Component Value Date    WBC 6.5 04/02/2024    RBC 3.65 (L) 04/02/2024    HGB 10.2 (L) 04/02/2024    HCT 31.7 (L) 04/02/2024     04/02/2024        CMP:    Lab Results   Component Value Date     04/02/2024    K 3.6 04/02/2024     04/02/2024    CO2 30 04/02/2024    BUN 17 04/02/2024    CREATININE 0.99 04/02/2024    GLUCOSE 150 (H) 04/02/2024    CALCIUM 8.7 04/02/2024       Magnesium:    Lab Results   Component Value Date     MG 1.38 (L) 03/01/2024       Lipid Profile:    Lab Results   Component Value Date    CHOL 175 08/18/2022    TRIG 146 08/18/2022    HDL 36.0 (A) 08/18/2022    LDLF 110 (H) 08/18/2022       Hepatic Function Panel:    Lab Results   Component Value Date    ALKPHOS 61 03/15/2024    ALT 16 03/15/2024    AST 22 03/15/2024    PROT 7.4 03/15/2024    BILITOT 0.4 03/15/2024    BILIDIR 0.4 (H) 02/25/2024       TSH:    Lab Results   Component Value Date    TSH 2.21 02/25/2024       HgBA1c:    Lab Results   Component Value Date    HGBA1C 7.3 (H) 02/25/2024       BNP:   Lab Results   Component Value Date     (H) 02/25/2024        PT/INR:    Lab Results   Component Value Date    PROTIME 14.9 (H) 04/02/2024    INR 1.3 (H) 04/02/2024       Cardiac Enzymes:    Lab Results   Component Value Date    TROPHS 42 (H) 02/25/2024    TROPHS 45 (H) 02/25/2024    TROPHS 15 (H) 01/13/2024                 PROBLEM LIST:     Patient Active Problem List   Diagnosis    Atrial fibrillation (Multi)    Back pain, thoracic    CAD S/P percutaneous coronary angioplasty    Cardiac pacemaker    Chronic midline low back pain without sciatica    Chronic pain of both knees    Foot pain    Dementia    DJD (degenerative joint disease), multiple sites    GERD (gastroesophageal reflux disease)    Hyperlipidemia    Hypertension    Hypothyroidism    Itching    MCI (mild cognitive impairment) with memory loss    Sleep apnea    Stage 3a chronic kidney disease (Multi)    Sinus node dysfunction (Multi)    Long term current use of anticoagulant therapy    Dementia with anxiety, unspecified dementia severity, unspecified dementia type (Multi)             Sj uSbramanian MD, FACC   [facility] / [facility] /  Cardiology      Of Note:  [employer] voice recognition dictation software was utilized partially in the preparation of this note, therefore, inaccuracies in spelling, word choice and punctuation may have occurred which were not recognized the time of  signing.    Patient was seen and examined with total time of visit including chart preparation, rooming, and chart completion exceeding 40 minutes.      ----

## 2024-10-07 NOTE — PATIENT INSTRUCTIONS
ORDERS FROM DR ALMAZAN APPOINTMENT 10/7/24:    SEE DR ALMAZAN IN 6 MONTHS    CONTINUE CURRENT MEDICATIONS    BMP, CBC, LIPIDS, LFTs, MAGNESIUM LEVEL, TSH-ALL TO BE DONE PRIOR TO APPOINTMENT IN 6 MONTHS

## 2024-10-07 NOTE — PROGRESS NOTES
CARDIOLOGY OFFICE VISIT      CHIEF COMPLAINT  Chief Complaint   Patient presents with    Follow-up     6 MONTH        HISTORY OF PRESENT ILLNESS  HPI  89-year-old female with a past medical history of coronary artery disease with percutaneous coronary interventions in the past, persistent atrial fibrillation on rate control strategy status post pacemaker for sinus node dysfunction in September 2016, hypertension, hyperlipidemia, diabetes mellitus, struct of sleep apnea.     Family members state that she had an admission at [facility] in November 2023 for UTI.  Since then her dementia got worse.  She can recognize family members but she is very disoriented.    Patient was seen my office in February 2024.  At that time he had a pacemaker with battery longevity at RRT.   A stress test in 2020 was negative for ischemia with a left intervention fraction 73%.     She presented to the emergency room in March 2024, with inability to eat.  She unfortunately has had a stepwise decline in her mentation since October 2023 after she was hospitalized for urinary tract infection.     She was profoundly dehydrated, started on IV fluids.  Initially she was confused, delirious and moaning.  She was found to have severe thrush, started on IV fluconazole.  She slowly continued to improve in terms of her mentation, she was seen by speech therapy who recommended a puréed diet.  Her oral thrush significantly improved on discharge.  She will be discharged on a fluconazole for 10 days, total duration is 14 days.  After that PCP can assess if she needs treatment for longer.     She was treated for urinary tract infection, her urine culture grew Enterococcus, she remained on vancomycin for 4 days and switch to nitrofurantoin to complete 7 days.     She was also found to be profoundly dehydrated and persistently hypokalemic.  At that skilled nursing feels that she was on Lasix as well as metolazone.  She was also noted to have acute  kidney injury with a creatinine of 3 on admission.  On discharge, her metolazone was discontinued, her Lasix was reduced to 20 mg 3 times weekly.     She has a history of diabetes, but blood sugars were within normal range.  Her Lantus was discontinued, discharged on a sliding scale.    Patient recovered from hospitalization and she underwent dual-chamber pacemaker exchange in April 2024 with no complications.    No new events since the last office visit.  Family members state that patient so far has not had any significant events except for some edema in the lower extremities occasionally.    Device interrogation in October 2024 shows battery longevity 9.8 years.  [place] of atrial fibrillation 11.9%.  Patient is not on warfarin therapy.  She is currently on Eliquis 2.5 mg 1 tablet twice a day.          Past Medical History  Past Medical History:   Diagnosis Date    A-fib (Multi)     Coronary artery disease     Dementia     Diabetes mellitus (Multi)     DJD (degenerative joint disease)     GERD (gastroesophageal reflux disease)     Hyperlipidemia     Hypertension     Hypothyroid     Muscle spasm of back     Back spasm    Pain in thoracic spine 08/15/2022    Back pain, thoracic    Personal history of other specified conditions     History of vertigo    Personal history of other specified conditions     History of itching    Sleep apnea        Social History  Social History     Tobacco Use    Smoking status: Never    Smokeless tobacco: Never   Substance Use Topics    Alcohol use: Not on file     Comment: [facility]    Drug use: Never       Family History   No family history on file.     Allergies:  Allergies   Allergen Reactions    Latex Unknown    Moxifloxacin Other    Penicillins Other        Outpatient Medications:  Current Outpatient Medications   Medication Instructions    acetaminophen (Tylenol) 325 mg tablet 2 tablets, oral, Every 4 hours PRN    acetaminophen (Tylenol) 500 mg tablet 2 tablets, oral, Every 4 hours  PRN    acetaminophen (TYLENOL) 650 mg, rectal, Every 4 hours PRN    albuterol (ProAir HFA) 90 mcg/actuation inhaler 2 puffs, inhalation, Every 6 hours PRN    alum-mag hydroxide-simeth (Mylanta) 200-200-20 mg/5 mL oral suspension 30 mL, oral, Every 6 hours PRN    ammonium lactate (Amlactin) 12 % cream Topical, As needed    apixaban (ELIQUIS) 2.5 mg, oral, 2 times daily, Hold for 2 days and resume on 4/5/24    bisacodyl (DULCOLAX) 10 mg, rectal, Once    cholecalciferol (Vitamin D-3) 25 MCG (1000 UT) tablet 1 tablet, oral, Daily    escitalopram (LEXAPRO) 10 mg, oral, Daily    furosemide (LASIX) 20 mg, oral, 3 times weekly    HumaLOG KwikPen Insulin 100 unit/mL injection subcutaneous, 3 times daily (morning, midday, late afternoon)    hydrOXYzine HCL (ATARAX) 25 mg, oral, 2 times daily    insulin aspart (NovoLOG Flexpen U-100 Insulin) 100 unit/mL (3 mL) pen subcutaneous, 3 times daily before meals, Take as directed per insulin instructions.    isosorbide mononitrate ER (IMDUR) 60 mg, oral, Daily    lactobacillus acidophilus capsule 1 capsule, oral, Daily    levothyroxine (SYNTHROID, LEVOXYL) 75 mcg, oral, Daily    magnesium hydroxide (Milk of Magnesia) 400 mg/5 mL suspension 30 mL, oral, Daily PRN    magnesium oxide 400 mg magnesium capsule 1 capsule, oral, Daily    meclizine (Antivert) 12.5 mg tablet 1 tablet, oral, Every 8 hours PRN    metoprolol succinate XL (TOPROL-XL) 100 mg, oral, 2 times daily    multivitamin with minerals iron-free (Centrum Silver) 1 tablet, oral, Every morning    nitroglycerin (Nitrostat) 0.4 mg SL tablet sublingual    pantoprazole (PROTONIX) 20 mg, oral, Daily    potassium chloride 40 mEq/15 mL liquid 15 mL, oral, Once    sodium chloride (Ocean) 0.65 % nasal spray 1 spray, Each Nostril, As needed    sodium phosphates (Fleet Enema Extra) 19-7 gram/197 mL enema 1 suppository, rectal, Daily    traZODone (DESYREL) 50 mg, oral, Nightly          REVIEW OF SYSTEMS  Review of Systems   All other  systems reviewed and are negative.        VITALS  Vitals:    10/07/24 1126   BP: 118/62   Pulse: 98       PHYSICAL EXAM  Constitutional:       Appearance: Not in distress. Chronically ill-appearing.   Neck:      Vascular: No JVR. JVD normal.   Pulmonary:      Effort: Pulmonary effort is normal.      Breath sounds: Normal breath sounds. No wheezing. No rhonchi. No rales.   Chest:      Chest wall: Not tender to palpatation.   Cardiovascular:      PMI at left midclavicular line. Normal rate. Regular rhythm. Normal S1. Normal S2.       Murmurs: There is no murmur.      No gallop.  No click. No rub.      Comments: Device in the left prepectoral healing well.  No signs of hematoma or infection.     Pulses:     Intact distal pulses.   Edema:     Peripheral edema absent.   Abdominal:      General: Bowel sounds are normal.      Palpations: Abdomen is soft.      Tenderness: There is no abdominal tenderness.   Musculoskeletal: Normal range of motion.         General: No tenderness. Skin:     General: Skin is warm and dry.   Neurological:      General: No focal deficit present.      Mental Status: Exhibits altered mental status.           ASSESSMENT AND PLAN  Clinical impression     1.  Sinus node dysfunction  2.  Status post dual-chamber pacemaker implant in 2016 with battery at [facility].  Status post dual-chamber pacemaker battery exchange in April 2024 with no complications  3.  Persistent atrial fibrillation  4.  Long-term anticoagulant therapy with warfarin  5.  Hypertension  6.  Hyperlipidemia  7.  Diabetes mellitus  8.  Obstructive sleep apnea  9.  Coronary disease with history of percutaneous coronary interventions in the past  10.  Severe dementia      Plan-recommendations    Patient is doing well from the electrophysiology standpoint.  Device interrogation reviewed with family members.    Continue with rate control for atrial fibrillation management.    Continue with Eliquis therapy.    Follow device clinic as  scheduled.    Follow my office every 6 months or sooner if needed.    Risk factor modification and lifestyle modification discussed with patient. Diet , exercise and hydration discussed with patient.    I have personally review with patient during this office visit, laboratory data, echocardiogram results, stress test results, Holter-event monitor results prior and after the last electrophysiology visit. All questions has been answered.    Please excuse any errors in grammar or translation related to this dictation.  Voice recognition software was utilized to prepare this document.      Scribe Attestation  By signing my name below, I, Veronika Raygoza MA  , Scribe   attest that this documentation has been prepared under the direction and in the presence of Norbert Finley MD.

## 2024-10-15 ENCOUNTER — OFFICE VISIT (OUTPATIENT)
Dept: GERIATRIC MEDICINE | Age: 89
End: 2024-10-15

## 2024-10-15 DIAGNOSIS — F03.90 DEMENTIA WITHOUT BEHAVIORAL DISTURBANCE (HCC): Primary | ICD-10-CM

## 2024-10-15 DIAGNOSIS — I10 HYPERTENSION, UNSPECIFIED TYPE: ICD-10-CM

## 2024-10-15 DIAGNOSIS — L89.152 DECUBITUS ULCER OF SACRAL REGION, STAGE 2 (HCC): ICD-10-CM

## 2024-10-15 DIAGNOSIS — B37.0 ORAL THRUSH: ICD-10-CM

## 2024-10-15 DIAGNOSIS — E03.9 HYPOTHYROIDISM, UNSPECIFIED TYPE: ICD-10-CM

## 2024-10-15 DIAGNOSIS — I50.32 CHRONIC DIASTOLIC CHF (CONGESTIVE HEART FAILURE) (HCC): ICD-10-CM

## 2024-10-30 NOTE — PROGRESS NOTES
SNF PROGRESS NOTE      Cc-  dementia       Patient is a Chen Medellin 89 y.o. female who is being seen at [place] for dementia. She is being seen for her 30 day exam for dementia.     Over the last 30 days, patient saw cardio with no adjustments, her device was interrogated. Otherwise, there have been no significant events        Past Medical History:   Diagnosis Date    Abnormal liver ultrasound 5/15/2019    Allergic contact dermatitis due to plants, except food 8/19/2019    Arthritis     Atrial fibrillation (HCC)     CHF (congestive heart failure) (HCC)     Chronic kidney disease     Chronic kidney disease (CKD), stage II (mild)     Depression     Hyperlipidemia     Hypertension     Hypothyroidism     Interstitial cystitis     Dr. Chappell diagnosed this for michele.    Type II or unspecified type diabetes mellitus without mention of complication, not stated as uncontrolled      Latex, Avelox [moxifloxacin hcl in nacl], Penicillins, and Adhesive tape    VS reviewed    Gen- Alert and oriented x 1  Heart- RRR no murmur no LE edema   Lungs- CTA b/l no resp distress RA  oxygen   Abd- bs x 4              Assessment and Plan    Dementia  with behavioral disturbances  Psych .   Hydroxyzine BID   Stage 2 sacral Decub  Wound care   Insomnia  Melatonin   Trazodone  HTN  Isosorbide, metoprolol, hydralazine    HLD   A fib   eliquis, metoprolol  Cardio follow up in 6 months    Chronic Diastolic CHF  Lasix   Metoprolol    Cardio  Depression   Lexparo   Hypothyroid  Synthroid         Darline Knapp DO, FACMAYKEL     Electronically signed by: Darline Knapp DO on 10/15/2024

## 2024-11-15 ENCOUNTER — OFFICE VISIT (OUTPATIENT)
Dept: GERIATRIC MEDICINE | Age: 88
End: 2024-11-15
Payer: MEDICARE

## 2024-11-15 DIAGNOSIS — L89.152 DECUBITUS ULCER OF SACRAL REGION, STAGE 2 (HCC): ICD-10-CM

## 2024-11-15 DIAGNOSIS — I50.32 CHRONIC DIASTOLIC CHF (CONGESTIVE HEART FAILURE) (HCC): ICD-10-CM

## 2024-11-15 DIAGNOSIS — I10 HYPERTENSION, UNSPECIFIED TYPE: ICD-10-CM

## 2024-11-15 DIAGNOSIS — F03.90 DEMENTIA WITHOUT BEHAVIORAL DISTURBANCE (HCC): Primary | ICD-10-CM

## 2024-11-15 DIAGNOSIS — E03.9 HYPOTHYROIDISM, UNSPECIFIED TYPE: ICD-10-CM

## 2024-11-15 PROCEDURE — 99308 SBSQ NF CARE LOW MDM 20: CPT | Performed by: INTERNAL MEDICINE

## 2024-11-15 PROCEDURE — G8484 FLU IMMUNIZE NO ADMIN: HCPCS | Performed by: INTERNAL MEDICINE

## 2024-11-15 PROCEDURE — 1123F ACP DISCUSS/DSCN MKR DOCD: CPT | Performed by: INTERNAL MEDICINE

## 2024-11-27 NOTE — PROGRESS NOTES
SNF PROGRESS NOTE      Cc- dementia       Patient is a Chen Medellin 89 y.o. female who is being seen at [place] for dementia. She is being seen for her 30 day exam for dementia.     Over the last 30 days, patient did have a fall, there was no injuries. Otherwise, there have been no other significant events.         Past Medical History:   Diagnosis Date    Abnormal liver ultrasound 5/15/2019    Allergic contact dermatitis due to plants, except food 8/19/2019    Arthritis     Atrial fibrillation (HCC)     CHF (congestive heart failure) (HCC)     Chronic kidney disease     Chronic kidney disease (CKD), stage II (mild)     Depression     Hyperlipidemia     Hypertension     Hypothyroidism     Interstitial cystitis     Dr. Chappell diagnosed this for michele.    Type II or unspecified type diabetes mellitus without mention of complication, not stated as uncontrolled      Latex, Avelox [moxifloxacin hcl in nacl], Penicillins, and Adhesive tape    VS reviewed      Gen- Alert and oriented x 1  Heart- RRR no murmur no LE edema   Lungs- CTA b/l no resp distress RA  oxygen   Abd- bs x 4         Assessment and Plan    Dementia  with behavioral disturbances  Psych .   Hydroxyzine BID   Stage 2 sacral Decub  Wound care   Insomnia  Melatonin   Trazodone  HTN  Isosorbide, metoprolol, hydralazine    HLD   A fib   eliquis, metoprolol  Cardio follow up in 6 months    Chronic Diastolic CHF  Lasix   Metoprolol    Cardio  Depression   Lexparo   Hypothyroid  Synthroid      Darline Knapp DO, FACMAYKEL     Electronically signed by: Darline Knapp DO on 11/15/2024

## 2024-12-16 ENCOUNTER — OFFICE VISIT (OUTPATIENT)
Dept: GERIATRIC MEDICINE | Age: 88
End: 2024-12-16
Payer: MEDICARE

## 2024-12-16 DIAGNOSIS — L89.152 DECUBITUS ULCER OF SACRAL REGION, STAGE 2 (HCC): ICD-10-CM

## 2024-12-16 DIAGNOSIS — E03.9 HYPOTHYROIDISM, UNSPECIFIED TYPE: ICD-10-CM

## 2024-12-16 DIAGNOSIS — F03.90 DEMENTIA WITHOUT BEHAVIORAL DISTURBANCE (HCC): Primary | ICD-10-CM

## 2024-12-16 DIAGNOSIS — I10 HYPERTENSION, UNSPECIFIED TYPE: ICD-10-CM

## 2024-12-16 DIAGNOSIS — I50.32 CHRONIC DIASTOLIC CHF (CONGESTIVE HEART FAILURE) (HCC): ICD-10-CM

## 2024-12-16 PROCEDURE — G8484 FLU IMMUNIZE NO ADMIN: HCPCS | Performed by: INTERNAL MEDICINE

## 2024-12-16 PROCEDURE — 99308 SBSQ NF CARE LOW MDM 20: CPT | Performed by: INTERNAL MEDICINE

## 2024-12-16 PROCEDURE — 1123F ACP DISCUSS/DSCN MKR DOCD: CPT | Performed by: INTERNAL MEDICINE

## 2024-12-23 NOTE — PROGRESS NOTES
SNF PROGRESS NOTE      Cc- dementia       Patient is a Chen Medellin 89 y.o. female  who is being seen at [place] for dementia. She is being seen for her 30 day exam for dementia.     Over the last 30 days, patient has not had any significant events.         Past Medical History:   Diagnosis Date    Abnormal liver ultrasound 5/15/2019    Allergic contact dermatitis due to plants, except food 8/19/2019    Arthritis     Atrial fibrillation (HCC)     CHF (congestive heart failure) (HCC)     Chronic kidney disease     Chronic kidney disease (CKD), stage II (mild)     Depression     Hyperlipidemia     Hypertension     Hypothyroidism     Interstitial cystitis     Dr. Chappell diagnosed this for patinet.    Type II or unspecified type diabetes mellitus without mention of complication, not stated as uncontrolled      Latex, Avelox [moxifloxacin hcl in nacl], Penicillins, and Adhesive tape    VS reviewed    Gen- Alert and oriented x 1  Heart- RRR no murmur no LE edema   Lungs- CTA b/l no resp distress RA  oxygen   Abd- bs x 4           Assessment and Plan       Dementia  with behavioral disturbances  Psych .   Hydroxyzine BID   Stage 2 sacral Decub  Wound care   Insomnia  Melatonin   Trazodone  HTN  Isosorbide, metoprolol, hydralazine    HLD   A fib   eliquis, metoprolol  Cardio follow up in 6 months    Chronic Diastolic CHF  Lasix   Metoprolol    Cardio  Depression   Lexparo   Hypothyroid  Synthroid      Darline Knapp DO, FACMAYKEL     Electronically signed by: Darline Knapp DO on 12/16/2024

## 2025-01-08 ENCOUNTER — HOSPITAL ENCOUNTER (OUTPATIENT)
Dept: CARDIOLOGY | Age: 89
Discharge: HOME | End: 2025-01-08
Payer: MEDICARE

## 2025-01-08 PROCEDURE — 93296 REM INTERROG EVL PM/IDS: CPT

## 2025-01-15 ENCOUNTER — OFFICE VISIT (OUTPATIENT)
Dept: GERIATRIC MEDICINE | Age: 89
End: 2025-01-15

## 2025-01-15 DIAGNOSIS — I50.32 CHRONIC DIASTOLIC CHF (CONGESTIVE HEART FAILURE) (HCC): ICD-10-CM

## 2025-01-15 DIAGNOSIS — Z95.0 CARDIAC PACEMAKER: ICD-10-CM

## 2025-01-15 DIAGNOSIS — L89.152 DECUBITUS ULCER OF SACRAL REGION, STAGE 2 (HCC): ICD-10-CM

## 2025-01-15 DIAGNOSIS — E03.9 HYPOTHYROIDISM, UNSPECIFIED TYPE: ICD-10-CM

## 2025-01-15 DIAGNOSIS — F32.A DEPRESSION, UNSPECIFIED DEPRESSION TYPE: ICD-10-CM

## 2025-01-15 DIAGNOSIS — I10 HYPERTENSION, UNSPECIFIED TYPE: ICD-10-CM

## 2025-01-15 DIAGNOSIS — F03.90 DEMENTIA WITHOUT BEHAVIORAL DISTURBANCE (HCC): Primary | ICD-10-CM

## 2025-01-15 NOTE — PROGRESS NOTES
Rec'd fax from [facility] device clinic requesting  order for services rendered.  Order entered for date of service rendered and x next year for routine device checks in-clinic and remotely or as directed by physician.

## 2025-01-23 NOTE — PROGRESS NOTES
SNF PROGRESS NOTE      Cc-dementia      Patient is a Chen Medellin 89 y.o. female who is being seen for her 30 day examination for dementia. She is being seen in the dementia unit.     Over the last 30 days, patient has had no significant events. The patient is eating ok. She is sitting in the main area.         Past Medical History:   Diagnosis Date    Abnormal liver ultrasound 5/15/2019    Allergic contact dermatitis due to plants, except food 8/19/2019    Arthritis     Atrial fibrillation (HCC)     CHF (congestive heart failure) (HCC)     Chronic kidney disease     Chronic kidney disease (CKD), stage II (mild)     Depression     Hyperlipidemia     Hypertension     Hypothyroidism     Interstitial cystitis     Dr. Chappell diagnosed this for michele.    Type II or unspecified type diabetes mellitus without mention of complication, not stated as uncontrolled      Latex, Avelox [moxifloxacin hcl in nacl], Penicillins, and Adhesive tape    VS reviewed      Gen- Alert and oriented x 1  Heart- RRR no murmur no LE edema   Lungs- CTA b/l no resp distress RA  oxygen   Abd- bs x 4         Assessment and Plan    Dementia  with behavioral disturbances  Psych .   Hydroxyzine BID   Stage 2 sacral Decub  Wound care   Insomnia  Melatonin   Trazodone  HTN  Isosorbide, metoprolol, hydralazine    HLD   A fib   eliquis, metoprolol  Cardio follow up i4/21/25   Chronic Diastolic CHF  Lasix   Metoprolol    Cardio 4/21/25  Depression   Lexparo   Hypothyroid  Synthroid         Darline Knapp DO, FACMAYKEL     Electronically signed by: Darline Knapp DO on 1/15/2025

## 2025-02-09 ENCOUNTER — APPOINTMENT (OUTPATIENT)
Dept: RADIOLOGY | Facility: HOSPITAL | Age: OVER 89
End: 2025-02-09
Payer: COMMERCIAL

## 2025-02-09 ENCOUNTER — APPOINTMENT (OUTPATIENT)
Dept: CARDIOLOGY | Facility: HOSPITAL | Age: OVER 89
End: 2025-02-09
Payer: COMMERCIAL

## 2025-02-09 ENCOUNTER — HOSPITAL ENCOUNTER (EMERGENCY)
Facility: HOSPITAL | Age: OVER 89
Discharge: HOME | End: 2025-02-09
Attending: STUDENT IN AN ORGANIZED HEALTH CARE EDUCATION/TRAINING PROGRAM
Payer: COMMERCIAL

## 2025-02-09 VITALS
HEART RATE: 70 BPM | DIASTOLIC BLOOD PRESSURE: 74 MMHG | OXYGEN SATURATION: 97 % | WEIGHT: 155 LBS | TEMPERATURE: 97.9 F | SYSTOLIC BLOOD PRESSURE: 171 MMHG | HEIGHT: 67 IN | RESPIRATION RATE: 18 BRPM | BODY MASS INDEX: 24.33 KG/M2

## 2025-02-09 DIAGNOSIS — M25.512 ACUTE PAIN OF LEFT SHOULDER: ICD-10-CM
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
## 2025-02-09 DIAGNOSIS — S42.035A CLOSED NONDISPLACED FRACTURE OF ACROMIAL END OF LEFT CLAVICLE, INITIAL ENCOUNTER: ICD-10-CM

## 2025-02-09 LAB
ABO GROUP (TYPE) IN BLOOD: NORMAL
ALBUMIN SERPL BCP-MCNC: 3.6 G/DL (ref 3.4–5)
ALP SERPL-CCNC: 60 U/L (ref 33–136)
ALT SERPL W P-5'-P-CCNC: 6 U/L (ref 7–45)
ANION GAP SERPL CALC-SCNC: 11 MMOL/L (ref 10–20)
ANTIBODY SCREEN: NORMAL
AST SERPL W P-5'-P-CCNC: 13 U/L (ref 9–39)
BASOPHILS # BLD AUTO: 0.03 X10*3/UL (ref 0–0.1)
BASOPHILS NFR BLD AUTO: 0.3 %
BILIRUB SERPL-MCNC: 0.5 MG/DL (ref 0–1.2)
BUN SERPL-MCNC: 26 MG/DL (ref 6–23)
CALCIUM SERPL-MCNC: 9.2 MG/DL (ref 8.6–10.3)
CHLORIDE SERPL-SCNC: 101 MMOL/L (ref 98–107)
CO2 SERPL-SCNC: 30 MMOL/L (ref 21–32)
CREAT SERPL-MCNC: 0.9 MG/DL (ref 0.5–1.05)
EGFRCR SERPLBLD CKD-EPI 2021: 61 ML/MIN/1.73M*2
EOSINOPHIL # BLD AUTO: 0.16 X10*3/UL (ref 0–0.4)
EOSINOPHIL NFR BLD AUTO: 1.8 %
ERYTHROCYTE [DISTWIDTH] IN BLOOD BY AUTOMATED COUNT: 12.9 % (ref 11.5–14.5)
ETHANOL SERPL-MCNC: <10 MG/DL
GLUCOSE SERPL-MCNC: 171 MG/DL (ref 74–99)
HCT VFR BLD AUTO: 35.3 % (ref 36–46)
HGB BLD-MCNC: 11.5 G/DL (ref 12–16)
IMM GRANULOCYTES # BLD AUTO: 0.03 X10*3/UL (ref 0–0.5)
IMM GRANULOCYTES NFR BLD AUTO: 0.3 % (ref 0–0.9)
INR PPP: 1.3 (ref 0.9–1.1)
LACTATE SERPL-SCNC: 1.5 MMOL/L (ref 0.4–2)
LYMPHOCYTES # BLD AUTO: 1.7 X10*3/UL (ref 0.8–3)
LYMPHOCYTES NFR BLD AUTO: 19.1 %
MCH RBC QN AUTO: 28.6 PG (ref 26–34)
MCHC RBC AUTO-ENTMCNC: 32.6 G/DL (ref 32–36)
MCV RBC AUTO: 88 FL (ref 80–100)
MONOCYTES # BLD AUTO: 0.84 X10*3/UL (ref 0.05–0.8)
MONOCYTES NFR BLD AUTO: 9.4 %
NEUTROPHILS # BLD AUTO: 6.15 X10*3/UL (ref 1.6–5.5)
NEUTROPHILS NFR BLD AUTO: 69.1 %
NRBC BLD-RTO: 0 /100 WBCS (ref 0–0)
PLATELET # BLD AUTO: 193 X10*3/UL (ref 150–450)
POTASSIUM SERPL-SCNC: 4.2 MMOL/L (ref 3.5–5.3)
PROT SERPL-MCNC: 6.5 G/DL (ref 6.4–8.2)
PROTHROMBIN TIME: 15.1 SECONDS (ref 9.8–12.8)
RBC # BLD AUTO: 4.02 X10*6/UL (ref 4–5.2)
RH FACTOR (ANTIGEN D): NORMAL
SODIUM SERPL-SCNC: 138 MMOL/L (ref 136–145)
WBC # BLD AUTO: 8.9 X10*3/UL (ref 4.4–11.3)

## 2025-02-09 PROCEDURE — 84075 ASSAY ALKALINE PHOSPHATASE: CPT | Performed by: STUDENT IN AN ORGANIZED HEALTH CARE EDUCATION/TRAINING PROGRAM

## 2025-02-09 PROCEDURE — 70450 CT HEAD/BRAIN W/O DYE: CPT | Performed by: STUDENT IN AN ORGANIZED HEALTH CARE EDUCATION/TRAINING PROGRAM

## 2025-02-09 PROCEDURE — 72132 CT LUMBAR SPINE W/DYE: CPT | Mod: RCN | Performed by: RADIOLOGY

## 2025-02-09 PROCEDURE — 99285 EMERGENCY DEPT VISIT HI MDM: CPT | Mod: 25 | Performed by: STUDENT IN AN ORGANIZED HEALTH CARE EDUCATION/TRAINING PROGRAM

## 2025-02-09 PROCEDURE — 86901 BLOOD TYPING SEROLOGIC RH(D): CPT | Performed by: STUDENT IN AN ORGANIZED HEALTH CARE EDUCATION/TRAINING PROGRAM

## 2025-02-09 PROCEDURE — 72128 CT CHEST SPINE W/O DYE: CPT | Mod: RCN

## 2025-02-09 PROCEDURE — 74177 CT ABD & PELVIS W/CONTRAST: CPT | Mod: RCN | Performed by: RADIOLOGY

## 2025-02-09 PROCEDURE — 70450 CT HEAD/BRAIN W/O DYE: CPT

## 2025-02-09 PROCEDURE — 85025 COMPLETE CBC W/AUTO DIFF WBC: CPT | Performed by: STUDENT IN AN ORGANIZED HEALTH CARE EDUCATION/TRAINING PROGRAM

## 2025-02-09 PROCEDURE — 83605 ASSAY OF LACTIC ACID: CPT | Performed by: STUDENT IN AN ORGANIZED HEALTH CARE EDUCATION/TRAINING PROGRAM

## 2025-02-09 PROCEDURE — 82077 ASSAY SPEC XCP UR&BREATH IA: CPT | Performed by: STUDENT IN AN ORGANIZED HEALTH CARE EDUCATION/TRAINING PROGRAM

## 2025-02-09 PROCEDURE — 72125 CT NECK SPINE W/O DYE: CPT

## 2025-02-09 PROCEDURE — 73030 X-RAY EXAM OF SHOULDER: CPT | Mod: LEFT SIDE | Performed by: RADIOLOGY

## 2025-02-09 PROCEDURE — 72129 CT CHEST SPINE W/DYE: CPT | Mod: RCN | Performed by: RADIOLOGY

## 2025-02-09 PROCEDURE — 86900 BLOOD TYPING SEROLOGIC ABO: CPT | Performed by: STUDENT IN AN ORGANIZED HEALTH CARE EDUCATION/TRAINING PROGRAM

## 2025-02-09 PROCEDURE — 2500000004 HC RX 250 GENERAL PHARMACY W/ HCPCS (ALT 636 FOR OP/ED): Performed by: STUDENT IN AN ORGANIZED HEALTH CARE EDUCATION/TRAINING PROGRAM

## 2025-02-09 PROCEDURE — 73030 X-RAY EXAM OF SHOULDER: CPT | Mod: LT

## 2025-02-09 PROCEDURE — 85610 PROTHROMBIN TIME: CPT | Performed by: STUDENT IN AN ORGANIZED HEALTH CARE EDUCATION/TRAINING PROGRAM

## 2025-02-09 PROCEDURE — 74177 CT ABD & PELVIS W/CONTRAST: CPT

## 2025-02-09 PROCEDURE — 71260 CT THORAX DX C+: CPT | Mod: RCN | Performed by: RADIOLOGY

## 2025-02-09 PROCEDURE — 72131 CT LUMBAR SPINE W/O DYE: CPT | Mod: RCN

## 2025-02-09 PROCEDURE — 2550000001 HC RX 255 CONTRASTS: Performed by: STUDENT IN AN ORGANIZED HEALTH CARE EDUCATION/TRAINING PROGRAM

## 2025-02-09 PROCEDURE — 96374 THER/PROPH/DIAG INJ IV PUSH: CPT | Mod: 59

## 2025-02-09 PROCEDURE — 93005 ELECTROCARDIOGRAM TRACING: CPT

## 2025-02-09 PROCEDURE — 72125 CT NECK SPINE W/O DYE: CPT | Performed by: STUDENT IN AN ORGANIZED HEALTH CARE EDUCATION/TRAINING PROGRAM

## 2025-02-09 RX ORDER — MORPHINE SULFATE 2 MG/ML
2 INJECTION, SOLUTION INTRAMUSCULAR; INTRAVENOUS ONCE
Status: COMPLETED | OUTPATIENT
Start: 2025-02-09 | End: 2025-02-09

## 2025-02-09 RX ADMIN — IOHEXOL 100 ML: 350 INJECTION, SOLUTION INTRAVENOUS at 04:58

## 2025-02-09 RX ADMIN — MORPHINE SULFATE 2 MG: 2 INJECTION, SOLUTION INTRAMUSCULAR; INTRAVENOUS at 03:36

## 2025-02-09 ASSESSMENT — LIFESTYLE VARIABLES
TOTAL SCORE: 0
EVER FELT BAD OR GUILTY ABOUT YOUR DRINKING: NO
HAVE PEOPLE ANNOYED YOU BY CRITICIZING YOUR DRINKING: NO
EVER HAD A DRINK FIRST THING IN THE MORNING TO STEADY YOUR NERVES TO GET RID OF A HANGOVER: NO
HAVE YOU EVER FELT YOU SHOULD CUT DOWN ON YOUR DRINKING: NO

## 2025-02-09 ASSESSMENT — PAIN SCALES - GENERAL
PAINLEVEL_OUTOF10: 7
PAINLEVEL_OUTOF10: 7
PAINLEVEL_OUTOF10: 8

## 2025-02-09 ASSESSMENT — PAIN DESCRIPTION - LOCATION: LOCATION: SHOULDER

## 2025-02-09 ASSESSMENT — PAIN - FUNCTIONAL ASSESSMENT: PAIN_FUNCTIONAL_ASSESSMENT: 0-10

## 2025-02-09 ASSESSMENT — PAIN DESCRIPTION - PAIN TYPE: TYPE: ACUTE PAIN

## 2025-02-09 ASSESSMENT — COLUMBIA-SUICIDE SEVERITY RATING SCALE - C-SSRS
6. HAVE YOU EVER DONE ANYTHING, STARTED TO DO ANYTHING, OR PREPARED TO DO ANYTHING TO END YOUR LIFE?: NO
1. IN THE PAST MONTH, HAVE YOU WISHED YOU WERE DEAD OR WISHED YOU COULD GO TO SLEEP AND NOT WAKE UP?: NO
2. HAVE YOU ACTUALLY HAD ANY THOUGHTS OF KILLING YOURSELF?: NO

## 2025-02-09 NOTE — ED PROVIDER NOTES
HPI   Chief Complaint   Patient presents with    Fall     Fell last night around 2100 at [facility]. Now complaining of sever left shoulder pain.        Patient is a 89-year-old female with past medical history of atrial fibrillation, CAD, dementia, diabetes, GERD, hyperlipidemia, hypertension, hypothyroidism, vertigo, sleep apnea presents the ED via EMS from nursing home for fall and left shoulder pain.  Per the nursing home the patient had an unwitnessed fall at around 2100 last night.  They had found her sitting up on the ground.  They report that she is not on any blood thinners.  She denies any headache or head neck pain.  She is complaining of left shoulder pain since the fall.  Per the nursing home her baseline mentation is oriented x 1-2.              Patient History   Past Medical History:   Diagnosis Date    A-fib (Multi)     Coronary artery disease     Dementia     Diabetes mellitus (Multi)     DJD (degenerative joint disease)     GERD (gastroesophageal reflux disease)     Hyperlipidemia     Hypertension     Hypothyroid     Muscle spasm of back     Back spasm    Pain in thoracic spine 08/15/2022    Back pain, thoracic    Personal history of other specified conditions     History of vertigo    Personal history of other specified conditions     History of itching    Sleep apnea      Past Surgical History:   Procedure Laterality Date    CARDIAC ELECTROPHYSIOLOGY PROCEDURE Left 4/2/2024    Procedure: PPM Generator Change;  Surgeon: Norbert Finley MD;  Location: [facility];  Service: Electrophysiology;  Laterality: Left;  dual chamber  medtronic    CORONARY ANGIOPLASTY      INSERT / REPLACE / REMOVE PACEMAKER      IR CVC PICC  02/29/2024    IR CVC PICC 2/29/2024 [facility]    OTHER SURGICAL HISTORY  01/03/2022    Cardioversion    OTHER SURGICAL HISTORY  01/03/2022    Total hysterectomy abdominal    OTHER SURGICAL HISTORY  01/03/2022    Tonsillectomy    OTHER SURGICAL HISTORY  01/03/2022     Pacemaker insertion    OTHER SURGICAL HISTORY  01/03/2022    Cataract surgery    OTHER SURGICAL HISTORY  01/03/2022    Arm surgery    OTHER SURGICAL HISTORY  01/03/2022    Atrial cardioversion    OTHER SURGICAL HISTORY  01/12/2022    Complete colonoscopy     No family history on file.  Social History     Tobacco Use    Smoking status: Never    Smokeless tobacco: Never   Substance Use Topics    Alcohol use: Not on file     Comment: [facility]    Drug use: Never       Physical Exam   ED Triage Vitals [02/09/25 0219]   Temperature Heart Rate Respirations BP   36.6 °C (97.9 °F) 69 16 178/83      Pulse Ox Temp Source Heart Rate Source Patient Position   98 % Temporal Monitor Lying      BP Location FiO2 (%)     Right arm --       Physical Exam  Vitals and nursing note reviewed.   Constitutional:       General: She is not in acute distress.     Appearance: She is not toxic-appearing.   HENT:      Head: Normocephalic and atraumatic.      Nose: Nose normal.      Mouth/Throat:      Mouth: Mucous membranes are moist.   Eyes:      Pupils: Pupils are equal, round, and reactive to light.   Cardiovascular:      Rate and Rhythm: Normal rate and regular rhythm.      Pulses: Normal pulses.      Heart sounds: No murmur heard.  Pulmonary:      Effort: Pulmonary effort is normal. No respiratory distress.      Breath sounds: Normal breath sounds. No wheezing.   Abdominal:      General: Abdomen is flat. There is no distension.      Palpations: Abdomen is soft.      Tenderness: There is no abdominal tenderness.   Musculoskeletal:      Cervical back: Normal range of motion and neck supple. No tenderness.      Comments: Patient has limited range of motion of the right shoulder.  She has tenderness of the left glenohumeral joint as well as clavicle.  No palpable deformity.  No tenderness of the elbow forearm or wrist.  No focal tenderness of the remainder of the 4 extremities   Skin:     General: Skin is warm and dry.   Neurological:      Mental  Status: She is alert.           ED Course & MDM   Diagnoses as of 02/09/25 0707   Fall, initial encounter   Acute pain of left shoulder                 No data recorded                                 Medical Decision Making  EKG performed at 0222 as interpreted by me.  AV dual paced rhythm.  Normal axis.  No STEMI.    Patient is nontoxic.  No distress.  He does have tenderness of left shoulder so we will obtain an x-ray.  She is a poor story about the fall so we will obtain CT imaging as well and lab work.    Lab work is overall reassuring.    CT head neck without acute traumatic finding.    Patient is out to the oncoming physician pending imaging results with remainder of her imaging.  If these are negative for acute process I anticipate she could be discharged        Procedure  Procedures     Maurice Bang,   02/09/25 2939

## 2025-02-09 NOTE — PROGRESS NOTES
Emergency Medicine Transition of Care Note.    I received Ernestina Argueta in signout from Dr. Bang.  Please see the previous ED provider note for all HPI, PE and MDM up to the time of signout at 0700. This is in addition to the primary record.    In brief Ernestina Argueta is an 89 y.o. female presenting for   Chief Complaint   Patient presents with    Fall     Fell last night around 2100 at [facility]. Now complaining of sever left shoulder pain.      At the time of signout we were awaiting: imaging results, dispo    Diagnoses as of 02/09/25 0906   Fall, initial encounter   Acute pain of left shoulder   Closed nondisplaced fracture of acromial end of left clavicle, initial encounter       Medical Decision Making  Patient's CT chest abdomen pelvis showed no acute findings.  CT T and L-spine are negative for acute findings.  Patient's x-ray showed a nondisplaced lateral left clavicle fracture.  I reevaluated the patient.  She has no skin tenting.  She has intact, 2+ radial pulse on the left.  She is intact sensation.   strength is 5 out of 5.  Patient's clinical well-appearing nontoxic.  She was placed in a sling by nursing.  I reevaluated the patient afterwards and she continues to have 2+ radial pulse, intact sensation and  strength 5 out of 5.  Patient was discharged back to her care facility with return precautions and follow-up instructions.    Disclaimer: This note was dictated using speech recognition software. Minor errors in transcription may be present. Please call if questions.     Dom Livingston MD  [facility] Emergency Medicine  Contact on [employer]        Problems Addressed:  Closed nondisplaced fracture of acromial end of left clavicle, initial encounter: acute illness or injury        Final diagnoses:   [W19.XXXA] Fall, initial encounter   [M25.512] Acute pain of left shoulder           Procedure  Procedures    Dom Livingston MD

## 2025-02-11 LAB
ATRIAL RATE: 70 BPM
P AXIS: 127 DEGREES
P OFFSET: 89 MS
P ONSET: 69 MS
PR INTERVAL: 256 MS
Q ONSET: 197 MS
QRS COUNT: 11 BEATS
QRS DURATION: 156 MS
QT INTERVAL: 484 MS
QTC CALCULATION(BAZETT): 522 MS
QTC FREDERICIA: 509 MS
R AXIS: 25 DEGREES
T AXIS: 77 DEGREES
T OFFSET: 439 MS
VENTRICULAR RATE: 70 BPM

## 2025-02-12 ENCOUNTER — APPOINTMENT (OUTPATIENT)
Dept: ORTHOPEDIC SURGERY | Facility: CLINIC | Age: OVER 89
End: 2025-02-12
Payer: COMMERCIAL

## 2025-02-13 ENCOUNTER — APPOINTMENT (OUTPATIENT)
Dept: ORTHOPEDIC SURGERY | Facility: CLINIC | Age: OVER 89
End: 2025-02-13
Payer: COMMERCIAL

## 2025-02-17 ENCOUNTER — OFFICE VISIT (OUTPATIENT)
Dept: GERIATRIC MEDICINE | Age: 89
End: 2025-02-17

## 2025-02-17 DIAGNOSIS — F32.A DEPRESSION, UNSPECIFIED DEPRESSION TYPE: ICD-10-CM

## 2025-02-17 DIAGNOSIS — I50.32 CHRONIC DIASTOLIC CHF (CONGESTIVE HEART FAILURE) (HCC): ICD-10-CM

## 2025-02-17 DIAGNOSIS — F03.90 DEMENTIA WITHOUT BEHAVIORAL DISTURBANCE (HCC): Primary | ICD-10-CM

## 2025-02-17 DIAGNOSIS — E03.9 HYPOTHYROIDISM, UNSPECIFIED TYPE: ICD-10-CM

## 2025-02-17 DIAGNOSIS — I10 HYPERTENSION, UNSPECIFIED TYPE: ICD-10-CM

## 2025-02-17 DIAGNOSIS — L89.152 DECUBITUS ULCER OF SACRAL REGION, STAGE 2 (HCC): ICD-10-CM

## 2025-02-26 NOTE — PROGRESS NOTES
SNF PROGRESS NOTE      Cc- dementia       Patient is a Chen Medellin 89 y.o. female who is being seen for her 30 day examination for dementia. She is being seen in the dementia unit.     Over the last 30 days, the patient continues to be seen in the dementia unit.  She eats well with no significant weight changes.  She was sent to the emergency department for shoulder pain.  X-rays were done and she was sent back with a left clavicle fracture        Past Medical History:   Diagnosis Date    Abnormal liver ultrasound 5/15/2019    Allergic contact dermatitis due to plants, except food 8/19/2019    Arthritis     Atrial fibrillation (HCC)     CHF (congestive heart failure) (HCC)     Chronic kidney disease     Chronic kidney disease (CKD), stage II (mild)     Depression     Hyperlipidemia     Hypertension     Hypothyroidism     Interstitial cystitis     Dr. Chappell diagnosed this for michele.    Type II or unspecified type diabetes mellitus without mention of complication, not stated as uncontrolled      Latex, Avelox [moxifloxacin hcl in nacl], Penicillins, and Adhesive tape    VS reviewed    Gen- Alert and oriented x 1  Heart- RRR no murmur no LE edema   Lungs- CTA b/l no resp distress RA  oxygen   Abd- bs x 4     Arm in sling      Assessment and Plan      Dementia  with behavioral disturbances  Psych .   Hydroxyzine BID   Stage 2 sacral Decub  Wound care   Insomnia  Melatonin   Trazodone  HTN  Isosorbide, metoprolol, hydralazine    Left clavicle fracture   Sling   Ortho follow up to be scheduled.    A fib   eliquis, metoprolol  Cardio follow up i4/21/25   Chronic Diastolic CHF  Lasix   Metoprolol    Cardio 4/21/25  Depression   Lexparo   Hypothyroid  Synthroid       Significant events    2/2025-ER visit/left clavicle fracture  TIN Chance DO     Electronically signed by: Darline Knapp DO on 2/17/2025

## 2025-03-17 ENCOUNTER — OFFICE VISIT (OUTPATIENT)
Dept: GERIATRIC MEDICINE | Age: 89
End: 2025-03-17

## 2025-03-17 DIAGNOSIS — E03.9 HYPOTHYROIDISM, UNSPECIFIED TYPE: ICD-10-CM

## 2025-03-17 DIAGNOSIS — F32.A DEPRESSION, UNSPECIFIED DEPRESSION TYPE: ICD-10-CM

## 2025-03-17 DIAGNOSIS — L89.152 DECUBITUS ULCER OF SACRAL REGION, STAGE 2 (HCC): ICD-10-CM

## 2025-03-17 DIAGNOSIS — F03.90 DEMENTIA WITHOUT BEHAVIORAL DISTURBANCE (HCC): Primary | ICD-10-CM

## 2025-03-17 DIAGNOSIS — I10 HYPERTENSION, UNSPECIFIED TYPE: ICD-10-CM

## 2025-03-17 DIAGNOSIS — I50.32 CHRONIC DIASTOLIC CHF (CONGESTIVE HEART FAILURE) (HCC): ICD-10-CM

## 2025-03-19 NOTE — PROGRESS NOTES
SNF PROGRESS NOTE      Cc- dementia       Patient is a Chen Medellin 89 y.o. female who is being seen for her 30 day examination for dementia. She is being seen in the dementia unit.     Over the past 30 days, the patient was seen by ENT with no changes she has had several episodes of behavior.  She does remain in the sling with the left arm when she allows it.  Otherwise there is no significant issues or events.        Past Medical History:   Diagnosis Date    Abnormal liver ultrasound 5/15/2019    Allergic contact dermatitis due to plants, except food 8/19/2019    Arthritis     Atrial fibrillation (HCC)     CHF (congestive heart failure) (HCC)     Chronic kidney disease     Chronic kidney disease (CKD), stage II (mild)     Depression     Hyperlipidemia     Hypertension     Hypothyroidism     Interstitial cystitis     Dr. Chappell diagnosed this for michele.    Type II or unspecified type diabetes mellitus without mention of complication, not stated as uncontrolled      Latex, Avelox [moxifloxacin hcl in nacl], Penicillins, and Adhesive tape    VS reviewed    Gen- Alert and oriented x 1  Heart- RRR no murmur no LE edema   Lungs- CTA b/l no resp distress RA  oxygen   Abd- bs x 4      Arm in sling        Assessment and Plan       Dementia  with behavioral disturbances/ depression   Psych .   Hydroxyzine BID   Hydroxyzine   Lexapro  Stage 2 sacral Decub  Wound care   Insomnia  Melatonin   Trazodone  HTN  Isosorbide, metoprolol, hydralazine    Left clavicle fracture   Sling   Ortho follow up to be scheduled.    A fib   eliquis, metoprolol  Cardio follow up i4/21/25   Chronic Diastolic CHF  Lasix   Metoprolol    Cardio 4/21/25  Depression   Lexparo   Hypothyroid  Synthroid        Significant events     2/2025-ER visit/left clavicle fracture      Darline Knapp DO, TIN     Electronically signed by: Darline Knapp DO on 3/17/2025

## 2025-04-15 ENCOUNTER — OFFICE VISIT (OUTPATIENT)
Dept: GERIATRIC MEDICINE | Age: 89
End: 2025-04-15

## 2025-04-15 DIAGNOSIS — F03.90 DEMENTIA WITHOUT BEHAVIORAL DISTURBANCE (HCC): Primary | ICD-10-CM

## 2025-04-15 DIAGNOSIS — I10 HYPERTENSION, UNSPECIFIED TYPE: ICD-10-CM

## 2025-04-15 DIAGNOSIS — F32.A DEPRESSION, UNSPECIFIED DEPRESSION TYPE: ICD-10-CM

## 2025-04-15 DIAGNOSIS — I50.32 CHRONIC DIASTOLIC CHF (CONGESTIVE HEART FAILURE) (HCC): ICD-10-CM

## 2025-04-15 DIAGNOSIS — L89.152 DECUBITUS ULCER OF SACRAL REGION, STAGE 2 (HCC): ICD-10-CM

## 2025-04-15 DIAGNOSIS — E03.9 HYPOTHYROIDISM, UNSPECIFIED TYPE: ICD-10-CM

## 2025-04-17 NOTE — PROGRESS NOTES
SNF PROGRESS NOTE      Cc- dementia       Patient is a Chen Medellin 89 y.o. female who is being seen for her 30 day examination for dementia. She is being seen in the dementia unit.     Over the last 30 days, patient refuses to wear sling. She has had no significant events. She is being seen in the dementia unit.         Past Medical History:   Diagnosis Date    Abnormal liver ultrasound 5/15/2019    Allergic contact dermatitis due to plants, except food 8/19/2019    Arthritis     Atrial fibrillation (HCC)     CHF (congestive heart failure) (HCC)     Chronic kidney disease     Chronic kidney disease (CKD), stage II (mild)     Depression     Hyperlipidemia     Hypertension     Hypothyroidism     Interstitial cystitis     Dr. Chappell diagnosed this for michele.    Type II or unspecified type diabetes mellitus without mention of complication, not stated as uncontrolled      Latex, Avelox [moxifloxacin hcl in nacl], Penicillins, and Adhesive tape    VS reviewed    Gen- Alert and oriented x 1  Heart- RRR no murmur no LE edema   Lungs- CTA b/l no resp distress RA  oxygen   Abd- bs x 4      Arm in sling          Assessment and Plan    Dementia  with behavioral disturbances/ depression   Psych .   Hydroxyzine BID   Hydroxyzine   Lexapro  Stage 2 sacral Decub  Wound care   Insomnia  Melatonin   Trazodone  HTN  Isosorbide, metoprolol, hydralazine    Left clavicle fracture   Sling - when compliant   Refused ortho follow up   A fib   eliquis, metoprolol  Cardio follow up i4/21/25   Chronic Diastolic CHF  Lasix   Metoprolol    Cardio 4/21/25  Depression   Lexparo   Hypothyroid  Synthroid        Significant events     2/2025-ER visit/left clavicle fracture      Darline Knapp DO, TIN     Electronically signed by: Darline Knapp DO on 4/15/2025

## 2025-04-21 ENCOUNTER — APPOINTMENT (OUTPATIENT)
Dept: CARDIOLOGY | Facility: CLINIC | Age: OVER 89
End: 2025-04-21
Payer: COMMERCIAL

## 2025-04-26 ENCOUNTER — HOSPITAL ENCOUNTER (OUTPATIENT)
Dept: CARDIOLOGY | Age: 89
Discharge: HOME | End: 2025-04-26
Payer: MEDICARE

## 2025-04-26 PROCEDURE — 93296 REM INTERROG EVL PM/IDS: CPT

## 2025-04-26 PROCEDURE — 93294 REM INTERROG EVL PM/LDLS PM: CPT | Performed by: INTERNAL MEDICINE

## 2025-04-30 ENCOUNTER — APPOINTMENT (OUTPATIENT)
Dept: CARDIOLOGY | Facility: HOSPITAL | Age: OVER 89
End: 2025-04-30
Payer: COMMERCIAL

## 2025-04-30 ENCOUNTER — HOSPITAL ENCOUNTER (INPATIENT)
Facility: HOSPITAL | Age: OVER 89
LOS: 3 days | Discharge: NURSING, REHAB OR LONG-TERM CARE | End: 2025-05-03
Attending: STUDENT IN AN ORGANIZED HEALTH CARE EDUCATION/TRAINING PROGRAM | Admitting: INTERNAL MEDICINE
Payer: COMMERCIAL

## 2025-04-30 ENCOUNTER — APPOINTMENT (OUTPATIENT)
Dept: RADIOLOGY | Facility: HOSPITAL | Age: OVER 89
End: 2025-04-30
Payer: COMMERCIAL

## 2025-04-30 DIAGNOSIS — J18.9 PNEUMONIA OF RIGHT LOWER LOBE DUE TO INFECTIOUS ORGANISM: Primary | ICD-10-CM

## 2025-04-30 DIAGNOSIS — J15.9 BACTERIAL PNEUMONIA: ICD-10-CM

## 2025-04-30 DIAGNOSIS — K59.00 CONSTIPATION, UNSPECIFIED CONSTIPATION TYPE: ICD-10-CM

## 2025-04-30 LAB
ALBUMIN SERPL BCP-MCNC: 3.7 G/DL (ref 3.4–5)
ALP SERPL-CCNC: 56 U/L (ref 33–136)
ALT SERPL W P-5'-P-CCNC: 5 U/L (ref 7–45)
ANION GAP SERPL CALC-SCNC: 16 MMOL/L (ref 10–20)
AST SERPL W P-5'-P-CCNC: 17 U/L (ref 9–39)
BASOPHILS # BLD AUTO: 0.03 X10*3/UL (ref 0–0.1)
BASOPHILS NFR BLD AUTO: 0.2 %
BILIRUB SERPL-MCNC: 0.7 MG/DL (ref 0–1.2)
BNP SERPL-MCNC: 445 PG/ML (ref 0–99)
BUN SERPL-MCNC: 28 MG/DL (ref 6–23)
CALCIUM SERPL-MCNC: 9 MG/DL (ref 8.6–10.3)
CARDIAC TROPONIN I PNL SERPL HS: 10 NG/L (ref 0–13)
CARDIAC TROPONIN I PNL SERPL HS: 8 NG/L (ref 0–13)
CHLORIDE SERPL-SCNC: 106 MMOL/L (ref 98–107)
CO2 SERPL-SCNC: 23 MMOL/L (ref 21–32)
CREAT SERPL-MCNC: 1.08 MG/DL (ref 0.5–1.05)
EGFRCR SERPLBLD CKD-EPI 2021: 49 ML/MIN/1.73M*2
EOSINOPHIL # BLD AUTO: 0.1 X10*3/UL (ref 0–0.4)
EOSINOPHIL NFR BLD AUTO: 0.7 %
ERYTHROCYTE [DISTWIDTH] IN BLOOD BY AUTOMATED COUNT: 13.7 % (ref 11.5–14.5)
FLUAV RNA RESP QL NAA+PROBE: NOT DETECTED
FLUBV RNA RESP QL NAA+PROBE: NOT DETECTED
GLUCOSE SERPL-MCNC: 224 MG/DL (ref 74–99)
HCT VFR BLD AUTO: 34.9 % (ref 36–46)
HGB BLD-MCNC: 11 G/DL (ref 12–16)
IMM GRANULOCYTES # BLD AUTO: 0.06 X10*3/UL (ref 0–0.5)
IMM GRANULOCYTES NFR BLD AUTO: 0.4 % (ref 0–0.9)
LYMPHOCYTES # BLD AUTO: 1.04 X10*3/UL (ref 0.8–3)
LYMPHOCYTES NFR BLD AUTO: 7.7 %
MCH RBC QN AUTO: 29 PG (ref 26–34)
MCHC RBC AUTO-ENTMCNC: 31.5 G/DL (ref 32–36)
MCV RBC AUTO: 92 FL (ref 80–100)
MONOCYTES # BLD AUTO: 0.79 X10*3/UL (ref 0.05–0.8)
MONOCYTES NFR BLD AUTO: 5.9 %
NEUTROPHILS # BLD AUTO: 11.44 X10*3/UL (ref 1.6–5.5)
NEUTROPHILS NFR BLD AUTO: 85.1 %
NRBC BLD-RTO: 0 /100 WBCS (ref 0–0)
PLATELET # BLD AUTO: 181 X10*3/UL (ref 150–450)
POTASSIUM SERPL-SCNC: 4.6 MMOL/L (ref 3.5–5.3)
PROT SERPL-MCNC: 6.9 G/DL (ref 6.4–8.2)
RBC # BLD AUTO: 3.79 X10*6/UL (ref 4–5.2)
SARS-COV-2 RNA RESP QL NAA+PROBE: NOT DETECTED
SODIUM SERPL-SCNC: 140 MMOL/L (ref 136–145)
WBC # BLD AUTO: 13.5 X10*3/UL (ref 4.4–11.3)

## 2025-04-30 PROCEDURE — 2500000002 HC RX 250 W HCPCS SELF ADMINISTERED DRUGS (ALT 637 FOR MEDICARE OP, ALT 636 FOR OP/ED): Performed by: INTERNAL MEDICINE

## 2025-04-30 PROCEDURE — 84484 ASSAY OF TROPONIN QUANT: CPT | Performed by: STUDENT IN AN ORGANIZED HEALTH CARE EDUCATION/TRAINING PROGRAM

## 2025-04-30 PROCEDURE — 93005 ELECTROCARDIOGRAM TRACING: CPT

## 2025-04-30 PROCEDURE — 36415 COLL VENOUS BLD VENIPUNCTURE: CPT | Performed by: INTERNAL MEDICINE

## 2025-04-30 PROCEDURE — 87636 SARSCOV2 & INF A&B AMP PRB: CPT | Performed by: STUDENT IN AN ORGANIZED HEALTH CARE EDUCATION/TRAINING PROGRAM

## 2025-04-30 PROCEDURE — 83880 ASSAY OF NATRIURETIC PEPTIDE: CPT | Performed by: STUDENT IN AN ORGANIZED HEALTH CARE EDUCATION/TRAINING PROGRAM

## 2025-04-30 PROCEDURE — 2500000001 HC RX 250 WO HCPCS SELF ADMINISTERED DRUGS (ALT 637 FOR MEDICARE OP): Performed by: INTERNAL MEDICINE

## 2025-04-30 PROCEDURE — 2500000004 HC RX 250 GENERAL PHARMACY W/ HCPCS (ALT 636 FOR OP/ED): Mod: JZ | Performed by: INTERNAL MEDICINE

## 2025-04-30 PROCEDURE — 99285 EMERGENCY DEPT VISIT HI MDM: CPT | Performed by: STUDENT IN AN ORGANIZED HEALTH CARE EDUCATION/TRAINING PROGRAM

## 2025-04-30 PROCEDURE — 85025 COMPLETE CBC W/AUTO DIFF WBC: CPT | Performed by: STUDENT IN AN ORGANIZED HEALTH CARE EDUCATION/TRAINING PROGRAM

## 2025-04-30 PROCEDURE — 71045 X-RAY EXAM CHEST 1 VIEW: CPT | Mod: FOREIGN READ | Performed by: RADIOLOGY

## 2025-04-30 PROCEDURE — 93280 PM DEVICE PROGR EVAL DUAL: CPT | Performed by: INTERNAL MEDICINE

## 2025-04-30 PROCEDURE — 1200000002 HC GENERAL ROOM WITH TELEMETRY DAILY

## 2025-04-30 PROCEDURE — 99223 1ST HOSP IP/OBS HIGH 75: CPT | Performed by: INTERNAL MEDICINE

## 2025-04-30 PROCEDURE — 2500000004 HC RX 250 GENERAL PHARMACY W/ HCPCS (ALT 636 FOR OP/ED): Performed by: STUDENT IN AN ORGANIZED HEALTH CARE EDUCATION/TRAINING PROGRAM

## 2025-04-30 PROCEDURE — 80053 COMPREHEN METABOLIC PANEL: CPT | Performed by: STUDENT IN AN ORGANIZED HEALTH CARE EDUCATION/TRAINING PROGRAM

## 2025-04-30 PROCEDURE — 71045 X-RAY EXAM CHEST 1 VIEW: CPT

## 2025-04-30 PROCEDURE — 93280 PM DEVICE PROGR EVAL DUAL: CPT

## 2025-04-30 RX ORDER — LORAZEPAM 2 MG/ML
0.5 INJECTION INTRAMUSCULAR ONCE
Status: COMPLETED | OUTPATIENT
Start: 2025-04-30 | End: 2025-04-30

## 2025-04-30 RX ORDER — CEFTRIAXONE 1 G/50ML
1 INJECTION, SOLUTION INTRAVENOUS EVERY 24 HOURS
Status: DISCONTINUED | OUTPATIENT
Start: 2025-05-01 | End: 2025-05-03 | Stop reason: HOSPADM

## 2025-04-30 RX ORDER — SODIUM CHLORIDE, SODIUM LACTATE, POTASSIUM CHLORIDE, CALCIUM CHLORIDE 600; 310; 30; 20 MG/100ML; MG/100ML; MG/100ML; MG/100ML
65 INJECTION, SOLUTION INTRAVENOUS CONTINUOUS
Status: ACTIVE | OUTPATIENT
Start: 2025-04-30 | End: 2025-05-01

## 2025-04-30 RX ORDER — LIDOCAINE HYDROCHLORIDE 20 MG/ML
10 SOLUTION OROPHARYNGEAL EVERY 4 HOURS PRN
COMMUNITY

## 2025-04-30 RX ORDER — ESCITALOPRAM OXALATE 10 MG/1
10 TABLET ORAL DAILY
COMMUNITY
End: 2025-04-30 | Stop reason: ENTERED-IN-ERROR

## 2025-04-30 RX ORDER — ACETAMINOPHEN 650 MG/1
650 SUPPOSITORY RECTAL EVERY 4 HOURS PRN
Status: DISCONTINUED | OUTPATIENT
Start: 2025-04-30 | End: 2025-05-03 | Stop reason: HOSPADM

## 2025-04-30 RX ORDER — CEFTRIAXONE 1 G/50ML
1 INJECTION, SOLUTION INTRAVENOUS ONCE
Status: COMPLETED | OUTPATIENT
Start: 2025-04-30 | End: 2025-04-30

## 2025-04-30 RX ORDER — L. ACIDOPHILUS/L.BULGARICUS 1MM CELL
1 TABLET ORAL DAILY
Status: DISCONTINUED | OUTPATIENT
Start: 2025-04-30 | End: 2025-05-03 | Stop reason: HOSPADM

## 2025-04-30 RX ORDER — METOPROLOL SUCCINATE 50 MG/1
100 TABLET, EXTENDED RELEASE ORAL 2 TIMES DAILY
Status: DISCONTINUED | OUTPATIENT
Start: 2025-04-30 | End: 2025-05-03 | Stop reason: HOSPADM

## 2025-04-30 RX ORDER — SENNOSIDES 8.6 MG/1
2 TABLET ORAL 2 TIMES DAILY
Status: DISCONTINUED | OUTPATIENT
Start: 2025-04-30 | End: 2025-05-03 | Stop reason: HOSPADM

## 2025-04-30 RX ORDER — ESCITALOPRAM OXALATE 10 MG/1
10 TABLET ORAL DAILY
Status: DISCONTINUED | OUTPATIENT
Start: 2025-04-30 | End: 2025-05-03 | Stop reason: HOSPADM

## 2025-04-30 RX ORDER — LANOLIN ALCOHOL/MO/W.PET/CERES
200 CREAM (GRAM) TOPICAL DAILY
Status: DISCONTINUED | OUTPATIENT
Start: 2025-04-30 | End: 2025-05-03 | Stop reason: HOSPADM

## 2025-04-30 RX ORDER — ACETAMINOPHEN 325 MG/1
650 TABLET ORAL EVERY 4 HOURS PRN
Status: DISCONTINUED | OUTPATIENT
Start: 2025-04-30 | End: 2025-05-03 | Stop reason: HOSPADM

## 2025-04-30 RX ORDER — ACETAMINOPHEN 500 MG
2 TABLET ORAL 3 TIMES DAILY
COMMUNITY
End: 2025-05-03 | Stop reason: HOSPADM

## 2025-04-30 RX ORDER — ACETAMINOPHEN 160 MG/5ML
650 SOLUTION ORAL EVERY 4 HOURS PRN
Status: DISCONTINUED | OUTPATIENT
Start: 2025-04-30 | End: 2025-05-03 | Stop reason: HOSPADM

## 2025-04-30 RX ORDER — FUROSEMIDE 40 MG/1
20 TABLET ORAL 3 TIMES WEEKLY
Status: DISCONTINUED | OUTPATIENT
Start: 2025-05-02 | End: 2025-05-03 | Stop reason: HOSPADM

## 2025-04-30 RX ORDER — LEVOTHYROXINE SODIUM 75 UG/1
75 TABLET ORAL DAILY
Status: DISCONTINUED | OUTPATIENT
Start: 2025-05-01 | End: 2025-05-03 | Stop reason: HOSPADM

## 2025-04-30 RX ORDER — IPRATROPIUM BROMIDE AND ALBUTEROL SULFATE 2.5; .5 MG/3ML; MG/3ML
3 SOLUTION RESPIRATORY (INHALATION) EVERY 4 HOURS PRN
Status: DISCONTINUED | OUTPATIENT
Start: 2025-04-30 | End: 2025-05-03 | Stop reason: HOSPADM

## 2025-04-30 RX ORDER — FUROSEMIDE 10 MG/ML
20 INJECTION INTRAMUSCULAR; INTRAVENOUS ONCE
Status: COMPLETED | OUTPATIENT
Start: 2025-04-30 | End: 2025-04-30

## 2025-04-30 RX ORDER — TRAZODONE HYDROCHLORIDE 50 MG/1
25 TABLET ORAL NIGHTLY
Status: DISCONTINUED | OUTPATIENT
Start: 2025-04-30 | End: 2025-05-03 | Stop reason: HOSPADM

## 2025-04-30 RX ORDER — ALBUTEROL SULFATE 0.83 MG/ML
2.5 SOLUTION RESPIRATORY (INHALATION) EVERY 6 HOURS PRN
COMMUNITY

## 2025-04-30 RX ORDER — PANTOPRAZOLE SODIUM 20 MG/1
20 TABLET, DELAYED RELEASE ORAL DAILY
Status: DISCONTINUED | OUTPATIENT
Start: 2025-04-30 | End: 2025-05-03 | Stop reason: HOSPADM

## 2025-04-30 RX ORDER — ISOSORBIDE MONONITRATE 60 MG/1
60 TABLET, EXTENDED RELEASE ORAL DAILY
Status: DISCONTINUED | OUTPATIENT
Start: 2025-04-30 | End: 2025-05-03 | Stop reason: HOSPADM

## 2025-04-30 RX ORDER — DOXYCYCLINE HYCLATE 100 MG
100 TABLET ORAL EVERY 12 HOURS SCHEDULED
Status: DISCONTINUED | OUTPATIENT
Start: 2025-04-30 | End: 2025-05-03 | Stop reason: HOSPADM

## 2025-04-30 RX ADMIN — MAGNESIUM OXIDE 400 MG (241.3 MG MAGNESIUM) TABLET 200 MG: TABLET at 13:45

## 2025-04-30 RX ADMIN — ESCITALOPRAM OXALATE 10 MG: 10 TABLET, FILM COATED ORAL at 13:45

## 2025-04-30 RX ADMIN — CEFTRIAXONE 1 G: 1 INJECTION, SOLUTION INTRAVENOUS at 13:44

## 2025-04-30 RX ADMIN — PANTOPRAZOLE 20 MG: 20 TABLET, DELAYED RELEASE ORAL at 13:45

## 2025-04-30 RX ADMIN — FUROSEMIDE 20 MG: 10 INJECTION, SOLUTION INTRAMUSCULAR; INTRAVENOUS at 13:44

## 2025-04-30 RX ADMIN — LORAZEPAM 0.5 MG: 2 INJECTION INTRAMUSCULAR; INTRAVENOUS at 18:14

## 2025-04-30 RX ADMIN — TRAZODONE HYDROCHLORIDE 25 MG: 50 TABLET ORAL at 20:23

## 2025-04-30 RX ADMIN — DOXYCYCLINE HYCLATE 100 MG: 100 TABLET, FILM COATED ORAL at 20:23

## 2025-04-30 RX ADMIN — ISOSORBIDE MONONITRATE 60 MG: 30 TABLET, EXTENDED RELEASE ORAL at 13:45

## 2025-04-30 RX ADMIN — APIXABAN 2.5 MG: 5 TABLET, FILM COATED ORAL at 20:23

## 2025-04-30 RX ADMIN — SODIUM CHLORIDE, SODIUM LACTATE, POTASSIUM CHLORIDE, AND CALCIUM CHLORIDE 65 ML/HR: .6; .31; .03; .02 INJECTION, SOLUTION INTRAVENOUS at 20:23

## 2025-04-30 RX ADMIN — SENNOSIDES 17.2 MG: 8.6 TABLET ORAL at 20:23

## 2025-04-30 RX ADMIN — DOXYCYCLINE 100 MG: 100 INJECTION, POWDER, LYOPHILIZED, FOR SOLUTION INTRAVENOUS at 14:09

## 2025-04-30 ASSESSMENT — COGNITIVE AND FUNCTIONAL STATUS - GENERAL
STANDING UP FROM CHAIR USING ARMS: A LOT
DAILY ACTIVITIY SCORE: 7
DRESSING REGULAR LOWER BODY CLOTHING: TOTAL
MOVING FROM LYING ON BACK TO SITTING ON SIDE OF FLAT BED WITH BEDRAILS: A LOT
HELP NEEDED FOR BATHING: TOTAL
CLIMB 3 TO 5 STEPS WITH RAILING: TOTAL
WALKING IN HOSPITAL ROOM: TOTAL
TURNING FROM BACK TO SIDE WHILE IN FLAT BAD: A LOT
TOILETING: TOTAL
MOVING TO AND FROM BED TO CHAIR: A LOT
PERSONAL GROOMING: TOTAL
EATING MEALS: A LOT
MOBILITY SCORE: 10
DRESSING REGULAR UPPER BODY CLOTHING: TOTAL

## 2025-04-30 ASSESSMENT — COLUMBIA-SUICIDE SEVERITY RATING SCALE - C-SSRS
2. HAVE YOU ACTUALLY HAD ANY THOUGHTS OF KILLING YOURSELF?: NO
6. HAVE YOU EVER DONE ANYTHING, STARTED TO DO ANYTHING, OR PREPARED TO DO ANYTHING TO END YOUR LIFE?: NO
1. IN THE PAST MONTH, HAVE YOU WISHED YOU WERE DEAD OR WISHED YOU COULD GO TO SLEEP AND NOT WAKE UP?: NO

## 2025-04-30 ASSESSMENT — PAIN SCALES - PAIN ASSESSMENT IN ADVANCED DEMENTIA (PAINAD)
CONSOLABILITY: NO NEED TO CONSOLE
TOTALSCORE: 0
BODYLANGUAGE: RELAXED
BREATHING: NORMAL
FACIALEXPRESSION: SMILING OR INEXPRESSIVE

## 2025-04-30 ASSESSMENT — PAIN SCALES - GENERAL: PAINLEVEL_OUTOF10: 3

## 2025-04-30 ASSESSMENT — PAIN - FUNCTIONAL ASSESSMENT
PAIN_FUNCTIONAL_ASSESSMENT: PAINAD (PAIN ASSESSMENT IN ADVANCED DEMENTIA SCALE)
PAIN_FUNCTIONAL_ASSESSMENT: 0-10

## 2025-04-30 NOTE — H&P
Medical Group History and Physical    ASSESSMENT/PLAN:     Acute respiratory failure with hypoxia  Pneumonia, presumed gram negatives  -CXR showing patchy airspace consolidation in the right base with small pleural effusion  -aspiration also a consideration; will have SLP see  -cont with rocephin/doxy for now  -check urine antigens, procal  -wean supplemental o2 as tolerated  -add PRN nebs  -gentle IVF overnight, appears dry; Cr is up slightly from baseline though not quite to VERONICA levels    Persistent atrial fibrillation  SSS s/p PPM  Chronic diastolic CHF  CAD  HTN  HLD  -cont home meds including eliquis  -no ongoing chest pain  -BNP mildly elevated but clinically appears euvolemic to dry    Dementia with behavioral disturbance  Depression  -cont home meds          VTE Prophylaxis: eliquis      HISTORY OF PRESENT ILLNESS:   Chief Complaint: hypoxia    History Of Present Illness:    Ernestina Argueta is a 89 y.o. female with a significant past medical history of persistent atrial fibrillation, sick sinus syndrome status post pacemaker, chronic diastolic CHF, hypertension, hyperlipidemia, CKD, dementia, who presents from her nursing facility for hypoxia overnight.  Patient is confused and alert to self and occasionally place at baseline.  She tells me that she has been having a cough and may be some shortness of breath but otherwise is not able to give much history.  Chest x-ray in the emergency department showed a right basilar consolidation consistent with pneumonia for which she has been started on IV antibiotics.  She denies any chest pain.  No fevers or chills.       Review of systems: 10 point review of systems is otherwise negative except as mentioned above.    PAST HISTORIES:       Past Medical History:  She has a past medical history of A-fib (Multi), Coronary artery disease, Dementia, Diabetes mellitus (Multi), DJD (degenerative joint disease), GERD (gastroesophageal reflux disease), Hyperlipidemia,  Hypertension, Hypothyroid, Muscle spasm of back, Pain in thoracic spine (08/15/2022), Personal history of other specified conditions, Personal history of other specified conditions, and Sleep apnea.    Past Surgical History:  She has a past surgical history that includes Other surgical history (01/03/2022); Other surgical history (01/03/2022); Other surgical history (01/03/2022); Other surgical history (01/03/2022); Other surgical history (01/03/2022); Other surgical history (01/03/2022); Other surgical history (01/03/2022); Other surgical history (01/12/2022); IR CVC PICC (02/29/2024); Insert / replace / remove pacemaker; Coronary angioplasty; and Cardiac electrophysiology procedure (Left, 4/2/2024).      Social History:  She reports that she has never smoked. She has never used smokeless tobacco. She reports that she does not use drugs. No history on file for alcohol use.    Family History:  Family History[1]     Allergies:  Latex, Moxifloxacin, and Penicillins      OBJECTIVE:       Last Recorded Vitals:  Vitals:    04/30/25 1142 04/30/25 1143 04/30/25 1144 04/30/25 1200   BP:   156/72    Pulse: 68 68 68 68   Resp:   16    Temp:       TempSrc:       SpO2: 97% 97% 97% 95%   Weight:       Height:           Last I/O:  No intake/output data recorded.    Physical Exam  GEN: resting in bed, pleasantly confused  HEENT: NCAT, PERRLA, EOMI, moist mucous membranes  NECK: supple, no masses  CV: RRR, no m/r/g, no LE edema  LUNGS: diminished R base, R sided rhonchi, mild expiratory wheeze, no crackles  ABD: soft, NT, ND, NBS  SKIN: no rashes  MSK; no gross deformities, normal joints  NEURO: A+Ox (self and place), no FND  PSYCH: appropriate mood, affect      Inpatient Medications:  Scheduled Medications[2]  PRN Medications[3]    Outpatient Medications:  Prior to Admission medications    Medication Sig Start Date End Date Taking? Authorizing Provider   acetaminophen (Tylenol) 325 mg tablet Take 2 tablets (650 mg) by mouth every  4 hours if needed for mild pain (1 - 3).    Historical Provider, MD   acetaminophen (Tylenol) 500 mg tablet Take 2 tablets (1,000 mg) by mouth every 4 hours if needed for mild pain (1 - 3).    Historical Provider, MD   acetaminophen (Tylenol) 650 mg suppository Insert 1 suppository (650 mg) into the rectum every 4 hours if needed for mild pain (1 - 3).    Historical Provider, MD   albuterol (ProAir HFA) 90 mcg/actuation inhaler Inhale 2 puffs every 6 hours if needed for wheezing or shortness of breath.    Historical Provider, MD   alum-mag hydroxide-simeth (Mylanta) 200-200-20 mg/5 mL oral suspension Take 30 mL by mouth every 6 hours if needed for indigestion or heartburn.    Historical Provider, MD   ammonium lactate (Amlactin) 12 % cream Apply topically if needed for dry skin.    Historical Provider, MD   apixaban (Eliquis) 2.5 mg tablet Take 1 tablet (2.5 mg) by mouth 2 times a day. Hold for 2 days and resume on 4/5/24 4/2/24   JACLYN Li-CNP   bisacodyl (Dulcolax) 10 mg suppository Insert 1 suppository (10 mg) into the rectum 1 time.    Historical Provider, MD   cholecalciferol (Vitamin D-3) 25 MCG (1000 UT) tablet Take 1 tablet (25 mcg) by mouth once daily.    Historical Provider, MD   escitalopram (Lexapro) 10 mg tablet Take 1 tablet (10 mg) by mouth once daily. Do not start before March 15, 2024. 3/15/24   Abby Carlin MD   furosemide (Lasix) 40 mg tablet Take 0.5 tablets (20 mg) by mouth 3 (three) times a week. 3/1/24   Abby Carlin MD   hydrOXYzine HCL (Atarax) 25 mg tablet Take 1 tablet (25 mg) by mouth 2 times a day.    Historical Provider, MD   insulin aspart (NovoLOG Flexpen U-100 Insulin) 100 unit/mL (3 mL) pen Inject under the skin 3 times a day before meals. Take as directed per insulin instructions.    Historical Provider, MD   isosorbide mononitrate ER (Imdur) 30 mg 24 hr tablet Take 2 tablets (60 mg) by mouth once daily.    Historical Provider, MD    lactobacillus acidophilus capsule Take 1 capsule by mouth once daily.    Historical Provider, MD   levothyroxine (Synthroid, Levoxyl) 75 mcg tablet Take 1 tablet (75 mcg) by mouth once daily.    Historical Provider, MD   magnesium hydroxide (Milk of Magnesia) 400 mg/5 mL suspension Take 30 mL by mouth once daily as needed for constipation.    Historical Provider, MD   magnesium oxide 400 mg magnesium capsule Take 1 capsule (400 mg) by mouth once daily.    Historical Provider, MD   meclizine (Antivert) 12.5 mg tablet Take 1 tablet (12.5 mg) by mouth every 8 hours if needed.    Historical Provider, MD   metoprolol succinate XL (Toprol-XL) 100 mg 24 hr tablet Take 1 tablet (100 mg) by mouth 2 times a day.    Historical Provider, MD   multivitamin with minerals iron-free (Centrum Silver) Take 1 tablet by mouth once daily in the morning.    Historical Provider, MD   nitroglycerin (Nitrostat) 0.4 mg SL tablet Place under the tongue.    Historical Provider, MD   pantoprazole (ProtoNix) 20 mg EC tablet Take 1 tablet (20 mg) by mouth once daily.    Historical Provider, MD   potassium chloride 40 mEq/15 mL liquid Take 15 mL (40 mEq) by mouth 1 time.    Historical Provider, MD   sodium chloride (Ocean) 0.65 % nasal spray Administer 1 spray into each nostril if needed for congestion.    Historical Provider, MD   sodium phosphates (Fleet Enema Extra) 19-7 gram/197 mL enema Insert 1 suppository into the rectum once daily.    Historical Provider, MD   traZODone (Desyrel) 50 mg tablet Take 1 tablet (50 mg) by mouth once daily at bedtime. Do not start before March 15, 2024.  Patient taking differently: Take 0.5 tablets (25 mg) by mouth once daily at bedtime. 3/15/24   Abby Carlin MD       LABS AND IMAGING:     Last Labs:  CBC - 4/30/2025: 10:17 AM  13.5 11.0 181    34.9      CMP - 4/30/2025: 10:17 AM  9.0 6.9 17 --- 0.7   _ 3.7 5 56      PTT - No results in last year.  1.3   15.1 _     Troponin I, High Sensitivity    Date/Time Value Ref Range Status   04/30/2025 11:46 AM 10 0 - 13 ng/L Final   04/30/2025 10:17 AM 8 0 - 13 ng/L Final   02/25/2024 05:38 AM 42 (H) 0 - 13 ng/L Final     BNP   Date/Time Value Ref Range Status   04/30/2025 10:17  (H) 0 - 99 pg/mL Final   02/25/2024 04:32  (H) 0 - 99 pg/mL Final     Hemoglobin A1C   Date/Time Value Ref Range Status   02/25/2024 03:17 PM 7.3 (H) see below % Final   10/05/2023 11:19 AM 12.1 (H) 4.8 - 5.9 % Final   03/17/2022 03:51 PM 8.1 (H) 4.8 - 5.9 % Final     VLDL   Date/Time Value Ref Range Status   08/18/2022 11:00 AM 29 0 - 40 mg/dL Final   03/01/2021 09:06 AM 27 0 - 40 mg/dL Final   10/08/2020 09:22 AM 33 0 - 40 mg/dL Final             [1] No family history on file.  [2] cefTRIAXone, 1 g, intravenous, Once  doxycycline, 100 mg, intravenous, Once  furosemide, 20 mg, intravenous, Once    [3]

## 2025-04-30 NOTE — ED PROVIDER NOTES
HPI   Chief Complaint   Patient presents with    Hypoxia     Nursing home facility staff called 911 this morning because patient oxygen saturation has been low all night and did not improve with application of        Patient is a 89-year-old female with history of A-fib on Eliquis, pacemaker, hypertension, hyperlipidemia, CKD, dementia and O x 1 at baseline who presents for hypoxia.  Patient was brought to the emergency department EMS from her care facility.  She is on a dementia salcedo.  Report from nursing was that patient had a oxygen saturation of 70s all night, was placed on a nonrebreather at 15 L without any improvement in her O2 sat.  EMS suspected nursing may have been reading the heart rate and not the O2 sat.  They states she was in the mid to upper upper 90s on arrival they placed the patient on 4 L and she remained in the upper 90s.  Patient denies any chest pain or shortness of breath.  She is not on oxygen at baseline.  She does not provide significant additional history.              Patient History   Medical History[1]  Surgical History[2]  Family History[3]  Social History[4]    Physical Exam   ED Triage Vitals   Temp Pulse Resp BP   -- -- -- --      SpO2 Temp src Heart Rate Source Patient Position   -- -- -- --      BP Location FiO2 (%)     -- --       Physical Exam  Constitutional:       General: She is not in acute distress.  HENT:      Head: Normocephalic.   Eyes:      Extraocular Movements: Extraocular movements intact.      Conjunctiva/sclera: Conjunctivae normal.      Pupils: Pupils are equal, round, and reactive to light.   Cardiovascular:      Rate and Rhythm: Normal rate and regular rhythm.      Pulses: Normal pulses.      Heart sounds: Normal heart sounds.   Pulmonary:      Effort: Pulmonary effort is normal.      Breath sounds: Normal breath sounds.   Abdominal:      General: There is no distension.      Palpations: Abdomen is soft. There is no mass.      Tenderness: There is no abdominal  tenderness. There is no guarding.   Musculoskeletal:         General: No deformity.      Cervical back: Normal range of motion and neck supple.      Right lower leg: No edema.      Left lower leg: No edema.   Skin:     General: Skin is warm and dry.      Findings: No lesion or rash.   Neurological:      General: No focal deficit present.      Mental Status: She is alert. Mental status is at baseline.      Cranial Nerves: No cranial nerve deficit.      Sensory: No sensory deficit.      Motor: No weakness.      Comments: A&O to self   Psychiatric:         Mood and Affect: Mood normal.           ED Course & MDM   Diagnoses as of 04/30/25 1231   Pneumonia of right lower lobe due to infectious organism                 No data recorded                                 Medical Decision Making  EKG on my interpretation: Rate 70, rhythm regular, axis normal, , , QTc 514, T waves: Unremarkable, ST segments: No elevations or depressions, interpretation: AV dual paced rhythm with prolonged AV conduction, no STEMI, similar to EKG from April 2, 2024.    EKG on my interpretation: Rate 70, rhythm regular, axis normal, , , QTc 522, T waves: Mild version in aVL, ST segments: No elevations or depressions, interpretation: AV dual paced rhythm with prolonged AV conduction, no STEMI    Patient is a 89-year-old female with above-stated past medical history who presents for hypoxia.  Patient's EKGs are nonischemic, troponins are negative x 2, low suspicion for ACS.  Patient's BNP is mildly elevated versus her previous value and she has a small pleural effusion, IV Lasix were given.  CMP shows a mildly elevated glucose, no signs of DKA.  Creatinine is near her baseline.  CBC shows a leukocytosis to 13.5 and on my review, patient has a pneumonia in her right lung base on her chest x-ray, this was confirmed by radiology.  Anemia is near baseline.  Influenza, COVID are negative.  Patient has remained stable on room  air.  Her curb 65 score makes her moderate risk for 30-day mortality.  Patient was started on ceftriaxone and doxycycline.  Low suspicion for pulmonary [place], cardiac tamponade, Boerhaave's, dissection.  Patient's case was discussed with the medicine service accepted the patient for admission.    Disclaimer: This note was dictated using speech recognition software. Minor errors in transcription may be present. Please call if questions.     Dom Livingston MD  [facility] Emergency Medicine  Contact on [employer]        Problems Addressed:  Pneumonia of right lower lobe due to infectious organism: acute illness or injury    Amount and/or Complexity of Data Reviewed  Labs: ordered.  Radiology: ordered and independent interpretation performed.  ECG/medicine tests: ordered and independent interpretation performed.        Procedure  Procedures         [1]   Past Medical History:  Diagnosis Date    A-fib (Multi)     Coronary artery disease     Dementia     Diabetes mellitus (Multi)     DJD (degenerative joint disease)     GERD (gastroesophageal reflux disease)     Hyperlipidemia     Hypertension     Hypothyroid     Muscle spasm of back     Back spasm    Pain in thoracic spine 08/15/2022    Back pain, thoracic    Personal history of other specified conditions     History of vertigo    Personal history of other specified conditions     History of itching    Sleep apnea    [2]   Past Surgical History:  Procedure Laterality Date    CARDIAC ELECTROPHYSIOLOGY PROCEDURE Left 4/2/2024    Procedure: PPM Generator Change;  Surgeon: Norbert Finley MD;  Location: [facility];  Service: Electrophysiology;  Laterality: Left;  dual chamber  medtronic    CORONARY ANGIOPLASTY      INSERT / REPLACE / REMOVE PACEMAKER      IR CVC PICC  02/29/2024    IR CVC PICC 2/29/2024 [facility]    OTHER SURGICAL HISTORY  01/03/2022    Cardioversion    OTHER SURGICAL HISTORY  01/03/2022    Total hysterectomy abdominal    OTHER SURGICAL HISTORY   01/03/2022    Tonsillectomy    OTHER SURGICAL HISTORY  01/03/2022    Pacemaker insertion    OTHER SURGICAL HISTORY  01/03/2022    Cataract surgery    OTHER SURGICAL HISTORY  01/03/2022    Arm surgery    OTHER SURGICAL HISTORY  01/03/2022    Atrial cardioversion    OTHER SURGICAL HISTORY  01/12/2022    Complete colonoscopy   [3] No family history on file.  [4]   Social History  Tobacco Use    Smoking status: Never    Smokeless tobacco: Never   Substance Use Topics    Alcohol use: Not on file     Comment: virgilio    Drug use: Never        Dom Livingston MD  04/30/25 1235

## 2025-05-01 LAB
ANION GAP SERPL CALC-SCNC: 9 MMOL/L (ref 10–20)
ATRIAL RATE: 70 BPM
ATRIAL RATE: 71 BPM
BASOPHILS # BLD AUTO: 0.03 X10*3/UL (ref 0–0.1)
BASOPHILS NFR BLD AUTO: 0.4 %
BUN SERPL-MCNC: 26 MG/DL (ref 6–23)
CALCIUM SERPL-MCNC: 8.7 MG/DL (ref 8.6–10.3)
CHLORIDE SERPL-SCNC: 105 MMOL/L (ref 98–107)
CO2 SERPL-SCNC: 31 MMOL/L (ref 21–32)
CREAT SERPL-MCNC: 0.97 MG/DL (ref 0.5–1.05)
EGFRCR SERPLBLD CKD-EPI 2021: 56 ML/MIN/1.73M*2
EOSINOPHIL # BLD AUTO: 0.23 X10*3/UL (ref 0–0.4)
EOSINOPHIL NFR BLD AUTO: 2.7 %
ERYTHROCYTE [DISTWIDTH] IN BLOOD BY AUTOMATED COUNT: 13.3 % (ref 11.5–14.5)
GLUCOSE SERPL-MCNC: 149 MG/DL (ref 74–99)
HCT VFR BLD AUTO: 30.2 % (ref 36–46)
HGB BLD-MCNC: 9.7 G/DL (ref 12–16)
IMM GRANULOCYTES # BLD AUTO: 0.03 X10*3/UL (ref 0–0.5)
IMM GRANULOCYTES NFR BLD AUTO: 0.4 % (ref 0–0.9)
LEGIONELLA AG UR QL: NEGATIVE
LYMPHOCYTES # BLD AUTO: 1.44 X10*3/UL (ref 0.8–3)
LYMPHOCYTES NFR BLD AUTO: 17 %
MCH RBC QN AUTO: 29 PG (ref 26–34)
MCHC RBC AUTO-ENTMCNC: 32.1 G/DL (ref 32–36)
MCV RBC AUTO: 90 FL (ref 80–100)
MONOCYTES # BLD AUTO: 0.83 X10*3/UL (ref 0.05–0.8)
MONOCYTES NFR BLD AUTO: 9.8 %
NEUTROPHILS # BLD AUTO: 5.91 X10*3/UL (ref 1.6–5.5)
NEUTROPHILS NFR BLD AUTO: 69.7 %
NRBC BLD-RTO: 0 /100 WBCS (ref 0–0)
PLATELET # BLD AUTO: 174 X10*3/UL (ref 150–450)
POTASSIUM SERPL-SCNC: 3.9 MMOL/L (ref 3.5–5.3)
PR INTERVAL: 242 MS
PR INTERVAL: 242 MS
Q ONSET: 171 MS
Q ONSET: 199 MS
QRS COUNT: 11 BEATS
QRS COUNT: 12 BEATS
QRS DURATION: 154 MS
QRS DURATION: 156 MS
QT INTERVAL: 476 MS
QT INTERVAL: 484 MS
QTC CALCULATION(BAZETT): 514 MS
QTC CALCULATION(BAZETT): 522 MS
QTC FREDERICIA: 501 MS
QTC FREDERICIA: 509 MS
R AXIS: 68 DEGREES
R AXIS: 71 DEGREES
RBC # BLD AUTO: 3.35 X10*6/UL (ref 4–5.2)
S PNEUM AG UR QL: NEGATIVE
SODIUM SERPL-SCNC: 141 MMOL/L (ref 136–145)
T AXIS: 81 DEGREES
T AXIS: 82 DEGREES
T OFFSET: 409 MS
T OFFSET: 441 MS
VENTRICULAR RATE: 70 BPM
VENTRICULAR RATE: 70 BPM
WBC # BLD AUTO: 8.5 X10*3/UL (ref 4.4–11.3)

## 2025-05-01 PROCEDURE — 2500000004 HC RX 250 GENERAL PHARMACY W/ HCPCS (ALT 636 FOR OP/ED): Mod: JZ | Performed by: NURSE PRACTITIONER

## 2025-05-01 PROCEDURE — 87449 NOS EACH ORGANISM AG IA: CPT | Mod: ELYLAB | Performed by: INTERNAL MEDICINE

## 2025-05-01 PROCEDURE — 36415 COLL VENOUS BLD VENIPUNCTURE: CPT | Performed by: INTERNAL MEDICINE

## 2025-05-01 PROCEDURE — 2500000002 HC RX 250 W HCPCS SELF ADMINISTERED DRUGS (ALT 637 FOR MEDICARE OP, ALT 636 FOR OP/ED): Mod: JZ | Performed by: INTERNAL MEDICINE

## 2025-05-01 PROCEDURE — 99232 SBSQ HOSP IP/OBS MODERATE 35: CPT | Performed by: INTERNAL MEDICINE

## 2025-05-01 PROCEDURE — 94640 AIRWAY INHALATION TREATMENT: CPT

## 2025-05-01 PROCEDURE — 2500000001 HC RX 250 WO HCPCS SELF ADMINISTERED DRUGS (ALT 637 FOR MEDICARE OP): Performed by: INTERNAL MEDICINE

## 2025-05-01 PROCEDURE — 2500000004 HC RX 250 GENERAL PHARMACY W/ HCPCS (ALT 636 FOR OP/ED): Performed by: INTERNAL MEDICINE

## 2025-05-01 PROCEDURE — 87899 AGENT NOS ASSAY W/OPTIC: CPT | Mod: ELYLAB | Performed by: INTERNAL MEDICINE

## 2025-05-01 PROCEDURE — 80048 BASIC METABOLIC PNL TOTAL CA: CPT | Performed by: INTERNAL MEDICINE

## 2025-05-01 PROCEDURE — 85025 COMPLETE CBC W/AUTO DIFF WBC: CPT | Performed by: INTERNAL MEDICINE

## 2025-05-01 PROCEDURE — 1200000002 HC GENERAL ROOM WITH TELEMETRY DAILY

## 2025-05-01 RX ORDER — LABETALOL HYDROCHLORIDE 5 MG/ML
20 INJECTION, SOLUTION INTRAVENOUS ONCE
Status: COMPLETED | OUTPATIENT
Start: 2025-05-01 | End: 2025-05-01

## 2025-05-01 RX ADMIN — MAGNESIUM OXIDE 400 MG (241.3 MG MAGNESIUM) TABLET 200 MG: TABLET at 08:01

## 2025-05-01 RX ADMIN — CEFTRIAXONE SODIUM 1 G: 1 INJECTION, SOLUTION INTRAVENOUS at 15:05

## 2025-05-01 RX ADMIN — APIXABAN 2.5 MG: 5 TABLET, FILM COATED ORAL at 08:01

## 2025-05-01 RX ADMIN — Medication 20 MG: at 21:50

## 2025-05-01 RX ADMIN — Medication 1 TABLET: at 08:01

## 2025-05-01 RX ADMIN — ESCITALOPRAM OXALATE 10 MG: 10 TABLET, FILM COATED ORAL at 08:01

## 2025-05-01 RX ADMIN — DOXYCYCLINE HYCLATE 100 MG: 100 TABLET, FILM COATED ORAL at 20:31

## 2025-05-01 RX ADMIN — PANTOPRAZOLE 20 MG: 20 TABLET, DELAYED RELEASE ORAL at 06:38

## 2025-05-01 RX ADMIN — LEVOTHYROXINE SODIUM 75 MCG: 75 TABLET ORAL at 06:38

## 2025-05-01 RX ADMIN — DOXYCYCLINE HYCLATE 100 MG: 100 TABLET, FILM COATED ORAL at 08:01

## 2025-05-01 RX ADMIN — ISOSORBIDE MONONITRATE 60 MG: 30 TABLET, EXTENDED RELEASE ORAL at 08:01

## 2025-05-01 RX ADMIN — IPRATROPIUM BROMIDE AND ALBUTEROL SULFATE 3 ML: 2.5; .5 SOLUTION RESPIRATORY (INHALATION) at 22:05

## 2025-05-01 RX ADMIN — APIXABAN 2.5 MG: 5 TABLET, FILM COATED ORAL at 20:31

## 2025-05-01 RX ADMIN — TRAZODONE HYDROCHLORIDE 25 MG: 50 TABLET ORAL at 20:31

## 2025-05-01 RX ADMIN — SENNOSIDES 17.2 MG: 8.6 TABLET ORAL at 20:31

## 2025-05-01 SDOH — SOCIAL STABILITY: SOCIAL INSECURITY: WITHIN THE LAST YEAR, HAVE YOU BEEN HUMILIATED OR EMOTIONALLY ABUSED IN OTHER WAYS BY YOUR PARTNER OR EX-PARTNER?: NO

## 2025-05-01 SDOH — SOCIAL STABILITY: SOCIAL INSECURITY: ARE YOU OR HAVE YOU BEEN THREATENED OR ABUSED PHYSICALLY, EMOTIONALLY, OR SEXUALLY BY ANYONE?: UNABLE TO ASSESS

## 2025-05-01 SDOH — SOCIAL STABILITY: SOCIAL INSECURITY: DO YOU FEEL UNSAFE GOING BACK TO THE PLACE WHERE YOU ARE LIVING?: UNABLE TO ASSESS

## 2025-05-01 SDOH — SOCIAL STABILITY: SOCIAL INSECURITY: DOES ANYONE TRY TO KEEP YOU FROM HAVING/CONTACTING OTHER FRIENDS OR DOING THINGS OUTSIDE YOUR HOME?: UNABLE TO ASSESS

## 2025-05-01 SDOH — SOCIAL STABILITY: SOCIAL INSECURITY
WITHIN THE LAST YEAR, HAVE YOU BEEN RAPED OR FORCED TO HAVE ANY KIND OF SEXUAL ACTIVITY BY YOUR PARTNER OR EX-PARTNER?: NO

## 2025-05-01 SDOH — SOCIAL STABILITY: SOCIAL INSECURITY: WITHIN THE LAST YEAR, HAVE YOU BEEN AFRAID OF YOUR PARTNER OR EX-PARTNER?: PATIENT UNABLE TO ANSWER

## 2025-05-01 SDOH — SOCIAL STABILITY: SOCIAL INSECURITY
WITHIN THE LAST YEAR, HAVE YOU BEEN KICKED, HIT, SLAPPED, OR OTHERWISE PHYSICALLY HURT BY YOUR PARTNER OR EX-PARTNER?: NO

## 2025-05-01 SDOH — ECONOMIC STABILITY: INCOME INSECURITY: IN THE PAST 12 MONTHS HAS THE ELECTRIC, GAS, OIL, OR WATER COMPANY THREATENED TO SHUT OFF SERVICES IN YOUR HOME?: NO

## 2025-05-01 SDOH — ECONOMIC STABILITY: FOOD INSECURITY: WITHIN THE PAST 12 MONTHS, YOU WORRIED THAT YOUR FOOD WOULD RUN OUT BEFORE YOU GOT THE MONEY TO BUY MORE.: NEVER TRUE

## 2025-05-01 SDOH — SOCIAL STABILITY: SOCIAL INSECURITY: HAS ANYONE EVER THREATENED TO HURT YOUR FAMILY OR YOUR PETS?: UNABLE TO ASSESS

## 2025-05-01 SDOH — ECONOMIC STABILITY: HOUSING INSECURITY: IN THE PAST 12 MONTHS, HOW MANY TIMES HAVE YOU MOVED WHERE YOU WERE LIVING?: 0

## 2025-05-01 SDOH — ECONOMIC STABILITY: FOOD INSECURITY: HOW HARD IS IT FOR YOU TO PAY FOR THE VERY BASICS LIKE FOOD, HOUSING, MEDICAL CARE, AND HEATING?: NOT VERY HARD

## 2025-05-01 SDOH — ECONOMIC STABILITY: FOOD INSECURITY: WITHIN THE PAST 12 MONTHS, THE FOOD YOU BOUGHT JUST DIDN'T LAST AND YOU DIDN'T HAVE MONEY TO GET MORE.: NEVER TRUE

## 2025-05-01 SDOH — ECONOMIC STABILITY: HOUSING INSECURITY: IN THE LAST 12 MONTHS, WAS THERE A TIME WHEN YOU WERE NOT ABLE TO PAY THE MORTGAGE OR RENT ON TIME?: NO

## 2025-05-01 SDOH — ECONOMIC STABILITY: TRANSPORTATION INSECURITY
IN THE PAST 12 MONTHS, HAS LACK OF TRANSPORTATION KEPT YOU FROM MEDICAL APPOINTMENTS OR FROM GETTING MEDICATIONS?: PATIENT UNABLE TO ANSWER

## 2025-05-01 SDOH — SOCIAL STABILITY: SOCIAL INSECURITY: DO YOU FEEL ANYONE HAS EXPLOITED OR TAKEN ADVANTAGE OF YOU FINANCIALLY OR OF YOUR PERSONAL PROPERTY?: UNABLE TO ASSESS

## 2025-05-01 SDOH — ECONOMIC STABILITY: HOUSING INSECURITY: AT ANY TIME IN THE PAST 12 MONTHS, WERE YOU HOMELESS OR LIVING IN A SHELTER (INCLUDING NOW)?: PATIENT UNABLE TO ANSWER

## 2025-05-01 SDOH — ECONOMIC STABILITY: FOOD INSECURITY
WITHIN THE PAST 12 MONTHS, YOU WORRIED THAT YOUR FOOD WOULD RUN OUT BEFORE YOU GOT THE MONEY TO BUY MORE.: PATIENT UNABLE TO ANSWER

## 2025-05-01 SDOH — ECONOMIC STABILITY: INCOME INSECURITY
IN THE PAST 12 MONTHS HAS THE ELECTRIC, GAS, OIL, OR WATER COMPANY THREATENED TO SHUT OFF SERVICES IN YOUR HOME?: PATIENT UNABLE TO ANSWER

## 2025-05-01 SDOH — ECONOMIC STABILITY: HOUSING INSECURITY: AT ANY TIME IN THE PAST 12 MONTHS, WERE YOU HOMELESS OR LIVING IN A SHELTER (INCLUDING NOW)?: NO

## 2025-05-01 SDOH — SOCIAL STABILITY: SOCIAL INSECURITY: DOES ANYONE TRY TO KEEP YOU FROM HAVING/CONTACTING OTHER FRIENDS OR DOING THINGS OUTSIDE YOUR HOME?: NO

## 2025-05-01 SDOH — SOCIAL STABILITY: SOCIAL INSECURITY
WITHIN THE LAST YEAR, HAVE YOU BEEN RAPED OR FORCED TO HAVE ANY KIND OF SEXUAL ACTIVITY BY YOUR PARTNER OR EX-PARTNER?: PATIENT UNABLE TO ANSWER

## 2025-05-01 SDOH — SOCIAL STABILITY: SOCIAL INSECURITY: HAVE YOU HAD ANY THOUGHTS OF HARMING ANYONE ELSE?: UNABLE TO ASSESS

## 2025-05-01 SDOH — ECONOMIC STABILITY: FOOD INSECURITY
WITHIN THE PAST 12 MONTHS, THE FOOD YOU BOUGHT JUST DIDN'T LAST AND YOU DIDN'T HAVE MONEY TO GET MORE.: PATIENT UNABLE TO ANSWER

## 2025-05-01 SDOH — SOCIAL STABILITY: SOCIAL INSECURITY
WITHIN THE LAST YEAR, HAVE YOU BEEN KICKED, HIT, SLAPPED, OR OTHERWISE PHYSICALLY HURT BY YOUR PARTNER OR EX-PARTNER?: PATIENT UNABLE TO ANSWER

## 2025-05-01 SDOH — SOCIAL STABILITY: SOCIAL INSECURITY
WITHIN THE LAST YEAR, HAVE YOU BEEN HUMILIATED OR EMOTIONALLY ABUSED IN OTHER WAYS BY YOUR PARTNER OR EX-PARTNER?: PATIENT UNABLE TO ANSWER

## 2025-05-01 SDOH — SOCIAL STABILITY: SOCIAL INSECURITY: WITHIN THE LAST YEAR, HAVE YOU BEEN AFRAID OF YOUR PARTNER OR EX-PARTNER?: NO

## 2025-05-01 SDOH — SOCIAL STABILITY: SOCIAL INSECURITY: DO YOU FEEL UNSAFE GOING BACK TO THE PLACE WHERE YOU ARE LIVING?: NO

## 2025-05-01 SDOH — SOCIAL STABILITY: SOCIAL INSECURITY: ABUSE: ADULT

## 2025-05-01 SDOH — SOCIAL STABILITY: SOCIAL INSECURITY: ARE YOU OR HAVE YOU BEEN THREATENED OR ABUSED PHYSICALLY, EMOTIONALLY, OR SEXUALLY BY ANYONE?: NO

## 2025-05-01 SDOH — SOCIAL STABILITY: SOCIAL INSECURITY: HAVE YOU HAD THOUGHTS OF HARMING ANYONE ELSE?: NO

## 2025-05-01 SDOH — SOCIAL STABILITY: SOCIAL INSECURITY: WERE YOU ABLE TO COMPLETE ALL THE BEHAVIORAL HEALTH SCREENINGS?: YES

## 2025-05-01 SDOH — SOCIAL STABILITY: SOCIAL INSECURITY: ARE THERE ANY APPARENT SIGNS OF INJURIES/BEHAVIORS THAT COULD BE RELATED TO ABUSE/NEGLECT?: NO

## 2025-05-01 SDOH — ECONOMIC STABILITY: TRANSPORTATION INSECURITY: IN THE PAST 12 MONTHS, HAS LACK OF TRANSPORTATION KEPT YOU FROM MEDICAL APPOINTMENTS OR FROM GETTING MEDICATIONS?: NO

## 2025-05-01 SDOH — SOCIAL STABILITY: SOCIAL INSECURITY: HAS ANYONE EVER THREATENED TO HURT YOUR FAMILY OR YOUR PETS?: NO

## 2025-05-01 SDOH — SOCIAL STABILITY: SOCIAL INSECURITY: ARE THERE ANY APPARENT SIGNS OF INJURIES/BEHAVIORS THAT COULD BE RELATED TO ABUSE/NEGLECT?: UNABLE TO ASSESS

## 2025-05-01 SDOH — SOCIAL STABILITY: SOCIAL INSECURITY: DO YOU FEEL ANYONE HAS EXPLOITED OR TAKEN ADVANTAGE OF YOU FINANCIALLY OR OF YOUR PERSONAL PROPERTY?: NO

## 2025-05-01 SDOH — SOCIAL STABILITY: SOCIAL INSECURITY: WERE YOU ABLE TO COMPLETE ALL THE BEHAVIORAL HEALTH SCREENINGS?: NO

## 2025-05-01 ASSESSMENT — COGNITIVE AND FUNCTIONAL STATUS - GENERAL
TURNING FROM BACK TO SIDE WHILE IN FLAT BAD: A LOT
PERSONAL GROOMING: TOTAL
MOVING FROM LYING ON BACK TO SITTING ON SIDE OF FLAT BED WITH BEDRAILS: A LOT
PERSONAL GROOMING: TOTAL
EATING MEALS: A LOT
HELP NEEDED FOR BATHING: TOTAL
MOVING FROM LYING ON BACK TO SITTING ON SIDE OF FLAT BED WITH BEDRAILS: A LOT
STANDING UP FROM CHAIR USING ARMS: A LOT
CLIMB 3 TO 5 STEPS WITH RAILING: TOTAL
TOILETING: TOTAL
DAILY ACTIVITIY SCORE: 7
CLIMB 3 TO 5 STEPS WITH RAILING: TOTAL
DRESSING REGULAR LOWER BODY CLOTHING: TOTAL
DRESSING REGULAR LOWER BODY CLOTHING: TOTAL
STANDING UP FROM CHAIR USING ARMS: A LOT
WALKING IN HOSPITAL ROOM: TOTAL
MOBILITY SCORE: 10
MOBILITY SCORE: 10
EATING MEALS: A LOT
HELP NEEDED FOR BATHING: TOTAL
TOILETING: TOTAL
TOILETING: TOTAL
DAILY ACTIVITIY SCORE: 7
DRESSING REGULAR UPPER BODY CLOTHING: TOTAL
WALKING IN HOSPITAL ROOM: TOTAL
TURNING FROM BACK TO SIDE WHILE IN FLAT BAD: A LOT
DRESSING REGULAR UPPER BODY CLOTHING: TOTAL
MOVING TO AND FROM BED TO CHAIR: A LOT
MOVING TO AND FROM BED TO CHAIR: A LOT

## 2025-05-01 ASSESSMENT — ACTIVITIES OF DAILY LIVING (ADL)
LACK_OF_TRANSPORTATION: PATIENT UNABLE TO ANSWER
LACK_OF_TRANSPORTATION: NO

## 2025-05-01 ASSESSMENT — PATIENT HEALTH QUESTIONNAIRE - PHQ9
2. FEELING DOWN, DEPRESSED OR HOPELESS: NOT AT ALL
SUM OF ALL RESPONSES TO PHQ9 QUESTIONS 1 & 2: 0
1. LITTLE INTEREST OR PLEASURE IN DOING THINGS: NOT AT ALL

## 2025-05-01 ASSESSMENT — PAIN SCALES - GENERAL
PAINLEVEL_OUTOF10: 0 - NO PAIN
PAINLEVEL_OUTOF10: 1

## 2025-05-01 NOTE — CARE PLAN
The patient's goals for the shift include      The clinical goals for the shift include patient will remain safe    Over the shift, the patient did make progress toward the following goals.  Problem: Safety - Adult  Goal: Free from fall injury  Outcome: Progressing     Problem: Discharge Planning  Goal: Discharge to home or other facility with appropriate resources  Outcome: Progressing     Problem: Nutrition  Goal: Nutrient intake appropriate for maintaining nutritional needs  Outcome: Progressing     Problem: Skin  Goal: Participates in plan/prevention/treatment measures  Outcome: Progressing  Goal: Prevent/manage excess moisture  Outcome: Progressing  Flowsheets (Taken 5/1/2025 3219)  Prevent/manage excess moisture:   Cleanse incontinence/protect with barrier cream   Monitor for/manage infection if present  Goal: Prevent/minimize sheer/friction injuries  Outcome: Progressing  Goal: Promote/optimize nutrition  Outcome: Progressing  Goal: Promote skin healing  Outcome: Progressing

## 2025-05-01 NOTE — PROGRESS NOTES
05/01/25 0719   Discharge Planning   Living Arrangements Other (Comment)   Support Systems Family members   Assistance Needed LTC   Type of Residence Nursing home/residential care   Home or Post Acute Services Post acute facilities (Rehab/SNF/etc)   Expected Discharge Disposition Inter   Does the patient need discharge transport arranged? Yes   RoundTrip coordination needed? Yes   Has discharge transport been arranged? No   Patient Choice   Provider Choice list and CMS website (https://medicare.gov/care-compare#search) for post-acute Quality and Resource Measure Data were provided and reviewed with: Other (Comment)   Patient / Family choosing to utilize agency / facility established prior to hospitalization No   Stroke Family Assessment   Stroke Family Assessment Needed No   Intensity of Service   Intensity of Service 0-30 min     Patient admitted to hospital for pneumonia. Patient appears to have been admitted from her LTC facility [facility]. Sent referral to [facility] to confirm. If patient is LTC, behold, she will go back once medically stable. Will call granddaughter to confirm. CT team to follow.

## 2025-05-01 NOTE — PROGRESS NOTES
Speech-Language Pathology                 Therapy Communication Note    Patient Name: Ernestina Argueta  MRN: [id number]  Department: [facility]  Room: [facility]/1025-A  Today's Date: 5/1/2025     Discipline: Speech Language Pathology    Missed Visit Reason:  Patient not appropriate for po trials at this time.    Missed Time: Attempt    Comment: Attempted clinical swallow evaluation this am. Patient did not awake despite max verbal/tactile cues. Per CNA, patient has been sleeping most of the morning and slept poorly last night. She reported, no difficulty swallowing at breakfast, but patient required consistent verbal cues to stay awake to finish her meal. ST will reattempt when patient is more alert as the schedule allows.

## 2025-05-02 LAB
ANION GAP SERPL CALC-SCNC: 14 MMOL/L (ref 10–20)
BASOPHILS # BLD AUTO: 0.02 X10*3/UL (ref 0–0.1)
BASOPHILS NFR BLD AUTO: 0.2 %
BUN SERPL-MCNC: 25 MG/DL (ref 6–23)
CALCIUM SERPL-MCNC: 9 MG/DL (ref 8.6–10.3)
CHLORIDE SERPL-SCNC: 103 MMOL/L (ref 98–107)
CO2 SERPL-SCNC: 27 MMOL/L (ref 21–32)
CREAT SERPL-MCNC: 0.95 MG/DL (ref 0.5–1.05)
EGFRCR SERPLBLD CKD-EPI 2021: 57 ML/MIN/1.73M*2
EOSINOPHIL # BLD AUTO: 0.13 X10*3/UL (ref 0–0.4)
EOSINOPHIL NFR BLD AUTO: 1.2 %
ERYTHROCYTE [DISTWIDTH] IN BLOOD BY AUTOMATED COUNT: 13.2 % (ref 11.5–14.5)
GLUCOSE SERPL-MCNC: 158 MG/DL (ref 74–99)
HCT VFR BLD AUTO: 32.4 % (ref 36–46)
HGB BLD-MCNC: 10.5 G/DL (ref 12–16)
IMM GRANULOCYTES # BLD AUTO: 0.05 X10*3/UL (ref 0–0.5)
IMM GRANULOCYTES NFR BLD AUTO: 0.5 % (ref 0–0.9)
LYMPHOCYTES # BLD AUTO: 1.43 X10*3/UL (ref 0.8–3)
LYMPHOCYTES NFR BLD AUTO: 13.4 %
MAGNESIUM SERPL-MCNC: 1.77 MG/DL (ref 1.6–2.4)
MCH RBC QN AUTO: 29.1 PG (ref 26–34)
MCHC RBC AUTO-ENTMCNC: 32.4 G/DL (ref 32–36)
MCV RBC AUTO: 90 FL (ref 80–100)
MONOCYTES # BLD AUTO: 1.09 X10*3/UL (ref 0.05–0.8)
MONOCYTES NFR BLD AUTO: 10.2 %
NEUTROPHILS # BLD AUTO: 7.98 X10*3/UL (ref 1.6–5.5)
NEUTROPHILS NFR BLD AUTO: 74.5 %
NRBC BLD-RTO: 0 /100 WBCS (ref 0–0)
PLATELET # BLD AUTO: 170 X10*3/UL (ref 150–450)
POTASSIUM SERPL-SCNC: 3.7 MMOL/L (ref 3.5–5.3)
RBC # BLD AUTO: 3.61 X10*6/UL (ref 4–5.2)
SODIUM SERPL-SCNC: 140 MMOL/L (ref 136–145)
WBC # BLD AUTO: 10.7 X10*3/UL (ref 4.4–11.3)

## 2025-05-02 PROCEDURE — 83735 ASSAY OF MAGNESIUM: CPT | Performed by: INTERNAL MEDICINE

## 2025-05-02 PROCEDURE — 99232 SBSQ HOSP IP/OBS MODERATE 35: CPT | Performed by: INTERNAL MEDICINE

## 2025-05-02 PROCEDURE — 2500000001 HC RX 250 WO HCPCS SELF ADMINISTERED DRUGS (ALT 637 FOR MEDICARE OP): Performed by: INTERNAL MEDICINE

## 2025-05-02 PROCEDURE — 85025 COMPLETE CBC W/AUTO DIFF WBC: CPT | Performed by: INTERNAL MEDICINE

## 2025-05-02 PROCEDURE — 92610 EVALUATE SWALLOWING FUNCTION: CPT | Mod: GN

## 2025-05-02 PROCEDURE — 2500000004 HC RX 250 GENERAL PHARMACY W/ HCPCS (ALT 636 FOR OP/ED): Performed by: INTERNAL MEDICINE

## 2025-05-02 PROCEDURE — 80048 BASIC METABOLIC PNL TOTAL CA: CPT | Performed by: INTERNAL MEDICINE

## 2025-05-02 PROCEDURE — 1200000002 HC GENERAL ROOM WITH TELEMETRY DAILY

## 2025-05-02 PROCEDURE — 2500000002 HC RX 250 W HCPCS SELF ADMINISTERED DRUGS (ALT 637 FOR MEDICARE OP, ALT 636 FOR OP/ED): Performed by: INTERNAL MEDICINE

## 2025-05-02 PROCEDURE — 2500000004 HC RX 250 GENERAL PHARMACY W/ HCPCS (ALT 636 FOR OP/ED): Mod: JZ | Performed by: NURSE PRACTITIONER

## 2025-05-02 PROCEDURE — 36415 COLL VENOUS BLD VENIPUNCTURE: CPT | Performed by: INTERNAL MEDICINE

## 2025-05-02 PROCEDURE — 82565 ASSAY OF CREATININE: CPT | Performed by: INTERNAL MEDICINE

## 2025-05-02 RX ORDER — HYDRALAZINE HYDROCHLORIDE 20 MG/ML
10 INJECTION INTRAMUSCULAR; INTRAVENOUS EVERY 6 HOURS PRN
Status: DISCONTINUED | OUTPATIENT
Start: 2025-05-02 | End: 2025-05-03 | Stop reason: HOSPADM

## 2025-05-02 RX ADMIN — IPRATROPIUM BROMIDE AND ALBUTEROL SULFATE 3 ML: 2.5; .5 SOLUTION RESPIRATORY (INHALATION) at 06:34

## 2025-05-02 RX ADMIN — HYDRALAZINE HYDROCHLORIDE 10 MG: 20 INJECTION INTRAMUSCULAR; INTRAVENOUS at 02:05

## 2025-05-02 RX ADMIN — Medication 1 TABLET: at 08:26

## 2025-05-02 RX ADMIN — APIXABAN 2.5 MG: 5 TABLET, FILM COATED ORAL at 08:26

## 2025-05-02 RX ADMIN — ACETAMINOPHEN 650 MG: 325 TABLET ORAL at 01:23

## 2025-05-02 RX ADMIN — SENNOSIDES 17.2 MG: 8.6 TABLET ORAL at 08:26

## 2025-05-02 RX ADMIN — CEFTRIAXONE SODIUM 1 G: 1 INJECTION, SOLUTION INTRAVENOUS at 16:40

## 2025-05-02 RX ADMIN — ESCITALOPRAM OXALATE 10 MG: 10 TABLET, FILM COATED ORAL at 08:26

## 2025-05-02 RX ADMIN — MAGNESIUM OXIDE 400 MG (241.3 MG MAGNESIUM) TABLET 200 MG: TABLET at 08:26

## 2025-05-02 RX ADMIN — APIXABAN 2.5 MG: 5 TABLET, FILM COATED ORAL at 21:08

## 2025-05-02 RX ADMIN — LEVOTHYROXINE SODIUM 75 MCG: 75 TABLET ORAL at 06:07

## 2025-05-02 RX ADMIN — SENNOSIDES 17.2 MG: 8.6 TABLET ORAL at 21:09

## 2025-05-02 RX ADMIN — DOXYCYCLINE HYCLATE 100 MG: 100 TABLET, FILM COATED ORAL at 21:09

## 2025-05-02 RX ADMIN — PANTOPRAZOLE 20 MG: 20 TABLET, DELAYED RELEASE ORAL at 05:33

## 2025-05-02 RX ADMIN — ISOSORBIDE MONONITRATE 60 MG: 30 TABLET, EXTENDED RELEASE ORAL at 08:26

## 2025-05-02 RX ADMIN — DOXYCYCLINE HYCLATE 100 MG: 100 TABLET, FILM COATED ORAL at 08:26

## 2025-05-02 RX ADMIN — TRAZODONE HYDROCHLORIDE 25 MG: 50 TABLET ORAL at 21:09

## 2025-05-02 ASSESSMENT — COGNITIVE AND FUNCTIONAL STATUS - GENERAL
TURNING FROM BACK TO SIDE WHILE IN FLAT BAD: A LOT
TOILETING: A LOT
EATING MEALS: A LOT
STANDING UP FROM CHAIR USING ARMS: A LOT
MOBILITY SCORE: 11
DAILY ACTIVITIY SCORE: 7
CLIMB 3 TO 5 STEPS WITH RAILING: TOTAL
STANDING UP FROM CHAIR USING ARMS: A LOT
MOVING FROM LYING ON BACK TO SITTING ON SIDE OF FLAT BED WITH BEDRAILS: A LOT
MOBILITY SCORE: 10
DRESSING REGULAR UPPER BODY CLOTHING: TOTAL
PERSONAL GROOMING: TOTAL
DRESSING REGULAR UPPER BODY CLOTHING: A LOT
PERSONAL GROOMING: A LOT
WALKING IN HOSPITAL ROOM: TOTAL
MOVING TO AND FROM BED TO CHAIR: A LOT
DRESSING REGULAR LOWER BODY CLOTHING: A LOT
TURNING FROM BACK TO SIDE WHILE IN FLAT BAD: A LOT
CLIMB 3 TO 5 STEPS WITH RAILING: TOTAL
DRESSING REGULAR LOWER BODY CLOTHING: TOTAL
EATING MEALS: A LOT
MOVING FROM LYING ON BACK TO SITTING ON SIDE OF FLAT BED WITH BEDRAILS: A LOT
WALKING IN HOSPITAL ROOM: A LOT
MOVING TO AND FROM BED TO CHAIR: A LOT
HELP NEEDED FOR BATHING: TOTAL
DAILY ACTIVITIY SCORE: 12
HELP NEEDED FOR BATHING: A LOT
TOILETING: TOTAL

## 2025-05-02 ASSESSMENT — PAIN SCALES - GENERAL
PAINLEVEL_OUTOF10: 0 - NO PAIN
PAINLEVEL_OUTOF10: 3
PAINLEVEL_OUTOF10: 0 - NO PAIN

## 2025-05-02 ASSESSMENT — PAIN - FUNCTIONAL ASSESSMENT
PAIN_FUNCTIONAL_ASSESSMENT: 0-10
PAIN_FUNCTIONAL_ASSESSMENT: 0-10

## 2025-05-02 ASSESSMENT — PAIN DESCRIPTION - LOCATION: LOCATION: HEAD

## 2025-05-02 NOTE — PROGRESS NOTES
Inpatient Speech-Language Pathology Clinical Swallow Evaluation       Patient Name: Ernestina Argueta  MRN: [id number]  : 1934  Patient Room: [address] [place] [address]  Today's Date: 25  Time Calculation  Start Time: 1215  Stop Time: 1230  Time Calculation (min): 15 min       Recommendations:  Solid Diet Recommendations : Regular (IDDSI Level 7)   Liquid Diet Recommendations: Thin (IDDSI Level 0)   Compensatory Swallowing Strategies: Alternate solids and liquids, Single sips, Small bites/sips, Eat/feed slowly     Medication Administration Recommendations: Whole, With Liquid    Medical Staff Made Aware: Yes    Functional Oral Intake Scale: Level 7        total oral diet with no restrictions    Assessment:  Consistencies Trialed: Consistencies Trialed: Thin (IDDSI Level 0) - Straw, Thin (IDDSI Level 0) - Cup, Pureed/extremely thick (IDDSI Level 4), Regular (IDDSI Level 7)       Oral Mechanism Exam   Trigeminal Nerve (V)   Jaw open/close: Adequate     Bite:  Adequate    Sensation of face:  Adequate    Sensation of oral cavity:  Adequate    Sensation of tongue: Adequate    Facial Nerve (VII)    Symmetry at rest: Adequate    Eyebrow raise: Reduced    Lip pucker: Adequate    Lip retraction/smile: Adequate      Cheek puffed: Reduced    Taste: Adequate    Vagus (X)  Volitional cough: Adequate    Hypoglossal (XII)       Lingual protrusion: Adequate    Tongue ROM/laterization, elevation: Adequate    Tongue strength: Adequate    Dentition: Edentulous  Respiratory Status: Oxygen via nasal cannula     Diagnostic impressions:      Oral status: Patient was edentulous.  Oral cavity clean and clear.    Oral Phase: Patient's oral phase of the swallow characterized by mild-moderate oral delay with chewing of chicken noodle soup.  She did have minimal oral residual post swallow.  Patient also had mild leakage of thin H2O out of oral cavity with cup sips.  She tolerated straw sips without leakage.    Pharyngeal Phase: Patient's pharyngeal  phase of the swallow characterized by no overt signs symptoms aspiration seen during patient's lunch today.  However, patient did have coughing earlier today per nurse report and does have right basilar consolidation consistent with pneumonia on chest x-ray.  DIAGNOSTIC IMPRESSIONS:  Patient presents with a mild oral phase dysphagia and possible pharyngeal phase dysphagia.  No overt signs symptoms aspiration seen during clinical swallow evaluation however patient does have chest x-ray that revealed right basilar consolidation consistent with pneumonia and nurse reported she was coughing earlier today.  Ending to continue with regular diet and have speech therapist further assess her ability to safely tolerate p.o. diet.  A modified barium swallow study may be warranted.  Speech therapy will determine if patient will need to participate in a modified barium swallow study.    Plan:  SLP Plan: Skilled SLP Skilled speech therapy for dysphagia treatment is warranted in order to provide training and instruction regarding the use of compensatory swallow strategies, assess tolerance of diet and pt/caregiver education in order to reduce risk of aspiration, dehydration and malnutrition.  SLP Frequency: 2x per week   Duration: 2 weeks       Discussed POC: Patient, Nursing   Discussed Risks/Benefits: Yes   Patient/Caregiver Agreeable: Yes   Prognosis: Fair, Good  Barriers to improvement: Cognitive status  SLP Discharge Recommendations: unable to determine at this time, refer to subsequent notes    Goals:  Goals established on 05/02/25  (To be met by discharge)  - Patient will tolerate current diet without noted pulmonary compromise or evidence of pulmonary sequela as noted in patient chart and/or reported by patient/family.  - Pt will independently implement safe swallow strategies to aide in oral clearance as measured by SLP assessment in 90% of therapeutic trials with SLP.  - Patient will independently implement safe  swallowing strategies to reduce risk of aspiration with use of compensatory strategies during 90% of therapeutic trials.    Subjective   Patient seen at bedside for clinical swallow evaluation.  She was sitting upright feeding herself her lunch.  Patient a one-on-one with nursing.  Nurse tech reported that patient has been tolerating her food well.  Nurse did report that she was coughing earlier today.  Patient Positioning: Upright in Bed  Pain:  Ratin-10   Pt report: 0  Action taken: none needed    General Visit Information:  Admission history and past medical history from H&P at admission: 89 y.o. female with a significant past medical history of persistent atrial fibrillation, sick sinus syndrome status post pacemaker, chronic diastolic CHF, hypertension, hyperlipidemia, CKD, dementia, who presents from her nursing facility for hypoxia overnight.  Patient is confused and alert to self and occasionally place at baseline.  She tells me that she has been having a cough and may be some shortness of breath but otherwise is not able to give much history.  Chest x-ray in the emergency department showed a right basilar consolidation consistent with pneumonia for which she has been started on IV antibiotics.  She denies any chest pain.  No fevers or chills.       Reason for Referral: Dysphagia      Current Diet : Regular with thin liquids   Prior to Session Communication: Bedside nurse     Treatment Completed with evaluation:   No    SLP Inpatient Education:  Learner caregiver, patient   Barriers to Learning Cognitive limitations   Method Verbal, Demonstration   Education - Topic ST provided patient education regarding role of ST, purpose of session, clinical impressions, goals of care     Outcome    0= unable to meet     This documentation was completed using the Dragon Dictation system. There may be spelling and/or grammatical errors that were not corrected prior to final submission.

## 2025-05-02 NOTE — PROGRESS NOTES
Medical Group Progress Note  ASSESSMENT & PLAN:     Acute respiratory failure with hypoxia  Pneumonia, presumed gram negatives  -CXR showing patchy airspace consolidation in the right base with small pleural effusion  -aspiration also a consideration; will have SLP see  -cont with rocephin/doxy for now  -check urine antigens, procal  -wean supplemental o2 as tolerated  -add PRN nebs  -gentle IVF overnight, appears dry; Cr is up slightly from baseline though not quite to VERONICA levels     Persistent atrial fibrillation  SSS s/p PPM  Chronic diastolic CHF  CAD  HTN  HLD  -cont home meds including eliquis  -no ongoing chest pain  -BNP mildly elevated but clinically appears euvolemic to dry     Dementia with behavioral disturbance  Depression  -cont home meds              VTE Prophylaxis: eliquis      5/2/25:  Cont with current antibiotics for another day  Still on 2-3L o2, coughing a fair amount  No o2 requirement at baseline  Will cont current therapies another day, once weaning o2 can likelydishcarge back to LTC        Juan Shaw MD    SUBJECTIVE     No overnight events. Resting in bed. Some cough. No chest pain. Minimal dyspnea.     OBJECTIVE:     Last Recorded Vitals:  Vitals:    05/01/25 2205 05/01/25 2339 05/02/25 0146 05/02/25 0700   BP:  156/70 (!) 193/85 (!) 139/91   BP Location:   Left arm Left arm   Patient Position:   Lying Lying   Pulse:  69 71 70   Resp:  19 18 18   Temp:  36.9 °C (98.4 °F) 36 °C (96.8 °F) 36.5 °C (97.7 °F)   TempSrc:   Temporal Temporal   SpO2: 93% 90% 94% 100%   Weight:       Height:         Last I/O:  I/O last 3 completed shifts:  In: 1246.3 (17.8 mL/kg) [P.O.:360; I.V.:836.3 (11.9 mL/kg); IV Piggyback:50]  Out: - (0 mL/kg)   Weight: 70.2 kg     Physical Exam    GEN: resting in bed, pleasantly confused  HEENT: NCAT, PERRLA, EOMI, moist mucous membranes  NECK: supple, no masses  CV: RRR, no m/r/g, no LE edema  LUNGS: diminished R base, R sided rhonchi, mild expiratory wheeze,  no crackles  ABD: soft, NT, ND, NBS  SKIN: no rashes  MSK; no gross deformities, normal joints  NEURO: A+Ox (self and place), no FND  PSYCH: appropriate mood, affect      Inpatient Medications:  Scheduled Medications[1]    PRN Medications  PRN Medications[2]    Continuous Medications:  Continuous Medications[3]  LABS AND IMAGING:     Labs:  Results from last 7 days   Lab Units 05/02/25  0414 05/01/25  0843 04/30/25  1017   WBC AUTO x10*3/uL 10.7 8.5 13.5*   RBC AUTO x10*6/uL 3.61* 3.35* 3.79*   HEMOGLOBIN g/dL 10.5* 9.7* 11.0*   HEMATOCRIT % 32.4* 30.2* 34.9*   MCV fL 90 90 92   MCH pg 29.1 29.0 29.0   MCHC g/dL 32.4 32.1 31.5*   RDW % 13.2 13.3 13.7   PLATELETS AUTO x10*3/uL 170 174 181     Results from last 7 days   Lab Units 05/02/25  0414 05/01/25  0843 04/30/25  1017   SODIUM mmol/L 140 141 140   POTASSIUM mmol/L 3.7 3.9 4.6   CHLORIDE mmol/L 103 105 106   CO2 mmol/L 27 31 23   BUN mg/dL 25* 26* 28*   CREATININE mg/dL 0.95 0.97 1.08*   GLUCOSE mg/dL 158* 149* 224*   PROTEIN TOTAL g/dL  --   --  6.9   CALCIUM mg/dL 9.0 8.7 9.0   BILIRUBIN TOTAL mg/dL  --   --  0.7   ALK PHOS U/L  --   --  56   AST U/L  --   --  17   ALT U/L  --   --  5*     Results from last 7 days   Lab Units 05/02/25  0414   MAGNESIUM mg/dL 1.77     Results from last 7 days   Lab Units 04/30/25  1146 04/30/25  1017   TROPHS ng/L 10 8     Imaging:  ECG 12 lead  AV dual-paced rhythm with prolonged AV conduction  Abnormal ECG  When compared with ECG of 30-APR-2025 10:02, (unconfirmed)  No significant change was found  See ED provider note for full interpretation and clinical correlation  Confirmed by Olesya Irizarry ([id number]) on 5/1/2025 9:45:23 PM  ECG 12 lead  AV dual-paced rhythm with prolonged AV conduction  Abnormal ECG  When compared with ECG of 09-FEB-2025 02:22,  No significant change was found  See ED provider note for full interpretation and clinical correlation  Confirmed by Olesya Irizarry ([id number]) on 5/1/2025 9:02:21 PM               [1] apixaban, 2.5 mg, oral, BID  cefTRIAXone, 1 g, intravenous, q24h  doxycycline, 100 mg, oral, q12h LUIS  escitalopram, 10 mg, oral, Daily  [Held by provider] furosemide, 20 mg, oral, Once per day on Monday Wednesday Friday  isosorbide mononitrate ER, 60 mg, oral, Daily  lactobacillus acidophilus, 1 tablet, oral, Daily  levothyroxine, 75 mcg, oral, Daily  magnesium oxide, 200 mg, oral, Daily  [Held by provider] metoprolol succinate XL, 100 mg, oral, BID  pantoprazole, 20 mg, oral, Daily  sennosides, 2 tablet, oral, BID  traZODone, 25 mg, oral, Nightly     [2] PRN medications: acetaminophen **OR** acetaminophen **OR** acetaminophen, hydrALAZINE, ipratropium-albuteroL  [3]

## 2025-05-02 NOTE — CARE PLAN
The patient's goals for the shift include to remain safe    The clinical goals for the shift include patient will remain safe      Problem: Safety - Adult  Goal: Free from fall injury  Outcome: Progressing     Problem: Discharge Planning  Goal: Discharge to home or other facility with appropriate resources  Outcome: Progressing     Problem: Nutrition  Goal: Nutrient intake appropriate for maintaining nutritional needs  Outcome: Progressing     Problem: Skin  Goal: Participates in plan/prevention/treatment measures  Outcome: Progressing  Goal: Prevent/manage excess moisture  Outcome: Progressing  Goal: Prevent/minimize sheer/friction injuries  Outcome: Progressing  Flowsheets (Taken 5/1/2025 1658)  Prevent/minimize sheer/friction injuries:   Use pull sheet   Complete micro-shifts as needed if patient unable. Adjust patient position to relieve pressure points, not a full turn   HOB 30 degrees or less   Turn/reposition every 2 hours/use positioning/transfer devices  Goal: Promote/optimize nutrition  Outcome: Progressing  Goal: Promote skin healing  Outcome: Progressing

## 2025-05-03 VITALS
WEIGHT: 154.76 LBS | TEMPERATURE: 98.1 F | RESPIRATION RATE: 16 BRPM | SYSTOLIC BLOOD PRESSURE: 167 MMHG | OXYGEN SATURATION: 90 % | DIASTOLIC BLOOD PRESSURE: 74 MMHG | HEART RATE: 71 BPM | BODY MASS INDEX: 25.79 KG/M2 | HEIGHT: 65 IN

## 2025-05-03 LAB
ANION GAP SERPL CALC-SCNC: 9 MMOL/L (ref 10–20)
BASOPHILS # BLD AUTO: 0.01 X10*3/UL (ref 0–0.1)
BASOPHILS NFR BLD AUTO: 0.1 %
BUN SERPL-MCNC: 23 MG/DL (ref 6–23)
CALCIUM SERPL-MCNC: 8.8 MG/DL (ref 8.6–10.3)
CHLORIDE SERPL-SCNC: 104 MMOL/L (ref 98–107)
CO2 SERPL-SCNC: 31 MMOL/L (ref 21–32)
CREAT SERPL-MCNC: 0.93 MG/DL (ref 0.5–1.05)
EGFRCR SERPLBLD CKD-EPI 2021: 59 ML/MIN/1.73M*2
EOSINOPHIL # BLD AUTO: 0.28 X10*3/UL (ref 0–0.4)
EOSINOPHIL NFR BLD AUTO: 3.3 %
ERYTHROCYTE [DISTWIDTH] IN BLOOD BY AUTOMATED COUNT: 13.1 % (ref 11.5–14.5)
GLUCOSE SERPL-MCNC: 144 MG/DL (ref 74–99)
HCT VFR BLD AUTO: 32 % (ref 36–46)
HGB BLD-MCNC: 10.1 G/DL (ref 12–16)
IMM GRANULOCYTES # BLD AUTO: 0.03 X10*3/UL (ref 0–0.5)
IMM GRANULOCYTES NFR BLD AUTO: 0.4 % (ref 0–0.9)
LYMPHOCYTES # BLD AUTO: 1 X10*3/UL (ref 0.8–3)
LYMPHOCYTES NFR BLD AUTO: 11.9 %
MAGNESIUM SERPL-MCNC: 1.81 MG/DL (ref 1.6–2.4)
MCH RBC QN AUTO: 28.5 PG (ref 26–34)
MCHC RBC AUTO-ENTMCNC: 31.6 G/DL (ref 32–36)
MCV RBC AUTO: 90 FL (ref 80–100)
MONOCYTES # BLD AUTO: 0.83 X10*3/UL (ref 0.05–0.8)
MONOCYTES NFR BLD AUTO: 9.9 %
NEUTROPHILS # BLD AUTO: 6.24 X10*3/UL (ref 1.6–5.5)
NEUTROPHILS NFR BLD AUTO: 74.4 %
NRBC BLD-RTO: 0 /100 WBCS (ref 0–0)
PLATELET # BLD AUTO: 184 X10*3/UL (ref 150–450)
POTASSIUM SERPL-SCNC: 4.1 MMOL/L (ref 3.5–5.3)
RBC # BLD AUTO: 3.55 X10*6/UL (ref 4–5.2)
SODIUM SERPL-SCNC: 140 MMOL/L (ref 136–145)
WBC # BLD AUTO: 8.4 X10*3/UL (ref 4.4–11.3)

## 2025-05-03 PROCEDURE — 85025 COMPLETE CBC W/AUTO DIFF WBC: CPT | Performed by: INTERNAL MEDICINE

## 2025-05-03 PROCEDURE — 2500000004 HC RX 250 GENERAL PHARMACY W/ HCPCS (ALT 636 FOR OP/ED): Mod: JZ | Performed by: INTERNAL MEDICINE

## 2025-05-03 PROCEDURE — 83735 ASSAY OF MAGNESIUM: CPT | Performed by: INTERNAL MEDICINE

## 2025-05-03 PROCEDURE — 99239 HOSP IP/OBS DSCHRG MGMT >30: CPT | Performed by: STUDENT IN AN ORGANIZED HEALTH CARE EDUCATION/TRAINING PROGRAM

## 2025-05-03 PROCEDURE — 2500000001 HC RX 250 WO HCPCS SELF ADMINISTERED DRUGS (ALT 637 FOR MEDICARE OP): Performed by: INTERNAL MEDICINE

## 2025-05-03 PROCEDURE — 2500000004 HC RX 250 GENERAL PHARMACY W/ HCPCS (ALT 636 FOR OP/ED): Mod: JZ | Performed by: NURSE PRACTITIONER

## 2025-05-03 PROCEDURE — 36415 COLL VENOUS BLD VENIPUNCTURE: CPT | Performed by: INTERNAL MEDICINE

## 2025-05-03 PROCEDURE — 2500000002 HC RX 250 W HCPCS SELF ADMINISTERED DRUGS (ALT 637 FOR MEDICARE OP, ALT 636 FOR OP/ED): Performed by: INTERNAL MEDICINE

## 2025-05-03 PROCEDURE — 82374 ASSAY BLOOD CARBON DIOXIDE: CPT | Performed by: INTERNAL MEDICINE

## 2025-05-03 RX ORDER — DOXYCYCLINE 100 MG/1
100 CAPSULE ORAL EVERY 12 HOURS SCHEDULED
Start: 2025-05-03 | End: 2025-05-05

## 2025-05-03 RX ORDER — CEFUROXIME AXETIL 500 MG/1
500 TABLET ORAL 2 TIMES DAILY
Start: 2025-05-03 | End: 2025-05-06

## 2025-05-03 RX ORDER — SENNOSIDES 8.6 MG/1
2 TABLET ORAL 2 TIMES DAILY
Start: 2025-05-03

## 2025-05-03 RX ADMIN — ISOSORBIDE MONONITRATE 60 MG: 30 TABLET, EXTENDED RELEASE ORAL at 08:03

## 2025-05-03 RX ADMIN — LEVOTHYROXINE SODIUM 75 MCG: 75 TABLET ORAL at 05:56

## 2025-05-03 RX ADMIN — DOXYCYCLINE HYCLATE 100 MG: 100 TABLET, FILM COATED ORAL at 08:03

## 2025-05-03 RX ADMIN — APIXABAN 2.5 MG: 5 TABLET, FILM COATED ORAL at 08:03

## 2025-05-03 RX ADMIN — HYDRALAZINE HYDROCHLORIDE 10 MG: 20 INJECTION INTRAMUSCULAR; INTRAVENOUS at 08:04

## 2025-05-03 RX ADMIN — CEFTRIAXONE SODIUM 1 G: 1 INJECTION, SOLUTION INTRAVENOUS at 14:40

## 2025-05-03 RX ADMIN — PANTOPRAZOLE 20 MG: 20 TABLET, DELAYED RELEASE ORAL at 05:56

## 2025-05-03 RX ADMIN — SENNOSIDES 17.2 MG: 8.6 TABLET ORAL at 08:04

## 2025-05-03 RX ADMIN — MAGNESIUM OXIDE 400 MG (241.3 MG MAGNESIUM) TABLET 200 MG: TABLET at 08:03

## 2025-05-03 RX ADMIN — Medication 1 TABLET: at 08:03

## 2025-05-03 RX ADMIN — ACETAMINOPHEN 650 MG: 325 TABLET ORAL at 16:22

## 2025-05-03 RX ADMIN — ESCITALOPRAM OXALATE 10 MG: 10 TABLET, FILM COATED ORAL at 08:03

## 2025-05-03 ASSESSMENT — PAIN - FUNCTIONAL ASSESSMENT
PAIN_FUNCTIONAL_ASSESSMENT: WONG-BAKER FACES
PAIN_FUNCTIONAL_ASSESSMENT: WONG-BAKER FACES

## 2025-05-03 ASSESSMENT — COGNITIVE AND FUNCTIONAL STATUS - GENERAL
MOBILITY SCORE: 10
WALKING IN HOSPITAL ROOM: TOTAL
TURNING FROM BACK TO SIDE WHILE IN FLAT BAD: A LOT
MOVING TO AND FROM BED TO CHAIR: A LOT
DAILY ACTIVITIY SCORE: 7
MOVING FROM LYING ON BACK TO SITTING ON SIDE OF FLAT BED WITH BEDRAILS: A LOT
STANDING UP FROM CHAIR USING ARMS: A LOT
EATING MEALS: A LOT
DRESSING REGULAR LOWER BODY CLOTHING: TOTAL
DRESSING REGULAR UPPER BODY CLOTHING: TOTAL
HELP NEEDED FOR BATHING: TOTAL
CLIMB 3 TO 5 STEPS WITH RAILING: TOTAL
TOILETING: TOTAL
PERSONAL GROOMING: TOTAL

## 2025-05-03 ASSESSMENT — PAIN SCALES - PAIN ASSESSMENT IN ADVANCED DEMENTIA (PAINAD)
TOTALSCORE: 1
BODYLANGUAGE: RELAXED
NEGVOCALIZATION: OCCASIONAL MOAN/GROAN, LOW SPEECH, NEGATIVE/DISAPPROVING QUALITY
CONSOLABILITY: NO NEED TO CONSOLE
BREATHING: NORMAL
FACIALEXPRESSION: SMILING OR INEXPRESSIVE

## 2025-05-03 ASSESSMENT — PAIN SCALES - GENERAL: PAINLEVEL_OUTOF10: 0 - NO PAIN

## 2025-05-03 ASSESSMENT — PAIN DESCRIPTION - LOCATION: LOCATION: HEAD

## 2025-05-03 ASSESSMENT — PAIN SCALES - WONG BAKER
WONGBAKER_NUMERICALRESPONSE: HURTS LITTLE MORE
WONGBAKER_NUMERICALRESPONSE: NO HURT

## 2025-05-03 NOTE — DISCHARGE SUMMARY
Medical Group Discharge Summary  DISCHARGE DIAGNOSIS     Acute respiratory failure with hypoxia  Pneumonia, presumed gram negatives  Dementia with Behavioral Disturbance  Hx Persistent atrial fibrillation, SSS s/p PPM, Chronic diastolic CHF, CAD, HTN, HLD, Depresion    HOSPITAL COURSE AND DETAILS       Ernestina Argueta is a 89 y.o. female with a significant past medical history of persistent atrial fibrillation, sick sinus syndrome status post pacemaker, chronic diastolic CHF, hypertension, hyperlipidemia, CKD, dementia, who presents from her nursing facility for hypoxia overnight. Chest x-ray in the emergency department showed a right basilar consolidation consistent with pneumonia for which she has been started on IV antibiotics. Patient was admitted to hospitalist service for continued management and monitoring.     Acute respiratory failure with hypoxia  Pneumonia, presumed gram negatives  -CXR showing patchy airspace consolidation in the right base with small pleural effusion  -aspiration also a consideration. SLP evaluated in house and regular diet recommended.   -Urine strep/legionella found negative   -cont with rocephin/doxy in house. TO transition to ceftin 500 mg BID/doxy on DC to complete therapy on DC  -Was on supplemental O2 in house which was new for her. Escalated to 4 L at one point. Was roomed to RA without incident towards end of hospitaliztaion.     Chronic conditions were managed with home medications.     Patient was in stable condition on day of discharge with no acute concerns.  Remains oriented x 1.  ROS was limited secondary patient condition.  Patient is to return to long-term care.     35 minutes spent on discharge. Time calculated includes outpatient care coordination, bedside education, and counselling.     ---Of note, this documentation is completed using the Dragon Dictation system (voice recognition software). There may be spelling and/or grammatical errors that were not  corrected prior to final submission.---    Kt Mosley MD    DISCHARGE PHYSICAL EXAM     Last Recorded Vitals:  Vitals:    05/03/25 0002 05/03/25 0409 05/03/25 0729 05/03/25 1202   BP: 147/66 138/66 (!) 185/84 121/85   Pulse: 70 70 72 71   Resp: 17 16     Temp: 36.9 °C (98.4 °F) 36.8 °C (98.2 °F) 36.3 °C (97.3 °F) 37 °C (98.6 °F)   TempSrc:       SpO2: 98% 98% 98% 97%   Weight:       Height:           Physical Exam  General: Ill-appearing elderly female in mild distress  HEENT: Clear sclera, EOMI, trachea midline, moist mucous membranes  Respiratory: Equal Chest Rise, No retractions  Cardiovascular: S1 and S2 auscultated, no murmurs clicks or rubs  Abdomen: Soft, nontender, nondistended  Extremities: No cyanosis or clubbing appreciated  Neurological: Spontaneously moves all extremities, no dysarthria, cranial nerves grossly intact awake, alert, oriented x 1 (self)  Psychiatric: Confused  Skin: Warm, dry    DISCHARGE MEDICATIONS        Your medication list        START taking these medications        Instructions Last Dose Given Next Dose Due   cefuroxime 500 mg tablet  Commonly known as: Ceftin      Take 1 tablet (500 mg) by mouth 2 times a day for 3 days.       doxycycline 100 mg capsule  Commonly known as: Vibramycin      Take 1 capsule (100 mg) by mouth every 12 hours for 4 doses. Take with a full glass of water and do not lie down for at least 30 minutes after.       sennosides 8.6 mg tablet  Commonly known as: Senokot      Take 2 tablets (17.2 mg) by mouth 2 times a day.              CHANGE how you take these medications        Instructions Last Dose Given Next Dose Due   acetaminophen 325 mg tablet  Commonly known as: Tylenol  What changed: Another medication with the same name was removed. Continue taking this medication, and follow the directions you see here.           furosemide 40 mg tablet  Commonly known as: Lasix  What changed: when to take this      Take 0.5 tablets (20 mg) by mouth 3 (three) times  a week.              CONTINUE taking these medications        Instructions Last Dose Given Next Dose Due   alum-mag hydroxide-simeth 200-200-20 mg/5 mL oral suspension  Commonly known as: Mylanta           ammonium lactate 12 % cream  Commonly known as: Amlactin           apixaban 2.5 mg tablet  Commonly known as: Eliquis      Take 1 tablet (2.5 mg) by mouth 2 times a day. Hold for 2 days and resume on 4/5/24       bisacodyl 10 mg suppository  Commonly known as: Dulcolax           cholecalciferol 25 mcg (1,000 units) tablet  Commonly known as: Vitamin D-3           escitalopram 10 mg tablet  Commonly known as: Lexapro      Take 1 tablet (10 mg) by mouth once daily. Do not start before March 15, 2024.       Fleet Enema Extra 19-7 gram/197 mL enema  Generic drug: sodium phosphates           hydrOXYzine HCL 25 mg tablet  Commonly known as: Atarax           isosorbide mononitrate ER 30 mg 24 hr tablet  Commonly known as: Imdur           levothyroxine 75 mcg tablet  Commonly known as: Synthroid, Levoxyl           lidocaine 2 % solution  Commonly known as: Xylocaine           magnesium hydroxide 400 mg/5 mL suspension  Commonly known as: Milk of Magnesia           magnesium oxide 400 mg magnesium capsule           meclizine 12.5 mg tablet  Commonly known as: Antivert           metoprolol succinate  mg 24 hr tablet  Commonly known as: Toprol-XL           multivitamin with minerals iron-free  Commonly known as: Centrum Silver           nitroglycerin 0.4 mg SL tablet  Commonly known as: Nitrostat           NovoLOG Flexpen U-100 Insulin 100 unit/mL (3 mL) pen  Generic drug: insulin aspart           pantoprazole 20 mg EC tablet  Commonly known as: ProtoNix           potassium chloride 40 mEq/15 mL liquid           ProAir HFA 90 mcg/actuation inhaler  Generic drug: albuterol           albuterol 2.5 mg /3 mL (0.083 %) nebulizer solution           sodium chloride 0.65 % nasal spray  Commonly known as: Ocean            traZODone 50 mg tablet  Commonly known as: Desyrel      Take 1 tablet (50 mg) by mouth once daily at bedtime. Do not start before March 15, 2024.                 Where to Get Your Medications        Information about where to get these medications is not yet available    Ask your nurse or doctor about these medications  cefuroxime 500 mg tablet  doxycycline 100 mg capsule  sennosides 8.6 mg tablet           OUTPATIENT FOLLOW-UP     Future Appointments   Date Time Provider Department Center   5/19/2025 12:30 PM JACLYN Vega-CNP [id number] [place]   5/19/2025  1:15 PM Sj Subramanian MD [id number] [place]

## 2025-05-03 NOTE — NURSING NOTE
Discharge order has been placed. Report has been called to [facility]. IV was removed. Patient is eating dinner and awaiting her ambulance ride with estimated  time of 18:30. Family has been notified.

## 2025-05-03 NOTE — CARE PLAN
The patient's goals for the shift include      The clinical goals for the shift include safety    Over the shift, the patient did make progress toward the following goals.    Problem: Safety - Adult  Goal: Free from fall injury  Outcome: Progressing     Problem: Discharge Planning  Goal: Discharge to home or other facility with appropriate resources  Outcome: Progressing     Problem: Nutrition  Goal: Nutrient intake appropriate for maintaining nutritional needs  Outcome: Progressing     Problem: Skin  Goal: Participates in plan/prevention/treatment measures  Outcome: Progressing  Goal: Prevent/manage excess moisture  Outcome: Progressing  Flowsheets (Taken 5/3/2025 1135)  Prevent/manage excess moisture:   Cleanse incontinence/protect with barrier cream   Monitor for/manage infection if present  Goal: Prevent/minimize sheer/friction injuries  Outcome: Progressing  Goal: Promote/optimize nutrition  Outcome: Progressing  Goal: Promote skin healing  Outcome: Progressing

## 2025-05-03 NOTE — PROGRESS NOTES
Patient Name: Zuly Lepe   Medical Record Number: [id number]  Date: 12/13/2021   Time: 9:20 AM   Room/Bed: 2030/2030-A    Central Line Placement Procedure Note    Indication: poor peripheral access    Consent: Unable to be obtained due to the emergent nature of this procedure. Procedure: The patient was positioned appropriately and the skin over the left subclavian vein was prepped with betadine and draped in a sterile fashion. Local anesthesia was not performed as patient is intubated. A large bore needle was used to identify the vein. A guide wire was then inserted into the vein through the needle. A triple lumen catheter was then inserted into the vessel over the guide wire using the Seldinger technique. All ports showed good, free flowing blood return and were flushed with saline solution. The catheter was then securely fastened to the skin with sutures and covered with a sterile dressing. A post procedure X-ray was ordered and is still pending at this time. The patient tolerated the procedure well.     Complications: None    Electronically Signed by: Liborio Waite MD Social Work Note Per physician order, pt is to discharge back to [facility] today.  [facility] & GF sent in [facility].  Provided number for report to RN.  Requested 6PM pickup in [place].  Floor to notify family and SNF of transport time once confirmed.  SANDRA Oliva

## 2025-05-05 RX ORDER — CEFUROXIME AXETIL 500 MG/1
500 TABLET ORAL 2 TIMES DAILY
COMMUNITY
Start: 2025-05-03 | End: 2025-05-06

## 2025-05-05 RX ORDER — PANTOPRAZOLE SODIUM 20 MG/1
20 TABLET, DELAYED RELEASE ORAL DAILY
COMMUNITY
Start: 2025-02-25

## 2025-05-05 RX ORDER — DOXYCYCLINE 100 MG/1
100 CAPSULE ORAL EVERY 12 HOURS SCHEDULED
COMMUNITY
Start: 2025-05-03 | End: 2025-05-05

## 2025-05-05 RX ORDER — AMMONIUM LACTATE 12 G/100G
1 CREAM TOPICAL NIGHTLY
COMMUNITY
Start: 2024-02-25

## 2025-05-05 RX ORDER — POTASSIUM CHLORIDE 3 G/15ML
40 SOLUTION ORAL DAILY
COMMUNITY

## 2025-05-05 RX ORDER — AMOXICILLIN 250 MG
2 CAPSULE ORAL 2 TIMES DAILY
COMMUNITY

## 2025-05-05 RX ORDER — ACETAMINOPHEN 500 MG
1000 TABLET ORAL 3 TIMES DAILY
COMMUNITY

## 2025-05-05 RX ORDER — LIDOCAINE HYDROCHLORIDE 20 MG/ML
10 SOLUTION OROPHARYNGEAL EVERY 4 HOURS PRN
COMMUNITY

## 2025-05-05 RX ORDER — INSULIN ASPART 100 [IU]/ML
0-5 INJECTION, SOLUTION INTRAVENOUS; SUBCUTANEOUS
COMMUNITY

## 2025-05-06 ENCOUNTER — OFFICE VISIT (OUTPATIENT)
Dept: GERIATRIC MEDICINE | Age: 89
End: 2025-05-06
Payer: MEDICARE

## 2025-05-06 DIAGNOSIS — I50.22 CHRONIC SYSTOLIC CONGESTIVE HEART FAILURE (HCC): ICD-10-CM

## 2025-05-06 DIAGNOSIS — N18.30 STAGE 3 CHRONIC KIDNEY DISEASE, UNSPECIFIED WHETHER STAGE 3A OR 3B CKD (HCC): Chronic | ICD-10-CM

## 2025-05-06 DIAGNOSIS — I48.20 CHRONIC ATRIAL FIBRILLATION (HCC): Primary | ICD-10-CM

## 2025-05-06 DIAGNOSIS — I10 ESSENTIAL HYPERTENSION: ICD-10-CM

## 2025-05-06 DIAGNOSIS — E03.9 HYPOTHYROIDISM, UNSPECIFIED TYPE: ICD-10-CM

## 2025-05-06 PROCEDURE — 99305 1ST NF CARE MODERATE MDM 35: CPT | Performed by: INTERNAL MEDICINE

## 2025-05-06 PROCEDURE — 1123F ACP DISCUSS/DSCN MKR DOCD: CPT | Performed by: INTERNAL MEDICINE

## 2025-05-16 ENCOUNTER — OFFICE VISIT (OUTPATIENT)
Dept: GERIATRIC MEDICINE | Age: 89
End: 2025-05-16

## 2025-05-16 DIAGNOSIS — F32.A DEPRESSION, UNSPECIFIED DEPRESSION TYPE: ICD-10-CM

## 2025-05-16 DIAGNOSIS — I10 HYPERTENSION, UNSPECIFIED TYPE: ICD-10-CM

## 2025-05-16 DIAGNOSIS — F03.90 DEMENTIA WITHOUT BEHAVIORAL DISTURBANCE (HCC): Primary | ICD-10-CM

## 2025-05-16 DIAGNOSIS — E03.9 HYPOTHYROIDISM, UNSPECIFIED TYPE: ICD-10-CM

## 2025-05-16 DIAGNOSIS — L89.152 DECUBITUS ULCER OF SACRAL REGION, STAGE 2 (HCC): ICD-10-CM

## 2025-05-16 DIAGNOSIS — I50.32 CHRONIC DIASTOLIC CHF (CONGESTIVE HEART FAILURE) (HCC): ICD-10-CM

## 2025-05-17 NOTE — PROGRESS NOTES
SNF PROGRESS NOTE      Cc- dementia       Patient is a Chen Medellin 89 y.o. female who is being seen for her 30 day examination for dementia. She is being seen in the dementia unit.     Over the last 30 days, patient was treated for pneumonia. She was placed on oxygen.         Past Medical History:   Diagnosis Date    Abnormal liver ultrasound 5/15/2019    Allergic contact dermatitis due to plants, except food 8/19/2019    Arthritis     Atrial fibrillation (HCC)     CHF (congestive heart failure) (HCC)     Chronic kidney disease     Chronic kidney disease (CKD), stage II (mild)     Depression     Hyperlipidemia     Hypertension     Hypothyroidism     Interstitial cystitis     Dr. Chappell diagnosed this for michele.    Type II or unspecified type diabetes mellitus without mention of complication, not stated as uncontrolled      Latex, Avelox [moxifloxacin hcl in nacl], Penicillins, and Adhesive tape    VS reviewed    Gen- Alert and oriented x 1  Heart- RRR no murmur no LE edema   Lungs- CTA b/l no resp distress RA  oxygen   Abd- bs x 4           Assessment and Plan    Dementia  with behavioral disturbances/ depression   Psych .   Hydroxyzine BID   Hydroxyzine   Lexapro  Stage 2 sacral Decub  Wound care   Insomnia  Melatonin   Trazodone  HTN  Isosorbide, metoprolol, hydralazine    Left clavicle fracture   Sling - when compliant   Refused ortho follow up   A fib   eliquis, metoprolol  Cardio follow up i4/21/25   Chronic Diastolic CHF  Lasix   Metoprolol    Cardio 4/21/25  Depression   Lexparo   Hypothyroid  Synthroid        Significant events     2/2025-ER visit/left clavicle fracture  5/2025- treated for pneumonia       Darline Knapp DO, TIN     Electronically signed by: Darline Knapp DO on 5/16/2025

## 2025-05-19 ENCOUNTER — APPOINTMENT (OUTPATIENT)
Dept: CARDIOLOGY | Facility: CLINIC | Age: OVER 89
End: 2025-05-19
Payer: COMMERCIAL

## 2025-05-19 VITALS
HEIGHT: 65 IN | SYSTOLIC BLOOD PRESSURE: 100 MMHG | DIASTOLIC BLOOD PRESSURE: 62 MMHG | HEART RATE: 69 BPM | BODY MASS INDEX: 25.75 KG/M2

## 2025-05-19 VITALS
BODY MASS INDEX: 25.75 KG/M2 | DIASTOLIC BLOOD PRESSURE: 62 MMHG | HEIGHT: 65 IN | HEART RATE: 69 BPM | SYSTOLIC BLOOD PRESSURE: 100 MMHG

## 2025-05-19 DIAGNOSIS — E03.9 ACQUIRED HYPOTHYROIDISM: ICD-10-CM

## 2025-05-19 DIAGNOSIS — Z78.9 NEVER SMOKED TOBACCO: ICD-10-CM

## 2025-05-19 DIAGNOSIS — I25.10 CAD S/P PERCUTANEOUS CORONARY ANGIOPLASTY: ICD-10-CM

## 2025-05-19 DIAGNOSIS — Z79.01 LONG TERM CURRENT USE OF ANTICOAGULANT THERAPY: ICD-10-CM

## 2025-05-19 DIAGNOSIS — I10 PRIMARY HYPERTENSION: ICD-10-CM

## 2025-05-19 DIAGNOSIS — E78.2 MIXED HYPERLIPIDEMIA: ICD-10-CM

## 2025-05-19 DIAGNOSIS — I48.0 PAROXYSMAL ATRIAL FIBRILLATION (MULTI): ICD-10-CM

## 2025-05-19 DIAGNOSIS — E11.9 TYPE 2 DIABETES MELLITUS WITHOUT COMPLICATION, WITHOUT LONG-TERM CURRENT USE OF INSULIN: ICD-10-CM

## 2025-05-19 DIAGNOSIS — I49.5 SINUS NODE DYSFUNCTION (MULTI): ICD-10-CM

## 2025-05-19 DIAGNOSIS — Z95.0 CARDIAC PACEMAKER: ICD-10-CM

## 2025-05-19 DIAGNOSIS — G47.33 OBSTRUCTIVE SLEEP APNEA SYNDROME: ICD-10-CM

## 2025-05-19 DIAGNOSIS — M15.9 OSTEOARTHRITIS OF MULTIPLE JOINTS, UNSPECIFIED OSTEOARTHRITIS TYPE: ICD-10-CM

## 2025-05-19 DIAGNOSIS — K21.9 GASTROESOPHAGEAL REFLUX DISEASE WITHOUT ESOPHAGITIS: ICD-10-CM

## 2025-05-19 DIAGNOSIS — Z98.61 CAD S/P PERCUTANEOUS CORONARY ANGIOPLASTY: ICD-10-CM

## 2025-05-19 DIAGNOSIS — F03.B0 MODERATE DEMENTIA, UNSPECIFIED DEMENTIA TYPE, UNSPECIFIED WHETHER BEHAVIORAL, PSYCHOTIC, OR MOOD DISTURBANCE OR ANXIETY: ICD-10-CM

## 2025-05-19 DIAGNOSIS — N18.31 STAGE 3A CHRONIC KIDNEY DISEASE (MULTI): ICD-10-CM

## 2025-05-19 PROCEDURE — 99215 OFFICE O/P EST HI 40 MIN: CPT | Performed by: INTERNAL MEDICINE

## 2025-05-19 PROCEDURE — 3074F SYST BP LT 130 MM HG: CPT | Performed by: INTERNAL MEDICINE

## 2025-05-19 PROCEDURE — 3078F DIAST BP <80 MM HG: CPT | Performed by: INTERNAL MEDICINE

## 2025-05-19 PROCEDURE — 3074F SYST BP LT 130 MM HG: CPT | Performed by: NURSE PRACTITIONER

## 2025-05-19 PROCEDURE — 1157F ADVNC CARE PLAN IN RCRD: CPT | Performed by: INTERNAL MEDICINE

## 2025-05-19 PROCEDURE — 1123F ACP DISCUSS/DSCN MKR DOCD: CPT | Performed by: INTERNAL MEDICINE

## 2025-05-19 PROCEDURE — 99214 OFFICE O/P EST MOD 30 MIN: CPT | Performed by: NURSE PRACTITIONER

## 2025-05-19 PROCEDURE — 1111F DSCHRG MED/CURRENT MED MERGE: CPT | Performed by: INTERNAL MEDICINE

## 2025-05-19 PROCEDURE — 3078F DIAST BP <80 MM HG: CPT | Performed by: NURSE PRACTITIONER

## 2025-05-19 PROCEDURE — 1111F DSCHRG MED/CURRENT MED MERGE: CPT | Performed by: NURSE PRACTITIONER

## 2025-05-19 PROCEDURE — 1159F MED LIST DOCD IN RCRD: CPT | Performed by: NURSE PRACTITIONER

## 2025-05-19 NOTE — PROGRESS NOTES
CARDIOLOGY OFFICE NOTE     Date:   5/19/2025    Patient:    Ernestina Argueta    YOB: 1934    Primary Physician: Katherin Montero DO         REASON FOR VISIT / CHIEF COMPLAINT:     Post Hospital cardiology follow-up.    HPI:     Ernestina Argueta was seen in cardiac evaluation at the [facility] Cardiology office May 19, 2025.      The patients problems are listed as in the impression below.    Electronic medical records reviewed.    Patient returns.  Though she was recently at [facility] with pneumonia.  She is doing better.  She is in the nursing facility.  She is controlled on current medications.    She has moderate dementia and is unable to give a reliable history.    Patient denies Chest Pain, SOB, Lightheadedness, Dizziness, TIA or CVA symptoms.  No CHF or Edema.  No Palpitations.  No GI,  or Bleeding Issues. No Recent Fever or Chills.     Cardiovascular and general review of systems is otherwise negative.    A 14-system review is otherwise negative, other than noted.     PHYSICAL EXAMINATION:      Vitals:    05/19/25 1314   BP: 100/62   Pulse: 69     General: No acute distress.  Alert and not oriented  Head And Neck Examination: No jugular venous distention, no carotid  bruits, no mass. Carotid upstrokes preserved. Oral mucosa moist.   No xanthelasma. Head and neck examination otherwise unremarkable.  Lungs: Clear to auscultation and percussion. No wheezes, no rales, and no rhonchi.  Chest: Excursion appeared to be normal. No chest wall tenderness on palpation. Pacemaker, left chest (pacemaker easily moves and with minimal effort can actually flip on at side).  Heart: Normal S1 and S2. No S3. No S4. No rub. Grade 1/6 systolic murmur best heard at left sternal border. Point of maximal impulse was within normal limits.  Abdomen: Obese. Soft. Nontender. No organomegaly. No bruits.   Extremities: 1-2+ bipedal edema. No clubbing. No cyanosis. Pulses are weight loss strong throughout. No  bruits.  Musculoskeletal Exam: No ulcers, otherwise unremarkable.  Neuro: Neurologically appeared grossly intact except for poor memory.     IMPRESSION:       1. Cardiovascular status stable  2. Asymptomatic  3. Coronary artery disease. History of coronary angioplasty.  4. Persistent atrial fibrillation, chronic persistent, rate control strategy  5. Sick sinus syndrome, status post pacemaker, post generator change for 2024 ([employer]tronic close her XT DR LOPEZ).  6. Hypertension.  7. Hyperlipidemia.  8. Diabetes.  9. Obstructive sleep apnea.  10. Obesity.  11.  Dementia  As per active problem list, otherwise below.     RECOMMENDATIONS:      Patient overall is doing well.  Would suggest continuing current medication.  Refills were provided.    [employer] portal use was encouraged.    We will plan to see back in 6 months with Laboratory Studies and ECG as ordered.     Patient will follow up with their primary physician for general care.    The patient knows to contact medical care earlier if need be.      ALLERGIES:     Adhesive tape-silicones, Latex, Moxifloxacin, and Penicillins     MEDICATIONS:     Current Outpatient Medications   Medication Instructions    acetaminophen (Tylenol) 325 mg tablet 2 tablets, Every 4 hours PRN    albuterol (ProAir HFA) 90 mcg/actuation inhaler 2 puffs, Every 6 hours PRN    albuterol 2.5 mg, Every 6 hours PRN    alum-mag hydroxide-simeth (Mylanta) 200-200-20 mg/5 mL oral suspension 30 mL, Every 4 hours PRN    ammonium lactate (Amlactin) 12 % cream 1 Application, Nightly    apixaban (ELIQUIS) 2.5 mg, oral, 2 times daily, Hold for 2 days and resume on 4/5/24    bisacodyl (DULCOLAX) 10 mg, Daily PRN    cholecalciferol (Vitamin D-3) 25 MCG (1000 UT) tablet 1 tablet, Daily    escitalopram (LEXAPRO) 10 mg, oral, Daily    furosemide (LASIX) 20 mg, oral, 3 times weekly    hydrOXYzine HCL (ATARAX) 25 mg, 2 times daily    insulin aspart (NovoLOG Flexpen U-100 Insulin) 100 unit/mL (3 mL) pen 3 times daily  before meals    isosorbide mononitrate ER (IMDUR) 60 mg, Daily    levothyroxine (SYNTHROID, LEVOXYL) 75 mcg, Daily    lidocaine (Xylocaine) 2 % solution 10 mL, Every 4 hours PRN    magnesium hydroxide (Milk of Magnesia) 400 mg/5 mL suspension 30 mL, Daily PRN    magnesium oxide 400 mg magnesium capsule 1 capsule, Daily    meclizine (Antivert) 12.5 mg tablet 1 tablet, Every 8 hours PRN    metoprolol succinate XL (TOPROL-XL) 100 mg, 2 times daily    multivitamin with minerals iron-free (Centrum Silver) 1 tablet, Every morning    nitroglycerin (NITROSTAT) 0.4 mg, Every 5 min PRN    pantoprazole (PROTONIX) 20 mg, Daily    potassium chloride 40 mEq/15 mL liquid 15 mL, Daily    sennosides (SENOKOT) 17.2 mg, oral, 2 times daily    sodium chloride (Ocean) 0.65 % nasal spray 1 spray, Every 4 hours PRN    sodium phosphates (Fleet Enema Extra) 19-7 gram/197 mL enema 1 suppository, Daily    traZODone (DESYREL) 50 mg, oral, Nightly       ELECTROCARDIOGRAM:      None this visit    CARDIAC TESTING:      None this visit    LABORATORY DATA:      CBC:   Lab Results   Component Value Date    WBC 8.4 05/03/2025    RBC 3.55 (L) 05/03/2025    HGB 10.1 (L) 05/03/2025    HCT 32.0 (L) 05/03/2025     05/03/2025        CMP:    Lab Results   Component Value Date     05/03/2025    K 4.1 05/03/2025     05/03/2025    CO2 31 05/03/2025    BUN 23 05/03/2025    CREATININE 0.93 05/03/2025    GLUCOSE 144 (H) 05/03/2025    CALCIUM 8.8 05/03/2025       Magnesium:    Lab Results   Component Value Date    MG 1.81 05/03/2025       Lipid Profile:    Lab Results   Component Value Date    CHOL 175 08/18/2022    TRIG 146 08/18/2022    HDL 36.0 (A) 08/18/2022       Hepatic Function Panel:    Lab Results   Component Value Date    ALKPHOS 56 04/30/2025    ALT 5 (L) 04/30/2025    AST 17 04/30/2025    PROT 6.9 04/30/2025    BILITOT 0.7 04/30/2025    BILIDIR 0.4 (H) 02/25/2024       TSH:    Lab Results   Component Value Date    TSH 2.21 02/25/2024        HgBA1c:    Lab Results   Component Value Date    HGBA1C 7.3 (H) 02/25/2024       BNP:   Lab Results   Component Value Date     (H) 04/30/2025        PT/INR:    Lab Results   Component Value Date    PROTIME 15.1 (H) 02/09/2025    INR 1.3 (H) 02/09/2025       Cardiac Enzymes:    Lab Results   Component Value Date    TROPHS 10 04/30/2025    TROPHS 8 04/30/2025    TROPHS 42 (H) 02/25/2024                 PROBLEM LIST:     Problem List[1]          Sj Subramanian MD, FACC   [facility] /  Cardiology      Of Note:  [employer] voice recognition dictation software was utilized partially in the preparation of this note, therefore, inaccuracies in spelling, word choice and punctuation may have occurred which were not recognized at the time of signing.    Patient was seen and examined with total time of visit including chart preparation, rooming, and chart completion exceeding 40 minutes.      I, Claire BOO am scribing for, and in the presence of Dr. Sj Subramanian MD, [facility].    I, Dr. Sj Subramanian MD, FACC, personally performed the services described in the documentation as scribed by Claire BOO in my presence, and confirm it is both accurate and complete.            [1]   Patient Active Problem List  Diagnosis    Atrial fibrillation (Multi)    Back pain, thoracic    CAD S/P percutaneous coronary angioplasty    Cardiac pacemaker    Chronic midline low back pain without sciatica    Chronic pain of both knees    Foot pain    Dementia    DJD (degenerative joint disease), multiple sites    GERD (gastroesophageal reflux disease)    Hyperlipidemia    Hypertension    Hypothyroidism    Itching    MCI (mild cognitive impairment) with memory loss    Sleep apnea    Stage 3a chronic kidney disease (Multi)    Sinus node dysfunction (Multi)    Long term current use of anticoagulant therapy    Dementia with anxiety, unspecified dementia severity, unspecified dementia type (Multi)    Pneumonia of right lower lobe due  to infectious organism    Never smoked tobacco

## 2025-05-20 NOTE — PROGRESS NOTES
Chief Complaint:  Chief Complaint   Patient presents with    Follow-up     Six month follow up        Ernestina Argueta is a 89 y.o. female that presents to the office today with her granddaughter Wendy for cardiac follow up.  She  follows with her  primary cardiologist Dr. Allen and Dr. Finley.   She was added to my schedule today as a 6 month follow up for Dr. Finley.  She  has a PMH of HTN dual-chamber pacemaker placement 2016 battery exchange 2024, persistent atrial fibrillation, long-term anticoagulation, hypertension, hyperlipidemia, diabetes, obstructive sleep apnea, CAD with remote intervention, severe dementia.    Recently hospitalized 4/30/2025 [facility] with pneumonia, discharged home on continuous O2.    Her granddaughter Wendy states that overall she feels that she is doing well, she feels she is eating and drinking well.  Denies any recent falls.    Testing Reviewed  5/5/2025 device interrogation  Battery longevity 8.9 years  44 AT/AF episodes.  AT/AF burden 55%    CBC:   Lab Results   Component Value Date    WBC 8.4 05/03/2025    RBC 3.55 (L) 05/03/2025    HGB 10.1 (L) 05/03/2025    HCT 32.0 (L) 05/03/2025     05/03/2025        CMP:    Lab Results   Component Value Date     05/03/2025    K 4.1 05/03/2025     05/03/2025    CO2 31 05/03/2025    BUN 23 05/03/2025    CREATININE 0.93 05/03/2025    GLUCOSE 144 (H) 05/03/2025    CALCIUM 8.8 05/03/2025       Lipid Profile:    Lab Results   Component Value Date    TRIG 146 08/18/2022    HDL 36.0 (A) 08/18/2022       BMP:  Lab Results   Component Value Date     05/03/2025     05/02/2025     05/01/2025    K 4.1 05/03/2025    K 3.7 05/02/2025    K 3.9 05/01/2025     05/03/2025     05/02/2025     05/01/2025    CO2 31 05/03/2025    CO2 27 05/02/2025    CO2 31 05/01/2025    BUN 23 05/03/2025    BUN 25 (H) 05/02/2025    BUN 26 (H) 05/01/2025    CREATININE 0.93 05/03/2025    CREATININE 0.95 05/02/2025     "CREATININE 0.97 05/01/2025       CBC:  Lab Results   Component Value Date    WBC 8.4 05/03/2025    WBC 10.7 05/02/2025    WBC 8.5 05/01/2025    RBC 3.55 (L) 05/03/2025    RBC 3.61 (L) 05/02/2025    RBC 3.35 (L) 05/01/2025    HGB 10.1 (L) 05/03/2025    HGB 10.5 (L) 05/02/2025    HGB 9.7 (L) 05/01/2025    HCT 32.0 (L) 05/03/2025    HCT 32.4 (L) 05/02/2025    HCT 30.2 (L) 05/01/2025    MCV 90 05/03/2025    MCV 90 05/02/2025    MCV 90 05/01/2025    MCH 28.5 05/03/2025    MCH 29.1 05/02/2025    MCH 29.0 05/01/2025    MCHC 31.6 (L) 05/03/2025    MCHC 32.4 05/02/2025    MCHC 32.1 05/01/2025    RDW 13.1 05/03/2025    RDW 13.2 05/02/2025    RDW 13.3 05/01/2025     05/03/2025     05/02/2025     05/01/2025       Hepatic Function Panel:    Lab Results   Component Value Date    ALKPHOS 56 04/30/2025    ALT 5 (L) 04/30/2025    AST 17 04/30/2025    PROT 6.9 04/30/2025    BILITOT 0.7 04/30/2025    BILIDIR 0.4 (H) 02/25/2024       HgBA1c:    Lab Results   Component Value Date    HGBA1C 7.3 (H) 02/25/2024       Magnesium:    Lab Results   Component Value Date    MG 1.81 05/03/2025       TSH:    Lab Results   Component Value Date    TSH 2.21 02/25/2024       BNP:   Lab Results   Component Value Date     (H) 04/30/2025        PT/INR:    Lab Results   Component Value Date    PROTIME 15.1 (H) 02/09/2025    INR 1.3 (H) 02/09/2025       Cardiac Enzymes:    Lab Results   Component Value Date    TROPHS 10 04/30/2025    TROPHS 8 04/30/2025    TROPHS 42 (H) 02/25/2024               Problem List[1]    Social History[2]    Medical History[3]    Current Medications[4]    Adhesive tape-silicones, Latex, Moxifloxacin, and Penicillins    Family History[5]    Surgical History[6]       Review of systems  Difficult to obtain due to severe dementia      /62 (BP Location: Left arm, Patient Position: Sitting)   Pulse 69   Ht 1.651 m (5' 5\")   BMI 25.75 kg/m²     Physical Exam  Constitutional: Well developed, " awake/alert x 3, no distress.  Head/Neck: No JVD, No bruits  Respiratory/Thorax: patent airways, CTAB, normal breath sounds with good expansion.  Cardiovascular: Regular rate and rhythm, no murmurs, normal S1 and S2,   Gastrointestinal: Non distended, soft, non-tender, no rebound tenderness or guarding.  Extremities: No cyanosis, edema.   Neurological: Alert and oriented x 3. Moves extremities spontaneous with purpose.  Psychological: Appropriate mood and behavior  Skin: Warm and Dry. No lesions or rashes.     Assessment/Plan  Persistent atrial fibrillation; A-fib burden noted on device interrogation  Continue with rate control therapy metoprolol succinate.  Anticoagulation; granddaughter denies bleeding.  No recent falls.  Continue Eliquis  Dual-chamber pacemaker; device interrogation as noted above.  Continue with scheduled device interrogation.  Will continue with remote interrogations due to the fact that is difficult to get Ernestina to in clinic.  Follow-up in the office with Dr. Finley in 6 months or sooner if needed      Please excuse any errors in grammar or translation related to dictation, voice recognition software was used to prepare this document.         [1]   Patient Active Problem List  Diagnosis    Atrial fibrillation (Multi)    Back pain, thoracic    CAD S/P percutaneous coronary angioplasty    Cardiac pacemaker    Chronic midline low back pain without sciatica    Chronic pain of both knees    Foot pain    Dementia    DJD (degenerative joint disease), multiple sites    GERD (gastroesophageal reflux disease)    Hyperlipidemia    Hypertension    Hypothyroidism    Itching    MCI (mild cognitive impairment) with memory loss    Sleep apnea    Stage 3a chronic kidney disease (Multi)    Sinus node dysfunction (Multi)    Long term current use of anticoagulant therapy    Dementia with anxiety, unspecified dementia severity, unspecified dementia type (Multi)    Pneumonia of right lower lobe due to infectious  organism    Never smoked tobacco   [2]   Social History  Tobacco Use    Smoking status: Never    Smokeless tobacco: Never   Substance Use Topics    Drug use: Never   [3]   Past Medical History:  Diagnosis Date    A-fib (Multi)     Coronary artery disease     Dementia     Diabetes mellitus (Multi)     DJD (degenerative joint disease)     GERD (gastroesophageal reflux disease)     Hyperlipidemia     Hypertension     Hypothyroid     Muscle spasm of back     Back spasm    Pain in thoracic spine 08/15/2022    Back pain, thoracic    Personal history of other specified conditions     History of vertigo    Personal history of other specified conditions     History of itching    Sleep apnea    [4]   Current Outpatient Medications:     acetaminophen (Tylenol) 325 mg tablet, Take 2 tablets (650 mg) by mouth every 4 hours if needed for mild pain (1 - 3) or fever (temp greater than 38.0 C)., Disp: , Rfl:     albuterol (ProAir HFA) 90 mcg/actuation inhaler, Inhale 2 puffs every 6 hours if needed for shortness of breath., Disp: , Rfl:     albuterol 2.5 mg /3 mL (0.083 %) nebulizer solution, Take 3 mL (2.5 mg) by nebulization every 6 hours if needed for wheezing or shortness of breath., Disp: , Rfl:     alum-mag hydroxide-simeth (Mylanta) 200-200-20 mg/5 mL oral suspension, Take 30 mL by mouth every 4 hours if needed for indigestion or heartburn., Disp: , Rfl:     ammonium lactate (Amlactin) 12 % cream, Apply 1 Application topically once daily at bedtime. Apply to BLE, Disp: , Rfl:     apixaban (Eliquis) 2.5 mg tablet, Take 1 tablet (2.5 mg) by mouth 2 times a day. Hold for 2 days and resume on 4/5/24, Disp: , Rfl:     bisacodyl (Dulcolax) 10 mg suppository, Insert 1 suppository (10 mg) into the rectum once daily as needed for constipation., Disp: , Rfl:     cholecalciferol (Vitamin D-3) 25 MCG (1000 UT) tablet, Take 1 tablet (25 mcg) by mouth once daily., Disp: , Rfl:     escitalopram (Lexapro) 10 mg tablet, Take 1 tablet (10 mg)  by mouth once daily. Do not start before March 15, 2024., Disp: , Rfl:     furosemide (Lasix) 40 mg tablet, Take 0.5 tablets (20 mg) by mouth 3 (three) times a week., Disp: 1 tablet, Rfl: 0    hydrOXYzine HCL (Atarax) 25 mg tablet, Take 1 tablet (25 mg) by mouth 2 times a day., Disp: , Rfl:     insulin aspart (NovoLOG Flexpen U-100 Insulin) 100 unit/mL (3 mL) pen, Inject under the skin 3 times a day before meals. Per Sliding Scale, Disp: , Rfl:     isosorbide mononitrate ER (Imdur) 30 mg 24 hr tablet, Take 2 tablets (60 mg) by mouth once daily., Disp: , Rfl:     levothyroxine (Synthroid, Levoxyl) 75 mcg tablet, Take 1 tablet (75 mcg) by mouth once daily., Disp: , Rfl:     lidocaine (Xylocaine) 2 % solution, Take 10 mL by mouth every 4 hours if needed for mild pain (1 - 3). Swish & Swallow, Disp: , Rfl:     magnesium hydroxide (Milk of Magnesia) 400 mg/5 mL suspension, Take 30 mL by mouth once daily as needed for constipation., Disp: , Rfl:     magnesium oxide 400 mg magnesium capsule, Take 1 capsule (400 mg) by mouth once daily., Disp: , Rfl:     meclizine (Antivert) 12.5 mg tablet, Take 1 tablet (12.5 mg) by mouth every 8 hours if needed for dizziness., Disp: , Rfl:     metoprolol succinate XL (Toprol-XL) 100 mg 24 hr tablet, Take 1 tablet (100 mg) by mouth 2 times a day., Disp: , Rfl:     multivitamin with minerals iron-free (Centrum Silver), Take 1 tablet by mouth once daily in the morning., Disp: , Rfl:     nitroglycerin (Nitrostat) 0.4 mg SL tablet, Place 1 tablet (0.4 mg) under the tongue every 5 minutes if needed for chest pain. Up to 3 doses, Disp: , Rfl:     pantoprazole (ProtoNix) 20 mg EC tablet, Take 1 tablet (20 mg) by mouth once daily., Disp: , Rfl:     potassium chloride 40 mEq/15 mL liquid, Take 15 mL (40 mEq) by mouth once daily., Disp: , Rfl:     sennosides (Senokot) 8.6 mg tablet, Take 2 tablets (17.2 mg) by mouth 2 times a day., Disp: , Rfl:     sodium chloride (Ocean) 0.65 % nasal spray,  Administer 1 spray into each nostril every 4 hours if needed for congestion., Disp: , Rfl:     sodium phosphates (Fleet Enema Extra) 19-7 gram/197 mL enema, Insert 1 suppository into the rectum once daily., Disp: , Rfl:     traZODone (Desyrel) 50 mg tablet, Take 1 tablet (50 mg) by mouth once daily at bedtime. Do not start before March 15, 2024. (Patient taking differently: Take 0.5 tablets (25 mg) by mouth once daily at bedtime.), Disp: , Rfl:   [5] No family history on file.  [6]   Past Surgical History:  Procedure Laterality Date    CARDIAC ELECTROPHYSIOLOGY PROCEDURE Left 4/2/2024    Procedure: PPM Generator Change;  Surgeon: Norbert Finley MD;  Location: [facility];  Service: Electrophysiology;  Laterality: Left;  dual chamber  medtronic    CORONARY ANGIOPLASTY      INSERT / REPLACE / REMOVE PACEMAKER      IR CVC PICC  02/29/2024    IR CVC PICC 2/29/2024 [facility]    OTHER SURGICAL HISTORY  01/03/2022    Cardioversion    OTHER SURGICAL HISTORY  01/03/2022    Total hysterectomy abdominal    OTHER SURGICAL HISTORY  01/03/2022    Tonsillectomy    OTHER SURGICAL HISTORY  01/03/2022    Pacemaker insertion    OTHER SURGICAL HISTORY  01/03/2022    Cataract surgery    OTHER SURGICAL HISTORY  01/03/2022    Arm surgery    OTHER SURGICAL HISTORY  01/03/2022    Atrial cardioversion    OTHER SURGICAL HISTORY  01/12/2022    Complete colonoscopy

## 2025-06-04 ASSESSMENT — ENCOUNTER SYMPTOMS
CONSTIPATION: 1
COUGH: 1

## 2025-06-04 NOTE — PROGRESS NOTES
Patient Name: Chen Medellin     YOB: 1934  Medical Record Number: [id number]        History of Present Illness:  This was a 89-year-old woman admitted here after recent hospitalization patient overall morbidities including atrial fibrillation catheter placement for sick sinus syndrome Advancing dementia.  Patient had hypoxemia sent out from the facility did have evidence of pneumonia right lower lobe patient started on respiratory and supplemental oxygen and IV antibiotic therapy.  Patient's fever curve white count monitor patient's antibiotic therapy was transitioned to oral.  Patient was seen by speech therapy diet.  Was globally weak this time return to study for course of care.  The patient seen in her room today, area function stable pleasant, continues coughing without chest pain at this time.    Review of Systems   Unable to perform ROS: Dementia   Constitutional:  Positive for activity change, appetite change and fatigue.   HENT:  Positive for congestion.    Respiratory:  Positive for cough.    Cardiovascular:  Negative for leg swelling.   Gastrointestinal:  Positive for constipation.   All other systems reviewed and are negative.      Review of Systems: All 14 review of systems negative other than as stated above    Social History     Tobacco Use    Smoking status: Never    Smokeless tobacco: Never   Vaping Use    Vaping status: Never Used   Substance Use Topics    Alcohol use: No     Comment: denies    Drug use: No         Past Medical History:   Diagnosis Date    Abnormal liver ultrasound 5/15/2019    Allergic contact dermatitis due to plants, except food 8/19/2019    Arthritis     Atrial fibrillation (HCC)     CHF (congestive heart failure) (HCC)     Chronic kidney disease     Chronic kidney disease (CKD), stage II (mild)     Depression     Hyperlipidemia     Hypertension     Hypothyroidism     Interstitial cystitis     Dr. Chappell diagnosed this for patinet.    Type II or unspecified type

## 2025-06-15 ENCOUNTER — OFFICE VISIT (OUTPATIENT)
Dept: GERIATRIC MEDICINE | Age: 89
End: 2025-06-15

## 2025-06-15 DIAGNOSIS — L89.152 DECUBITUS ULCER OF SACRAL REGION, STAGE 2 (HCC): ICD-10-CM

## 2025-06-15 DIAGNOSIS — I48.20 CHRONIC ATRIAL FIBRILLATION (HCC): ICD-10-CM

## 2025-06-15 DIAGNOSIS — I10 HYPERTENSION, UNSPECIFIED TYPE: ICD-10-CM

## 2025-06-15 DIAGNOSIS — I50.32 CHRONIC DIASTOLIC CHF (CONGESTIVE HEART FAILURE) (HCC): ICD-10-CM

## 2025-06-15 DIAGNOSIS — F32.A DEPRESSION, UNSPECIFIED DEPRESSION TYPE: ICD-10-CM

## 2025-06-15 DIAGNOSIS — F03.90 DEMENTIA WITHOUT BEHAVIORAL DISTURBANCE (HCC): Primary | ICD-10-CM

## 2025-06-15 DIAGNOSIS — E03.9 HYPOTHYROIDISM, UNSPECIFIED TYPE: ICD-10-CM

## 2025-06-26 NOTE — PROGRESS NOTES
SNF PROGRESS NOTE      Cc- dementia       Patient is a Chen Medellin 89 y.o. female who is being seen for her 30 day examination for dementia. She is being seen in the dementia unit.     Over the last 30 days, The patient is on RA oxygen. She is being seen in the dementia unit. She has not had any issues.         Past Medical History:   Diagnosis Date    Abnormal liver ultrasound 5/15/2019    Allergic contact dermatitis due to plants, except food 8/19/2019    Arthritis     Atrial fibrillation (HCC)     CHF (congestive heart failure) (HCC)     Chronic kidney disease     Chronic kidney disease (CKD), stage II (mild)     Depression     Hyperlipidemia     Hypertension     Hypothyroidism     Interstitial cystitis     Dr. Chappell diagnosed this for michele.    Type II or unspecified type diabetes mellitus without mention of complication, not stated as uncontrolled      Latex, Avelox [moxifloxacin hcl in nacl], Penicillins, and Adhesive tape    VS reviewed      Gen- Alert and oriented x 1  Heart- RRR no murmur no LE edema   Lungs- CTA b/l no resp distress RA  oxygen   Abd- bs x 4         Assessment and Plan    Dementia  with behavioral disturbances/ depression   Psych .   Hydroxyzine BID   Lexapro  Stage 2 sacral Decub  Wound care   Insomnia  Melatonin   Trazodone  HTN-- BP controlled   Isosorbide 60 mg daily   metoprolol 100 mg BID   hydralazine  25 mg BID   Left clavicle fracture   Sling - when compliant   Refused ortho follow up   A fib -- rate controlled.   eliquis, metoprolol  Cardio follow up i4/21/25   Chronic Diastolic CHF  Lasix   Metoprolol    Cardio 12/1/25  Depression   Lexparo   Hypothyroid  Synthroid        Significant events     2/2025-ER visit/left clavicle fracture  5/2025- treated for pneumonia       TIN Chance DO     Electronically signed by: Darline Knapp DO on 6/15/2025

## 2025-07-15 ENCOUNTER — OFFICE VISIT (OUTPATIENT)
Dept: GERIATRIC MEDICINE | Age: 89
End: 2025-07-15

## 2025-07-15 DIAGNOSIS — F03.90 DEMENTIA WITHOUT BEHAVIORAL DISTURBANCE (HCC): Primary | ICD-10-CM

## 2025-07-15 DIAGNOSIS — F32.A DEPRESSION, UNSPECIFIED DEPRESSION TYPE: ICD-10-CM

## 2025-07-15 DIAGNOSIS — I50.32 CHRONIC DIASTOLIC CHF (CONGESTIVE HEART FAILURE) (HCC): ICD-10-CM

## 2025-07-15 DIAGNOSIS — E03.9 HYPOTHYROIDISM, UNSPECIFIED TYPE: ICD-10-CM

## 2025-07-15 DIAGNOSIS — I48.20 CHRONIC ATRIAL FIBRILLATION (HCC): ICD-10-CM

## 2025-07-15 DIAGNOSIS — L89.152 DECUBITUS ULCER OF SACRAL REGION, STAGE 2 (HCC): ICD-10-CM

## 2025-07-15 DIAGNOSIS — I10 HYPERTENSION, UNSPECIFIED TYPE: ICD-10-CM

## 2025-07-16 NOTE — PROGRESS NOTES
SNF PROGRESS NOTE      Cc- dementia       Patient is a Chen Medellin 89 y.o. female who is being seen for her 30 day examination for dementia. She is being seen in the dementia unit.     Over the last 30 days, the patient is being seen in the dementia unit. She is pleasant. There have been no significant issues/ events.         Past Medical History:   Diagnosis Date    Abnormal liver ultrasound 5/15/2019    Allergic contact dermatitis due to plants, except food 8/19/2019    Arthritis     Atrial fibrillation (HCC)     CHF (congestive heart failure) (HCC)     Chronic kidney disease     Chronic kidney disease (CKD), stage II (mild)     Depression     Hyperlipidemia     Hypertension     Hypothyroidism     Interstitial cystitis     Dr. Chappell diagnosed this for michele.    Type II or unspecified type diabetes mellitus without mention of complication, not stated as uncontrolled      Latex, Avelox [moxifloxacin hcl in nacl], Penicillins, and Adhesive tape    VS reviewed    Gen- Alert and oriented x 1  Heart- RRR no murmur no LE edema   Lungs- CTA b/l no resp distress RA  oxygen   Abd- bs x 4            Assessment and Plan      Dementia  with behavioral disturbances/ depression   Psych .   Hydroxyzine BID   Lexapro  Stage 2 sacral Decub  Wound care   Insomnia  Melatonin   Trazodone  HTN-- BP controlled   Isosorbide 60 mg daily   metoprolol 100 mg BID   hydralazine  25 mg BID   Left clavicle fracture   Sling - when compliant   Refused ortho follow up   A fib -- rate controlled.   eliquis, metoprolol  Follows with cardio   Chronic Diastolic CHF  Lasix   Metoprolol    Follows with cardio   Depression   Lexparo   Hypothyroid  Synthroid        Significant events     2/2025-ER visit/left clavicle fracture  5/2025- treated for pneumonia     TIN Chance DO     Electronically signed by: Darline Knapp DO on 7/15/2025

## 2025-08-05 ENCOUNTER — HOSPITAL ENCOUNTER (OUTPATIENT)
Dept: CARDIOLOGY | Age: 89
Discharge: HOME | End: 2025-08-05
Payer: MEDICARE

## 2025-08-05 PROCEDURE — 93296 REM INTERROG EVL PM/IDS: CPT

## 2025-08-05 PROCEDURE — 93294 REM INTERROG EVL PM/LDLS PM: CPT | Performed by: INTERNAL MEDICINE

## 2025-08-14 ENCOUNTER — OFFICE VISIT (OUTPATIENT)
Dept: GERIATRIC MEDICINE | Age: 89
End: 2025-08-14
Payer: MEDICARE

## 2025-08-14 DIAGNOSIS — I10 HYPERTENSION, UNSPECIFIED TYPE: ICD-10-CM

## 2025-08-14 DIAGNOSIS — F03.90 DEMENTIA WITHOUT BEHAVIORAL DISTURBANCE (HCC): Primary | ICD-10-CM

## 2025-08-14 DIAGNOSIS — I50.32 CHRONIC DIASTOLIC CHF (CONGESTIVE HEART FAILURE) (HCC): ICD-10-CM

## 2025-08-14 DIAGNOSIS — F32.A DEPRESSION, UNSPECIFIED DEPRESSION TYPE: ICD-10-CM

## 2025-08-14 DIAGNOSIS — E03.9 HYPOTHYROIDISM, UNSPECIFIED TYPE: ICD-10-CM

## 2025-08-14 PROCEDURE — 1123F ACP DISCUSS/DSCN MKR DOCD: CPT | Performed by: INTERNAL MEDICINE

## 2025-08-14 PROCEDURE — 99309 SBSQ NF CARE MODERATE MDM 30: CPT | Performed by: INTERNAL MEDICINE

## 2025-12-01 ENCOUNTER — APPOINTMENT (OUTPATIENT)
Dept: CARDIOLOGY | Facility: CLINIC | Age: OVER 89
End: 2025-12-01
Payer: COMMERCIAL

## (undated) DEVICE — DRESSING, MEPILEX BORDER, POST-OP AG, 4 X 6 IN

## (undated) DEVICE — HEMOSTAT, ARISTA, ABSORBABLE, 3 GRAMS